# Patient Record
Sex: MALE | Race: WHITE | NOT HISPANIC OR LATINO | Employment: FULL TIME | URBAN - METROPOLITAN AREA
[De-identification: names, ages, dates, MRNs, and addresses within clinical notes are randomized per-mention and may not be internally consistent; named-entity substitution may affect disease eponyms.]

---

## 2017-06-14 ENCOUNTER — APPOINTMENT (OUTPATIENT)
Dept: RADIOLOGY | Facility: MEDICAL CENTER | Age: 77
DRG: 870 | End: 2017-06-14
Attending: HOSPITALIST
Payer: MEDICARE

## 2017-06-14 ENCOUNTER — RESOLUTE PROFESSIONAL BILLING HOSPITAL PROF FEE (OUTPATIENT)
Dept: HOSPITALIST | Facility: MEDICAL CENTER | Age: 77
End: 2017-06-14
Payer: MEDICARE

## 2017-06-14 ENCOUNTER — APPOINTMENT (OUTPATIENT)
Dept: RADIOLOGY | Facility: MEDICAL CENTER | Age: 77
DRG: 870 | End: 2017-06-14
Attending: EMERGENCY MEDICINE
Payer: MEDICARE

## 2017-06-14 ENCOUNTER — HOSPITAL ENCOUNTER (INPATIENT)
Facility: MEDICAL CENTER | Age: 77
LOS: 14 days | DRG: 870 | End: 2017-06-28
Attending: EMERGENCY MEDICINE | Admitting: HOSPITALIST
Payer: MEDICARE

## 2017-06-14 DIAGNOSIS — A41.51 SEPSIS DUE TO ESCHERICHIA COLI (HCC): ICD-10-CM

## 2017-06-14 PROBLEM — A41.9 SEPSIS, UNSPECIFIED: Status: ACTIVE | Noted: 2017-06-14

## 2017-06-14 PROBLEM — I21.4 NON-STEMI (NON-ST ELEVATED MYOCARDIAL INFARCTION) (HCC): Status: ACTIVE | Noted: 2017-06-14

## 2017-06-14 PROBLEM — K81.0 ACUTE CHOLECYSTITIS: Status: ACTIVE | Noted: 2017-06-14

## 2017-06-14 LAB
ALBUMIN SERPL BCP-MCNC: 3.1 G/DL (ref 3.2–4.9)
ALBUMIN/GLOB SERPL: 0.8 G/DL
ALP SERPL-CCNC: 312 U/L (ref 30–99)
ALT SERPL-CCNC: 142 U/L (ref 2–50)
ANION GAP SERPL CALC-SCNC: 23 MMOL/L (ref 0–11.9)
APPEARANCE UR: ABNORMAL
APTT PPP: 34.3 SEC (ref 24.7–36)
AST SERPL-CCNC: 159 U/L (ref 12–45)
BACTERIA #/AREA URNS HPF: ABNORMAL /HPF
BASOPHILS # BLD AUTO: 0 % (ref 0–1.8)
BASOPHILS # BLD: 0 K/UL (ref 0–0.12)
BILIRUB SERPL-MCNC: 1.6 MG/DL (ref 0.1–1.5)
BILIRUB UR QL STRIP.AUTO: ABNORMAL
BUN SERPL-MCNC: 60 MG/DL (ref 8–22)
CALCIUM SERPL-MCNC: 7.8 MG/DL (ref 8.4–10.2)
CHLORIDE SERPL-SCNC: 94 MMOL/L (ref 96–112)
CO2 SERPL-SCNC: 13 MMOL/L (ref 20–33)
COLOR UR: ABNORMAL
CREAT SERPL-MCNC: 3.55 MG/DL (ref 0.5–1.4)
EOSINOPHIL # BLD AUTO: 0 K/UL (ref 0–0.51)
EOSINOPHIL NFR BLD: 0 % (ref 0–6.9)
EPI CELLS #/AREA URNS HPF: ABNORMAL /HPF
ERYTHROCYTE [DISTWIDTH] IN BLOOD BY AUTOMATED COUNT: 43 FL (ref 35.9–50)
GFR SERPL CREATININE-BSD FRML MDRD: 17 ML/MIN/1.73 M 2
GIANT PLATELETS BLD QL SMEAR: NORMAL
GLOBULIN SER CALC-MCNC: 3.7 G/DL (ref 1.9–3.5)
GLUCOSE SERPL-MCNC: 178 MG/DL (ref 65–99)
GLUCOSE UR STRIP.AUTO-MCNC: NEGATIVE MG/DL
HCT VFR BLD AUTO: 42.2 % (ref 42–52)
HGB BLD-MCNC: 15.1 G/DL (ref 14–18)
INR PPP: 1.22 (ref 0.87–1.13)
KETONES UR STRIP.AUTO-MCNC: ABNORMAL MG/DL
LACTATE BLD-SCNC: 6.31 MMOL/L (ref 0.5–2)
LACTATE BLD-SCNC: 7.91 MMOL/L (ref 0.5–2)
LEUKOCYTE ESTERASE UR QL STRIP.AUTO: NEGATIVE
LIPASE SERPL-CCNC: 23 U/L (ref 7–58)
LYMPHOCYTES # BLD AUTO: 0.51 K/UL (ref 1–4.8)
LYMPHOCYTES NFR BLD: 7 % (ref 22–41)
MAGNESIUM SERPL-MCNC: 1.4 MG/DL (ref 1.5–2.5)
MANUAL DIFF BLD: NORMAL
MCH RBC QN AUTO: 32.1 PG (ref 27–33)
MCHC RBC AUTO-ENTMCNC: 35.8 G/DL (ref 33.7–35.3)
MCV RBC AUTO: 89.8 FL (ref 81.4–97.8)
METAMYELOCYTES NFR BLD MANUAL: 2 %
MICRO URNS: ABNORMAL
MONOCYTES # BLD AUTO: 0.29 K/UL (ref 0–0.85)
MONOCYTES NFR BLD AUTO: 4 % (ref 0–13.4)
MUCOUS THREADS #/AREA URNS HPF: ABNORMAL /HPF
NEUTROPHILS # BLD AUTO: 6.35 K/UL (ref 1.82–7.42)
NEUTROPHILS NFR BLD: 78 % (ref 44–72)
NEUTS BAND NFR BLD MANUAL: 9 % (ref 0–10)
NITRITE UR QL STRIP.AUTO: NEGATIVE
NRBC # BLD AUTO: 0.02 K/UL
NRBC BLD AUTO-RTO: 0.3 /100 WBC
PH UR STRIP.AUTO: 5 [PH]
PHOSPHATE SERPL-MCNC: 1.8 MG/DL (ref 2.5–4.5)
PLATELET # BLD AUTO: 100 K/UL (ref 164–446)
PMV BLD AUTO: 10.8 FL (ref 9–12.9)
POTASSIUM SERPL-SCNC: 2.5 MMOL/L (ref 3.6–5.5)
POTASSIUM SERPL-SCNC: 2.8 MMOL/L (ref 3.6–5.5)
PROT SERPL-MCNC: 6.8 G/DL (ref 6–8.2)
PROT UR QL STRIP: 30 MG/DL
PROTHROMBIN TIME: 15.2 SEC (ref 12–14.6)
RBC # BLD AUTO: 4.7 M/UL (ref 4.7–6.1)
RBC # URNS HPF: ABNORMAL /HPF
RBC BLD AUTO: NORMAL
RBC UR QL AUTO: ABNORMAL
SODIUM SERPL-SCNC: 130 MMOL/L (ref 135–145)
SP GR UR REFRACTOMETRY: 1.02
SPECIMEN DRAWN FROM PATIENT: ABNORMAL
SPECIMEN DRAWN FROM PATIENT: ABNORMAL
TROPONIN I SERPL-MCNC: 2.47 NG/ML (ref 0–0.04)
TROPONIN I SERPL-MCNC: 3.33 NG/ML (ref 0–0.04)
TSH SERPL DL<=0.005 MIU/L-ACNC: 3.41 UIU/ML (ref 0.35–5.5)
UNIDENT CRYS URNS QL MICRO: ABNORMAL /HPF
WBC # BLD AUTO: 7.3 K/UL (ref 4.8–10.8)
WBC #/AREA URNS HPF: ABNORMAL /HPF

## 2017-06-14 PROCEDURE — 85730 THROMBOPLASTIN TIME PARTIAL: CPT

## 2017-06-14 PROCEDURE — 700111 HCHG RX REV CODE 636 W/ 250 OVERRIDE (IP): Performed by: HOSPITALIST

## 2017-06-14 PROCEDURE — 99291 CRITICAL CARE FIRST HOUR: CPT | Mod: 25 | Performed by: HOSPITALIST

## 2017-06-14 PROCEDURE — 93005 ELECTROCARDIOGRAM TRACING: CPT

## 2017-06-14 PROCEDURE — 87186 SC STD MICRODIL/AGAR DIL: CPT

## 2017-06-14 PROCEDURE — 36556 INSERT NON-TUNNEL CV CATH: CPT

## 2017-06-14 PROCEDURE — 80053 COMPREHEN METABOLIC PANEL: CPT

## 2017-06-14 PROCEDURE — 51702 INSERT TEMP BLADDER CATH: CPT

## 2017-06-14 PROCEDURE — 700105 HCHG RX REV CODE 258

## 2017-06-14 PROCEDURE — 71010 DX-CHEST-PORTABLE (1 VIEW): CPT

## 2017-06-14 PROCEDURE — 83605 ASSAY OF LACTIC ACID: CPT | Mod: 91

## 2017-06-14 PROCEDURE — 99291 CRITICAL CARE FIRST HOUR: CPT

## 2017-06-14 PROCEDURE — 770022 HCHG ROOM/CARE - ICU (200)

## 2017-06-14 PROCEDURE — 85007 BL SMEAR W/DIFF WBC COUNT: CPT

## 2017-06-14 PROCEDURE — 83735 ASSAY OF MAGNESIUM: CPT

## 2017-06-14 PROCEDURE — 81001 URINALYSIS AUTO W/SCOPE: CPT

## 2017-06-14 PROCEDURE — 74176 CT ABD & PELVIS W/O CONTRAST: CPT

## 2017-06-14 PROCEDURE — 84484 ASSAY OF TROPONIN QUANT: CPT | Mod: 91

## 2017-06-14 PROCEDURE — 83690 ASSAY OF LIPASE: CPT

## 2017-06-14 PROCEDURE — 84145 PROCALCITONIN (PCT): CPT

## 2017-06-14 PROCEDURE — 36415 COLL VENOUS BLD VENIPUNCTURE: CPT

## 2017-06-14 PROCEDURE — 96361 HYDRATE IV INFUSION ADD-ON: CPT

## 2017-06-14 PROCEDURE — 700105 HCHG RX REV CODE 258: Performed by: HOSPITALIST

## 2017-06-14 PROCEDURE — 87077 CULTURE AEROBIC IDENTIFY: CPT

## 2017-06-14 PROCEDURE — 84443 ASSAY THYROID STIM HORMONE: CPT

## 2017-06-14 PROCEDURE — 87040 BLOOD CULTURE FOR BACTERIA: CPT

## 2017-06-14 PROCEDURE — 700105 HCHG RX REV CODE 258: Performed by: EMERGENCY MEDICINE

## 2017-06-14 PROCEDURE — 87086 URINE CULTURE/COLONY COUNT: CPT

## 2017-06-14 PROCEDURE — 85610 PROTHROMBIN TIME: CPT

## 2017-06-14 PROCEDURE — 303105 HCHG CATHETER EXTRA

## 2017-06-14 PROCEDURE — 700111 HCHG RX REV CODE 636 W/ 250 OVERRIDE (IP): Performed by: EMERGENCY MEDICINE

## 2017-06-14 PROCEDURE — 3E043XZ INTRODUCTION OF VASOPRESSOR INTO CENTRAL VEIN, PERCUTANEOUS APPROACH: ICD-10-PCS | Performed by: HOSPITALIST

## 2017-06-14 PROCEDURE — C1751 CATH, INF, PER/CENT/MIDLINE: HCPCS

## 2017-06-14 PROCEDURE — 84100 ASSAY OF PHOSPHORUS: CPT

## 2017-06-14 PROCEDURE — 96365 THER/PROPH/DIAG IV INF INIT: CPT

## 2017-06-14 PROCEDURE — 94760 N-INVAS EAR/PLS OXIMETRY 1: CPT

## 2017-06-14 PROCEDURE — 700101 HCHG RX REV CODE 250

## 2017-06-14 PROCEDURE — 36556 INSERT NON-TUNNEL CV CATH: CPT | Performed by: HOSPITALIST

## 2017-06-14 PROCEDURE — B548ZZA ULTRASONOGRAPHY OF SUPERIOR VENA CAVA, GUIDANCE: ICD-10-PCS | Performed by: HOSPITALIST

## 2017-06-14 PROCEDURE — 84132 ASSAY OF SERUM POTASSIUM: CPT

## 2017-06-14 PROCEDURE — 02HV33Z INSERTION OF INFUSION DEVICE INTO SUPERIOR VENA CAVA, PERCUTANEOUS APPROACH: ICD-10-PCS | Performed by: HOSPITALIST

## 2017-06-14 PROCEDURE — 85027 COMPLETE CBC AUTOMATED: CPT

## 2017-06-14 RX ORDER — LISINOPRIL AND HYDROCHLOROTHIAZIDE 12.5; 1 MG/1; MG/1
1 TABLET ORAL DAILY
Status: ON HOLD | COMMUNITY
End: 2017-06-28

## 2017-06-14 RX ORDER — SODIUM CHLORIDE 9 MG/ML
30 INJECTION, SOLUTION INTRAVENOUS
Status: DISCONTINUED | OUTPATIENT
Start: 2017-06-14 | End: 2017-06-14

## 2017-06-14 RX ORDER — LORAZEPAM 2 MG/ML
0.5 INJECTION INTRAMUSCULAR EVERY 6 HOURS PRN
Status: DISCONTINUED | OUTPATIENT
Start: 2017-06-14 | End: 2017-06-28 | Stop reason: HOSPADM

## 2017-06-14 RX ORDER — ASCORBIC ACID 500 MG
500 TABLET ORAL DAILY
COMMUNITY
End: 2021-01-01

## 2017-06-14 RX ORDER — VITAMIN E 268 MG
400 CAPSULE ORAL DAILY
COMMUNITY
End: 2021-01-01

## 2017-06-14 RX ORDER — SODIUM CHLORIDE 9 MG/ML
INJECTION, SOLUTION INTRAVENOUS ONCE
Status: DISCONTINUED | OUTPATIENT
Start: 2017-06-14 | End: 2017-06-14

## 2017-06-14 RX ORDER — AMOXICILLIN 250 MG
2 CAPSULE ORAL 2 TIMES DAILY
Status: DISCONTINUED | OUTPATIENT
Start: 2017-06-15 | End: 2017-06-18

## 2017-06-14 RX ORDER — BISACODYL 10 MG
10 SUPPOSITORY, RECTAL RECTAL
Status: DISCONTINUED | OUTPATIENT
Start: 2017-06-14 | End: 2017-06-18

## 2017-06-14 RX ORDER — ONDANSETRON 2 MG/ML
4 INJECTION INTRAMUSCULAR; INTRAVENOUS EVERY 4 HOURS PRN
Status: DISCONTINUED | OUTPATIENT
Start: 2017-06-14 | End: 2017-06-28 | Stop reason: HOSPADM

## 2017-06-14 RX ORDER — M-VIT,TX,IRON,MINS/CALC/FOLIC 27MG-0.4MG
1 TABLET ORAL DAILY
COMMUNITY
End: 2021-01-01

## 2017-06-14 RX ORDER — ONDANSETRON 4 MG/1
4 TABLET, ORALLY DISINTEGRATING ORAL EVERY 4 HOURS PRN
Status: DISCONTINUED | OUTPATIENT
Start: 2017-06-14 | End: 2017-06-28 | Stop reason: HOSPADM

## 2017-06-14 RX ORDER — OMEPRAZOLE 20 MG/1
20 CAPSULE, DELAYED RELEASE ORAL EVERY MORNING
COMMUNITY

## 2017-06-14 RX ORDER — SODIUM CHLORIDE 9 MG/ML
1000 INJECTION, SOLUTION INTRAVENOUS
Status: COMPLETED | OUTPATIENT
Start: 2017-06-14 | End: 2017-06-15

## 2017-06-14 RX ORDER — LORAZEPAM 0.5 MG/1
0.5 TABLET ORAL EVERY 6 HOURS PRN
Status: DISCONTINUED | OUTPATIENT
Start: 2017-06-14 | End: 2017-06-28 | Stop reason: HOSPADM

## 2017-06-14 RX ORDER — POLYETHYLENE GLYCOL 3350 17 G/17G
1 POWDER, FOR SOLUTION ORAL
Status: DISCONTINUED | OUTPATIENT
Start: 2017-06-14 | End: 2017-06-18

## 2017-06-14 RX ORDER — CHLORAL HYDRATE 500 MG
1000 CAPSULE ORAL DAILY
COMMUNITY
End: 2021-01-01

## 2017-06-14 RX ORDER — LORATADINE 10 MG/1
10 TABLET ORAL DAILY
COMMUNITY
End: 2021-01-01

## 2017-06-14 RX ORDER — ACETAMINOPHEN 325 MG/1
650 TABLET ORAL EVERY 6 HOURS PRN
Status: DISCONTINUED | OUTPATIENT
Start: 2017-06-14 | End: 2017-06-28 | Stop reason: HOSPADM

## 2017-06-14 RX ORDER — SODIUM CHLORIDE 9 MG/ML
INJECTION, SOLUTION INTRAVENOUS CONTINUOUS
Status: DISCONTINUED | OUTPATIENT
Start: 2017-06-14 | End: 2017-06-15

## 2017-06-14 RX ORDER — UBIDECARENONE 75 MG
100 CAPSULE ORAL DAILY
COMMUNITY
End: 2019-11-18

## 2017-06-14 RX ORDER — ASPIRIN 81 MG/1
81 TABLET, CHEWABLE ORAL DAILY
Status: ON HOLD | COMMUNITY
End: 2019-09-15

## 2017-06-14 RX ORDER — POTASSIUM CHLORIDE 7.45 MG/ML
10 INJECTION INTRAVENOUS
Status: COMPLETED | OUTPATIENT
Start: 2017-06-14 | End: 2017-06-15

## 2017-06-14 RX ORDER — SODIUM CHLORIDE 9 MG/ML
30 INJECTION, SOLUTION INTRAVENOUS ONCE
Status: COMPLETED | OUTPATIENT
Start: 2017-06-14 | End: 2017-06-14

## 2017-06-14 RX ADMIN — POTASSIUM CHLORIDE 10 MEQ: 7.46 INJECTION, SOLUTION INTRAVENOUS at 21:49

## 2017-06-14 RX ADMIN — SODIUM CHLORIDE 1000 ML: 9 INJECTION, SOLUTION INTRAVENOUS at 18:31

## 2017-06-14 RX ADMIN — NOREPINEPHRINE BITARTRATE 5 MCG/MIN: 1 INJECTION INTRAVENOUS at 21:47

## 2017-06-14 RX ADMIN — POTASSIUM CHLORIDE 10 MEQ: 7.46 INJECTION, SOLUTION INTRAVENOUS at 22:51

## 2017-06-14 RX ADMIN — SODIUM CHLORIDE 1000 ML: 9 INJECTION, SOLUTION INTRAVENOUS at 22:01

## 2017-06-14 RX ADMIN — POTASSIUM CHLORIDE 10 MEQ: 7.46 INJECTION, SOLUTION INTRAVENOUS at 23:50

## 2017-06-14 RX ADMIN — PIPERACILLIN SODIUM,TAZOBACTAM SODIUM 4.5 G: 4; .5 INJECTION, POWDER, FOR SOLUTION INTRAVENOUS at 21:00

## 2017-06-14 ASSESSMENT — PAIN SCALES - GENERAL
PAINLEVEL_OUTOF10: 0
PAINLEVEL_OUTOF10: 4

## 2017-06-14 ASSESSMENT — LIFESTYLE VARIABLES
ALCOHOL_USE: NO
EVER_SMOKED: YES
EVER_SMOKED: YES

## 2017-06-15 ENCOUNTER — APPOINTMENT (OUTPATIENT)
Dept: RADIOLOGY | Facility: MEDICAL CENTER | Age: 77
DRG: 870 | End: 2017-06-15
Attending: HOSPITALIST
Payer: MEDICARE

## 2017-06-15 PROBLEM — E87.1 HYPONATREMIA: Status: ACTIVE | Noted: 2017-06-15

## 2017-06-15 PROBLEM — E87.20 METABOLIC ACIDOSIS: Status: ACTIVE | Noted: 2017-06-15

## 2017-06-15 PROBLEM — R78.81 BACTEREMIA: Status: ACTIVE | Noted: 2017-06-15

## 2017-06-15 PROBLEM — N17.9 ACUTE KIDNEY INJURY (NONTRAUMATIC) (HCC): Status: ACTIVE | Noted: 2017-06-15

## 2017-06-15 PROBLEM — J96.01 ACUTE RESPIRATORY FAILURE WITH HYPOXIA (HCC): Status: ACTIVE | Noted: 2017-06-15

## 2017-06-15 PROBLEM — I95.9 HYPOTENSION (ARTERIAL): Status: ACTIVE | Noted: 2017-06-15

## 2017-06-15 PROBLEM — D69.6 THROMBOCYTOPENIA (HCC): Status: ACTIVE | Noted: 2017-06-15

## 2017-06-15 PROBLEM — K81.0 ACUTE EMPHYSEMATOUS CHOLECYSTITIS: Status: ACTIVE | Noted: 2017-06-15

## 2017-06-15 PROBLEM — E87.6 HYPOKALEMIA DUE TO INADEQUATE POTASSIUM INTAKE: Status: ACTIVE | Noted: 2017-06-15

## 2017-06-15 LAB
ABO GROUP BLD: NORMAL
ANION GAP SERPL CALC-SCNC: 17 MMOL/L (ref 0–11.9)
ANION GAP SERPL CALC-SCNC: 19 MMOL/L (ref 0–11.9)
ANION GAP SERPL CALC-SCNC: 21 MMOL/L (ref 0–11.9)
APTT PPP: 31.5 SEC (ref 24.7–36)
APTT PPP: 34.4 SEC (ref 24.7–36)
BARCODED ABORH UBTYP: 6200
BARCODED PRD CODE UBPRD: NORMAL
BARCODED UNIT NUM UBUNT: NORMAL
BASE EXCESS BLDA CALC-SCNC: -13 MMOL/L (ref -4–3)
BILIRUBIN.CONJUGATED [MASS/VOLUME] IN BODY FLUID: 0.2 MG/DL
BODY FLD TYPE: NORMAL
BODY TEMPERATURE: 37.7 CENTIGRADE
BUN SERPL-MCNC: 57 MG/DL (ref 8–22)
BUN SERPL-MCNC: 58 MG/DL (ref 8–22)
BUN SERPL-MCNC: 62 MG/DL (ref 8–22)
CALCIUM SERPL-MCNC: 6.7 MG/DL (ref 8.4–10.2)
CALCIUM SERPL-MCNC: 7 MG/DL (ref 8.4–10.2)
CALCIUM SERPL-MCNC: 7 MG/DL (ref 8.4–10.2)
CHLORIDE SERPL-SCNC: 103 MMOL/L (ref 96–112)
CHLORIDE SERPL-SCNC: 106 MMOL/L (ref 96–112)
CHLORIDE SERPL-SCNC: 107 MMOL/L (ref 96–112)
CO2 SERPL-SCNC: 12 MMOL/L (ref 20–33)
CO2 SERPL-SCNC: 12 MMOL/L (ref 20–33)
CO2 SERPL-SCNC: 13 MMOL/L (ref 20–33)
COMPONENT P 8504P: NORMAL
CREAT SERPL-MCNC: 2.55 MG/DL (ref 0.5–1.4)
CREAT SERPL-MCNC: 2.87 MG/DL (ref 0.5–1.4)
CREAT SERPL-MCNC: 3.04 MG/DL (ref 0.5–1.4)
EKG IMPRESSION: NORMAL
EKG IMPRESSION: NORMAL
ERYTHROCYTE [DISTWIDTH] IN BLOOD BY AUTOMATED COUNT: 47.7 FL (ref 35.9–50)
ERYTHROCYTE [DISTWIDTH] IN BLOOD BY AUTOMATED COUNT: 48.4 FL (ref 35.9–50)
ERYTHROCYTE [DISTWIDTH] IN BLOOD BY AUTOMATED COUNT: 49.1 FL (ref 35.9–50)
GFR SERPL CREATININE-BSD FRML MDRD: 20 ML/MIN/1.73 M 2
GFR SERPL CREATININE-BSD FRML MDRD: 22 ML/MIN/1.73 M 2
GFR SERPL CREATININE-BSD FRML MDRD: 25 ML/MIN/1.73 M 2
GLUCOSE SERPL-MCNC: 143 MG/DL (ref 65–99)
GLUCOSE SERPL-MCNC: 187 MG/DL (ref 65–99)
GLUCOSE SERPL-MCNC: 191 MG/DL (ref 65–99)
GRAM STN SPEC: NORMAL
HCO3 BLDA-SCNC: 12 MMOL/L (ref 17–25)
HCT VFR BLD AUTO: 35.8 % (ref 42–52)
HCT VFR BLD AUTO: 40.2 % (ref 42–52)
HCT VFR BLD AUTO: 41.2 % (ref 42–52)
HGB BLD-MCNC: 12.5 G/DL (ref 14–18)
HGB BLD-MCNC: 13.9 G/DL (ref 14–18)
HGB BLD-MCNC: 14.3 G/DL (ref 14–18)
INR PPP: 1.3 (ref 0.87–1.13)
INR PPP: 1.32 (ref 0.87–1.13)
LACTATE BLD-SCNC: 2.89 MMOL/L (ref 0.5–2)
LACTATE BLD-SCNC: 4.11 MMOL/L (ref 0.5–2)
LACTATE BLD-SCNC: 5.44 MMOL/L (ref 0.5–2)
LACTATE BLD-SCNC: 5.49 MMOL/L (ref 0.5–2)
LACTATE BLD-SCNC: 5.49 MMOL/L (ref 0.5–2)
LACTATE BLD-SCNC: 6.02 MMOL/L (ref 0.5–2)
LV EJECT FRACT  99904: 60
LV EJECT FRACT MOD 2C 99903: 60.82
LV EJECT FRACT MOD 4C 99902: 66.83
LV EJECT FRACT MOD BP 99901: 63.64
MAGNESIUM SERPL-MCNC: 2.6 MG/DL (ref 1.5–2.5)
MCH RBC QN AUTO: 32.9 PG (ref 27–33)
MCH RBC QN AUTO: 33.2 PG (ref 27–33)
MCH RBC QN AUTO: 33.2 PG (ref 27–33)
MCHC RBC AUTO-ENTMCNC: 34.6 G/DL (ref 33.7–35.3)
MCHC RBC AUTO-ENTMCNC: 34.7 G/DL (ref 33.7–35.3)
MCHC RBC AUTO-ENTMCNC: 34.9 G/DL (ref 33.7–35.3)
MCV RBC AUTO: 94.7 FL (ref 81.4–97.8)
MCV RBC AUTO: 95 FL (ref 81.4–97.8)
MCV RBC AUTO: 95.9 FL (ref 81.4–97.8)
PCO2 BLDA: 28 MMHG (ref 26–37)
PCO2 TEMP ADJ BLDA: 28.9 MMHG (ref 26–37)
PH BLDA: 7.27 [PH] (ref 7.4–7.5)
PH TEMP ADJ BLDA: 7.26 [PH] (ref 7.4–7.5)
PHOSPHATE SERPL-MCNC: 6.7 MG/DL (ref 2.5–4.5)
PLATELET # BLD AUTO: 58 K/UL (ref 164–446)
PLATELET # BLD AUTO: 59 K/UL (ref 164–446)
PLATELET # BLD AUTO: 83 K/UL (ref 164–446)
PMV BLD AUTO: 11.5 FL (ref 9–12.9)
PMV BLD AUTO: 11.6 FL (ref 9–12.9)
PMV BLD AUTO: 12 FL (ref 9–12.9)
PO2 BLDA: 310 MMHG (ref 64–87)
PO2 TEMP ADJ BLDA: 313.5 MMHG (ref 64–87)
POTASSIUM SERPL-SCNC: 3.1 MMOL/L (ref 3.6–5.5)
POTASSIUM SERPL-SCNC: 3.7 MMOL/L (ref 3.6–5.5)
POTASSIUM SERPL-SCNC: 3.9 MMOL/L (ref 3.6–5.5)
POTASSIUM SERPL-SCNC: 4 MMOL/L (ref 3.6–5.5)
PROCALCITONIN SERPL-MCNC: 51.97 NG/ML
PRODUCT TYPE UPROD: NORMAL
PROTHROMBIN TIME: 16 SEC (ref 12–14.6)
PROTHROMBIN TIME: 16.2 SEC (ref 12–14.6)
RBC # BLD AUTO: 3.77 M/UL (ref 4.7–6.1)
RBC # BLD AUTO: 4.19 M/UL (ref 4.7–6.1)
RBC # BLD AUTO: 4.35 M/UL (ref 4.7–6.1)
RH BLD: NORMAL
SAO2 % BLDA: 99.6 % (ref 93–99)
SIGNIFICANT IND 70042: NORMAL
SITE SITE: NORMAL
SODIUM SERPL-SCNC: 134 MMOL/L (ref 135–145)
SODIUM SERPL-SCNC: 137 MMOL/L (ref 135–145)
SODIUM SERPL-SCNC: 139 MMOL/L (ref 135–145)
SOURCE SOURCE: NORMAL
SPECIMEN DRAWN FROM PATIENT: ABNORMAL
TROPONIN I SERPL-MCNC: 0.78 NG/ML (ref 0–0.04)
TROPONIN I SERPL-MCNC: 0.96 NG/ML (ref 0–0.04)
TROPONIN I SERPL-MCNC: 1.04 NG/ML (ref 0–0.04)
TROPONIN I SERPL-MCNC: 1.12 NG/ML (ref 0–0.04)
TROPONIN I SERPL-MCNC: 1.7 NG/ML (ref 0–0.04)
UNIT STATUS USTAT: NORMAL
WBC # BLD AUTO: 17.9 K/UL (ref 4.8–10.8)
WBC # BLD AUTO: 25.6 K/UL (ref 4.8–10.8)
WBC # BLD AUTO: 5.7 K/UL (ref 4.8–10.8)

## 2017-06-15 PROCEDURE — 94640 AIRWAY INHALATION TREATMENT: CPT

## 2017-06-15 PROCEDURE — 700111 HCHG RX REV CODE 636 W/ 250 OVERRIDE (IP): Performed by: HOSPITALIST

## 2017-06-15 PROCEDURE — 74176 CT ABD & PELVIS W/O CONTRAST: CPT

## 2017-06-15 PROCEDURE — 84132 ASSAY OF SERUM POTASSIUM: CPT

## 2017-06-15 PROCEDURE — C1729 CATH, DRAINAGE: HCPCS

## 2017-06-15 PROCEDURE — 84484 ASSAY OF TROPONIN QUANT: CPT | Mod: 91

## 2017-06-15 PROCEDURE — 700102 HCHG RX REV CODE 250 W/ 637 OVERRIDE(OP): Performed by: HOSPITALIST

## 2017-06-15 PROCEDURE — 700105 HCHG RX REV CODE 258: Performed by: HOSPITALIST

## 2017-06-15 PROCEDURE — 99233 SBSQ HOSP IP/OBS HIGH 50: CPT | Performed by: HOSPITALIST

## 2017-06-15 PROCEDURE — 82803 BLOOD GASES ANY COMBINATION: CPT

## 2017-06-15 PROCEDURE — 85610 PROTHROMBIN TIME: CPT | Mod: 91

## 2017-06-15 PROCEDURE — 80048 BASIC METABOLIC PNL TOTAL CA: CPT

## 2017-06-15 PROCEDURE — 87040 BLOOD CULTURE FOR BACTERIA: CPT | Mod: 91

## 2017-06-15 PROCEDURE — 93005 ELECTROCARDIOGRAM TRACING: CPT | Performed by: HOSPITALIST

## 2017-06-15 PROCEDURE — 0F9430Z DRAINAGE OF GALLBLADDER WITH DRAINAGE DEVICE, PERCUTANEOUS APPROACH: ICD-10-PCS | Performed by: RADIOLOGY

## 2017-06-15 PROCEDURE — 0BH18EZ INSERTION OF ENDOTRACHEAL AIRWAY INTO TRACHEA, VIA NATURAL OR ARTIFICIAL OPENING ENDOSCOPIC: ICD-10-PCS | Performed by: EMERGENCY MEDICINE

## 2017-06-15 PROCEDURE — 700111 HCHG RX REV CODE 636 W/ 250 OVERRIDE (IP)

## 2017-06-15 PROCEDURE — 93306 TTE W/DOPPLER COMPLETE: CPT

## 2017-06-15 PROCEDURE — P9034 PLATELETS, PHERESIS: HCPCS

## 2017-06-15 PROCEDURE — 84100 ASSAY OF PHOSPHORUS: CPT

## 2017-06-15 PROCEDURE — 700105 HCHG RX REV CODE 258: Performed by: INTERNAL MEDICINE

## 2017-06-15 PROCEDURE — 700101 HCHG RX REV CODE 250

## 2017-06-15 PROCEDURE — 700111 HCHG RX REV CODE 636 W/ 250 OVERRIDE (IP): Performed by: INTERNAL MEDICINE

## 2017-06-15 PROCEDURE — 700105 HCHG RX REV CODE 258

## 2017-06-15 PROCEDURE — A9270 NON-COVERED ITEM OR SERVICE: HCPCS | Performed by: HOSPITALIST

## 2017-06-15 PROCEDURE — 700101 HCHG RX REV CODE 250: Performed by: HOSPITALIST

## 2017-06-15 PROCEDURE — 93306 TTE W/DOPPLER COMPLETE: CPT | Mod: 26 | Performed by: INTERNAL MEDICINE

## 2017-06-15 PROCEDURE — 87070 CULTURE OTHR SPECIMN AEROBIC: CPT

## 2017-06-15 PROCEDURE — 36430 TRANSFUSION BLD/BLD COMPNT: CPT

## 2017-06-15 PROCEDURE — 83605 ASSAY OF LACTIC ACID: CPT | Mod: 91

## 2017-06-15 PROCEDURE — 87077 CULTURE AEROBIC IDENTIFY: CPT | Mod: 91

## 2017-06-15 PROCEDURE — 87205 SMEAR GRAM STAIN: CPT

## 2017-06-15 PROCEDURE — 5A1955Z RESPIRATORY VENTILATION, GREATER THAN 96 CONSECUTIVE HOURS: ICD-10-PCS | Performed by: INTERNAL MEDICINE

## 2017-06-15 PROCEDURE — 770022 HCHG ROOM/CARE - ICU (200)

## 2017-06-15 PROCEDURE — 71010 DX-CHEST-PORTABLE (1 VIEW): CPT

## 2017-06-15 PROCEDURE — 85027 COMPLETE CBC AUTOMATED: CPT | Mod: 91

## 2017-06-15 PROCEDURE — 85730 THROMBOPLASTIN TIME PARTIAL: CPT | Mod: 91

## 2017-06-15 PROCEDURE — 83735 ASSAY OF MAGNESIUM: CPT

## 2017-06-15 PROCEDURE — 82248 BILIRUBIN DIRECT: CPT

## 2017-06-15 PROCEDURE — 700111 HCHG RX REV CODE 636 W/ 250 OVERRIDE (IP): Performed by: EMERGENCY MEDICINE

## 2017-06-15 PROCEDURE — 94002 VENT MGMT INPAT INIT DAY: CPT

## 2017-06-15 PROCEDURE — 93010 ELECTROCARDIOGRAM REPORT: CPT | Performed by: INTERNAL MEDICINE

## 2017-06-15 PROCEDURE — 87186 SC STD MICRODIL/AGAR DIL: CPT | Mod: 91

## 2017-06-15 RX ORDER — LEVALBUTEROL INHALATION SOLUTION 1.25 MG/3ML
1.25 SOLUTION RESPIRATORY (INHALATION) 4 TIMES DAILY
Status: DISCONTINUED | OUTPATIENT
Start: 2017-06-15 | End: 2017-06-16

## 2017-06-15 RX ORDER — HYDRALAZINE HYDROCHLORIDE 20 MG/ML
20 INJECTION INTRAMUSCULAR; INTRAVENOUS EVERY 6 HOURS PRN
Status: DISCONTINUED | OUTPATIENT
Start: 2017-06-15 | End: 2017-06-15

## 2017-06-15 RX ORDER — SODIUM CHLORIDE 9 MG/ML
INJECTION, SOLUTION INTRAVENOUS CONTINUOUS
Status: DISCONTINUED | OUTPATIENT
Start: 2017-06-15 | End: 2017-06-15

## 2017-06-15 RX ORDER — POTASSIUM CHLORIDE 7.45 MG/ML
10 INJECTION INTRAVENOUS ONCE
Status: COMPLETED | OUTPATIENT
Start: 2017-06-15 | End: 2017-06-15

## 2017-06-15 RX ORDER — MAGNESIUM SULFATE HEPTAHYDRATE 40 MG/ML
4 INJECTION, SOLUTION INTRAVENOUS ONCE
Status: COMPLETED | OUTPATIENT
Start: 2017-06-15 | End: 2017-06-15

## 2017-06-15 RX ORDER — MIDAZOLAM HYDROCHLORIDE 1 MG/ML
.5-2 INJECTION INTRAMUSCULAR; INTRAVENOUS PRN
Status: ACTIVE | OUTPATIENT
Start: 2017-06-15 | End: 2017-06-15

## 2017-06-15 RX ORDER — MORPHINE SULFATE 4 MG/ML
2-4 INJECTION, SOLUTION INTRAMUSCULAR; INTRAVENOUS
Status: DISCONTINUED | OUTPATIENT
Start: 2017-06-15 | End: 2017-06-28 | Stop reason: HOSPADM

## 2017-06-15 RX ORDER — FUROSEMIDE 10 MG/ML
40 INJECTION INTRAMUSCULAR; INTRAVENOUS ONCE
Status: COMPLETED | OUTPATIENT
Start: 2017-06-15 | End: 2017-06-15

## 2017-06-15 RX ORDER — MAGNESIUM SULFATE HEPTAHYDRATE 40 MG/ML
INJECTION, SOLUTION INTRAVENOUS
Status: COMPLETED
Start: 2017-06-15 | End: 2017-06-15

## 2017-06-15 RX ORDER — SUCCINYLCHOLINE CHLORIDE 20 MG/ML
160 INJECTION INTRAMUSCULAR; INTRAVENOUS ONCE
Status: COMPLETED | OUTPATIENT
Start: 2017-06-15 | End: 2017-06-15

## 2017-06-15 RX ORDER — ONDANSETRON 2 MG/ML
4 INJECTION INTRAMUSCULAR; INTRAVENOUS PRN
Status: ACTIVE | OUTPATIENT
Start: 2017-06-15 | End: 2017-06-15

## 2017-06-15 RX ORDER — POTASSIUM CHLORIDE 14.9 MG/ML
20 INJECTION INTRAVENOUS ONCE
Status: DISCONTINUED | OUTPATIENT
Start: 2017-06-15 | End: 2017-06-15

## 2017-06-15 RX ORDER — SODIUM CHLORIDE 9 MG/ML
500 INJECTION, SOLUTION INTRAVENOUS
Status: ACTIVE | OUTPATIENT
Start: 2017-06-15 | End: 2017-06-15

## 2017-06-15 RX ORDER — LEVALBUTEROL INHALATION SOLUTION 1.25 MG/3ML
1.25 SOLUTION RESPIRATORY (INHALATION)
Status: DISCONTINUED | OUTPATIENT
Start: 2017-06-15 | End: 2017-06-15

## 2017-06-15 RX ORDER — ETOMIDATE 2 MG/ML
160 INJECTION INTRAVENOUS ONCE
Status: COMPLETED | OUTPATIENT
Start: 2017-06-15 | End: 2017-06-15

## 2017-06-15 RX ORDER — LATANOPROST 50 UG/ML
1 SOLUTION/ DROPS OPHTHALMIC DAILY
COMMUNITY

## 2017-06-15 RX ADMIN — PROPOFOL 60 MCG/KG/MIN: 10 INJECTION, EMULSION INTRAVENOUS at 14:25

## 2017-06-15 RX ADMIN — LEVALBUTEROL 1.25 MG: 1.25 SOLUTION RESPIRATORY (INHALATION) at 09:39

## 2017-06-15 RX ADMIN — SODIUM CHLORIDE: 9 INJECTION, SOLUTION INTRAVENOUS at 11:00

## 2017-06-15 RX ADMIN — NOREPINEPHRINE BITARTRATE 10 MCG/MIN: 1 INJECTION, SOLUTION, CONCENTRATE INTRAVENOUS at 11:09

## 2017-06-15 RX ADMIN — POTASSIUM CHLORIDE 10 MEQ: 7.46 INJECTION, SOLUTION INTRAVENOUS at 20:57

## 2017-06-15 RX ADMIN — VASOPRESSIN 0.03 UNITS/MIN: 20 INJECTION INTRAVENOUS at 13:28

## 2017-06-15 RX ADMIN — PIPERACILLIN AND TAZOBACTAM 3.38 G: 3; .375 INJECTION, POWDER, LYOPHILIZED, FOR SOLUTION INTRAVENOUS; PARENTERAL at 02:48

## 2017-06-15 RX ADMIN — MORPHINE SULFATE 4 MG: 4 INJECTION INTRAVENOUS at 09:35

## 2017-06-15 RX ADMIN — SODIUM BICARBONATE: 84 INJECTION, SOLUTION INTRAVENOUS at 14:38

## 2017-06-15 RX ADMIN — PIPERACILLIN AND TAZOBACTAM 3.38 G: 3; .375 INJECTION, POWDER, FOR SOLUTION INTRAVENOUS at 08:43

## 2017-06-15 RX ADMIN — MAGNESIUM SULFATE HEPTAHYDRATE 4 G: 40 INJECTION, SOLUTION INTRAVENOUS at 00:59

## 2017-06-15 RX ADMIN — PROPOFOL 50 MCG/KG/MIN: 10 INJECTION, EMULSION INTRAVENOUS at 17:28

## 2017-06-15 RX ADMIN — HYDRALAZINE HYDROCHLORIDE 20 MG: 20 INJECTION INTRAMUSCULAR; INTRAVENOUS at 10:07

## 2017-06-15 RX ADMIN — LEVALBUTEROL 1.25 MG: 1.25 SOLUTION RESPIRATORY (INHALATION) at 22:38

## 2017-06-15 RX ADMIN — CALCIUM GLUCONATE 1 G: 94 INJECTION, SOLUTION INTRAVENOUS at 05:53

## 2017-06-15 RX ADMIN — PIPERACILLIN AND TAZOBACTAM 3.38 G: 3; .375 INJECTION, POWDER, FOR SOLUTION INTRAVENOUS at 15:02

## 2017-06-15 RX ADMIN — PROPOFOL 65 MCG/KG/MIN: 10 INJECTION, EMULSION INTRAVENOUS at 12:01

## 2017-06-15 RX ADMIN — POTASSIUM CHLORIDE 10 MEQ: 7.46 INJECTION, SOLUTION INTRAVENOUS at 01:01

## 2017-06-15 RX ADMIN — LEVALBUTEROL 1.25 MG: 1.25 SOLUTION RESPIRATORY (INHALATION) at 14:36

## 2017-06-15 RX ADMIN — PROPOFOL 30 MG/KG/HR: 10 INJECTION, EMULSION INTRAVENOUS at 10:30

## 2017-06-15 RX ADMIN — NOREPINEPHRINE BITARTRATE 30 MCG/MIN: 1 INJECTION, SOLUTION, CONCENTRATE INTRAVENOUS at 20:36

## 2017-06-15 RX ADMIN — MORPHINE SULFATE 4 MG: 4 INJECTION INTRAVENOUS at 22:59

## 2017-06-15 RX ADMIN — PIPERACILLIN AND TAZOBACTAM 3.38 G: 3; .375 INJECTION, POWDER, FOR SOLUTION INTRAVENOUS at 20:58

## 2017-06-15 RX ADMIN — SODIUM CHLORIDE 1000 ML: 9 INJECTION, SOLUTION INTRAVENOUS at 00:08

## 2017-06-15 RX ADMIN — NOREPINEPHRINE BITARTRATE 30 MCG/MIN: 1 INJECTION, SOLUTION, CONCENTRATE INTRAVENOUS at 16:37

## 2017-06-15 RX ADMIN — PROPOFOL 40 MCG/KG/MIN: 10 INJECTION, EMULSION INTRAVENOUS at 23:58

## 2017-06-15 RX ADMIN — SODIUM BICARBONATE: 84 INJECTION, SOLUTION INTRAVENOUS at 22:59

## 2017-06-15 RX ADMIN — FUROSEMIDE 40 MG: 10 INJECTION, SOLUTION INTRAMUSCULAR; INTRAVENOUS at 09:34

## 2017-06-15 RX ADMIN — PROPOFOL 45 MCG/KG/MIN: 10 INJECTION, EMULSION INTRAVENOUS at 20:36

## 2017-06-15 RX ADMIN — PHENYLEPHRINE HYDROCHLORIDE 20 MCG/MIN: 10 INJECTION INTRAVENOUS at 14:38

## 2017-06-15 RX ADMIN — SUCCINYLCHOLINE CHLORIDE 160 MG: 20 INJECTION, SOLUTION INTRAMUSCULAR; INTRAVENOUS at 10:45

## 2017-06-15 RX ADMIN — LORAZEPAM 0.5 MG: 2 INJECTION INTRAMUSCULAR; INTRAVENOUS at 09:41

## 2017-06-15 RX ADMIN — SODIUM CHLORIDE 1000 ML: 9 INJECTION, SOLUTION INTRAVENOUS at 06:41

## 2017-06-15 RX ADMIN — POTASSIUM PHOSPHATE, MONOBASIC AND POTASSIUM PHOSPHATE, DIBASIC 30 MMOL: 224; 236 INJECTION, SOLUTION INTRAVENOUS at 02:18

## 2017-06-15 RX ADMIN — SODIUM BICARBONATE: 84 INJECTION, SOLUTION INTRAVENOUS at 13:33

## 2017-06-15 RX ADMIN — ETOMIDATE 20 MG: 2 INJECTION, SOLUTION INTRAVENOUS at 10:44

## 2017-06-15 RX ADMIN — Medication 2 TABLET: at 08:43

## 2017-06-15 RX ADMIN — VASOPRESSIN 0.03 UNITS/MIN: 20 INJECTION INTRAVENOUS at 20:36

## 2017-06-15 ASSESSMENT — PAIN SCALES - GENERAL
PAINLEVEL_OUTOF10: 0
PAINLEVEL_OUTOF10: 6
PAINLEVEL_OUTOF10: 3
PAINLEVEL_OUTOF10: 0
PAINLEVEL_OUTOF10: 0
PAINLEVEL_OUTOF10: 6
PAINLEVEL_OUTOF10: 0

## 2017-06-15 ASSESSMENT — ENCOUNTER SYMPTOMS
MYALGIAS: 0
BRUISES/BLEEDS EASILY: 0
FEVER: 0
FOCAL WEAKNESS: 0
BACK PAIN: 0
CONSTIPATION: 0
HEADACHES: 0
CLAUDICATION: 0
LOSS OF CONSCIOUSNESS: 0
CHILLS: 0
SENSORY CHANGE: 0
WEAKNESS: 0
PALPITATIONS: 0
EYE DISCHARGE: 0
DIAPHORESIS: 1
NAUSEA: 0
WHEEZING: 1
ABDOMINAL PAIN: 1
SHORTNESS OF BREATH: 1
SPUTUM PRODUCTION: 0
DIZZINESS: 0
SORE THROAT: 0
NECK PAIN: 0
VOMITING: 0
EYE PAIN: 0
DEPRESSION: 0
DIARRHEA: 0
SPEECH CHANGE: 0
HEMOPTYSIS: 0
COUGH: 0

## 2017-06-15 ASSESSMENT — COPD QUESTIONNAIRES
DO YOU EVER COUGH UP ANY MUCUS OR PHLEGM?: NO/ONLY WITH OCCASIONAL COLDS OR INFECTIONS
COPD SCREENING SCORE: 4
HAVE YOU SMOKED AT LEAST 100 CIGARETTES IN YOUR ENTIRE LIFE: YES
DURING THE PAST 4 WEEKS HOW MUCH DID YOU FEEL SHORT OF BREATH: NONE/LITTLE OF THE TIME

## 2017-06-15 ASSESSMENT — LIFESTYLE VARIABLES: SUBSTANCE_ABUSE: 0

## 2017-06-15 NOTE — PROGRESS NOTES
Isa from Lab called with critical result of Lactic of 5.49 at 1235. Critical lab result read back to Isa.   Dr. Nash notified of critical lab result at 1240.  Critical lab result read back by Dr. Nash.

## 2017-06-15 NOTE — PROGRESS NOTES
Updated Dr. Mancilla about patient's Magnesium level of 1.4 and phosphorus of 1.8. Also updated about potassium 2.5, running the 3rd K-rider right now. Received orders of 30 mmol of K-Phos and 4 grams of magnesium.

## 2017-06-15 NOTE — PROGRESS NOTES
Rashida from Lab called with critical result of Calcium = 6.7 at 0505. Critical lab result read back to Rashida.   Dr. Obrien paged at 0513. Dr. Obrien notified of critical lab result at 0527.  Critical lab result read back by Dr. Obrien. Corrected calcium is 7.4. Also updated about BUN/Cr and WBC this am. Received order for calcium replacement. Will continue to monitor.

## 2017-06-15 NOTE — CARE PLAN
Problem: Ventilation Defect:  Goal: Ability to achieve and maintain unassisted ventilation or tolerate decreased levels of ventilator support  Outcome: PROGRESSING AS EXPECTED  Adult Ventilation Update    Total Vent Days: 1      Patient Lines/Drains/Airways Status    Active Airway      Name: Placement date: Placement time: Site: Days:     Airway Group ET Tube Oral 8.0 06/15/17  1020  Oral  less than 1                 Pt placed on ventilation on full support in ICU.

## 2017-06-15 NOTE — ED NOTES
Britney from Lab called with critical result of Trop at 3.33. Critical lab result read back to Britney.   Dr. Boudreaux notified of critical lab result at 1920.  Critical lab result read back by Dr. Boudreaux.

## 2017-06-15 NOTE — ED NOTES
Arrives diaphoretic, dyspneic for 4 days at home with intermittent fevers. Wife states he has passed out and she has had a hard time moving him. Iv's started bilateral ac and NS infusing at a fast rate. Monitor on and labs with cultures sent. Orders in progress.

## 2017-06-15 NOTE — PROGRESS NOTES
[facility] Hospitalist Progress Note    Date of Service: 6/15/2017    Chief Complaint  76 y.o. male admitted 6/14/2017 with abdominal pain and SOB, fever, chills, diaphoresis, syncope x4.  CT abdomen revealed acute cholecystitis with lactic acid of 8 admitted to ICU on zosyn IV, sepsis protocol initiated.    Interval Problem Update  6/15:  On exam, patient had become more diaphoretic, complaining of severe upper abdominal pain that radiated to both shoulders.  He was given morphine IV, /100s, increased RR.  His IVFs were dc'd, stat CXR, lasix 40mg IV x1, xopenex respiratory neb, but continued to worsen with increased RR, shallow breathing.  He was acidotic with low bicarb due to sepsis.  Patient required ET intubation, Dr. Ivy kindly consulted for vent management.  I spoke with Dr. Last about worsening condition.  Ordered stat abd CT:  No perforation, remains with emphysematous swollen gallbladder.  U/s cholecystostomy tube placed successfully with drain of dark necrotic bile fluid.  BCs + GNR ss pending, on zosyn.  Met with wife and son and gave updates in a.m. and in afternoon.  Family aware of critically ill patient for next 24 hours.    Consultants/Specialty  Dr. Berhane Ivy      Disposition  TBD.        Review of Systems   Constitutional: Positive for diaphoresis. Negative for fever, chills and malaise/fatigue.        Pale   HENT: Negative for congestion and sore throat.    Eyes: Negative for pain and discharge.   Respiratory: Positive for shortness of breath and wheezing. Negative for cough, hemoptysis and sputum production.    Cardiovascular: Negative for chest pain, palpitations, claudication and leg swelling.   Gastrointestinal: Positive for abdominal pain. Negative for nausea, vomiting, diarrhea, constipation and melena.        Radiating to b/l shoulders   Genitourinary: Negative for dysuria, urgency and frequency.   Musculoskeletal: Negative for myalgias, back pain, joint pain and neck pain.    Skin: Negative for itching and rash.   Neurological: Negative for dizziness, sensory change, speech change, focal weakness, loss of consciousness, weakness and headaches.   Endo/Heme/Allergies: Does not bruise/bleed easily.   Psychiatric/Behavioral: Negative for depression, suicidal ideas and substance abuse.      Physical Exam  Laboratory/Imaging   Hemodynamics  Temp (24hrs), Av.1 °C (98.8 °F), Min:35.9 °C (96.6 °F), Max:38 °C (100.4 °F)   Temperature: 37.4 °C (99.3 °F)  Pulse  Av.7  Min: 70  Max: 120 Heart Rate (Monitored): 77  Blood Pressure : (!) 79/51 mmHg, NIBP: 123/80 mmHg CVP (mm Hg): (!) 16 MM HG    Respiratory      Respiration: (!) 27, Pulse Oximetry: 99 %     Work Of Breathing / Effort: Tachypnea  RUL Breath Sounds: Clear, RML Breath Sounds: Clear, RLL Breath Sounds: Clear;Diminished, OZZY Breath Sounds: Clear, LLL Breath Sounds: Clear;Diminished    Fluids    Intake/Output Summary (Last 24 hours) at 06/15/17 0757  Last data filed at 06/15/17 0600   Gross per 24 hour   Intake 3082.25 ml   Output    650 ml   Net 2432.25 ml       Nutrition  Orders Placed This Encounter   Procedures   • Diet NPO     Standing Status: Standing      Number of Occurrences: 1      Standing Expiration Date:      Order Specific Question:  Restrict to:     Answer:  Sips with Medications [3]     Physical Exam   Constitutional: He is oriented to person, place, and time. He appears well-developed. He appears distressed.   Pale, diaphoretic with increased RR   HENT:   Head: Normocephalic and atraumatic.   Mouth/Throat: Oropharynx is clear and moist. No oropharyngeal exudate.   Eyes: Conjunctivae and EOM are normal. Pupils are equal, round, and reactive to light. Right eye exhibits no discharge. Left eye exhibits no discharge. No scleral icterus.   Neck: Normal range of motion. Neck supple. No JVD present. No tracheal deviation present. No thyromegaly present.   Cardiovascular: Normal rate, regular rhythm and normal heart  sounds.  Exam reveals no gallop and no friction rub.    No murmur heard.  Pulmonary/Chest: He is in respiratory distress (increased RR 40). He has wheezes (b/l expiratory). He has no rales. He exhibits no tenderness.   Abdominal: Soft. Bowel sounds are normal. He exhibits no distension and no mass. There is tenderness. There is no rebound and no guarding.   Musculoskeletal: Normal range of motion. He exhibits no edema or tenderness.   Lymphadenopathy:     He has no cervical adenopathy.   Neurological: He is alert and oriented to person, place, and time. No cranial nerve deficit. He exhibits normal muscle tone.   Skin: Skin is warm. No rash noted. He is diaphoretic. No erythema.   Psychiatric: He has a normal mood and affect. His behavior is normal. Judgment and thought content normal.   Vitals reviewed.      Recent Labs      06/14/17   1820  06/15/17   0410   WBC  7.3  25.6*   RBC  4.70  3.77*   HEMOGLOBIN  15.1  12.5*   HEMATOCRIT  42.2  35.8*   MCV  89.8  95.0   MCH  32.1  33.2*   MCHC  35.8*  34.9   RDW  43.0  47.7   PLATELETCT  100*  83*   MPV  10.8  11.6     Recent Labs      06/14/17   1820  06/14/17   2020  06/15/17   0410   SODIUM  130*   --   134*   POTASSIUM  2.8*  2.5*  3.9   CHLORIDE  94*   --   103   CO2  13*   --   12*   GLUCOSE  178*   --   191*   BUN  60*   --   57*   CREATININE  3.55*   --   3.04*   CALCIUM  7.8*   --   6.7*     Recent Labs      06/14/17   1720  06/15/17   0410   APTT  34.3  34.4   INR  1.22*  1.30*                  Assessment/Plan     * Sepsis (CMS-[facility]) (present on admission)  Assessment & Plan  Source:  Infected emphysematous cholecystitis  IVFs  Zosyn  BC+ GNR x 2    Non-STEMI (non-ST elevated myocardial infarction) (CMS-[facility]) (present on admission)  Assessment & Plan  Dr. Chua consulted:  No intervention  Cardiac strain due to sepsis  dc'd heparin drip  trops trending down    Acute respiratory failure with hypoxia (CMS-[facility]) (present on admission)  Assessment & Plan  6/15  requiring ET intubation for acute wheeze, acidosis due to sepsis.  xopenex breathing treatments  S/p lasix 40mg iv with improved breathing post intubation      Metabolic acidosis (present on admission)  Assessment & Plan  2/2 severe sepsis    Acute kidney injury (nontraumatic) (CMS-HCC) (present on admission)  Assessment & Plan  New onset  2/2 sepsis related renal failure with poor perfusion  Attempt to raise BP with pressors  Bicarb drip    Hyponatremia (present on admission)  Assessment & Plan  Bicarb drip    Bacteremia (present on admission)  Assessment & Plan  GNR on BC 6/14 x2 ss pending  Zosyn IV    Acute emphysematous cholecystitis (present on admission)  Assessment & Plan  6/15 u/s cholecystostomy completed  Culture fluid  Repeat CT abdomen:  No perforation  Spoke with Dr. Last about worsening clinical sepsis:  Too sick for OR removal  Await MRCP results and improvement with u/s drain of infection.    Hypotension (arterial) (present on admission)  Assessment & Plan  Since intubated, started propofol drip, drop in BP:  Levophed, vasopressin, neosinephrine drips  Bicarb drip at 150/hr.    Thrombocytopenia (CMS-HCC) (present on admission)  Assessment & Plan  Unclear etiology  Given 1 unit platelets prior to u/s cholecystostomy for plts <50K.    Hypokalemia due to inadequate potassium intake (present on admission)  Assessment & Plan  Replace K x 1 K rider.  BMP q 6 hr.        Laird catheter: No Laird      DVT Prophylaxis: Contraindicated - High bleeding risk    Ulcer prophylaxis: Not indicated  Antibiotics: Treating active infection/contamination beyond 24 hours perioperative coverage

## 2017-06-15 NOTE — ED NOTES
Ruthie from Lab called with critical result of Lactic acid at 7.91. Critical lab result read back to Ruthie.   Dr. Boudreaux notified of critical lab result at Lactic acid at 7.91.  Critical lab result read back by Dr. Boudreaux.

## 2017-06-15 NOTE — PROGRESS NOTES
Pt unable to come down to MRI at this time per RN.  Pt on medications that require a pump.  RN will call when pt is ready for MRI.

## 2017-06-15 NOTE — ED NOTES
Pallavi from Lab called with critical result of Lactic at 6.31. Critical lab result read back to Korina.   Dr. Boudreaux notified of critical lab result at 2025.  Critical lab result read back by Dr. Boudreaux.

## 2017-06-15 NOTE — PROGRESS NOTES
2127  Report received from Katerin DILLARD. Per Katerin DAWKINS made aware of the MRI not being able to be done tonight d/t staffing and that MD dcd heparin for the patient and new potassium order for replacement.     2145  Patient admitted to room 321. Alert and oriented, family at BS. Denies discomfort especially chest pain or nausea at this time. Has been tachyonic and c/o sob with transfer from [facility] to bed. NS Bolus #4 and Zosyn infusing at this time. Oriented to room and use of call light, verbalized understanding. Laird in place with small and cosme output. POC discussed with patient and family, verbalized understanding. Bed low and locked, BA on and call light within reach. Will continue to monitor closely.      2149 First potassium bag started.     2153  Levophed started d/t low BP.

## 2017-06-15 NOTE — FLOWSHEET NOTE
This note also relates to the following rows which could not be included:  Respiration - Cannot attach notes to unvalidated device data  Pulse - Cannot attach notes to unvalidated device data  Heart Rate (Monitored) - Cannot attach notes to unvalidated device data  Pulse Oximetry - Cannot attach notes to unvalidated device data         06/15/17 0941   Interdisciplinary Plan of Care-Outcomes    Bronchodilator Outcome Diminished Wheezing and Volume of Air Movement Increased   Education   Education Yes - Pt. / Family has been Instructed in use of Respiratory Medications and Adverse Reactions   RT Assessment of Delivered Medications   Evaluation of Medication Delivery Daily Yes-- Pt /Family has been Instructed in use of Respiratory Medications and Adverse Reactions   SVN Group   #SVN Performed Yes   Given By: Mouthpiece   Date SVN Last Changed 06/15/17   Date SVN Next Change Due (Q 7 Days) 06/22/17   Respiratory WDL   Respiratory (WDL) X   Chest Exam   Work Of Breathing / Effort Tachypnea   Breath Sounds   RUL Breath Sounds Expiratory Wheezes   RML Breath Sounds Expiratory Wheezes   RLL Breath Sounds Diminished   OZZY Breath Sounds Expiratory Wheezes   LLL Breath Sounds Diminished   Secretions   Cough Moist;Non Productive   How Sputum Obtained Cough on Request   Sputum Amount Unable to Evaluate   Sputum Color Unable to Evaluate   Sputum Consistency Unable to Evaluate   Oximetry   Continuous Oximetry Yes   Oxygen   O2 (LPM) 5   O2 Daily Delivery Respiratory  OxyMask

## 2017-06-15 NOTE — ED PROVIDER NOTES
ED Provider Note    Subsequent hospital care note    The patient had been admitted last night and was called to the ICU to intubate him.  I requested the hospitalist, who is at bedside.    I spoke with the patient.  He is wearing his being intubated.  He is noted respiratory distress with increased work of breathing, tachypnea, one-word sentences and hypoxemia without significant amounts of supple oxygenation.    The patient was preoxygenated.  His airway is assessed.    Intubation Procedure Note    Indication: Respiratory failure    Consent: Unable to be obtained due to the emergent nature of this procedure.    Medications Used: etomidate intravenously and succinycholine intravenously    Procedure: The patient was placed in the appropriate position.  Cricoid pressure was not required.  Intubation was performed by direct laryngoscopy using a laryngoscope and an 8.0 cuffed endotracheal tube.  The cuff was then inflated and the tube was secured appropriately at a distance of 25 cm to the dental ridge.  Initial confirmation of placement included bilateral breath sounds, an end tidal CO2 detector, absence of sounds over the stomach, tube fogging, adequate chest rise and adequate pulse oximetry reading.  A chest x-ray to verify correct placement of the tube showed appropriate tube position.    The patient tolerated the procedure well.     Complications: None    The hospitals was at bedside.  Care transferred back to the hospitalist.  She will speak with [facility].    Koby Moctezuma MD

## 2017-06-15 NOTE — PROGRESS NOTES
Korina from Lab called with critical result of lactic = 4.11 at 0426. Critical lab result read back to Korina. Also received a call from Korina at 0353 re positive blood culture first set = both positive with gram negative; second set anaerobe = positive with gram negative. Dr. Obrien notified of critical lab result at 0436 both lactic and positive blood cultures. Also updated that patient has received 5L of NS since ER and NS at 125 ml/hr is running. No new order regarding lactic acid. Received order to draw 2 sets of blood cultures.

## 2017-06-15 NOTE — CARE PLAN
Problem: Safety  Goal: Will remain free from falls  Intervention: Implement fall precautions  Hand washing every encounter. IV ports scrubbed with alcohol when hanging medicine. Patient watch for s/s of infection. Patient taught to report s/s of infection, verbalized understanding.        Problem: Infection  Goal: Will remain free from infection  Intervention: Implement standard precautions and perform hand washing before and after patient contact  Hand washing every encounter. IV ports scrubbed with alcohol when hanging medicine. Patient watch for s/s of infection. Patient taught to report s/s of infection, verbalized understanding.        Problem: Pain Management  Goal: Pain level will decrease to patient’s comfort goal  Intervention: Follow pain managment plan developed in collaboration with patient and Interdisciplinary Team  Pain assessment completed. Denies any discomfort. Encouraged to report, verbalized understanding.       Problem: Respiratory:  Goal: Respiratory status will improve  Intervention: Educate and encourage coughing and deep breathing  Encouraged to perform coughing and deep breathing, verbalized understanding.

## 2017-06-15 NOTE — PROGRESS NOTES
Pt seen and examined  Presented with GN sepsis, cholecystitis and questionable MI/myocardial strain  Ct shows cholecystitis, ? Emphysematous  GNR on BC  On levophed  On abx  Abd soft but tender in RUQ  Would recommend IR cholecystostomy drainage  Needs MRCP due to LFTS all elevated  May need GI consult if MRCP positive for choledocholithiasis  Will follow along

## 2017-06-15 NOTE — CONSULTS
Cardiology Consult Note:    Humza Chua  Date of Service:    6/15/2017   7:11 AM     Referring MD:  Dr. Valdivia    Patient ID:   Name:             Damian Larsen   YOB: 1940  Age:                 76 y.o.  male   MRN:               [id number]                                                             Chief Complaint:      NSTEMI    History of Present Illness:    75 y/o male was admitted for sepsis with abd pain and SOB; and found to have lactic acidosis and elevated Troponin suggested NSTEMI; h/o HTN  EKG: sinus tachycardia  rate is 116,  QT interval with QT at 484, [place] of 124,    Review of Systems:      Constitutional: Denies fevers, Denies weight changes  Eyes: Denies changes in vision, no eye pain  Ears/Nose/Throat/Mouth: Denies nasal congestion or sore throat   Cardiovascular: - chest pain, - palpitations   Respiratory: - shortness of breath , Denies cough  Gastrointestinal/Hepatic: Denies abdominal pain, nausea, vomiting, diarrhea, constipation or GI bleeding   Genitourinary: Denies dysuria or frequency  Musculoskeletal/Rheum: Denies  joint pain and swelling   Skin: Denies rash  Neurological: Denies headache, confusion, memory loss or focal weakness/parasthesias  Psychiatric: denies mood disorder   Endocrine: Kitty thyroid problems  Heme/Oncology/Lymph Nodes: Denies enlarged lymph nodes, denies brusing or known bleeding disorder  All other systems were reviewed and are negative (AMA/CMS criteria)                Past Medical History:   History reviewed. No pertinent past medical history.  Active Hospital Problems    Diagnosis   • Acute cholecystitis [K81.0]   • Sepsis (CMS-HCC) [A41.9]   • Non-STEMI (non-ST elevated myocardial infarction) (CMS-HCC) [I21.4]       Past Surgical History:  History reviewed. No pertinent past surgical history.    Hospital Medications:    Current facility-administered medications:   •  senna-docusate (PERICOLACE or SENOKOT S) 8.6-50 MG per tablet 2 Tab, 2 Tab, Oral,  BID **AND** polyethylene glycol/lytes (MIRALAX) PACKET 1 Packet, 1 Packet, Oral, QDAY PRN **AND** magnesium hydroxide (MILK OF MAGNESIA) suspension 30 mL, 30 mL, Oral, QDAY PRN **AND** bisacodyl (DULCOLAX) suppository 10 mg, 10 mg, Rectal, QDAY PRN, Jose Valdivia M.D.  •  Respiratory Care per Protocol, , Nebulization, Continuous RT, Jose Valdivia M.D.  •  NS infusion, , Intravenous, Continuous, Jose Valdivia M.D., Last Rate: 125 mL/hr at 06/15/17 0641, 1,000 mL at 06/15/17 0641  •  acetaminophen (TYLENOL) tablet 650 mg, 650 mg, Oral, Q6HRS PRN, Jose Valdivia M.D.  •  piperacillin-tazobactam (ZOSYN) 3.375 g in  mL IVPB, 3.375 g, Intravenous, Q6HRS, Jose Valdivia M.D., 0 g at 06/15/17 0248  •  norepinephrine (LEVOPHED) 8 mg in  mL Infusion, 0.5-30 mcg/min, Intravenous, Continuous, Last Rate: 5.6 mL/hr at 06/15/17 0636, 3 mcg/min at 06/15/17 0636 **AND** vasopressin (VASOSTRICT) 20 Units in  mL Infusion, 0.03 Units/min, Intravenous, Continuous, Stopped at 06/14/17 2200 **AND** Notify physician if MAP remains less than 65 mmHg after 15 minutes of Norepinephrine and Vasopressin initiation, , , Once, Jose Valdivia M.D.  •  ondansetron (ZOFRAN) syringe/vial injection 4 mg, 4 mg, Intravenous, Q4HRS PRN, Jose Valdivia M.D.  •  ondansetron (ZOFRAN ODT) dispertab 4 mg, 4 mg, Oral, Q4HRS PRN, Jose Valdivia M.D.  •  lorazepam (ATIVAN) tablet 0.5 mg, 0.5 mg, Oral, Q6HRS PRN **OR** lorazepam (ATIVAN) injection 0.5 mg, 0.5 mg, Intravenous, Q6HRS PRN, Jose Valdivia M.D.    Current Outpatient Medications:  Prescriptions prior to admission   Medication Sig Dispense Refill Last Dose   • lisinopril-hydrochlorothiazide (PRINZIDE, ZESTORETIC) 10-12.5 MG per tablet Take 1 Tab by mouth every day.   6/14/2017 at 09   • omeprazole (PRILOSEC) 20 MG delayed-release capsule Take 20 mg by mouth every day.   6/14/2017 at 09   • ascorbic acid (ASCORBIC ACID) 500 MG Tab Take 500 mg by mouth every day.   6/13/2017 at  "Unknown time   • vitamin D (CHOLECALCIFEROL) 1000 UNIT Tab Take 1,000 Units by mouth every day.   2017   • cyanocobalamin (VITAMIN B-12) 100 MCG Tab Take 100 mcg by mouth every day.   2017   • vitamin e (VITAMIN E) 400 UNIT Cap Take 400 Units by mouth every day.   2017   • therapeutic multivitamin-minerals (THERAGRAN-M) Tab Take 1 Tab by mouth every day.   2017   • Omega-3 Fatty Acids (FISH OIL) 1000 MG Cap capsule Take 1,000 mg by mouth 3 times a day, with meals.   2017   • aspirin (ASA) 81 MG Chew Tab chewable tablet Take 81 mg by mouth every day.   2017   • loratadine (CLARITIN) 10 MG Tab Take 10 mg by mouth every day.   2017       Medication Allergy:  No Known Allergies    Family History:  Mother  of colon cancer and had COPD.  Father  of    colon cancer. History reviewed. No pertinent family history.    Social History:  Social History     Social History   • Marital Status:      Spouse Name: N/A   • Number of Children: N/A   • Years of Education: N/A     Occupational History   • Not on file.     Social History Main Topics   • Smoking status: Former Smoker     Quit date: 1987   • Smokeless tobacco: Not on file   • Alcohol Use: No   • Drug Use: No   • Sexual Activity: Not on file     Other Topics Concern   • Not on file     Social History Narrative   • No narrative on file         Physical Exam:  Vitals  Weight/BMI: Body mass index is 32.04 kg/(m^2).  Blood pressure 79/51, pulse 73, temperature 37.4 °C (99.3 °F), resp. rate 23, height 1.778 m (5' 10\"), weight 101.3 kg (223 lb 5.2 oz), SpO2 100 %.  Filed Vitals:    06/15/17 0545 06/15/17 0600 06/15/17 0615 06/15/17 0630   BP:       Pulse: 73 74 76 73   Temp:       Resp: 8    Height:       Weight:   101.3 kg (223 lb 5.2 oz)    SpO2: 100% 100%       Oxygen Therapy:  Pulse Oximetry: 100 %, O2 (LPM): 3, O2 Delivery: Silicone Nasal Cannula  General Appearance:  Obese;  Well developed, Well nourished, No " acute distress, Non-toxic appearance.   HENT:  Normocephalic, Atraumatic, Oropharynx moist mucous membranes, Dentition: , Nose normal.    Eyes:  PERRLA, EOMI, Conjunctiva normal, No discharge.  Neck:  Normal range of motion, No cervical tenderness, Supple, No stridor, no JVD .  No thyromegaly.  No carotid bruit.  Cardiovascular:  Normal heart rate, Normal rhythm,  S1, S2, no S3,  S4; No gallops; No murmurs, No rubs, .   Extremitites with intact distal pulses, no cyanosis, clubbing or edema.  No heaves, thrills, HJR;  Peripheral pulses: carotid 2+, brachial 2+, radial 2+, ulnar 2+, femoral 2+, popliteal 2+, PT 2+, DP 2+;  Lungs:  Respiratory effort is normal. Normal breath sounds, breath sounds clear to auscultation bilaterally,  no rales, no rhonchi, no wheezing.   Abdomen: Bowel sounds normal, Soft, No tenderness, No guarding, No rebound, No masses, No hepatosplenomegaly.  Skin: Warm, Dry, No erythema, No rash, no induration or crepitus.  Neurologic: Alert & oriented x 3, Normal motor function, Normal sensory function, No focal deficits noted, cranial nerves II through XII are normal, .  Psychiatric: Affect normal, Judgment normal, Mood normal.      MDM (Data Review):     Records reviewed and summarized in current documentation    Lab Data Review:  Recent Results (from the past 24 hour(s))   PT/INR    Collection Time: 06/14/17  5:20 PM   Result Value Ref Range    PT 15.2 (H) 12.0 - 14.6 sec    INR 1.22 (H) 0.87 - 1.13   PTT    Collection Time: 06/14/17  5:20 PM   Result Value Ref Range    APTT 34.3 24.7 - 36.0 sec   TSH (Thyroid Stimulating Hormone)    Collection Time: 06/14/17  5:20 PM   Result Value Ref Range    TSH 3.410 0.350 - 5.500 uIU/mL   Lactic acid (lactate)    Collection Time: 06/14/17  6:20 PM   Result Value Ref Range    Lactic Acid 7.91 (HH) 0.50 - 2.00 mmol/L    Specimen Venous    CBC WITH DIFFERENTIAL    Collection Time: 06/14/17  6:20 PM   Result Value Ref Range    WBC 7.3 4.8 - 10.8 K/uL    RBC  4.70 4.70 - 6.10 M/uL    Hemoglobin 15.1 14.0 - 18.0 g/dL    Hematocrit 42.2 42.0 - 52.0 %    MCV 89.8 81.4 - 97.8 fL    MCH 32.1 27.0 - 33.0 pg    MCHC 35.8 (H) 33.7 - 35.3 g/dL    RDW 43.0 35.9 - 50.0 fL    Platelet Count 100 (L) 164 - 446 K/uL    MPV 10.8 9.0 - 12.9 fL    Nucleated RBC 0.30 /100 WBC    NRBC (Absolute) 0.02 K/uL    Neutrophils-Polys 78.00 (H) 44.00 - 72.00 %    Lymphocytes 7.00 (L) 22.00 - 41.00 %    Monocytes 4.00 0.00 - 13.40 %    Eosinophils 0.00 0.00 - 6.90 %    Basophils 0.00 0.00 - 1.80 %    Neutrophils (Absolute) 6.35 1.82 - 7.42 K/uL    Lymphs (Absolute) 0.51 (L) 1.00 - 4.80 K/uL    Monos (Absolute) 0.29 0.00 - 0.85 K/uL    Eos (Absolute) 0.00 0.00 - 0.51 K/uL    Baso (Absolute) 0.00 0.00 - 0.12 K/uL   COMP METABOLIC PANEL    Collection Time: 06/14/17  6:20 PM   Result Value Ref Range    Sodium 130 (L) 135 - 145 mmol/L    Potassium 2.8 (L) 3.6 - 5.5 mmol/L    Chloride 94 (L) 96 - 112 mmol/L    Co2 13 (L) 20 - 33 mmol/L    Anion Gap 23.0 (H) 0.0 - 11.9    Glucose 178 (H) 65 - 99 mg/dL    Bun 60 (H) 8 - 22 mg/dL    Creatinine 3.55 (H) 0.50 - 1.40 mg/dL    Calcium 7.8 (L) 8.4 - 10.2 mg/dL    AST(SGOT) 159 (H) 12 - 45 U/L    ALT(SGPT) 142 (H) 2 - 50 U/L    Alkaline Phosphatase 312 (H) 30 - 99 U/L    Total Bilirubin 1.6 (H) 0.1 - 1.5 mg/dL    Albumin 3.1 (L) 3.2 - 4.9 g/dL    Total Protein 6.8 6.0 - 8.2 g/dL    Globulin 3.7 (H) 1.9 - 3.5 g/dL    A-G Ratio 0.8 g/dL   BLOOD CULTURE    Collection Time: 06/14/17  6:20 PM   Result Value Ref Range    Significant Indicator POS (POS)     Source BLD     Site Peripheral     Blood Culture (A)      Growth detected by Bactec instrument.  06/15/2017  03:54  Gram Stain: Gram negative rods.  Blood culture testing and Gram stain, if indicated, are  performed at [facility], [address] [place] [address].  Positive blood cultures are  sent to [facility], [address] [place] [address], for organism  identification and  susceptibility testing.     TROPONIN    Collection Time: 17  6:20 PM   Result Value Ref Range    Troponin I 3.33 (H) 0.00 - 0.04 ng/mL   LIPASE    Collection Time: 17  6:20 PM   Result Value Ref Range    Lipase 23 7 - 58 U/L   ESTIMATED GFR    Collection Time: 17  6:20 PM   Result Value Ref Range    GFR If  20 (A) >60 mL/min/1.73 m 2    GFR If Non African American 17 (A) >60 mL/min/1.73 m 2   DIFFERENTIAL MANUAL    Collection Time: 17  6:20 PM   Result Value Ref Range    Bands-Stabs 9.00 0.00 - 10.00 %    Metamyelocytes 2.00 %    Manual Diff Status PERFORMED    MORPHOLOGY    Collection Time: 17  6:20 PM   Result Value Ref Range    RBC Morphology Normal     Giant Platelets 1+    EKG (ER)    Collection Time: 17  6:25 PM   Result Value Ref Range    Report       [facility] Emergency [facility].    Test Date:  2017  Pt Name:    ANITHA RICHMOND                 Department: [facility]  MRN:        [id number]                      Room:       [facility][note 3]  Gender:     M                            Technician: SANTO  :        1940                   Requested By:PRESLEY SHORT  Order #:    [id number]                    Reading MD: PRESLEY SHORT MD    Measurements  Intervals                                Axis  Rate:       116                          P:          52  DC:         124                          QRS:        61  QRSD:       92                           T:          31  QT:         348  QTc:        484    Interpretive Statements  SINUS TACHYCARDIA  BORDERLINE PROLONGED QT INTERVAL  No previous ECG available for comparison    Electronically Signed On 6- 1:01:13 PDT by PRESLEY SHORT MD     BLOOD CULTURE    Collection Time: 17  6:30 PM   Result Value Ref Range    Significant Indicator POS (POS)     Source BLD     Site Peripheral     Blood Culture (A)      Growth detected by Bactec instrument.  06/15/2017  03:50  Gram Stain: Gram  negative rods.  Blood culture testing and Gram stain, if indicated, are  performed at [facility], [address] [place] [address].  Positive blood cultures are  sent to [facility], [address] [place] [address], for organism identification and  susceptibility testing.     Lactic acid (lactate)    Collection Time: 06/14/17  8:20 PM   Result Value Ref Range    Lactic Acid 6.31 (HH) 0.50 - 2.00 mmol/L    Specimen Venous    Potassium serum (K)    Collection Time: 06/14/17  8:20 PM   Result Value Ref Range    Potassium 2.5 (LL) 3.6 - 5.5 mmol/L   Troponin    Collection Time: 06/14/17  8:20 PM   Result Value Ref Range    Troponin I 2.47 (H) 0.00 - 0.04 ng/mL   MAGNESIUM    Collection Time: 06/14/17  8:20 PM   Result Value Ref Range    Magnesium 1.4 (L) 1.5 - 2.5 mg/dL   PHOSPHORUS    Collection Time: 06/14/17  8:20 PM   Result Value Ref Range    Phosphorus 1.8 (L) 2.5 - 4.5 mg/dL   URINALYSIS    Collection Time: 06/14/17  9:15 PM   Result Value Ref Range    Micro Urine Req Microscopic     Color Dark Yellow     Character Hazy (A)     Ph 5.0 5.0-8.0    Glucose Negative Negative mg/dL    Ketones Trace (A) Negative mg/dL    Protein 30 (A) Negative mg/dL    Bilirubin Moderate (A) Negative    Nitrite Negative Negative    Leukocyte Esterase Negative Negative    Occult Blood Moderate (A) Negative   REFRACTOMETER SG    Collection Time: 06/14/17  9:15 PM   Result Value Ref Range    Specific Gravity 1.020    URINE MICROSCOPIC (W/UA)    Collection Time: 06/14/17  9:15 PM   Result Value Ref Range    WBC 2-5 (A) /hpf    RBC 2-5 (A) /hpf    Bacteria Few (A) None /hpf    Epithelial Cells Few Few /hpf    Mucous Threads Few /hpf    Urine Crystals Many Amorphous /hpf   Lactic Acid -STAT Once    Collection Time: 06/14/17 11:53 PM   Result Value Ref Range    Lactic Acid 5.49 (HH) 0.50 - 2.00 mmol/L    Specimen Venous    TROPONIN    Collection Time: 06/14/17 11:53 PM   Result Value Ref Range     Troponin I 1.70 (H) 0.00 - 0.04 ng/mL   Prothrombin time (INR)    Collection Time: 06/15/17  4:10 AM   Result Value Ref Range    PT 16.0 (H) 12.0 - 14.6 sec    INR 1.30 (H) 0.87 - 1.13   APTT    Collection Time: 06/15/17  4:10 AM   Result Value Ref Range    APTT 34.4 24.7 - 36.0 sec   Lactic Acid Every four hours after STAT order    Collection Time: 06/15/17  4:10 AM   Result Value Ref Range    Lactic Acid 4.11 (HH) 0.50 - 2.00 mmol/L    Specimen Venous    Troponin    Collection Time: 06/15/17  4:10 AM   Result Value Ref Range    Troponin I 1.12 (H) 0.00 - 0.04 ng/mL   CBC without Differential    Collection Time: 06/15/17  4:10 AM   Result Value Ref Range    WBC 25.6 (H) 4.8 - 10.8 K/uL    RBC 3.77 (L) 4.70 - 6.10 M/uL    Hemoglobin 12.5 (L) 14.0 - 18.0 g/dL    Hematocrit 35.8 (L) 42.0 - 52.0 %    MCV 95.0 81.4 - 97.8 fL    MCH 33.2 (H) 27.0 - 33.0 pg    MCHC 34.9 33.7 - 35.3 g/dL    RDW 47.7 35.9 - 50.0 fL    Platelet Count 83 (L) 164 - 446 K/uL    MPV 11.6 9.0 - 12.9 fL   Basic Metabolic Panel (BMP)    Collection Time: 06/15/17  4:10 AM   Result Value Ref Range    Potassium 3.9 3.6 - 5.5 mmol/L    Sodium 134 (L) 135 - 145 mmol/L    Chloride 103 96 - 112 mmol/L    Co2 12 (L) 20 - 33 mmol/L    Glucose 191 (H) 65 - 99 mg/dL    Bun 57 (H) 8 - 22 mg/dL    Creatinine 3.04 (H) 0.50 - 1.40 mg/dL    Calcium 6.7 (LL) 8.4 - 10.2 mg/dL    Anion Gap 19.0 (H) 0.0 - 11.9   ESTIMATED GFR    Collection Time: 06/15/17  4:10 AM   Result Value Ref Range    GFR If  24 (A) >60 mL/min/1.73 m 2    GFR If Non African American 20 (A) >60 mL/min/1.73 m 2       Imaging/Procedures Review:    Chest Xray:  Reviewed    EKG:   As in HPI. See above    MDM (Assessment and Plan):     Active Hospital Problems    Diagnosis   • Acute cholecystitis [K81.0]   • Sepsis (CMS-[facility]) [A41.9]   • Non-STEMI (non-ST elevated myocardial infarction) (CMS-HCC) [I21.4]     NSTEMI is likely related to his sepsis  Ischemia w/u when his condition  improves  Order Echo to assess LV fxn  Currently he has renal insufficiency and will follow renal fxn  Case discussed with attending  Will follow  Thx

## 2017-06-15 NOTE — PROGRESS NOTES
Pulmonary Critical Care Consult        Chief Complaint: VDRF    History of Present Illness:   76 y.o. male was admitted on 6/14 with a chief complaint of abdominal pain. Last felt well 4 days PTA. 3 days PTA developed abd pain, sob, and has had fever, chills, diaphoresis and syncope    Presented to ED on 6/14 and CT Abd showed:   1.  Severe cholecystitis, possible component of emphysematous cholecystitis.  2.  Appendicolith, the appendix otherwise appears within normal limits. Appendicolith however places patient at future risk for development of appendicitis.  3.  Atherosclerosis and atherosclerotic coronary artery disease  4.  Hepatomegaly  Was cultured, placed on zosyn, and admitted to the ICU. Had hypotension, treated with IVF and levophed. Had increased O2 requirements which resulted in intubation and mechanical ventilation, hence Pulmonary Service notified for consultation.    Now intubated, mechanically ventilated, tachycardic, hypotensive on levophed with a CVP of 12 cm of water. Current vent settings are APV 16/500/8/100 with ABG pending. His white count is 25,600, hemoglobin 12.5, hematocrit 35.8, and platelet count 83,000. He has 9% bands. His sodium is 134, bicarbonate 12, anion gap 19, glucose 191, BUN 57, creatinine 3.04, estimated GFR 24, , , alkaline phosphatase 312, total bilirubin 1.6, and albumin 3.1. His pro calcitonin is 51.97 ng per mL with normal being less than 0.25 ng per mL. His blood cultures are growing gram-negative rods.    His post intubation chest x-ray shows that the ET tube is in a satisfactory position. There is minimal linear lower lobe atelectasis with possible mild edema. His EKG does not show acute changes other than sinus tachycardia. His INR is 1.32.    Past medical history: Hypertension    Medical procedures and surgery: Colonoscopies    Medication allergies: None    Tobacco: He is a former smoker but stopped in 1987 he smoked a pack a day for 30  years    Alcohol: None    Illicit drugs: None    Social history: he is .    Family history: Mother with COPD and colon cancer. Father with colon cancer.    ROS:  Respiratory: , Cardiac: unable to perform due to the patient's inability to effectively communicate, GI: unable to perform due to the patient's inability to effectively communicate.  All other systems negative.    Interval Events:  24 hour interval history reviewed    PFSH:  No change.    Respiratory:  Fuller Vent Mode: APVCMV, Rate (breaths/min): 16, Vt Target (mL): 500, PEEP/CPAP: 8, FiO2: 100, Static Compliance (ml / cm H2O): 44, Control VTE (exp VT): 500  Pulse Oximetry: 100 %  Chest Tube Drains:          Exam: unlabored respirations, no intercostal retractions or accessory muscle use  ImagingAvailable data reviewed   Recent Labs      06/14/17   2353  06/15/17   0410  06/15/17   0745   ISTATSPEC  Venous  Venous  Venous       HemoDynamics:  Pulse: (!) 111, Heart Rate (Monitored): (!) 110  Blood Pressure : (!) 79/51 mmHg, NIBP: (!) 77/41 mmHg  CVP (mm Hg): (!) 12 MM HG    Exam: sinus tachycardia  Imaging: Available data reviewed  Recent Labs      06/14/17   2353  06/15/17   0410  06/15/17   0745   TROPONINI  1.70*  1.12*  1.04*       Neuro:  GCS         Exam: Heavily sedated  Imaging: Available data reviewed    Fluids:  Intake/Output       06/13/17 0700 - 06/14/17 0659 (Not Admitted) 06/14/17 0700 - 06/15/17 0659 06/15/17 0700 - 06/16/17 0659      2154-7035 2930-6007 Total 4562-7202 8952-4185 Total 0652-6563 9305-9980 Total       Intake    P.O.  --  -- --  --  -- --  0  -- 0    P.O. -- -- -- -- -- -- 0 -- 0    I.V.  --  -- --  --  3082.3 3082.3  265.3  -- 265.3    Magnesium Sulfate Volume -- -- -- -- 100 100 -- -- --    Norepinephrine Volume -- -- -- -- 182.3 182.3 15.3 -- 15.3    IV Volume (NS Bolus) -- -- -- -- 1000 1000 -- -- --    IV Volume (Potassium) -- -- -- -- 400 400 -- -- --    IV Volume (/hr) -- -- -- -- 1000 1000 250 -- 250     IV Volume (Potassium Phosphate ) -- -- -- -- 400 400 -- -- --    Total Intake -- -- -- -- 3082.3 3082.3 265.3 -- 265.3       Output    Urine  --  -- --  --  650 650  340  -- 340    Indwelling Cathether -- -- -- -- 650 650 340 -- 340    Total Output -- -- -- -- 650 650 340 -- 340       Net I/O     -- -- -- -- 2432.3 2432.3 -74.7 -- -74.7        Weight: 101.3 kg (223 lb 5.2 oz)  Recent Labs      06/14/17   1820  06/14/17   2020  06/15/17   0410  06/15/17   0745   SODIUM  130*   --   134*   --    POTASSIUM  2.8*  2.5*  3.9  4.0   CHLORIDE  94*   --   103   --    CO2  13*   --   12*   --    BUN  60*   --   57*   --    CREATININE  3.55*   --   3.04*   --    MAGNESIUM   --   1.4*   --   2.6*   PHOSPHORUS   --   1.8*   --   6.7*   CALCIUM  7.8*   --   6.7*   --        GI/Nutrition:  Exam: patient sedated  Imaging: None - Reviewed  NPO  Liver Function  Recent Labs      06/14/17   1820  06/15/17   0410   ALTSGPT  142*   --    ASTSGOT  159*   --    ALKPHOSPHAT  312*   --    TBILIRUBIN  1.6*   --    LIPASE  23   --    GLUCOSE  178*  191*       Heme:  Recent Labs      06/14/17   1720  06/14/17   1820  06/15/17   0410  06/15/17   1032   RBC   --   4.70  3.77*   --    HEMOGLOBIN   --   15.1  12.5*   --    HEMATOCRIT   --   42.2  35.8*   --    PLATELETCT   --   100*  83*   --    PROTHROMBTM  15.2*   --   16.0*  16.2*   APTT  34.3   --   34.4  31.5   INR  1.22*   --   1.30*  1.32*       Infectious Disease:  Temp  Av.2 °C (98.9 °F)  Min: 35.9 °C (96.6 °F)  Max: 38 °C (100.4 °F)  Micro: reviewed  Recent Labs      17   1820  06/15/17   0410   WBC  7.3  25.6*   NEUTSPOLYS  78.00*   --    LYMPHOCYTES  7.00*   --    MONOCYTES  4.00   --    EOSINOPHILS  0.00   --    BASOPHILS  0.00   --    ASTSGOT  159*   --    ALTSGPT  142*   --    ALKPHOSPHAT  312*   --    TBILIRUBIN  1.6*   --      Current Facility-Administered Medications   Medication Dose Frequency Provider Last Rate Last Dose   • morphine (pf) 4 mg/ml injection 2-4 mg   2-4 mg Q2HRS PRN Che Nash M.D.   4 mg at 06/15/17 0935   • levalbuterol (XOPENEX) 1.25 MG/3ML nebulizer solution 1.25 mg  1.25 mg 4XDAY Che Nash M.D.   1.25 mg at 06/15/17 0939   • hydrALAZINE (APRESOLINE) injection 20 mg  20 mg Q6HRS PRN Che Nash M.D.   20 mg at 06/15/17 1007   • MD ALERT...Pharmacy to implement PROPOFOL CRITICAL CARE PROTOCOL 1 Each  1 Each PRN Che Nash M.D.       • propofol (DIPRIVAN) injection  5-80 mcg/kg/min Continuous Che Nash M.D. 39.5 mL/hr at 06/15/17 1051 65 mcg/kg/min at 06/15/17 1051   • norepinephrine (LEVOPHED) 8 mg in  mL Infusion  0.5-30 mcg/min Continuous Che Nash M.D. 18.8 mL/hr at 06/15/17 1109 10 mcg/min at 06/15/17 1109   • NS infusion   Continuous Che Nash M.D. 75 mL/hr at 06/15/17 1100     • senna-docusate (PERICOLACE or SENOKOT S) 8.6-50 MG per tablet 2 Tab  2 Tab BID Jose Valdivia M.D.   2 Tab at 06/15/17 0843    And   • polyethylene glycol/lytes (MIRALAX) PACKET 1 Packet  1 Packet QDAY PRN Jose Valdivia M.D.        And   • magnesium hydroxide (MILK OF MAGNESIA) suspension 30 mL  30 mL QDAY PRN Jose Valdivia M.D.        And   • bisacodyl (DULCOLAX) suppository 10 mg  10 mg QDAY PRN Jose Valdivia M.D.       • Respiratory Care per Protocol   Continuous RT Jose Valdivia M.D.       • acetaminophen (TYLENOL) tablet 650 mg  650 mg Q6HRS PRN Jose Valdivia M.D.       • piperacillin-tazobactam (ZOSYN) 3.375 g in  mL IVPB  3.375 g Q6HRS Jose Valdivia M.D.   Stopped at 06/15/17 0913   • ondansetron (ZOFRAN) syringe/vial injection 4 mg  4 mg Q4HRS PRN Jose Valdivia M.D.       • ondansetron (ZOFRAN ODT) dispertab 4 mg  4 mg Q4HRS PRN Jose Valdivia M.D.       • lorazepam (ATIVAN) tablet 0.5 mg  0.5 mg Q6HRS PRN Jose Valdivia M.D.        Or   • lorazepam (ATIVAN) injection 0.5 mg  0.5 mg Q6HRS PRN Jose Valdivia M.D.   0.5 mg at 06/15/17 0941     Last reviewed on 6/15/2017  8:22 AM by Ayanna Chaudhari, TrentT    Quality   Measures:  Radiology images reviewed, Medications reviewed and Labs reviewed                      Problems:  Acute cholecystitis  Gram-negative rhiannon bacteremia  Severe sepsis with shock  Ventilator dependent respiratory failure  History of systemic arterial hypertension  Anemia, leukocytosis, thrombocytopenia  Metabolic acidosis acute renal failure  Mild atelectasis  S/p brook drain      Plan:  Ventilator support, adjust FiO2 and PEEP as well as tidal volume and rate to provide for satisfactory alveolar ventilation and oxygenation using lung protective strategies.  Anti-infectives   Pressors and intravenous fluids  Prophylaxis awaiting surgical evaluation check echocardiogram   Cholecystostomy  Adjust IVF, add bicarb        Discussed patient condition and risk of morbidity and/or mortality with RN, RT and hospitalist.  D/w family at the bedside  The patient remains critically ill.  Critical care time = 45 minutes in directly providing and coordinating critical care and extensive data review.  No time overlap and excludes procedures.

## 2017-06-15 NOTE — PROGRESS NOTES
Received bedside report from Romelia DILLARD with preceptor NISHANT Busby at 0700.  Patient is A&O x 4 resting in bed comfortably and stated he had 0/10 (pain) abdominal tightness.  Gtts confirmed, receiving 3L O2 via NC, and safety protocols put in place.  Patient's wife Glenda is at bedside.  Vitals stable at this time and will begin to titrate patient off vasopressor support before IR procedure scheduled for today.

## 2017-06-15 NOTE — PROGRESS NOTES
Blanca from Lab called with critical result of platelets of 46 at 1025. Critical lab result read back to Blanca.   Dr. Nash notified of critical lab result at 1025.  Critical lab result read back by Dr. Nash.

## 2017-06-15 NOTE — PROGRESS NOTES
0922 - Responded to patient call light and found patient with sudden onset abdominal pain 6/10, in respiratory distress: shortness of breath, labored breathing, expiratory wheezing.  Blood pressure at this time was 194/120    0924 - Called Dr. Brooks and notified her of abnormal VS and patient condition.    0927 - Dr. Brooks arrived in patients room 321.  New orders received and carried out at this time. (See MAR / Diagnositcs)    10:09 - ED physician DR. Moctezuma was contacted by Dr. Brooks for emergency intubation.    10:13 - Medications for intubation given followed by rapid intubation at 10:15 by Dr. Moctezuma.  New orders received. (See MAR / Diagnostics)

## 2017-06-15 NOTE — PROGRESS NOTES
Notified by nurse  Pt has 2 blood cx's positive for gram negative rods. He is on zosyn and pressors. Will repeat blood cx x 2; follow up on cultures and de-escalate as allowed.

## 2017-06-15 NOTE — PROGRESS NOTES
Korina from Lab called with critical result of Lactic = 5.49 at 0005. Critical lab result read back to Korina.    not notified. Patient has PRN bolus order and will infuse immediately.

## 2017-06-15 NOTE — PROGRESS NOTES
Blanca from Lab called with critical result of pH 7.27, temp corrected 7.26 at 1155. Critical lab result read back to Blanca.   Dr. Nash notified of critical lab result at 1200.  Critical lab result read back by Dr. Nash.

## 2017-06-15 NOTE — DISCHARGE PLANNING
Medical Social Work    Referral: Pt discussed at IDT rounds this AM.    Intervention: Per flowsheet, pt lives with spouse and expects to d.c home.  No SS needs identified nor any requests for BEN Hernandez during rounds.      Plan: BEN available for any assistance with d.c planning.    Care Transition Team Assessment    Information Source  Information Given By: Patient    Readmission Evaluation  Is this a readmission?: No    Elopement Risk  Legal Hold: No  Ambulatory or Self Mobile in Wheelchair: No-Not an Elopement Risk  Elopement Risk: Not at Risk for Elopement    Interdisciplinary Discharge Planning  Does Admitting Nurse Feel This Could be a Complex Discharge?: No  Primary Care Physician: Dr. Graham Barcenas  Lives with - Patient's Self Care Capacity: Spouse  Patient or legal guardian wants to designate a caregiver (see row info): No  Support Systems: Family Member(s), Friends / Neighbors  Housing / Facility: [address]  Do You Take your Prescribed Medications Regularly: Yes  Able to Return to Previous ADL's: Yes  Mobility Issues: No  Prior Services: None  Patient Expects to be Discharged to:: home  Assistance Needed: Unknown at this Time  Durable Medical Equipment: Not Applicable    Discharge Preparedness  What is your plan after discharge?: Uncertain - pending medical team collaboration  What are your discharge supports?: Spouse         Finances  Prescription Coverage: Yes    Vision / Hearing Impairment  Vision Impairment : Yes  Right Eye Vision: Wears Glasses  Left Eye Vision: Wears Glasses  Hearing Impairment : Yes  Hearing Impairment: Both Ears, Hearing Device(s) Available  Does Pt Need Special Equipment for the Hearing Impaired?: No    Values / Beliefs / Concerns  Values / Beliefs Concerns : No    Advance Directive  Advance Directive?: None    Domestic Abuse  Have you ever been the victim of abuse or violence?: No  Physical Abuse or Sexual Abuse: No  Verbal Abuse or Emotional Abuse: No  Possible Abuse Reported  to:: Not Applicable    Psychological Assessment  History of Substance Abuse: None         Anticipated Discharge Information  Anticipated discharge disposition: Home

## 2017-06-15 NOTE — ED PROVIDER NOTES
CHIEF COMPLAINT  Chief Complaint   Patient presents with   • Abdominal Pain     abdominal pain since saturday  diaphoretic and weakness  diarrhea yesterday       HPI  Damian Larsen is a 76 y.o. male who presents with diffuse abdominal pain for the past several days. Associated with constipation initially however for the past 2 days has had diarrhea. Patient's significant other at bedside notes that he was diaphoretic and weak at home. Had a syncopal episode yesterday secondary to his profound weakness. No vomiting. No obvious fevers at home. Has been having severe chills however. Denies any chest pain or shortness of breath. Denies prior history of abdominal surgeries.    Denies dysuria or hematuria. No recent travel. No recent antibiotics. No recent sick contacts.    REVIEW OF SYSTEMS  See HPI for further details. All other systems are negative.     PAST MEDICAL HISTORY    hypertension    SOCIAL HISTORY  Social History     Social History Main Topics   • Smoking status: Former Smoker     Quit date: 06/14/1987   • Smokeless tobacco: Not on file   • Alcohol Use: No   • Drug Use: No   • Sexual Activity: Not on file       SURGICAL HISTORY  patient denies any surgical history    CURRENT MEDICATIONS  Home Medications     Reviewed by Ángela Sherman R.N. (Registered Nurse) on 06/14/17 at 2227  Med List Status: Complete    Medication Last Dose Status    ascorbic acid (ASCORBIC ACID) 500 MG Tab 6/13/2017 Active    aspirin (ASA) 81 MG Chew Tab chewable tablet 6/13/2017 Active    cyanocobalamin (VITAMIN B-12) 100 MCG Tab 6/13/2017 Active    lisinopril-hydrochlorothiazide (PRINZIDE, ZESTORETIC) 10-12.5 MG per tablet 6/14/2017 Active    loratadine (CLARITIN) 10 MG Tab 6/13/2017 Active    Omega-3 Fatty Acids (FISH OIL) 1000 MG Cap capsule 6/13/2017 Active    omeprazole (PRILOSEC) 20 MG delayed-release capsule 6/14/2017 Active    therapeutic multivitamin-minerals (THERAGRAN-M) Tab 6/13/2017 Active    vitamin D (CHOLECALCIFEROL)  "1000 UNIT Tab 6/13/2017 Active    vitamin e (VITAMIN E) 400 UNIT Cap 6/13/2017 Active                ALLERGIES  No Known Allergies    PHYSICAL EXAM  VITAL SIGNS: BP 85/53 mmHg  Pulse 120  Temp(Src) 35.9 °C (96.6 °F)  Resp 28  Ht 1.778 m (5' 10\")  Wt 93.7 kg (206 lb 9.1 oz)  BMI 29.64 kg/m2  Pulse ox interpretation: I interpret this pulse ox as normal. 96% on room air.  Constitutional: Alert in no apparent distress.  HENT: No signs of trauma, Bilateral external ears normal, Nose normal.   Eyes: Pupils are equal and reactive, Conjunctiva normal, Non-icteric.   Neck: Normal range of motion, No tenderness, Supple, No stridor.   Cardiovascular: Regular rate and rhythm, no murmurs.   Thorax & Lungs: Normal breath sounds, No respiratory distress, No wheezing, No chest tenderness.   Abdomen: Bowel sounds normal, Soft, mild right sided abdominal tenderness, No masses, No pulsatile masses. No peritoneal signs.  Skin: Warm, Dry, No erythema, No rash.   Back: No bony tenderness, No CVA tenderness.   Extremities: Intact distal pulses, No edema, No tenderness, No cyanosis  Musculoskeletal: Good range of motion in all major joints. No tenderness to palpation or major deformities noted.   Neurologic: Alert , Normal motor function and gait, Normal sensory function, No focal deficits noted.   Psychiatric: Affect normal, Judgment normal, Mood normal.       DIAGNOSTIC STUDIES / PROCEDURES    EKG  6/14/2017 at 1825  Sinus tachycardia at 116  [place], QRS, QTC within normal limits  Normal axis  No ST elevations or depressions  No T-wave abnormalities  No signs of acute ischemia      LABS  Labs Reviewed   LACTIC ACID - Abnormal; Notable for the following:     Lactic Acid 7.91 (*)     All other components within normal limits   LACTIC ACID - Abnormal; Notable for the following:     Lactic Acid 6.31 (*)     All other components within normal limits   CBC WITH DIFFERENTIAL - Abnormal; Notable for the following:     MCHC 35.8 (*)     " Platelet Count 100 (*)     Neutrophils-Polys 78.00 (*)     Lymphocytes 7.00 (*)     Lymphs (Absolute) 0.51 (*)     All other components within normal limits   COMP METABOLIC PANEL - Abnormal; Notable for the following:     Sodium 130 (*)     Potassium 2.8 (*)     Chloride 94 (*)     Co2 13 (*)     Anion Gap 23.0 (*)     Glucose 178 (*)     Bun 60 (*)     Creatinine 3.55 (*)     Calcium 7.8 (*)     AST(SGOT) 159 (*)     ALT(SGPT) 142 (*)     Alkaline Phosphatase 312 (*)     Total Bilirubin 1.6 (*)     Albumin 3.1 (*)     Globulin 3.7 (*)     All other components within normal limits   URINALYSIS - Abnormal; Notable for the following:     Character Hazy (*)     Ketones Trace (*)     Protein 30 (*)     Bilirubin Moderate (*)     Occult Blood Moderate (*)     All other components within normal limits    Narrative:     Indication for culture:->Emergency Room Patient   TROPONIN - Abnormal; Notable for the following:     Troponin I 3.33 (*)     All other components within normal limits   ESTIMATED GFR - Abnormal; Notable for the following:     GFR If  20 (*)     GFR If Non  17 (*)     All other components within normal limits   PROTHROMBIN TIME - Abnormal; Notable for the following:     PT 15.2 (*)     INR 1.22 (*)     All other components within normal limits    Narrative:     Indicate which anticoagulants the patient is on:->UNKNOWN   LACTIC ACID - Abnormal; Notable for the following:     Lactic Acid 5.49 (*)     All other components within normal limits   POTASSIUM SERUM (K) - Abnormal; Notable for the following:     Potassium 2.5 (*)     All other components within normal limits    Narrative:     Every 6 hours for 4 tests  Every 6 hours for 3 tests   TROPONIN - Abnormal; Notable for the following:     Troponin I 2.47 (*)     All other components within normal limits    Narrative:     Every 6 hours for 4 tests  Every 6 hours for 3 tests   MAGNESIUM - Abnormal; Notable for the following:   "   Magnesium 1.4 (*)     All other components within normal limits    Narrative:     Every 6 hours for 4 tests  Every 6 hours for 3 tests   PHOSPHORUS - Abnormal; Notable for the following:     Phosphorus 1.8 (*)     All other components within normal limits    Narrative:     Every 6 hours for 4 tests  Every 6 hours for 3 tests   URINE MICROSCOPIC (W/UA) - Abnormal; Notable for the following:     WBC 2-5 (*)     RBC 2-5 (*)     Bacteria Few (*)     All other components within normal limits    Narrative:     Indication for culture:->Emergency Room Patient   TROPONIN - Abnormal; Notable for the following:     Troponin I 1.70 (*)     All other components within normal limits   URINE CULTURE(NEW)    Narrative:     Indication for culture:->Emergency Room Patient   BLOOD CULTURE    Narrative:     Per Hospital Policy: Only change Specimen Src: to \"Line\" if  specified by physician order.   BLOOD CULTURE    Narrative:     Per Hospital Policy: Only change Specimen Src: to \"Line\" if  specified by physician order.   LIPASE   DIFFERENTIAL MANUAL   MORPHOLOGY   APTT    Narrative:     Indicate which anticoagulants the patient is on:->UNKNOWN   TSH    Narrative:     If not done within the last 4 hours  Indicate which anticoagulants the patient is on:->NONE   REFRACTOMETER SG    Narrative:     Indication for culture:->Emergency Room Patient   CULTURE RESPIRATORY W/ GRM STN   PROTHROMBIN TIME   APTT   LACTIC ACID   PROCALCITONIN   TROPONIN   CBC WITHOUT DIFFERENTIAL   BASIC METABOLIC PANEL       RADIOLOGY  DX-CHEST-PORTABLE (1 VIEW)   Final Result         1.  No acute cardiopulmonary disease.      CT-ABDOMEN-PELVIS W/O   Final Result         1.  Severe cholecystitis, possible component of emphysematous cholecystitis.   2.  Appendicolith, the appendix otherwise appears within normal limits. Appendicolith however places patient at future risk for development of appendicitis.   3.  Atherosclerosis and atherosclerotic coronary artery " disease   4.  Hepatomegaly      These findings were discussed with the patient's clinician, Poncho Boudreaux, on 6/14/2017 8:07 PM.      DX-CHEST-PORTABLE (1 VIEW)   Final Result      Hypoinflation without other evidence for acute cardiopulmonary disease.      Echocardiogram Comp W/O Cont    (Results Pending)   [id number]    (Results Pending)         COURSE & MEDICAL DECISION MAKING  Pertinent Labs & Imaging studies reviewed. (See chart for details)  76 y.o. male presenting with abdominal pain. He is hypotensive and tachycardic upon arrival. Has right-sided abdominal pain however does not appear to be peritoneal. Patient was started on sepsis protocol.    Laboratory studies were performed showing lactic acidosis of 7.9. No leukocytosis. Has metabolic acidosis along with acute renal failure with a troponin of 3. To further evaluate the patient's abdominal pain, after fluid resuscitation and improvement in blood pressure, CT abdomen and pelvis without contrast was performed showing concerns for cholecystitis. At that time, the Zosyn was ordered for antibiotic coverage and Dr. Last from general surgery was contacted.     Lactic acid improving with IV fluid resuscitation. Patient's elevation in troponin most likely secondary to demand ischemia from his persistent hypotension from sepsis. Less likely ACS. No active chest pain or shortness of breath. EKG without ischemic changes. To continue monitoring troponin. Given that he is an acute surgical candidate, anticoagulation was not immediately started for this patient. He denies prior known history of cardiac issues in general and ACS in particular.    Spoke with Dr. Last asked 8:15 PM and she is recommending admission to the ICU by medicine and she will consults. Recommending MRCP for further evaluation.    Spoke with Dr. Grajeda from medicine service and he agrees to the admission. He will be placing a central line for further resuscitation efforts in the intensive care  unit as the patient continues to be with continued low blood pressure in the 90s systolic. The patient was admitted to the ICU in critical condition.    The total critical care time on this patient is 50 minutes, resuscitating patient, speaking with admitting physician, and deciphering test results. This 50 minutes is exclusive of separately billable procedures.      FINAL IMPRESSION  Cholecystitis  Severe sepsis  Acute renal failure  Dehydration  Elevated troponin  Hypotension  Septic shock        Electronically signed by: Poncho Boudreaux, 6/14/2017 6:26 PM

## 2017-06-15 NOTE — OP REPORT
Reason for procedure: hypotrension    Type of Procedure: Right Internal Jugular line placement with US guidance    Course of procedure:  Informed consent was obtained, the patient was explained risks, benefits alternatives and complications. The patient signed the consent. Time out was take. Patient was draped and dressed in a sterile fashion in the reverse trendelenburg position. The right nape of the neck was disinfected with chlorhexidine rinse for 60 seconds. I was dressed in sterile fashion including hat, mask, gown and gloves, and my assistants were wearing hat and mask and gloves. The right IJ was pierced with a 21 gauge needle under US guidance. The guide was introduced through the needle into position and the needle was removed and discarded. A small incision was made along the guide wire. A dilator probe was advanced over the wire and the skin dilated. The triple lumen catheter was than advanced over the guide wire into position. The guide wire was removed. The triple lumen catheter was secured to the nape of the neck using two interrupted sutures.  All three ports were tested and flushed. A bio-disk was applied at the entrance of the catheter through the skin. A Tegaderm was used to cover the area. Postoperatively a stat portable chest x-ray was ordered.     Complications: none    Blood Loss: less than 5 ml

## 2017-06-15 NOTE — H&P
DATE OF ADMISSION:  06/14/2017    CHIEF COMPLAINT:  Abdominal pain.    PRIMARY CARE PHYSICIAN:  Dr. Barcenas.    [facility]    DIAGNOSES:  1.  Sepsis secondary to acute cholecystitis.  2.  Non-ST elevation myocardial infarction.    HISTORY OF CURRENT ILLNESS:  The patient is a 76-year-old gentleman whose   Saturday was in his normal state of health and he played a round of golf with   his friends afterwards, he ate an entire pizza.  The patient next morning woke   up, had severe abdominal pain and shortness of breath.  He lingered around   for the next 4 days and he was having on and off chills and rigors and fevers.    He has been diaphoretic on many occasions, he passed out 4 times.  He also   had severe abdominal pain, shortness of breath, and he has also had nausea,   vomiting.  The patient finally today decided to come into the emergency room   to be evaluated.  At this point, patient had then an urgent CT of the abdomen   which at this point shows an acute gallbladder.  The patient's lactic acid is   up to almost 8.  His troponin is up to 3.3.  The patient at this point for   severe sepsis will be admitted to the ICU.  He will at this point be given   fluid resuscitation and pressors; he will be given IV antibiotics with Zosyn.    He will be placed on heparin drip.  Cardiology and surgery will need to   consult as will need GI.  The patient will need at this point resuscitation   and then he will need an MRCP once he is stable.  The patient then will need a   gallbladder to be removed once he is stabilized.    PAST MEDICAL HISTORY:  Includes hypertension.    PAST SURGICAL HISTORY:  He only had recurrent colonoscopies.    ALLERGIES:  No known drug allergies.    MEDICATIONS:  At the time of admission include lisinopril 10 mg p.o. daily,   hydrochlorothiazide 12.5 mg p.o. daily, omeprazole 20 mg p.o. daily.    SOCIAL HISTORY:  He is .  He is a former smoker.  He smoked till  .    He smoked for 30 years, 1 pack per day.  He also smoked a pipe.  No alcohol,   no drugs.  He has no advanced directive.  He is full code status.    FAMILY HISTORY:  Mother  of colon cancer and had COPD.  Father  of   colon cancer.    REVIEW OF SYSTEMS:  At this point, he complains of 4 days of constipation with   abdominal pain, shortness of breath, chills, rigors, fevers, diaphoresis,   syncope x4, nausea and vomiting as well as inability to keep food down or eat   and he complains of shortness of breath as well.  All other review of systems   were reviewed and are negative.    PHYSICAL EXAMINATION:  VITAL SIGNS:  Temperature 35.9 with a pulse of 102, respirations 24, blood   pressure 79/51.  He is 177 cm tall and he is 93 kilograms in weight.  GENERAL:  He is currently alert, awake, oriented x3, pleasant, cooperative   gentleman with his wife at bedside.  HEENT:  Head is atraumatic.  Eyes follow normal range of gaze.  Pupils are   equal, round, reactive bilaterally.  Nose midline without discharge.  Ears   bilaterally intact without discharge.  Oral cavity, moist mucous membranes.    No apparent focus infection in the oral cavity.  NECK:  Soft and supple.  No JVD, carotid bruit, thyromegaly or lymphadenopathy   appreciated.  CHEST WALL:  Moves equal with inspiration and expiration.  No paradoxical   motion.  No reproducible chest wall tenderness.  HEART:  S1, S2.  No murmurs or gallops detected.  LUNGS:  Clear to auscultation.  No rales or rhonchi detected.  ABDOMEN:  Distended, bowel sounds are hyperactive.  There is tenderness in the   epigastric region and the right upper quadrant.  The patient has a rebound   with Robins sign positive.  GENITAL:  Deferred.  RECTAL:  Deferred.  Spleen and liver could not be palpated.  EXTREMITIES:  Upper and lower extremities, positive pulses, no edema.  Good   muscle strength, good muscle tone.  Positive deep tendon reflexes.  NEUROLOGIC:  Cranial nerves  II-XII grossly within normal limits without any   focal deficits appreciated.  SKIN:  Normal turgor.  No rashes or abrasions identified.    LABORATORY EVALUATION:  WBC count is 7.3, hemoglobin is 15.1, hematocrit 42.2,   platelets are down to 100.  His sodium is 130, potassium 2.8, chloride 94,   CO2 is 13, BUN 60, creatinine 3.55, calcium 7.8 with a glucose of 178.  Liver   function tests are all elevated with a total bilirubin of 1.6, _____.  AST   159, , albumin is down at 3.1, anion gap is 23, troponin is 3.33 and   the lactic acid is 7.91, INR is 1.22.    Imaging studies which I have reviewed myself at this point shows CT of the   abdomen and pelvis without contrast due to the patient's acute renal failure.    At this point shows severe cholecystitis, emphysematous, there is   appendicolith with the appendix, otherwise normal.  Atherosclerosis and   hepatomegaly.  The patient also had a portable chest x-ray, which at this   point shows hypoinflation, otherwise negative for cardiopulmonary process, I   have reviewed it myself.  A 12-lead EKG, which I have reviewed myself shows a   sinus tachycardia, borderline prolonged QT interval with QT at 484, IN of 124,   rate is 116, otherwise no ST elevations, no acute MI noted on the 12-lead   EKG.    IMPRESSION AND PLAN:  At this point, the patient is a 76-year-old gentleman   who for the past 4 days has been having abdominal pain.  He appears to have at   this point an acute gallbladder that is emphysematous.  The patient at this   point will need an MRCP to evaluate his gallbladder and then surgery has been   consulted.  Dr. Last will be seeing the patient.  The patient will most   likely also need GI evaluation once the MRCP is back.  1.  The patient is septic.  Patient's sepsis is from his acute gallbladder.    He is at this point with a lactic acid of 7.9.  The patient will need fluid   resuscitation.  He is hypotensive.  I am going to put him on pressor  support   and the patient at this point will need blood cultures and I have put him on   IV Zosyn.  2.  Acute non-ST elevation myocardial infarction.  Troponins will be trended.    He will be placed on heparin drip.  Currently, I cannot put him on beta   blockers due to his hypotension.  Patient does have tachycardia, which is   related to his sepsis.  The patient at this point is essentially positive for   stress test, eventually the patient will probably need a cardiac   catheterization.  3.  Hyponatremia.  This is secondary to fluid dehydration.  He will need at   this point fluid resuscitation.  4.  Hypokalemia.  He will need potassium supplementation.  5.  Anion gap metabolic acidosis.  This is going to be corrected with of the   sepsis.  6.  Acute renal injury related to the sepsis present on admission.  The   patient's renal functions will need to be monitored daily and need to be given   fluid resuscitation.  7.  Acute hyperglycemia, most likely related to the sepsis.  Monitor at this   point blood sugars.  8.  Acute thrombocytopenia, monitor at this point does not need transfusion if   he starts bleeding the patient will need platelet supplementation.  Patient   at this point is critically ill.    I have spent 60 minutes of critical care time with this patient.  Patient is   full admission to the ICU.       ____________________________________     MD DOLORES PEREZ / GREGG    DD:  06/14/2017 21:11:58  DT:  06/14/2017 21:55:04    D#:  [id number]  Job#:  [id number]

## 2017-06-15 NOTE — PROGRESS NOTES
1210 Pt transported on ventilator with RT at bedside to CT, ICU monitoring during transport.  1240 Pt returned to Room 321 from CT scan, Pt tolerated well, on full mechanical ventilation, titrating Levophed per protocol, see Dr Kyle THAKUR at bedside for IR placement of drain to gall bladder, explaining procedure to wife. Dr Marron called to inform Pt at maximum dose of  Levophed and BP dropping. Orders received for vasopressin gtt entered and implemented.  1315 Procedure completed at bedside, new DOMINGO drain placed to right upper abdomen, labs sent per order Dr Wright. Pt resting calmly, no distress or agitation noted. Vasopressin gtt now infusing per order Dr Nash, Bicarb gtt per orders Dr Ivy, Will closely monitor.

## 2017-06-15 NOTE — PROGRESS NOTES
Korina from Lab called with critical result of Potassium = 2.5 and Troponin = 2.47 at 2143. Critical results taken by Nicole DILLARD. Critical lab result read back to Korina.   Dr. Valdivia not notified of critical lab d/t K-riders x4 is already ordee edand has been started; troponin is improving from previous result. Will recheck potassium level at 0200 and troponin at 0000. Will continue to monitor closely.

## 2017-06-15 NOTE — PROGRESS NOTES
Alyse from Lab called with critical result of Lactic Acid of 6.02 at 1645. Critical lab result read back to Kiki.   Dr. Nash notified of critical lab result at 1647.  Critical lab result read back by Dr. Nash.

## 2017-06-16 ENCOUNTER — APPOINTMENT (OUTPATIENT)
Dept: RADIOLOGY | Facility: MEDICAL CENTER | Age: 77
DRG: 870 | End: 2017-06-16
Attending: HOSPITALIST
Payer: MEDICARE

## 2017-06-16 PROBLEM — I48.91 ATRIAL FIBRILLATION WITH RVR (HCC): Status: ACTIVE | Noted: 2017-06-16

## 2017-06-16 LAB
ANION GAP SERPL CALC-SCNC: 10 MMOL/L (ref 0–11.9)
ANION GAP SERPL CALC-SCNC: 11 MMOL/L (ref 0–11.9)
ANION GAP SERPL CALC-SCNC: 14 MMOL/L (ref 0–11.9)
ANION GAP SERPL CALC-SCNC: 19 MMOL/L (ref 0–11.9)
BASE EXCESS BLDA CALC-SCNC: -8 MMOL/L (ref -4–3)
BODY TEMPERATURE: 37.2 CENTIGRADE
BUN SERPL-MCNC: 36 MG/DL (ref 8–22)
BUN SERPL-MCNC: 43 MG/DL (ref 8–22)
BUN SERPL-MCNC: 55 MG/DL (ref 8–22)
BUN SERPL-MCNC: 61 MG/DL (ref 8–22)
CALCIUM SERPL-MCNC: 6.8 MG/DL (ref 8.4–10.2)
CALCIUM SERPL-MCNC: 7.1 MG/DL (ref 8.4–10.2)
CHLORIDE SERPL-SCNC: 100 MMOL/L (ref 96–112)
CHLORIDE SERPL-SCNC: 100 MMOL/L (ref 96–112)
CHLORIDE SERPL-SCNC: 104 MMOL/L (ref 96–112)
CHLORIDE SERPL-SCNC: 105 MMOL/L (ref 96–112)
CO2 SERPL-SCNC: 15 MMOL/L (ref 20–33)
CO2 SERPL-SCNC: 22 MMOL/L (ref 20–33)
CO2 SERPL-SCNC: 25 MMOL/L (ref 20–33)
CO2 SERPL-SCNC: 27 MMOL/L (ref 20–33)
CREAT SERPL-MCNC: 1.33 MG/DL (ref 0.5–1.4)
CREAT SERPL-MCNC: 1.49 MG/DL (ref 0.5–1.4)
CREAT SERPL-MCNC: 2.01 MG/DL (ref 0.5–1.4)
CREAT SERPL-MCNC: 2.53 MG/DL (ref 0.5–1.4)
EKG IMPRESSION: NORMAL
ERYTHROCYTE [DISTWIDTH] IN BLOOD BY AUTOMATED COUNT: 47.1 FL (ref 35.9–50)
ERYTHROCYTE [DISTWIDTH] IN BLOOD BY AUTOMATED COUNT: 47.3 FL (ref 35.9–50)
ERYTHROCYTE [DISTWIDTH] IN BLOOD BY AUTOMATED COUNT: 48.3 FL (ref 35.9–50)
ERYTHROCYTE [DISTWIDTH] IN BLOOD BY AUTOMATED COUNT: 49.7 FL (ref 35.9–50)
GFR SERPL CREATININE-BSD FRML MDRD: 25 ML/MIN/1.73 M 2
GFR SERPL CREATININE-BSD FRML MDRD: 32 ML/MIN/1.73 M 2
GFR SERPL CREATININE-BSD FRML MDRD: 46 ML/MIN/1.73 M 2
GFR SERPL CREATININE-BSD FRML MDRD: 52 ML/MIN/1.73 M 2
GLUCOSE BLD-MCNC: 174 MG/DL (ref 65–99)
GLUCOSE BLD-MCNC: 184 MG/DL (ref 65–99)
GLUCOSE BLD-MCNC: 236 MG/DL (ref 65–99)
GLUCOSE BLD-MCNC: 318 MG/DL (ref 65–99)
GLUCOSE BLD-MCNC: 319 MG/DL (ref 65–99)
GLUCOSE SERPL-MCNC: 207 MG/DL (ref 65–99)
GLUCOSE SERPL-MCNC: 263 MG/DL (ref 65–99)
GLUCOSE SERPL-MCNC: 296 MG/DL (ref 65–99)
GLUCOSE SERPL-MCNC: 343 MG/DL (ref 65–99)
GRAM STN SPEC: NORMAL
HCO3 BLDA-SCNC: 17 MMOL/L (ref 17–25)
HCT VFR BLD AUTO: 35 % (ref 42–52)
HCT VFR BLD AUTO: 35.2 % (ref 42–52)
HCT VFR BLD AUTO: 35.4 % (ref 42–52)
HCT VFR BLD AUTO: 38.1 % (ref 42–52)
HGB BLD-MCNC: 12.5 G/DL (ref 14–18)
HGB BLD-MCNC: 12.6 G/DL (ref 14–18)
HGB BLD-MCNC: 12.8 G/DL (ref 14–18)
HGB BLD-MCNC: 13.3 G/DL (ref 14–18)
LACTATE BLD-SCNC: 2.58 MMOL/L (ref 0.5–2)
LACTATE BLD-SCNC: 2.81 MMOL/L (ref 0.5–2)
LACTATE BLD-SCNC: 2.84 MMOL/L (ref 0.5–2)
LACTATE BLD-SCNC: 4.75 MMOL/L (ref 0.5–2)
LACTATE BLD-SCNC: 6.58 MMOL/L (ref 0.5–2)
MCH RBC QN AUTO: 33.3 PG (ref 27–33)
MCH RBC QN AUTO: 33.4 PG (ref 27–33)
MCH RBC QN AUTO: 33.5 PG (ref 27–33)
MCH RBC QN AUTO: 33.6 PG (ref 27–33)
MCHC RBC AUTO-ENTMCNC: 34.9 G/DL (ref 33.7–35.3)
MCHC RBC AUTO-ENTMCNC: 35.7 G/DL (ref 33.7–35.3)
MCHC RBC AUTO-ENTMCNC: 35.8 G/DL (ref 33.7–35.3)
MCHC RBC AUTO-ENTMCNC: 36.2 G/DL (ref 33.7–35.3)
MCV RBC AUTO: 92.4 FL (ref 81.4–97.8)
MCV RBC AUTO: 93.6 FL (ref 81.4–97.8)
MCV RBC AUTO: 94.1 FL (ref 81.4–97.8)
MCV RBC AUTO: 95.5 FL (ref 81.4–97.8)
O2/TOTAL GAS SETTING VFR VENT: 35 % (ref 30–60)
PCO2 BLDA: 33.5 MMHG (ref 26–37)
PCO2 TEMP ADJ BLDA: 33.8 MMHG (ref 26–37)
PEEP SETTING VENT: 8 CM[H2O]
PH BLDA: 7.32 [PH] (ref 7.4–7.5)
PH TEMP ADJ BLDA: 7.32 [PH] (ref 7.4–7.5)
PLATELET # BLD AUTO: 40 K/UL (ref 164–446)
PLATELET # BLD AUTO: 40 K/UL (ref 164–446)
PLATELET # BLD AUTO: 41 K/UL (ref 164–446)
PLATELET # BLD AUTO: 54 K/UL (ref 164–446)
PLATELETS.RETICULATED NFR BLD AUTO: 12.8 K/UL (ref 0.6–13.1)
PLATELETS.RETICULATED NFR BLD AUTO: 16 K/UL (ref 0.6–13.1)
PMV BLD AUTO: 12.3 FL (ref 9–12.9)
PMV BLD AUTO: 12.7 FL (ref 9–12.9)
PMV BLD AUTO: 12.9 FL (ref 9–12.9)
PMV BLD AUTO: 13.1 FL (ref 9–12.9)
PO2 BLDA: 127.4 MMHG (ref 64–87)
PO2 TEMP ADJ BLDA: 128.6 MMHG (ref 64–87)
POTASSIUM SERPL-SCNC: 2.8 MMOL/L (ref 3.6–5.5)
POTASSIUM SERPL-SCNC: 3.3 MMOL/L (ref 3.6–5.5)
POTASSIUM SERPL-SCNC: 3.5 MMOL/L (ref 3.6–5.5)
POTASSIUM SERPL-SCNC: 3.6 MMOL/L (ref 3.6–5.5)
RBC # BLD AUTO: 3.72 M/UL (ref 4.7–6.1)
RBC # BLD AUTO: 3.76 M/UL (ref 4.7–6.1)
RBC # BLD AUTO: 3.83 M/UL (ref 4.7–6.1)
RBC # BLD AUTO: 3.99 M/UL (ref 4.7–6.1)
RESP RATE: 16
SAO2 % BLDA: 97.9 % (ref 93–99)
SIGNIFICANT IND 70042: NORMAL
SITE SITE: NORMAL
SODIUM SERPL-SCNC: 136 MMOL/L (ref 135–145)
SODIUM SERPL-SCNC: 138 MMOL/L (ref 135–145)
SODIUM SERPL-SCNC: 138 MMOL/L (ref 135–145)
SODIUM SERPL-SCNC: 140 MMOL/L (ref 135–145)
SOURCE SOURCE: NORMAL
SPECIMEN DRAWN FROM PATIENT: ABNORMAL
TROPONIN I SERPL-MCNC: 0.65 NG/ML (ref 0–0.04)
VT SETTING VENT: 500 ML
WBC # BLD AUTO: 13 K/UL (ref 4.8–10.8)
WBC # BLD AUTO: 13.7 K/UL (ref 4.8–10.8)
WBC # BLD AUTO: 14.1 K/UL (ref 4.8–10.8)
WBC # BLD AUTO: 17.6 K/UL (ref 4.8–10.8)

## 2017-06-16 PROCEDURE — 94640 AIRWAY INHALATION TREATMENT: CPT

## 2017-06-16 PROCEDURE — 93010 ELECTROCARDIOGRAM REPORT: CPT | Performed by: INTERNAL MEDICINE

## 2017-06-16 PROCEDURE — 99233 SBSQ HOSP IP/OBS HIGH 50: CPT | Performed by: HOSPITALIST

## 2017-06-16 PROCEDURE — 82962 GLUCOSE BLOOD TEST: CPT

## 2017-06-16 PROCEDURE — 87070 CULTURE OTHR SPECIMN AEROBIC: CPT

## 2017-06-16 PROCEDURE — 700102 HCHG RX REV CODE 250 W/ 637 OVERRIDE(OP): Performed by: HOSPITALIST

## 2017-06-16 PROCEDURE — 36600 WITHDRAWAL OF ARTERIAL BLOOD: CPT

## 2017-06-16 PROCEDURE — 86901 BLOOD TYPING SEROLOGIC RH(D): CPT

## 2017-06-16 PROCEDURE — 93005 ELECTROCARDIOGRAM TRACING: CPT | Performed by: HOSPITALIST

## 2017-06-16 PROCEDURE — 700101 HCHG RX REV CODE 250

## 2017-06-16 PROCEDURE — 84484 ASSAY OF TROPONIN QUANT: CPT

## 2017-06-16 PROCEDURE — 700102 HCHG RX REV CODE 250 W/ 637 OVERRIDE(OP)

## 2017-06-16 PROCEDURE — A9270 NON-COVERED ITEM OR SERVICE: HCPCS | Performed by: HOSPITALIST

## 2017-06-16 PROCEDURE — 85027 COMPLETE CBC AUTOMATED: CPT | Mod: 91

## 2017-06-16 PROCEDURE — 700105 HCHG RX REV CODE 258: Performed by: HOSPITALIST

## 2017-06-16 PROCEDURE — 86900 BLOOD TYPING SEROLOGIC ABO: CPT

## 2017-06-16 PROCEDURE — 83036 HEMOGLOBIN GLYCOSYLATED A1C: CPT

## 2017-06-16 PROCEDURE — 71010 DX-CHEST-PORTABLE (1 VIEW): CPT

## 2017-06-16 PROCEDURE — 85055 RETICULATED PLATELET ASSAY: CPT | Mod: 91

## 2017-06-16 PROCEDURE — 700111 HCHG RX REV CODE 636 W/ 250 OVERRIDE (IP): Performed by: HOSPITALIST

## 2017-06-16 PROCEDURE — 80048 BASIC METABOLIC PNL TOTAL CA: CPT | Mod: 91

## 2017-06-16 PROCEDURE — 94003 VENT MGMT INPAT SUBQ DAY: CPT

## 2017-06-16 PROCEDURE — 700101 HCHG RX REV CODE 250: Performed by: HOSPITALIST

## 2017-06-16 PROCEDURE — 83605 ASSAY OF LACTIC ACID: CPT

## 2017-06-16 PROCEDURE — 82803 BLOOD GASES ANY COMBINATION: CPT

## 2017-06-16 PROCEDURE — 87205 SMEAR GRAM STAIN: CPT

## 2017-06-16 PROCEDURE — 770022 HCHG ROOM/CARE - ICU (200)

## 2017-06-16 RX ORDER — POTASSIUM CHLORIDE 29.8 MG/ML
40 INJECTION INTRAVENOUS ONCE
Status: COMPLETED | OUTPATIENT
Start: 2017-06-16 | End: 2017-06-16

## 2017-06-16 RX ORDER — BISACODYL 10 MG
10 SUPPOSITORY, RECTAL RECTAL
Status: DISCONTINUED | OUTPATIENT
Start: 2017-06-16 | End: 2017-06-16

## 2017-06-16 RX ORDER — POTASSIUM CHLORIDE 7.45 MG/ML
10 INJECTION INTRAVENOUS
Status: DISCONTINUED | OUTPATIENT
Start: 2017-06-16 | End: 2017-06-16

## 2017-06-16 RX ORDER — LEVALBUTEROL INHALATION SOLUTION 1.25 MG/3ML
1.25 SOLUTION RESPIRATORY (INHALATION)
Status: DISCONTINUED | OUTPATIENT
Start: 2017-06-16 | End: 2017-06-21

## 2017-06-16 RX ORDER — POLYETHYLENE GLYCOL 3350 17 G/17G
1 POWDER, FOR SOLUTION ORAL
Status: DISCONTINUED | OUTPATIENT
Start: 2017-06-16 | End: 2017-06-16

## 2017-06-16 RX ORDER — DEXTROSE MONOHYDRATE 25 G/50ML
25 INJECTION, SOLUTION INTRAVENOUS
Status: DISCONTINUED | OUTPATIENT
Start: 2017-06-16 | End: 2017-06-16

## 2017-06-16 RX ORDER — ACETAMINOPHEN 650 MG/1
650 SUPPOSITORY RECTAL EVERY 6 HOURS PRN
Status: DISCONTINUED | OUTPATIENT
Start: 2017-06-16 | End: 2017-06-28 | Stop reason: HOSPADM

## 2017-06-16 RX ORDER — AMOXICILLIN 250 MG
2 CAPSULE ORAL 2 TIMES DAILY
Status: DISCONTINUED | OUTPATIENT
Start: 2017-06-16 | End: 2017-06-16

## 2017-06-16 RX ORDER — DEXTROSE MONOHYDRATE 25 G/50ML
25 INJECTION, SOLUTION INTRAVENOUS
Status: DISCONTINUED | OUTPATIENT
Start: 2017-06-16 | End: 2017-06-28 | Stop reason: HOSPADM

## 2017-06-16 RX ORDER — SODIUM CHLORIDE 9 MG/ML
INJECTION, SOLUTION INTRAVENOUS CONTINUOUS
Status: DISCONTINUED | OUTPATIENT
Start: 2017-06-16 | End: 2017-06-18

## 2017-06-16 RX ADMIN — SODIUM BICARBONATE: 84 INJECTION, SOLUTION INTRAVENOUS at 05:52

## 2017-06-16 RX ADMIN — LEVALBUTEROL 1.25 MG: 1.25 SOLUTION RESPIRATORY (INHALATION) at 03:00

## 2017-06-16 RX ADMIN — PIPERACILLIN AND TAZOBACTAM 3.38 G: 3; .375 INJECTION, POWDER, FOR SOLUTION INTRAVENOUS at 02:30

## 2017-06-16 RX ADMIN — INSULIN LISPRO 10 UNITS: 100 INJECTION, SOLUTION INTRAVENOUS; SUBCUTANEOUS at 01:36

## 2017-06-16 RX ADMIN — NOREPINEPHRINE BITARTRATE 25 MCG/MIN: 1 INJECTION, SOLUTION, CONCENTRATE INTRAVENOUS at 02:00

## 2017-06-16 RX ADMIN — PROPOFOL 40 MCG/KG/MIN: 10 INJECTION, EMULSION INTRAVENOUS at 02:00

## 2017-06-16 RX ADMIN — LEVALBUTEROL 1.25 MG: 1.25 SOLUTION RESPIRATORY (INHALATION) at 22:25

## 2017-06-16 RX ADMIN — PROPOFOL 40 MCG/KG/MIN: 10 INJECTION, EMULSION INTRAVENOUS at 19:49

## 2017-06-16 RX ADMIN — LEVALBUTEROL 1.25 MG: 1.25 SOLUTION RESPIRATORY (INHALATION) at 06:40

## 2017-06-16 RX ADMIN — POTASSIUM CHLORIDE 40 MEQ: 400 INJECTION, SOLUTION INTRAVENOUS at 14:04

## 2017-06-16 RX ADMIN — FAMOTIDINE 20 MG: 10 INJECTION, SOLUTION INTRAVENOUS at 10:45

## 2017-06-16 RX ADMIN — ACETAMINOPHEN 650 MG: 650 SUPPOSITORY RECTAL at 16:15

## 2017-06-16 RX ADMIN — PIPERACILLIN AND TAZOBACTAM 3.38 G: 3; .375 INJECTION, POWDER, FOR SOLUTION INTRAVENOUS at 09:05

## 2017-06-16 RX ADMIN — VASOPRESSIN 0.03 UNITS/MIN: 20 INJECTION INTRAVENOUS at 07:17

## 2017-06-16 RX ADMIN — SODIUM CHLORIDE 1000 ML: 9 INJECTION, SOLUTION INTRAVENOUS at 22:17

## 2017-06-16 RX ADMIN — VASOPRESSIN 0.03 UNITS/MIN: 20 INJECTION INTRAVENOUS at 18:12

## 2017-06-16 RX ADMIN — PROPOFOL 45 MCG/KG/MIN: 10 INJECTION, EMULSION INTRAVENOUS at 16:05

## 2017-06-16 RX ADMIN — LEVALBUTEROL 1.25 MG: 1.25 SOLUTION RESPIRATORY (INHALATION) at 14:19

## 2017-06-16 RX ADMIN — PIPERACILLIN AND TAZOBACTAM 3.38 G: 3; .375 INJECTION, POWDER, FOR SOLUTION INTRAVENOUS at 20:44

## 2017-06-16 RX ADMIN — PROPOFOL 45 MCG/KG/MIN: 10 INJECTION, EMULSION INTRAVENOUS at 09:34

## 2017-06-16 RX ADMIN — INSULIN LISPRO 10 UNITS: 100 INJECTION, SOLUTION INTRAVENOUS; SUBCUTANEOUS at 05:56

## 2017-06-16 RX ADMIN — PROPOFOL 45 MCG/KG/MIN: 10 INJECTION, EMULSION INTRAVENOUS at 12:33

## 2017-06-16 RX ADMIN — NOREPINEPHRINE BITARTRATE 16 MCG/MIN: 1 INJECTION, SOLUTION, CONCENTRATE INTRAVENOUS at 18:12

## 2017-06-16 RX ADMIN — NOREPINEPHRINE BITARTRATE 17 MCG/MIN: 1 INJECTION, SOLUTION, CONCENTRATE INTRAVENOUS at 10:24

## 2017-06-16 RX ADMIN — INSULIN LISPRO 1 UNITS: 100 INJECTION, SOLUTION INTRAVENOUS; SUBCUTANEOUS at 21:19

## 2017-06-16 RX ADMIN — PROPOFOL 35 MCG/KG/MIN: 10 INJECTION, EMULSION INTRAVENOUS at 05:03

## 2017-06-16 RX ADMIN — SODIUM CHLORIDE: 9 INJECTION, SOLUTION INTRAVENOUS at 09:13

## 2017-06-16 RX ADMIN — SODIUM CHLORIDE: 9 INJECTION, SOLUTION INTRAVENOUS at 13:16

## 2017-06-16 RX ADMIN — CALCIUM GLUCONATE 2 G: 94 INJECTION, SOLUTION INTRAVENOUS at 01:30

## 2017-06-16 RX ADMIN — PROPOFOL 45 MCG/KG/MIN: 10 INJECTION, EMULSION INTRAVENOUS at 23:57

## 2017-06-16 RX ADMIN — INSULIN LISPRO 4 UNITS: 100 INJECTION, SOLUTION INTRAVENOUS; SUBCUTANEOUS at 12:19

## 2017-06-16 RX ADMIN — PIPERACILLIN AND TAZOBACTAM 3.38 G: 3; .375 INJECTION, POWDER, FOR SOLUTION INTRAVENOUS at 15:03

## 2017-06-16 RX ADMIN — INSULIN LISPRO 1 UNITS: 100 INJECTION, SOLUTION INTRAVENOUS; SUBCUTANEOUS at 17:15

## 2017-06-16 RX ADMIN — LEVALBUTEROL 1.25 MG: 1.25 SOLUTION RESPIRATORY (INHALATION) at 10:17

## 2017-06-16 RX ADMIN — LEVALBUTEROL 1.25 MG: 1.25 SOLUTION RESPIRATORY (INHALATION) at 18:48

## 2017-06-16 NOTE — ASSESSMENT & PLAN NOTE
S/p cholecystostomy completed  Cx with Klebsiella pneumonia- continue zosyn IV and ID following.   Surgery Dr Last following: plan OR once pt has stabilized- likely 4-6 weeks.

## 2017-06-16 NOTE — ASSESSMENT & PLAN NOTE
Dr. Chua consulted:  No intervention  Type 2 2/2  sepsis  No wall motion abnormalities on echo  Preserved LVEF, Gd I DHF

## 2017-06-16 NOTE — PROGRESS NOTES
Cardiology Progress Note               Author: Teresita Guzman Date & Time created: 2017  7:03 AM     Interval History:  Patient intubated and sedated currently on Levophed for blood pressure support.    Review of Systems   Unable to perform ROS: intubated       Physical Exam   Constitutional: He appears well-developed and well-nourished.   HENT:   Head: Normocephalic and atraumatic.   Right IJ line present   Eyes: Right eye exhibits no discharge.   Neck: No JVD present. No tracheal deviation present.   Cardiovascular: Normal rate and regular rhythm.    Murmur heard.  Pulmonary/Chest:   Patient currently intubated decreased breath sounds in lower half lungs.   Abdominal: Soft.   Drain seen near left upper abdominal quadrant   Musculoskeletal: He exhibits no edema.   Neurological:   Deferred as patient is intubated     Psychiatric:   Deferred       Hemodynamics:  Temp (24hrs), Av.3 °C (97.4 °F), Min:35.3 °C (95.6 °F), Max:37.4 °C (99.4 °F)  Temperature: 36.1 °C (97 °F), Monitored Temp: 36.9 °C (98.4 °F)  Pulse  Av.4  Min: 70  Max: 120Heart Rate (Monitored): 75  Blood Pressure : (!) 78/48 mmHg, NIBP: 101/64 mmHg  CVP (mm Hg): (!) 15 MM HG  Respiratory:  Fuller Vent Mode: APVCMV, Rate (breaths/min): 16, PEEP/CPAP: 8, FiO2: 35, P Peak (PIP): 18, P MEAN: 10 Respiration: 15, Pulse Oximetry: 99 %, O2 Daily Delivery Respiratory : OxyMask     Given By:: Mouthpiece, Work Of Breathing / Effort: Vented  RUL Breath Sounds: Clear, RML Breath Sounds: Clear, RLL Breath Sounds: Diminished, OZZY Breath Sounds: Clear, LLL Breath Sounds: Diminished  Fluids:     Weight: 102.8 kg (226 lb 10.1 oz)  GI/Nutrition:  Orders Placed This Encounter   Procedures   • Diet NPO     Standing Status: Standing      Number of Occurrences: 1      Standing Expiration Date:      Order Specific Question:  Restrict to:     Answer:  Sips with Medications [3]     Lab Results:  Recent Labs      06/15/17   1800  17   0015  17   0540    WBC  17.9*  17.6*  14.1*   RBC  4.19*  3.99*  3.72*   HEMOGLOBIN  13.9*  13.3*  12.5*   HEMATOCRIT  40.2*  38.1*  35.0*   MCV  95.9  95.5  94.1   MCH  33.2*  33.3*  33.6*   MCHC  34.6  34.9  35.7*   RDW  49.1  49.7  48.3   PLATELETCT  59*  54*  40*   MPV  11.5  13.1*  12.9     Recent Labs      06/15/17   1800  17   0015  17   0540   SODIUM  139  138  136   POTASSIUM  3.1*  3.6  3.3*   CHLORIDE  106  104  100   CO2  12*  15*  22   GLUCOSE  187*  296*  343*   BUN  62*  61*  55*   CREATININE  2.87*  2.53*  2.01*   CALCIUM  7.0*  6.8*  7.1*     Recent Labs      17   1720  06/15/17   0410  06/15/17   1032   APTT  34.3  34.4  31.5   INR  1.22*  1.30*  1.32*         Recent Labs      06/15/17   1800  06/15/17   2015  06/16/17   0015   TROPONINI  0.96*  0.78*  0.65*             TTE: 6/15/17  No prior study is available for comparison.   Left ventricular ejection fraction is visually estimated to be 60%.  Grade I diastolic dysfunction.  No valvular heart disease.  Estimated right ventricular systolic pressure  is 35 mmHg    EK/15/17  EKG personally reviewed by me.  Sinus rhythm 111 bpm normal axis diffuse nonspecific ST-T wave changes.  Medical Decision Making, by Problem:  Active Hospital Problems    Diagnosis   • *Sepsis (CMS-HCC) [A41.9]   • Acute respiratory failure with hypoxia (CMS-HCC) [J96.01]   • Metabolic acidosis [E87.2]   • Acute kidney injury (nontraumatic) (CMS-HCC) [N17.9]   • Hyponatremia [E87.1]   • Bacteremia [R78.81]   • Acute emphysematous cholecystitis [K81.0]   • Hypotension (arterial) [I95.9]   • Thrombocytopenia (CMS-HCC) [D69.6]   • Hypokalemia due to inadequate potassium intake [E87.6]   • Non-STEMI (non-ST elevated myocardial infarction) (CMS-HCC) [I21.4]       Plan:  6-year-old male with known history of hypertension admitted for sepsis due to cholecystitis also found to have elevated troponin acute renal failure. Secondary to elevated troponin and cardiology was  consulted.    Elevated troponin:  Enzyme leak in setting of acute renal failure and hypotension rather than acute coronary syndrome.  Echocardiogram showed preserved LV function. When stable consider initiating aspirin.  As patient is on pressors not a candidate for beta blockers.    Primary hypertension:  Repeat echocardiogram on outpatient basis once acute situation resolves.    Sepsis:  Titrate Levophed for map more than 60.    Continue supportive care.    Thank you for allowing me to participate in taking care of patient.    Teresita Mendez MD.        Core Measures

## 2017-06-16 NOTE — PROGRESS NOTES
Rashida from Lab called with critical result of platelets at 0640. Critical lab result read back to Rashida.   Dr. Obrien notified of critical lab result at 0648.  Critical lab result read back by Dr. Obrien.

## 2017-06-16 NOTE — PROGRESS NOTES
Report received from Swapnil Rn's. Pt appears to be resting comfortably, sedated, intubated. BL soft wrist restrains in place. All drips/lines verified, NSR 80s on telemetry. Will continue with care.

## 2017-06-16 NOTE — FLOWSHEET NOTE
17 0145   Ventilator Management Group   Intensivist Group Yes   Ventilator Identifier   Ventilator Number [id number]   General Vent Information   #Ventilator Subsequent Day Yes   Ventilator Red Plug Ventilator Plugged into Red Electrical Outlet   Vent Temp (Celsius) 35   Pulse Oximetry 100 %   Heart Rate (Monitored) 78   CO2 Monitoring   #ETCO2 32   Vent Alarms   Ventilation Alarms Reviewed / Activated Yes   Upper Pressure Limit (HI PIP) Alarm 40   Low VE (LPM) Alarm 4   High Respiratory Rate Alarm 40   Low Respiratory Rate Alarm 8   Low VT Alarm 200   Apnea Parameters Checked / Activated Yes   EtCO2 High Alarm 55   EtCO2 Low Alarm 15   Fuller Vent   Fuller Vent Yes   Fuller Vent Mode APVCMV   Fulelr Conventional Settings   Rate (breaths/min) 16   Vt Target (mL) 500   TI (Seconds) 0.7   PEEP/CPAP 8   Pramp 50   FiO2 35   Sensitivity Setting Flow Trigger   Other Settings 5   Fuller Measured Parameters   P Peak (PIP) 15    Minute Volume 9.41   Control VTE (exp VT) 503   f Total (Breaths/Min) 18   P MEAN 9   Static Compliance (ml / cm H2O) 87   Servo Vent   PEEP (Monitored) (cmH2O) 8   Chest Exam   Work Of Breathing / Effort Vented   Breath Sounds   RUL Breath Sounds Clear   RML Breath Sounds Clear   RLL Breath Sounds Diminished   OZZY Breath Sounds Clear   LLL Breath Sounds Diminished   Secretions   Cough Productive  (following ABG)   How Sputum Obtained Endotracheal   Sputum Amount Small   Sputum Color Clear;Tan   Sputum Consistency Thin   Suction Frequency Suctioned Once or Twice Per Encounter   Blood Gas / Site draw   Blood Gas / Site draw  #R Radial   Allens Test Performed Yes   Site Pressure Held X 5 Min Yes   Airway Group ET Tube Oral 8.0   Placement Date/Time: 06/15/17 1020   Airway Type: ET Tube  Airway Location: Oral  Airway Size: 8.0  Inserted In: Unit  Inserted by: MD   Skin Integrity Intact   Airway Tube Secured By Commercial securing device   Date Securing Device changed? 06/15/17    Date Securing Device to be changed (Q 7 days) 06/22/17   Secured At  (cm) 26   Tube Repositioned Left   Cuffless No   Cuff Pressure cmH2O (R.C.) 22

## 2017-06-16 NOTE — PROGRESS NOTES
Yuridia from Lab called with critical result of calcium at 0100. Critical lab result read back to Yuridia.   Dr. Mancilla notified of critical lab result at 0110.  Critical lab result read back by Dr. Mancilla.  Orders received and input to give pt 2 G calcium gluconate IV.

## 2017-06-16 NOTE — PROGRESS NOTES
MRI still on hold per RN; pt is still vented and on pressors.  RN will call us when pt is stable enough for MRI.

## 2017-06-16 NOTE — DISCHARGE PLANNING
Medical Social Work    Referral: Pt discussed at IDT rounds this AM.    Intervention: Per flowsheet, pt lives with spouse and expects to d.c home.  Pt is on vent currently. No SS needs identified nor any requests for BEN Hernandez during rounds.      Plan: BEN available for any assistance with d.c planning.

## 2017-06-16 NOTE — FLOWSHEET NOTE
17 0300   Ventilator Management Group   Intensivist Group Yes   Ventilator Identifier   Ventilator Number [id number]   General Vent Information   #Ventilator Subsequent Day Yes   Ventilator Red Plug Ventilator Plugged into Red Electrical Outlet   Vent Temp (Celsius) 35   Pulse Oximetry 99 %   Heart Rate (Monitored) 74   CO2 Monitoring   #ETCO2 32   Vent Alarms   Ventilation Alarms Reviewed / Activated Yes   Upper Pressure Limit (HI PIP) Alarm 40   Low VE (LPM) Alarm 4   High Respiratory Rate Alarm 40   Low Respiratory Rate Alarm 8   Low VT Alarm 200   Apnea Parameters Checked / Activated Yes   EtCO2 High Alarm 55   EtCO2 Low Alarm 15   Fuller Vent   Fuller Vent Yes   Fuller Vent Mode APVCMV   Fuller Conventional Settings   Rate (breaths/min) 16   Vt Target (mL) 500   TI (Seconds) 0.7   PEEP/CPAP 8   Pramp 50   FiO2 35   Sensitivity Setting Flow Trigger   Other Settings 5   Fuller Measured Parameters   P Peak (PIP) 18    Minute Volume 9.84   Control VTE (exp VT) 496   f Total (Breaths/Min) 20   P MEAN 10   Static Compliance (ml / cm H2O) 63   Servo Vent   PEEP (Monitored) (cmH2O) 8   Mobility Group   Activity Performed Unable to mobilize   Pt Calls for Assistance No   Reason Not Mobilized Unstable condition   Vent Inline Medication   #(Single Treatment) Vent Inline Medication Yes   Chest Exam   Work Of Breathing / Effort Vented   Breath Sounds   RUL Breath Sounds Clear   RML Breath Sounds Clear   RLL Breath Sounds Diminished   OZZY Breath Sounds Clear   LLL Breath Sounds Diminished

## 2017-06-16 NOTE — PROGRESS NOTES
Pulmonary Critical Care Consult        Chief Complaint: VDRF    History of Present Illness:   76 y.o. male was admitted on 6/14 with a chief complaint of abdominal pain. Last felt well 4 days PTA. 3 days PTA developed abd pain, sob, and has had fever, chills, diaphoresis and syncope    Presented to ED on 6/14 and CT Abd showed:   1.  Severe cholecystitis, possible component of emphysematous cholecystitis.  2.  Appendicolith, the appendix otherwise appears within normal limits. Appendicolith however places patient at future risk for development of appendicitis.  3.  Atherosclerosis and atherosclerotic coronary artery disease  4.  Hepatomegaly  Was cultured, placed on zosyn, and admitted to the ICU. Had hypotension, treated with IVF and levophed. Had increased O2 requirements which resulted in intubation and mechanical ventilation, hence Pulmonary Service notified for consultation.    Now intubated, mechanically ventilated, tachycardic, hypotensive on levophed with a CVP of 12 cm of water. Current vent settings are APV 16/500/8/100 with ABG pending. His white count is 25,600, hemoglobin 12.5, hematocrit 35.8, and platelet count 83,000. He has 9% bands. His sodium is 134, bicarbonate 12, anion gap 19, glucose 191, BUN 57, creatinine 3.04, estimated GFR 24, , , alkaline phosphatase 312, total bilirubin 1.6, and albumin 3.1. His pro calcitonin is 51.97 ng per mL with normal being less than 0.25 ng per mL. His blood cultures are growing gram-negative rods.    His post intubation chest x-ray shows that the ET tube is in a satisfactory position. There is minimal linear lower lobe atelectasis with possible mild edema. His EKG does not show acute changes other than sinus tachycardia. His INR is 1.32.    Past medical history: Hypertension    Medical procedures and surgery: Colonoscopies    Medication allergies: None    Tobacco: He is a former smoker but stopped in 1987 he smoked a pack a day for 30  years    Alcohol: None    Illicit drugs: None    Social history: he is .    Family history: Mother with COPD and colon cancer. Father with colon cancer.    ROS:  Respiratory: , Cardiac: unable to perform due to the patient's inability to effectively communicate, GI: unable to perform due to the patient's inability to effectively communicate.  All other systems negative.    Interval Events:  24 hour interval history reviewed      6/16 - remains on 3 pressors, cvp 15, on 35/8/500/16 with an excellent abg, improved bicarb to 17, plts to 40 K, still anemic, leukocytosis, renal fxn better, K+ 3.3, glu higher, lactate better at 4.75, + about 1 L last 24 H and + 3.4 L this admission, 3.7 L UO; awaiting ID of GNR; remains on Zosyn; echo: No prior study is available for comparison.   Left ventricular ejection fraction is visually estimated to be 60%.  Grade I diastolic dysfunction.  No valvular heart disease.  Estimated right ventricular systolic pressure  is 35 mmHg.  CXR reviewed and shows mild edema, atx, small left effusion      PFSH:  No change.    Respiratory:  Fuller Vent Mode: APVCMV, Rate (breaths/min): 16, Vt Target (mL): 500, PEEP/CPAP: 8, FiO2: 35, Static Compliance (ml / cm H2O): 63, Control VTE (exp VT): 496  Pulse Oximetry: 99 %  Chest Tube Drains:  Axel Pillai 1: 20 ml       Exam: unlabored respirations, no intercostal retractions or accessory muscle use  ImagingAvailable data reviewed   Recent Labs      06/15/17   1135   06/15/17   2015  06/16/17   0015  06/16/17   0145  06/16/17   0540   VQLEQ49E  7.27*   --    --    --   7.32*   --    XQEFYK039P  28.0   --    --    --   33.5   --    UARXU173V  310.0*   --    --    --   127.4*   --    [id number]  99.6*   --    --    --   97.9   --    ARTHCO3  12*   --    --    --   17   --    FIO2   --    --    --    --   35   --    ARTBE  -13*   --    --    --   -8*   --    ISTATSPEC   --    < >  Venous  Venous   --   Venous    < > = values in this interval not  displayed.       HemoDynamics:  Pulse: 75, Heart Rate (Monitored): 75  Blood Pressure : (!) 78/48 mmHg, NIBP: 101/64 mmHg  CVP (mm Hg): (!) 15 MM HG    Exam: sinus tachycardia  Imaging: Available data reviewed  Recent Labs      06/15/17   1800  06/15/17   2015  06/16/17   0015   TROPONINI  0.96*  0.78*  0.65*       Neuro:  GCS Total Jyothi Coma Score: 6       Exam: Heavily sedated  Imaging: Available data reviewed    Fluids:  Intake/Output       06/14/17 0700 - 06/15/17 0659 06/15/17 0700 - 06/16/17 0659 06/16/17 0700 - 06/17/17 0659      3015-6427 4408-0023 Total 2324-2286 6280-9801 Total 8466-8164 9281-2636 Total       Intake    P.O.  --  -- --  0  0 0  --  -- --    P.O. -- -- -- 0 0 0 -- -- --    I.V.  --  3082.3 3082.3  2037.9  3201.8 5239.7  --  -- --    Magnesium Sulfate Volume -- 100 100 -- -- -- -- -- --    Vasopressin Volume -- -- -- 40.8 108 148.8 -- -- --    Phenylephrine Volume -- -- -- 138.8 56.3 195 -- -- --    Propofol Volume -- -- -- 256.5 294.5 551 -- -- --    Norepinephrine Volume -- 182.3 182.3 376.8 543.1 919.9 -- -- --    IV Volume (NS Bolus) -- 1000 1000 -- -- -- -- -- --    IV Volume (Potassium) -- 400 400 -- 100 100 -- -- --    IV Volume (/hr) -- 1000 1000 525 -- 525 -- -- --    IV Volume (Potassium Phosphate ) -- 400 400 -- -- -- -- -- --    IV Volume (Bicarb at 75 ml/hr) -- -- -- 500 1800 2300 -- -- --    IV Piggyback Volume -- -- -- 200 300 500 -- -- --    Blood  --  -- --  227  -- 227  --  -- --    Volume (RELEASE PLATELET PHERESIS) -- -- -- 227 -- 227 -- -- --    Enteral  --  -- --  --  90 90  --  -- --    Free Water / Tube Flush -- -- -- -- 90 90 -- -- --    Total Intake -- 3082.3 3082.3 2264.9 3291.8 5556.7 -- -- --       Output    Urine  --  650 650  1835  1900 3735  --  -- --    Indwelling Cathether --  1900 3735 -- -- --    Drains  --  -- --  380  505 885  --  -- --    Nasogastric Tube -- -- -- 250 450 700 -- -- --    Axel Pillai 1 -- -- -- 130 55 185 -- -- --     Total Output -- [phone] 2405 4620 -- -- --       Net I/O     -- 2432.3 2432.3 49.9 886.8 936.7 -- -- --        Weight: 102.8 kg (226 lb 10.1 oz)  Recent Labs      06/14/17   2020   06/15/17   0745   06/15/17   1800  17   0015  17   0540   SODIUM   --    < >   --    < >  139  138  136   POTASSIUM  2.5*   < >  4.0   < >  3.1*  3.6  3.3*   CHLORIDE   --    < >   --    < >  106  104  100   CO2   --    < >   --    < >  12*  15*  22   BUN   --    < >   --    < >  62*  61*  55*   CREATININE   --    < >   --    < >  2.87*  2.53*  2.01*   MAGNESIUM  1.4*   --   2.6*   --    --    --    --    PHOSPHORUS  1.8*   --   6.7*   --    --    --    --    CALCIUM   --    < >   --    < >  7.0*  6.8*  7.1*    < > = values in this interval not displayed.       GI/Nutrition:  Exam: patient sedated  Imaging: None - Reviewed  NPO  Liver Function  Recent Labs      17   1820   06/15/17   1800  17   0015  17   0540   ALTSGPT  142*   --    --    --    --    ASTSGOT  159*   --    --    --    --    ALKPHOSPHAT  312*   --    --    --    --    TBILIRUBIN  1.6*   --    --    --    --    LIPASE  23   --    --    --    --    GLUCOSE  178*   < >  187*  296*  343*    < > = values in this interval not displayed.       Heme:  Recent Labs      17   1720   06/15/17   0410  06/15/17   1032   06/15/17   1800  17   0015  17   0540   RBC   --    < >  3.77*   --    < >  4.19*  3.99*  3.72*   HEMOGLOBIN   --    < >  12.5*   --    < >  13.9*  13.3*  12.5*   HEMATOCRIT   --    < >  35.8*   --    < >  40.2*  38.1*  35.0*   PLATELETCT   --    < >  83*   --    < >  59*  54*  40*   PROTHROMBTM  15.2*   --   16.0*  16.2*   --    --    --    --    APTT  34.3   --   34.4  31.5   --    --    --    --    INR  1.22*   --   1.30*  1.32*   --    --    --    --     < > = values in this interval not displayed.       Infectious Disease:  Temp  Av.3 °C (97.4 °F)  Min: 35.3 °C (95.6 °F)  Max: 37.4 °C (99.4 °F)  Monitored  Temp  Av.1 °C (98.8 °F)  Min: 36.8 °C (98.2 °F)  Max: 37.3 °C (99.1 °F)  Micro: reviewed  Recent Labs      17   1820   06/15/17   1800  17   0015  17   0540   WBC  7.3   < >  17.9*  17.6*  14.1*   NEUTSPOLYS  78.00*   --    --    --    --    LYMPHOCYTES  7.00*   --    --    --    --    MONOCYTES  4.00   --    --    --    --    EOSINOPHILS  0.00   --    --    --    --    BASOPHILS  0.00   --    --    --    --    ASTSGOT  159*   --    --    --    --    ALTSGPT  142*   --    --    --    --    ALKPHOSPHAT  312*   --    --    --    --    TBILIRUBIN  1.6*   --    --    --    --     < > = values in this interval not displayed.     Current Facility-Administered Medications   Medication Dose Frequency Provider Last Rate Last Dose   • insulin lispro (HUMALOG) injection 3-14 Units  3-14 Units Q6HRS Era Mancilla M.D.   10 Units at 17 0556   • glucose 4 g chewable tablet 16 g  16 g Q15 MIN PRN Era Mancilla M.D.        And   • dextrose 50% (D50W) injection 25 mL  25 mL Q15 MIN PRN Era Mancilla M.D.       • levalbuterol (XOPENEX) 1.25 MG/3ML nebulizer solution 1.25 mg  1.25 mg Q4HRS (RT) Che Nash M.D.   1.25 mg at 17 0640   • morphine (pf) 4 mg/ml injection 2-4 mg  2-4 mg Q2HRS PRN Che Nash M.D.   4 mg at 06/15/17 1500   • MD ALERT...Pharmacy to implement PROPOFOL CRITICAL CARE PROTOCOL 1 Each  1 Each PRN Che Nash M.D.       • propofol (DIPRIVAN) injection  5-80 mcg/kg/min Continuous Che Nash M.D. 21.3 mL/hr at 17 0503 35 mcg/kg/min at 17 0503   • norepinephrine (LEVOPHED) 8 mg in  mL Infusion  0.5-30 mcg/min Continuous Che Nash M.D. 28.1 mL/hr at 17 15 mcg/min at 17   • vasopressin (VASOSTRICT) 20 Units in  mL Infusion  0.03 Units/min Continuous Che Nash M.D. 9 mL/hr at 06/15/17 2036 0.03 Units/min at 06/15/17 2036   • phenylephrine (SAW-SYNEPHRINE) 40,000 mcg in  mL Infusion  1-300 mcg/min  Continuous Che Nash M.D.   Stopped at 06/15/17 1955   • sodium bicarbonate 8.4 % 150 mEq in D5W 1,000 mL infusion   Continuous Che Nash M.D. 150 mL/hr at 06/16/17 0552     • senna-docusate (PERICOLACE or SENOKOT S) 8.6-50 MG per tablet 2 Tab  2 Tab BID Jose Valdivia M.D.   Stopped at 06/15/17 2100    And   • polyethylene glycol/lytes (MIRALAX) PACKET 1 Packet  1 Packet QDAY PRN Jose Valdivia M.D.        And   • magnesium hydroxide (MILK OF MAGNESIA) suspension 30 mL  30 mL QDAY PRN Jose Valdivia M.D.        And   • bisacodyl (DULCOLAX) suppository 10 mg  10 mg QDAY PRN Jose Valdivia M.D.       • Respiratory Care per Protocol   Continuous RT Jose Valdivia M.D.       • acetaminophen (TYLENOL) tablet 650 mg  650 mg Q6HRS PRN Jose Valdivia M.D.       • piperacillin-tazobactam (ZOSYN) 3.375 g in  mL IVPB  3.375 g Q6HRS Jose Valdivia M.D.   Stopped at 06/16/17 0300   • ondansetron (ZOFRAN) syringe/vial injection 4 mg  4 mg Q4HRS PRN Jose Valdivia M.D.       • ondansetron (ZOFRAN ODT) dispertab 4 mg  4 mg Q4HRS PRN Jose Valdivia M.D.       • lorazepam (ATIVAN) tablet 0.5 mg  0.5 mg Q6HRS PRN Jose Valdivia M.D.        Or   • lorazepam (ATIVAN) injection 0.5 mg  0.5 mg Q6HRS PRN Jose Valdivia M.D.   0.5 mg at 06/15/17 0941     Last reviewed on 6/15/2017  8:22 AM by Sahara Staton    Quality  Measures:  Radiology images reviewed, Medications reviewed and Labs reviewed                      Problems:  Acute cholecystitis  Gram-negative rhiannon bacteremia  Severe sepsis with shock  Ventilator dependent respiratory failure  History of systemic arterial hypertension  Anemia, leukocytosis, thrombocytopenia  Metabolic acidosis acute renal failure  Mild atelectasis  S/p brook drain      Plan:  Ventilator support, adjust FiO2 and PEEP as well as tidal volume and rate to provide for satisfactory alveolar ventilation and oxygenation using lung protective strategies.  Anti-infectives   Pressors and  intravenous fluids  Prophylaxis awaiting surgical evaluation check echocardiogram   Cholecystostomy  Adjust IVF, add bicarb        Discussed patient condition and risk of morbidity and/or mortality with RN, RT and hospitalist.  D/w family at the bedside  The patient remains critically ill.  Critical care time = 45 minutes in directly providing and coordinating critical care and extensive data review.  No time overlap and excludes procedures.

## 2017-06-16 NOTE — PROGRESS NOTES
Kiki from Lab called with critical result of lactic acid 5.44 at 2053. Critical lab result read back to Kiki.   This critical lab result is within parameters established by  for this patient

## 2017-06-16 NOTE — PROGRESS NOTES
Yuridia from Lab called with critical result of positive blood cultures- anaerobic and aerobic bottles positive for gram neg rods at 0040. Critical lab result read back to Yuridia.   Reported to Dr. Mancilla at 0110.

## 2017-06-16 NOTE — RESPIRATORY CARE
COPD EDUCATION by COPD CLINICAL EDUCATOR  6/16/2017 at 7:24 AM by Mehnaz Snyder     Patient reviewed by COPD education team. Patient does not qualify for COPD program. No COPD history.

## 2017-06-16 NOTE — PROGRESS NOTES
Yuridia from Lab called with critical result of lactic acid of 6.58 at 0040. Critical lab result read back to Yuridia.   Dr. Mancilla notified of critical lab result at 0110.  Critical lab result read back by .  Orders input in epic to change lactic acid draws to Q 8 hours per MD.

## 2017-06-16 NOTE — PROGRESS NOTES
[facility] Hospitalist Progress Note    Date of Service: 6/16/2017    Chief Complaint  76 y.o. male admitted 6/14/2017 with abdominal pain and SOB, fever, chills, diaphoresis, syncope x4.  CT abdomen revealed acute cholecystitis with lactic acid of 8 admitted to ICU on zosyn IV, sepsis protocol initiated.    Interval Problem Update  6/15:  On exam, patient had become more diaphoretic, complaining of severe upper abdominal pain that radiated to both shoulders.  He was given morphine IV, /100s, increased RR.  His IVFs were dc'd, stat CXR, lasix 40mg IV x1, xopenex respiratory neb, but continued to worsen with increased RR, shallow breathing.  He was acidotic with low bicarb due to sepsis.  Patient required ET intubation, Dr. Ivy kindly consulted for vent management.  I spoke with Dr. Last about worsening condition.  Ordered stat abd CT:  No perforation, remains with emphysematous swollen gallbladder.  U/s cholecystostomy tube placed successfully with drain of dark necrotic bile fluid.  BCs + GNR ss pending, on zosyn.  Met with wife and son and gave updates in a.m. and in afternoon.  Family aware of critically ill patient for next 24 hours.  6/16:  Mild improvement overnight with improving bicarb, renal function and lactic acid levels.  BCs and bile culture with LFGNR ss pending.  BP maintaining on decreasing amounts of levophed and vasopressin, off neosinephrine drip.  Remains intubated.  Went into afib with RVR at 0800, but platelets remain at 40 therefore no AC at this time, no rate controllers due to hypotension/pressors.  Attempt to wean propofol and pressors to better control the afib.  [id number] drip changed to NS since CO2 up to 22 and elevated blood sugars.  Check a1c and added SSI, accuchecks for now.  Better perfusion peripheral pulses stronger with improved cap refill.    Consultants/Specialty  Dr. Last:  General surgery  Dr. Ivy:  pulmonology  Dr. Guzman:  cardiology      Disposition  TBD.         Review of Systems   Unable to perform ROS: critical illness      Physical Exam  Laboratory/Imaging   Hemodynamics  Temp (24hrs), Av.7 °C (96.3 °F), Min:35.3 °C (95.6 °F), Max:36.1 °C (97 °F)   Temperature: 36.1 °C (97 °F), Monitored Temp: 38 °C (100.4 °F)  Pulse  Av.7  Min: 70  Max: 124 Heart Rate (Monitored): (!) 107  NIBP: 114/72 mmHg CVP (mm Hg): (!) 292 MM HG    Respiratory  Fuller Vent Mode: APVCMV, Rate (breaths/min): 16, PEEP/CPAP: 8, PEEP/CPAP: 8, FiO2: 35, P Peak (PIP): 22, P MEAN: 11   Respiration: 16, Pulse Oximetry: 98 %     Work Of Breathing / Effort: Vented (Simultaneous filing. User may not have seen previous data.)  RUL Breath Sounds: Clear;Diminished, RML Breath Sounds: Clear;Diminished, RLL Breath Sounds: Clear;Diminished, OZZY Breath Sounds: Clear;Diminished, LLL Breath Sounds: Clear;Diminished    Fluids    Intake/Output Summary (Last 24 hours) at 17 1553  Last data filed at 17 1400   Gross per 24 hour   Intake 5609.92 ml   Output   4180 ml   Net 1429.92 ml       Nutrition  Orders Placed This Encounter   Procedures   • Diet NPO     Standing Status: Standing      Number of Occurrences: 1      Standing Expiration Date:      Order Specific Question:  Restrict to:     Answer:  Sips with Medications [3]     Physical Exam   Constitutional: He is oriented to person, place, and time. He appears well-developed and well-nourished. No distress.   More calm, RR normalized to 14.   HENT:   Head: Normocephalic and atraumatic.   Mouth/Throat: Oropharynx is clear and moist. No oropharyngeal exudate.   Eyes: Conjunctivae and EOM are normal. Pupils are equal, round, and reactive to light. Right eye exhibits no discharge. Left eye exhibits no discharge. No scleral icterus.   Neck: Normal range of motion. Neck supple. No JVD present. No tracheal deviation present. No thyromegaly present.   Cardiovascular: Normal rate, regular rhythm and normal heart sounds.  Exam reveals no gallop and no  friction rub.    No murmur heard.  Pulmonary/Chest: No respiratory distress. He has no wheezes. He has no rales. He exhibits no tenderness.   Abdominal: Soft. Bowel sounds are normal. He exhibits no distension and no mass. There is no tenderness. There is no rebound and no guarding.   Dark, necrotic appearance to DOMINGO cholecystostomy drain.  Dark bile NGT aspirate.   Musculoskeletal: Normal range of motion. He exhibits no edema or tenderness.   Lymphadenopathy:     He has no cervical adenopathy.   Neurological: He is alert and oriented to person, place, and time. No cranial nerve deficit. He exhibits normal muscle tone.   Skin: Skin is warm. No rash noted. He is not diaphoretic. No erythema.   Psychiatric: He has a normal mood and affect. His behavior is normal. Judgment and thought content normal.   Nursing note and vitals reviewed.      Recent Labs      06/16/17   0015  06/16/17   0540  06/16/17   1235   WBC  17.6*  14.1*  13.7*   RBC  3.99*  3.72*  3.83*   HEMOGLOBIN  13.3*  12.5*  12.8*   HEMATOCRIT  38.1*  35.0*  35.4*   MCV  95.5  94.1  92.4   MCH  33.3*  33.6*  33.4*   MCHC  34.9  35.7*  36.2*   RDW  49.7  48.3  47.1   PLATELETCT  54*  40*  40*   MPV  13.1*  12.9  12.3     Recent Labs      06/16/17   0015  06/16/17   0540  06/16/17   1235   SODIUM  138  136  138   POTASSIUM  3.6  3.3*  2.8*   CHLORIDE  104  100  100   CO2  15*  22  27   GLUCOSE  296*  343*  263*   BUN  61*  55*  43*   CREATININE  2.53*  2.01*  1.49*   CALCIUM  6.8*  7.1*  7.1*     Recent Labs      06/14/17   1720  06/15/17   0410  06/15/17   1032   APTT  34.3  34.4  31.5   INR  1.22*  1.30*  1.32*                  Assessment/Plan     * Sepsis (CMS-HCC) (present on admission)  Assessment & Plan  Source:  Infected emphysematous cholecystitis  IVFs  Zosyn  6/14 BC+ LFGNR  6/15 BC+ x2 LFGNR  6/15Chole fluid:  LFGNR  6/16 lactic acid trending down, bicarb normalizing, BP improving, less pressors, remains on levophed, vasopressin, off steve  drip.    Non-STEMI (non-ST elevated myocardial infarction) (CMS-HCC) (present on admission)  Assessment & Plan  Dr. Chua consulted:  No intervention  Cardiac strain due to sepsis  dc'd heparin drip since low platelets  trops trending down    Acute respiratory failure with hypoxia (CMS-[facility]) (present on admission)  Assessment & Plan  6/15 requiring ET intubation for acute wheeze, acidosis due to sepsis.  xopenex breathing treatments  S/p lasix 40mg iv with improved breathing post intubation  6/16 repeat CXR with minimal pulmonary edema, lungs CTA.      Metabolic acidosis (present on admission)  Assessment & Plan  2/2 severe sepsis  6/16 improving bicarb with drainage of infection, abx, fluids, BP improvement    Acute kidney injury (nontraumatic) (CMS-[facility]) (present on admission)  Assessment & Plan  New onset since admission   2/2 sepsis related renal failure with poor perfusion  6/16 improving since BP improved and sepsis control  Bicarb improved, 6/16 changed bicarb drip to NS at 125/hr.    Hyponatremia (present on admission)  Assessment & Plan  Resolving on IVFs.    Bacteremia (present on admission)  Assessment & Plan  BC 6/14 x2 with LFGNR  6/15 BCs x2 +LFGNR  Source:  Acute cholecystitis  Zosyn IV    Acute emphysematous cholecystitis (present on admission)  Assessment & Plan  6/15 u/s cholecystostomy completed  Culture fluid  Repeat CT abdomen:  No perforation  Spoke with Dr. Last about worsening clinical sepsis:  Too sick for OR removal  Await MRCP results and improvement with u/s drain of infection.    Hypotension (arterial) (present on admission)  Assessment & Plan  Since intubated, started propofol drip, drop in BP:  Levophed, vasopressin drips continue  Off neosinephrine drip  dc'd Bicarb drip at 150/hr to NS at 125/hr    Thrombocytopenia (CMS-[facility]) (present on admission)  Assessment & Plan  Etiology 2/2 severe sepsis, no known heavy alcohol use.  Given 1 unit platelets prior to u/s cholecystostomy for plts  <50K.    Hypokalemia due to inadequate potassium intake (present on admission)  Assessment & Plan  Replace K with IV 40K as needed.  BMP q 6 hr.  Renal function improving with IVFs and increase in BP.    Atrial fibrillation with RVR (CMS-HCC)  Assessment & Plan  Secondary to acute sepsis and pressors  NO AC since platelets 40  No rate control since hypotension requiring pressors, rate currently in 100-118 range.  Attempt to decrease levophed as BP allows, off neosinephrine.  Continues with vasopressin        Laird catheter: No Laird      DVT Prophylaxis: Contraindicated - High bleeding risk    Ulcer prophylaxis: Not indicated  Antibiotics: Treating active infection/contamination beyond 24 hours perioperative coverage

## 2017-06-16 NOTE — PROGRESS NOTES
"  Trauma/Surgical Progress Note    Author: Kae aLst Date & Time created: 6/16/2017   6:38 AM     Interval Events:  Events of last 24 hours noted. Intubated. Still on pressors. Cholecystostomy tube placed. On abx.  WBC stable. Still thrombocytopenic.  Lactate improving.    Hemodynamics:  Blood pressure 78/48, pulse 75, temperature 36.1 °C (97 °F), resp. rate 15, height 1.778 m (5' 10\"), weight 102.8 kg (226 lb 10.1 oz), SpO2 99 %.     Respiratory:  Fuller Vent Mode: APVCMV, Rate (breaths/min): 16, PEEP/CPAP: 8, FiO2: 35, P Peak (PIP): 18, P MEAN: 10 Respiration: 15, Pulse Oximetry: 99 %, O2 Daily Delivery Respiratory : OxyMask     Given By:: Mouthpiece, Work Of Breathing / Effort: Vented  RUL Breath Sounds: Clear, RML Breath Sounds: Clear, RLL Breath Sounds: Diminished, OZZY Breath Sounds: Clear, LLL Breath Sounds: Diminished  Fluids:    Intake/Output Summary (Last 24 hours) at 06/16/17 0638  Last data filed at 06/16/17 0600   Gross per 24 hour   Intake 5556.69 ml   Output   4620 ml   Net 936.69 ml     Admit Weight: 93.7 kg (206 lb 9.1 oz)  Current Weight: 102.8 kg (226 lb 10.1 oz)    Physical Exam   Constitutional: He appears well-developed and well-nourished.   HENT:   Head: Normocephalic.   Cardiovascular: Normal rate.    Pulmonary/Chest: Effort normal.   Abdominal: Soft. He exhibits distension.   DOMINGO in RUQ - bilious   Genitourinary:   Laird to gravity   Skin: Skin is warm.       Medical Decision Making/Problem List:    Active Hospital Problems    Diagnosis   • Acute respiratory failure with hypoxia (CMS-[facility]) [J96.01]   • Metabolic acidosis [E87.2]   • Acute kidney injury (nontraumatic) (CMS-HCC) [N17.9]   • Hyponatremia [E87.1]   • Bacteremia [R78.81]   • Acute emphysematous cholecystitis [K81.0]   • Hypotension (arterial) [I95.9]   • Thrombocytopenia (CMS-HCC) [D69.6]   • Hypokalemia due to inadequate potassium intake [E87.6]   • Sepsis (CMS-HCC) [A41.9]   • Non-STEMI (non-ST elevated myocardial " infarction) (CMS-[facility]) [I21.4]     Core Measures & Quality Metrics:  Labs reviewed, Medications reviewed and Radiology images reviewed  Laird catheter: Critically Ill - Requiring Accurate Measurement of Urinary Output  Central line in place: Need for access and Vasopressors    DVT Prophylaxis: Contraindicated - High bleeding risk  DVT prophylaxis - mechanical: SCDs  Ulcer prophylaxis: Yes  Antibiotics: Treating active infection/contamination beyond 24 hours perioperative coverage  Assessed for rehab: Patient unable to tolerate rehabilitation therapeutic regimen    WALT Score  Discussed patient condition with Family and RN.  CRITICAL CARE TIME EXCLUDING PROCEDURES: 20   minutes

## 2017-06-16 NOTE — PROGRESS NOTES
0715 - Received bedside report from Ángela DILLARD.  Patient resting comfortably in bed under sedation with no apparent signs of pain or distress.  IV medications checked, safety protocols in place, and ventilator settings verified.    1000 - Dr. Nash rounded on patient and discussed POC with family.

## 2017-06-16 NOTE — ASSESSMENT & PLAN NOTE
Source:  Infected emphysematous cholecystitis  IVFs  PICC line placed  Zosyn stop date 7/7  Need elective cholecystectomy as outpatient

## 2017-06-16 NOTE — PROGRESS NOTES
Hospitalist RN:     Notified by primary RN of following critical labs: Platelets 40 and K+ 2.8. Values verified in EPIC and reported to Dr. Nash. Dr. Nash read back. Dr. Nash to place orders for K+ replacement.

## 2017-06-16 NOTE — RESPIRATORY CARE
Vent update: Day 1  No vent changes made. Vent settings: APVCMV 16,500,35% FI02, peep 8. Suctioned for small amount of thin pale secretions, both down ETT and mouth. No mobility done today, pt continues on propofol and is too unstable.

## 2017-06-16 NOTE — PROGRESS NOTES
Blanca from Lab called with critical result of Platelets of 40 at 1312. Critical lab result read back to Blanca.   This critical lab result is within parameters established by Dr. Nash for this patient.  Potassium of 2.8 was also reported to Dr. Nash at this time.

## 2017-06-16 NOTE — RESPIRATORY CARE
Adult Ventilation Update    Total Vent Days:2   Patient Lines/Drains/Airways Status    Active Airway     Name: Placement date: Placement time: Site: Days:    Airway Group ET Tube Oral 8.0 06/15/17  1020  Oral  less than 1              Patient cont on APV/CMV: 16    ml  FI02 35%  PEEP 8   Results for ANITHA RICHMOND (MRN [id number]) as of 6/16/2017 05:28   Ref. Range 6/16/2017 01:45   Ph Latest Ref Range: 7.40-7.50  7.32 (L)   Pco2 Latest Ref Range: 26.0-37.0 mmHg 33.5   Po2 Latest Ref Range: 64.0-87.0 mmHg 127.4 (H)   Hco3 Latest Ref Range: 17-25 mmol/L 17   Base Excess Latest Ref Range: -4-3 mmol/L -8 (L)   O2 Saturation Latest Ref Range: 93.0-99.0 % 97.9   Ph -TC Latest Ref Range: 7.40-7.50  7.32 (L)   Pco2 -TC Latest Ref Range: 26.0-37.0 mmHg 33.8   Po2 -TC Latest Ref Range: 64.0-87.0 mmHg 128.6 (H)   FIO2 Latest Ref Range: 30-60 % 35   Peep Unknown 8   Tidal Volume Unknown 500   Frequency of Treatments Unknown 16   Body Temp Latest Units: Centigrade 37.2      Plateau Pressure (Q Shift): 16 (06/15/17 1900)  Static Compliance (ml / cm H2O): 63 (06/16/17 0300)  Patient failed trials because of Barriers to Wean: No Order, cont propofol infusion (06/15/17 3788)  Barriers to SBT    Length of Weaning Trial    Cough: Congested;Productive (06/16/17 0400)  Sputum Amount: Small (06/16/17 0400)  Sputum Color: Clear (06/16/17 0400)  Sputum Consistency: Thin (06/16/17 0400)  Mobility Group  Activity Performed: Unable to mobilize (06/16/17 0400)  Pt Calls for Assistance: No (06/16/17 0300)  Reason Not Mobilized: Unstable condition (06/16/17 0400)    Events/Summary/Plan: ABG drawn & sputum collected for lab (06/16/17 0145) HOB elevated @ 30 degrees, oral care done X 2 this shift by RT, separate suction for oral & ETT suction, continue to monitor closely

## 2017-06-16 NOTE — CARE PLAN
Problem: Fluid Volume:  Goal: Hemodynamic stability will improve  Outcome: PROGRESSING AS EXPECTED  Patient maintained MAP >65 but is still receiving vasoactive medications to achieve goal.    Problem: Skin Integrity  Goal: Risk for impaired skin integrity will decrease  Outcome: MET Date Met:  06/16/17  Patient's skin condition will not worsen over shift.

## 2017-06-17 ENCOUNTER — APPOINTMENT (OUTPATIENT)
Dept: RADIOLOGY | Facility: MEDICAL CENTER | Age: 77
DRG: 870 | End: 2017-06-17
Attending: HOSPITALIST
Payer: MEDICARE

## 2017-06-17 LAB
ALBUMIN SERPL BCP-MCNC: 2.1 G/DL (ref 3.2–4.9)
ALBUMIN/GLOB SERPL: 0.7 G/DL
ALP SERPL-CCNC: 114 U/L (ref 30–99)
ALT SERPL-CCNC: 219 U/L (ref 2–50)
ANION GAP SERPL CALC-SCNC: 10 MMOL/L (ref 0–11.9)
ANION GAP SERPL CALC-SCNC: 10 MMOL/L (ref 0–11.9)
ANION GAP SERPL CALC-SCNC: 11 MMOL/L (ref 0–11.9)
AST SERPL-CCNC: 212 U/L (ref 12–45)
BACTERIA BLD CULT: ABNORMAL
BACTERIA FLD AEROBE CULT: ABNORMAL
BACTERIA FLD AEROBE CULT: ABNORMAL
BACTERIA UR CULT: NORMAL
BASE EXCESS BLDA CALC-SCNC: 1 MMOL/L (ref -4–3)
BILIRUB SERPL-MCNC: 2.1 MG/DL (ref 0.1–1.5)
BODY TEMPERATURE: 37.5 CENTIGRADE
BUN SERPL-MCNC: 29 MG/DL (ref 8–22)
BUN SERPL-MCNC: 33 MG/DL (ref 8–22)
BUN SERPL-MCNC: 35 MG/DL (ref 8–22)
CALCIUM SERPL-MCNC: 7 MG/DL (ref 8.4–10.2)
CALCIUM SERPL-MCNC: 7.1 MG/DL (ref 8.4–10.2)
CALCIUM SERPL-MCNC: 7.1 MG/DL (ref 8.4–10.2)
CHLORIDE SERPL-SCNC: 105 MMOL/L (ref 96–112)
CHLORIDE SERPL-SCNC: 108 MMOL/L (ref 96–112)
CHLORIDE SERPL-SCNC: 108 MMOL/L (ref 96–112)
CO2 SERPL-SCNC: 24 MMOL/L (ref 20–33)
CO2 SERPL-SCNC: 25 MMOL/L (ref 20–33)
CO2 SERPL-SCNC: 26 MMOL/L (ref 20–33)
CREAT SERPL-MCNC: 1.04 MG/DL (ref 0.5–1.4)
CREAT SERPL-MCNC: 1.12 MG/DL (ref 0.5–1.4)
CREAT SERPL-MCNC: 1.3 MG/DL (ref 0.5–1.4)
ERYTHROCYTE [DISTWIDTH] IN BLOOD BY AUTOMATED COUNT: 47.4 FL (ref 35.9–50)
ERYTHROCYTE [DISTWIDTH] IN BLOOD BY AUTOMATED COUNT: 47.6 FL (ref 35.9–50)
ERYTHROCYTE [DISTWIDTH] IN BLOOD BY AUTOMATED COUNT: 48.1 FL (ref 35.9–50)
EST. AVERAGE GLUCOSE BLD GHB EST-MCNC: 189 MG/DL
GFR SERPL CREATININE-BSD FRML MDRD: 54 ML/MIN/1.73 M 2
GFR SERPL CREATININE-BSD FRML MDRD: >60 ML/MIN/1.73 M 2
GFR SERPL CREATININE-BSD FRML MDRD: >60 ML/MIN/1.73 M 2
GLOBULIN SER CALC-MCNC: 3.1 G/DL (ref 1.9–3.5)
GLUCOSE BLD-MCNC: 129 MG/DL (ref 65–99)
GLUCOSE BLD-MCNC: 151 MG/DL (ref 65–99)
GLUCOSE BLD-MCNC: 165 MG/DL (ref 65–99)
GLUCOSE SERPL-MCNC: 167 MG/DL (ref 65–99)
GLUCOSE SERPL-MCNC: 190 MG/DL (ref 65–99)
GLUCOSE SERPL-MCNC: 212 MG/DL (ref 65–99)
GRAM STN SPEC: ABNORMAL
HBA1C MFR BLD: 8.2 % (ref 0–5.6)
HCO3 BLDA-SCNC: 25 MMOL/L (ref 17–25)
HCT VFR BLD AUTO: 33.3 % (ref 42–52)
HCT VFR BLD AUTO: 35.1 % (ref 42–52)
HCT VFR BLD AUTO: 36.3 % (ref 42–52)
HGB BLD-MCNC: 11.6 G/DL (ref 14–18)
HGB BLD-MCNC: 12.3 G/DL (ref 14–18)
HGB BLD-MCNC: 12.7 G/DL (ref 14–18)
LACTATE BLD-SCNC: 1.91 MMOL/L (ref 0.5–2)
LACTATE BLD-SCNC: 2.69 MMOL/L (ref 0.5–2)
MAGNESIUM SERPL-MCNC: 2.3 MG/DL (ref 1.5–2.5)
MCH RBC QN AUTO: 32.3 PG (ref 27–33)
MCH RBC QN AUTO: 32.9 PG (ref 27–33)
MCH RBC QN AUTO: 33 PG (ref 27–33)
MCHC RBC AUTO-ENTMCNC: 34.8 G/DL (ref 33.7–35.3)
MCHC RBC AUTO-ENTMCNC: 35 G/DL (ref 33.7–35.3)
MCHC RBC AUTO-ENTMCNC: 35 G/DL (ref 33.7–35.3)
MCV RBC AUTO: 92.8 FL (ref 81.4–97.8)
MCV RBC AUTO: 94 FL (ref 81.4–97.8)
MCV RBC AUTO: 94.1 FL (ref 81.4–97.8)
O2/TOTAL GAS SETTING VFR VENT: 35 % (ref 30–60)
PCO2 BLDA: 38 MMHG (ref 26–37)
PCO2 TEMP ADJ BLDA: 38.8 MMHG (ref 26–37)
PEEP SETTING VENT: 8 CM[H2O]
PH BLDA: 7.44 [PH] (ref 7.4–7.5)
PH TEMP ADJ BLDA: 7.43 [PH] (ref 7.4–7.5)
PLATELET # BLD AUTO: 41 K/UL (ref 164–446)
PLATELET # BLD AUTO: 47 K/UL (ref 164–446)
PLATELET # BLD AUTO: 48 K/UL (ref 164–446)
PLATELETS.RETICULATED NFR BLD AUTO: 16.4 K/UL (ref 0.6–13.1)
PMV BLD AUTO: 12.2 FL (ref 9–12.9)
PMV BLD AUTO: 12.2 FL (ref 9–12.9)
PMV BLD AUTO: 12.9 FL (ref 9–12.9)
PO2 BLDA: 102.3 MMHG (ref 64–87)
PO2 TEMP ADJ BLDA: 105.4 MMHG (ref 64–87)
POTASSIUM SERPL-SCNC: 3.3 MMOL/L (ref 3.6–5.5)
POTASSIUM SERPL-SCNC: 3.3 MMOL/L (ref 3.6–5.5)
POTASSIUM SERPL-SCNC: 3.5 MMOL/L (ref 3.6–5.5)
PROT SERPL-MCNC: 5.2 G/DL (ref 6–8.2)
RBC # BLD AUTO: 3.59 M/UL (ref 4.7–6.1)
RBC # BLD AUTO: 3.73 M/UL (ref 4.7–6.1)
RBC # BLD AUTO: 3.86 M/UL (ref 4.7–6.1)
RESP RATE: 16
SAO2 % BLDA: 97.3 % (ref 93–99)
SIGNIFICANT IND 70042: ABNORMAL
SIGNIFICANT IND 70042: NORMAL
SITE SITE: ABNORMAL
SITE SITE: NORMAL
SODIUM SERPL-SCNC: 141 MMOL/L (ref 135–145)
SODIUM SERPL-SCNC: 142 MMOL/L (ref 135–145)
SODIUM SERPL-SCNC: 144 MMOL/L (ref 135–145)
SOURCE SOURCE: ABNORMAL
SOURCE SOURCE: NORMAL
SPECIMEN DRAWN FROM PATIENT: ABNORMAL
SPECIMEN DRAWN FROM PATIENT: NORMAL
TRIGL SERPL-MCNC: 563 MG/DL (ref 0–149)
VT SETTING VENT: 500 ML
WBC # BLD AUTO: 12.6 K/UL (ref 4.8–10.8)
WBC # BLD AUTO: 12.7 K/UL (ref 4.8–10.8)
WBC # BLD AUTO: 13.5 K/UL (ref 4.8–10.8)

## 2017-06-17 PROCEDURE — 71010 DX-CHEST-LIMITED (1 VIEW): CPT

## 2017-06-17 PROCEDURE — 94003 VENT MGMT INPAT SUBQ DAY: CPT

## 2017-06-17 PROCEDURE — 700111 HCHG RX REV CODE 636 W/ 250 OVERRIDE (IP): Performed by: HOSPITALIST

## 2017-06-17 PROCEDURE — 85027 COMPLETE CBC AUTOMATED: CPT | Mod: 91

## 2017-06-17 PROCEDURE — 700102 HCHG RX REV CODE 250 W/ 637 OVERRIDE(OP): Performed by: HOSPITALIST

## 2017-06-17 PROCEDURE — 87077 CULTURE AEROBIC IDENTIFY: CPT

## 2017-06-17 PROCEDURE — 700111 HCHG RX REV CODE 636 W/ 250 OVERRIDE (IP)

## 2017-06-17 PROCEDURE — A9270 NON-COVERED ITEM OR SERVICE: HCPCS | Performed by: INTERNAL MEDICINE

## 2017-06-17 PROCEDURE — 80048 BASIC METABOLIC PNL TOTAL CA: CPT

## 2017-06-17 PROCEDURE — 770022 HCHG ROOM/CARE - ICU (200)

## 2017-06-17 PROCEDURE — 80053 COMPREHEN METABOLIC PANEL: CPT

## 2017-06-17 PROCEDURE — 84478 ASSAY OF TRIGLYCERIDES: CPT

## 2017-06-17 PROCEDURE — 700101 HCHG RX REV CODE 250: Performed by: HOSPITALIST

## 2017-06-17 PROCEDURE — 94640 AIRWAY INHALATION TREATMENT: CPT

## 2017-06-17 PROCEDURE — 36600 WITHDRAWAL OF ARTERIAL BLOOD: CPT

## 2017-06-17 PROCEDURE — 82803 BLOOD GASES ANY COMBINATION: CPT

## 2017-06-17 PROCEDURE — 85055 RETICULATED PLATELET ASSAY: CPT

## 2017-06-17 PROCEDURE — 99233 SBSQ HOSP IP/OBS HIGH 50: CPT | Performed by: HOSPITALIST

## 2017-06-17 PROCEDURE — 94770 HCHG CO2 EXPIRED GAS DETERMINATION: CPT

## 2017-06-17 PROCEDURE — 87186 SC STD MICRODIL/AGAR DIL: CPT

## 2017-06-17 PROCEDURE — 87040 BLOOD CULTURE FOR BACTERIA: CPT

## 2017-06-17 PROCEDURE — 700105 HCHG RX REV CODE 258: Performed by: HOSPITALIST

## 2017-06-17 PROCEDURE — 700102 HCHG RX REV CODE 250 W/ 637 OVERRIDE(OP): Performed by: INTERNAL MEDICINE

## 2017-06-17 PROCEDURE — 82962 GLUCOSE BLOOD TEST: CPT | Mod: 91

## 2017-06-17 PROCEDURE — 83735 ASSAY OF MAGNESIUM: CPT

## 2017-06-17 PROCEDURE — 83605 ASSAY OF LACTIC ACID: CPT

## 2017-06-17 RX ORDER — POTASSIUM CHLORIDE 29.8 MG/ML
40 INJECTION INTRAVENOUS ONCE
Status: COMPLETED | OUTPATIENT
Start: 2017-06-17 | End: 2017-06-17

## 2017-06-17 RX ORDER — DIGOXIN 0.25 MG/ML
125 INJECTION INTRAMUSCULAR; INTRAVENOUS EVERY 12 HOURS
Status: DISCONTINUED | OUTPATIENT
Start: 2017-06-17 | End: 2017-06-21

## 2017-06-17 RX ORDER — DIGOXIN 0.25 MG/ML
INJECTION INTRAMUSCULAR; INTRAVENOUS
Status: COMPLETED
Start: 2017-06-17 | End: 2017-06-17

## 2017-06-17 RX ORDER — CHLORHEXIDINE GLUCONATE ORAL RINSE 1.2 MG/ML
30 SOLUTION DENTAL 2 TIMES DAILY
Status: DISCONTINUED | OUTPATIENT
Start: 2017-06-17 | End: 2017-06-21

## 2017-06-17 RX ORDER — DIGOXIN 0.25 MG/ML
250 INJECTION INTRAMUSCULAR; INTRAVENOUS ONCE
Status: COMPLETED | OUTPATIENT
Start: 2017-06-17 | End: 2017-06-17

## 2017-06-17 RX ADMIN — LEVALBUTEROL 1.25 MG: 1.25 SOLUTION RESPIRATORY (INHALATION) at 19:41

## 2017-06-17 RX ADMIN — DIGOXIN 125 MCG: 0.25 INJECTION INTRAMUSCULAR; INTRAVENOUS at 08:39

## 2017-06-17 RX ADMIN — DIGOXIN 250 MCG: 0.25 INJECTION INTRAMUSCULAR; INTRAVENOUS at 03:47

## 2017-06-17 RX ADMIN — PIPERACILLIN AND TAZOBACTAM 3.38 G: 3; .375 INJECTION, POWDER, FOR SOLUTION INTRAVENOUS at 08:36

## 2017-06-17 RX ADMIN — NOREPINEPHRINE BITARTRATE 8 MCG/MIN: 1 INJECTION, SOLUTION, CONCENTRATE INTRAVENOUS at 19:16

## 2017-06-17 RX ADMIN — PROPOFOL 45 MCG/KG/MIN: 10 INJECTION, EMULSION INTRAVENOUS at 08:40

## 2017-06-17 RX ADMIN — PIPERACILLIN AND TAZOBACTAM 3.38 G: 3; .375 INJECTION, POWDER, FOR SOLUTION INTRAVENOUS at 14:37

## 2017-06-17 RX ADMIN — LEVALBUTEROL 1.25 MG: 1.25 SOLUTION RESPIRATORY (INHALATION) at 06:44

## 2017-06-17 RX ADMIN — SODIUM CHLORIDE: 9 INJECTION, SOLUTION INTRAVENOUS at 13:38

## 2017-06-17 RX ADMIN — LEVALBUTEROL 1.25 MG: 1.25 SOLUTION RESPIRATORY (INHALATION) at 14:21

## 2017-06-17 RX ADMIN — PROPOFOL 42 MCG/KG/MIN: 10 INJECTION, EMULSION INTRAVENOUS at 04:12

## 2017-06-17 RX ADMIN — PIPERACILLIN AND TAZOBACTAM 3.38 G: 3; .375 INJECTION, POWDER, FOR SOLUTION INTRAVENOUS at 03:05

## 2017-06-17 RX ADMIN — POTASSIUM CHLORIDE 40 MEQ: 400 INJECTION, SOLUTION INTRAVENOUS at 08:38

## 2017-06-17 RX ADMIN — FAMOTIDINE 20 MG: 10 INJECTION, SOLUTION INTRAVENOUS at 08:36

## 2017-06-17 RX ADMIN — DIGOXIN 125 MCG: 0.25 INJECTION INTRAMUSCULAR; INTRAVENOUS at 21:00

## 2017-06-17 RX ADMIN — INSULIN LISPRO 1 UNITS: 100 INJECTION, SOLUTION INTRAVENOUS; SUBCUTANEOUS at 06:03

## 2017-06-17 RX ADMIN — PROPOFOL 60 MCG/KG/MIN: 10 INJECTION, EMULSION INTRAVENOUS at 15:54

## 2017-06-17 RX ADMIN — MORPHINE SULFATE 4 MG: 4 INJECTION INTRAVENOUS at 21:54

## 2017-06-17 RX ADMIN — SODIUM CHLORIDE 125 ML: 9 INJECTION, SOLUTION INTRAVENOUS at 23:53

## 2017-06-17 RX ADMIN — LEVALBUTEROL 1.25 MG: 1.25 SOLUTION RESPIRATORY (INHALATION) at 10:22

## 2017-06-17 RX ADMIN — PROPOFOL 55 MCG/KG/MIN: 10 INJECTION, EMULSION INTRAVENOUS at 21:54

## 2017-06-17 RX ADMIN — PIPERACILLIN AND TAZOBACTAM 3.38 G: 3; .375 INJECTION, POWDER, FOR SOLUTION INTRAVENOUS at 21:00

## 2017-06-17 RX ADMIN — LEVALBUTEROL 1.25 MG: 1.25 SOLUTION RESPIRATORY (INHALATION) at 02:00

## 2017-06-17 RX ADMIN — CHLORHEXIDINE GLUCONATE 30 ML: 1.2 RINSE ORAL at 21:00

## 2017-06-17 RX ADMIN — MORPHINE SULFATE 4 MG: 4 INJECTION INTRAVENOUS at 13:38

## 2017-06-17 RX ADMIN — MORPHINE SULFATE 2 MG: 4 INJECTION INTRAVENOUS at 19:56

## 2017-06-17 RX ADMIN — NOREPINEPHRINE BITARTRATE 15 MCG/MIN: 1 INJECTION, SOLUTION, CONCENTRATE INTRAVENOUS at 04:12

## 2017-06-17 RX ADMIN — LEVALBUTEROL 1.25 MG: 1.25 SOLUTION RESPIRATORY (INHALATION) at 22:38

## 2017-06-17 ASSESSMENT — COPD QUESTIONNAIRES
COPD SCREENING SCORE: 2
DO YOU EVER COUGH UP ANY MUCUS OR PHLEGM?: NO/ONLY WITH OCCASIONAL COLDS OR INFECTIONS
DURING THE PAST 4 WEEKS HOW MUCH DID YOU FEEL SHORT OF BREATH: NONE/LITTLE OF THE TIME
HAVE YOU SMOKED AT LEAST 100 CIGARETTES IN YOUR ENTIRE LIFE: NO/DON'T KNOW

## 2017-06-17 ASSESSMENT — PAIN SCALES - GENERAL
PAINLEVEL_OUTOF10: 0
PAINLEVEL_OUTOF10: 7
PAINLEVEL_OUTOF10: ASSUMED PAIN PRESENT
PAINLEVEL_OUTOF10: ASSUMED PAIN PRESENT

## 2017-06-17 ASSESSMENT — LIFESTYLE VARIABLES: REASON UNABLE TO ASSESS: SEDATED

## 2017-06-17 NOTE — PROGRESS NOTES
[id number] - report at bs from Romelia DILLARD.  Pt resting comfortably in bed.  Vented and sedated. Pt opens and follows with eyes to voice, does not follow other commands.  Repositioning q2 for comfort ongoing.  Fall precautions in effect. No s/s pain per cpot. Remains npo with ngt to lis.  See flowsheet for assess.  poc reviewed with pt during care, no evidence of learn, reviewed with wife, all questions answered.  Tele = a-fib,  Vss.

## 2017-06-17 NOTE — CARE PLAN
Problem: Safety  Goal: Will remain free from falls  Intervention: Implement fall precautions  Room/floor clear. Non skid socks on. Proper signs up. Bed alarm on, bed low and locked. Call light and belonging within reach. Hourly rounding in place to make sure needs are met.        Problem: Infection  Goal: Will remain free from infection  Intervention: Implement standard precautions and perform hand washing before and after patient contact  Hand washing every encounter. IV ports scrubbed with alcohol when hanging medicine. Patient watch for s/s of infection. Patient taught to report s/s of infection, verbalized understanding.

## 2017-06-17 NOTE — PROGRESS NOTES
Pulmonary Critical Care Progress        Chief Complaint: VDRF    History of Present Illness:   76 y.o. male was admitted on 6/14 with a chief complaint of abdominal pain. Last felt well 4 days PTA. 3 days PTA developed abd pain, sob, and has had fever, chills, diaphoresis and syncope    Presented to ED on 6/14 and CT Abd showed:   1.  Severe cholecystitis, possible component of emphysematous cholecystitis.  2.  Appendicolith, the appendix otherwise appears within normal limits. Appendicolith however places patient at future risk for development of appendicitis.  3.  Atherosclerosis and atherosclerotic coronary artery disease  4.  Hepatomegaly  Was cultured, placed on zosyn, and admitted to the ICU. Had hypotension, treated with IVF and levophed. Had increased O2 requirements which resulted in intubation and mechanical ventilation, hence Pulmonary Service notified for consultation.    Now intubated, mechanically ventilated, tachycardic, hypotensive on levophed with a CVP of 12 cm of water. Current vent settings are APV 16/500/8/100 with ABG pending. His white count is 25,600, hemoglobin 12.5, hematocrit 35.8, and platelet count 83,000. He has 9% bands. His sodium is 134, bicarbonate 12, anion gap 19, glucose 191, BUN 57, creatinine 3.04, estimated GFR 24, , , alkaline phosphatase 312, total bilirubin 1.6, and albumin 3.1. His pro calcitonin is 51.97 ng per mL with normal being less than 0.25 ng per mL. His blood cultures are growing gram-negative rods.    His post intubation chest x-ray shows that the ET tube is in a satisfactory position. There is minimal linear lower lobe atelectasis with possible mild edema. His EKG does not show acute changes other than sinus tachycardia. His INR is 1.32.    Past medical history: Hypertension    Medical procedures and surgery: Colonoscopies    Medication allergies: None    Tobacco: He is a former smoker but stopped in 1987 he smoked a pack a day for 30  years    Alcohol: None    Illicit drugs: None    Social history: he is .    Family history: Mother with COPD and colon cancer. Father with colon cancer.    ROS:  Respiratory: , Cardiac: unable to perform due to the patient's inability to effectively communicate, GI: unable to perform due to the patient's inability to effectively communicate.  All other systems negative.    Interval Events:  24 hour interval history reviewed      6/16 - remains on 3 pressors, cvp 15, on 35/8/500/16 with an excellent abg, improved bicarb to 17, plts to 40 K, still anemic, leukocytosis, renal fxn better, K+ 3.3, glu higher, lactate better at 4.75, + about 1 L last 24 H and + 3.4 L this admission, 3.7 L UO; awaiting ID of GNR; remains on Zosyn; echo: No prior study is available for comparison.   Left ventricular ejection fraction is visually estimated to be 60%.  Grade I diastolic dysfunction.  No valvular heart disease.  Estimated right ventricular systolic pressure  is 35 mmHg.  CXR reviewed and shows mild edema, atx, small left effusion    6/17 - CBC similar with plts sl better, persisting anemia, leukocytosis, ongoing hypokalemia, improved azotemia, persisting abnl LFTs, hypoalbuminemia, improving lactic acid; abgs with normal PO2 and acid-base; BC + kleb oxytoca; CXR with improved edema; + 1.3 L last 24 hours and + 4.7 L this admission; weaning pressors, p atrial fib per cards, need rate control; try sedation vacation and vent wean soon, correct potassium      PFSH:  No change.    Respiratory:  Fuller Vent Mode: APVCMV, Rate (breaths/min): 16, Vt Target (mL): 500, PEEP/CPAP: 8, FiO2: 35, Static Compliance (ml / cm H2O): 50, Control VTE (exp VT): 493  Pulse Oximetry: 99 %  Chest Tube Drains:  Axel Pillai 1: 35 ml       Exam: unlabored respirations, no intercostal retractions or accessory muscle use  ImagingAvailable data reviewed   Recent Labs      06/15/17   1135   06/16/17   0145   06/16/17   1805  06/16/17   2141   06/17/17   0145  06/17/17   0609   [id number]  7.27*   --   7.32*   --    --    --   7.44   --    [id number]  28.0   --   33.5   --    --    --   38.0*   --    [id number]  310.0*   --   127.4*   --    --    --   102.3*   --    KIPC0QEC  99.6*   --   97.9   --    --    --   97.3   --    ARTHCO3  12*   --   17   --    --    --   25   --    FIO2   --    < >  35   --    --    --   35   --    ARTBE  -13*   --   -8*   --    --    --   1   --    ISTATSPEC   --    < >   --    < >  Venous  Venous   --   Venous    < > = values in this interval not displayed.       HemoDynamics:  Pulse: 83, Heart Rate (Monitored): (!) 124  NIBP: 127/90 mmHg  CVP (mm Hg): (!) 12 MM HG    Exam: sinus tachycardia  Imaging: Available data reviewed  Recent Labs      06/15/17   1800  06/15/17   2015  06/16/17   0015   TROPONINI  0.96*  0.78*  0.65*       Neuro:  GCS Total Shubuta Coma Score: 7       Exam: Heavily sedated  Imaging: Available data reviewed    Fluids:  Intake/Output       06/15/17 0700 - 06/16/17 0659 06/16/17 0700 - 06/17/17 0659 06/17/17 0700 - 06/18/17 0659      7935-6246 5889-5322 Total 2501-5669 5412-6221 Total 7066-5627 4163-8749 Total       Intake    P.O.  0  0 0  --  -- --  --  -- --    P.O. 0 0 0 -- -- -- -- -- --    I.V.  2037.9  3201.8 5239.7  2522.9  1922 4444.9  343.7  -- 343.7    Vasopressin Volume 40.8 108 148.8 108 75.9 183.9 -- -- --    Phenylephrine Volume 138.8 56.3 195 -- -- -- -- -- --    Propofol Volume 256.5 294.5 551 308.3 314.5 622.8 54.8 -- 54.8    Norepinephrine Volume 376.8 543.1 919.9 366.6 331.6 698.2 38.9 -- 38.9    IV Volume (Potassium) -- 100 100 100 -- 100 -- -- --    IV Volume (/hr) 525 -- 525 1065 1000 2065 250 -- 250    IV Volume (Bicarb at 75 ml/hr) 500 1800 2300 375 -- 375 -- -- --    IV Piggyback Volume 200 300 500 200 200 400 -- -- --    Blood  227  -- 227  --  -- --  --  -- --    Volume (RELEASE PLATELET PHERESIS) 227 -- 227 -- -- -- -- -- --    Enteral  --  90 90  --  -- --  --  -- --     Free Water / Tube Flush -- 90 90 -- -- -- -- -- --    Total Intake 2264.9 3291.8 5556.7 2522.9 1922 4444.9 343.7 -- 343.7       Output    Urine  1835  1900 3735  1575  1025 2600  90  -- 90    Indwelling Cathether 1835 1900 3735 1575 1025 2600 90 -- 90    Drains  380  505 885  180  335 515  --  -- --    Nasogastric Tube 250 450 700 150 300 450 -- -- --    Axel Pillai 1 130 55 185 30 35 65 -- -- --    Total Output 2215 2405 4620 1755 1360 3115 90 -- 90       Net I/O     49.9 886.8 936.7 767.9 562 1329.9 253.7 -- 253.7           Recent Labs      06/14/17   2020   06/15/17   0745   06/16/17   1805  06/17/17   0020  06/17/17   0609   SODIUM   --    < >   --    < >  140  141  142   POTASSIUM  2.5*   < >  4.0   < >  3.5*  3.3*  3.3*   CHLORIDE   --    < >   --    < >  105  105  108   CO2   --    < >   --    < >  25  26  24   BUN   --    < >   --    < >  36*  35*  33*   CREATININE   --    < >   --    < >  1.33  1.30  1.12   MAGNESIUM  1.4*   --   2.6*   --    --    --   2.3   PHOSPHORUS  1.8*   --   6.7*   --    --    --    --    CALCIUM   --    < >   --    < >  7.1*  7.0*  7.1*    < > = values in this interval not displayed.       GI/Nutrition:  Exam: patient sedated  Imaging: None - Reviewed  NPO  Liver Function  Recent Labs      06/14/17   1820   06/16/17   1805  06/17/17   0020  06/17/17   0609   ALTSGPT  142*   --    --    --   219*   ASTSGOT  159*   --    --    --   212*   ALKPHOSPHAT  312*   --    --    --   114*   TBILIRUBIN  1.6*   --    --    --   2.1*   LIPASE  23   --    --    --    --    GLUCOSE  178*   < >  207*  212*  190*    < > = values in this interval not displayed.       Heme:  Recent Labs      06/14/17   1720   06/15/17   0410  06/15/17   1032   06/16/17   1805  06/17/17   0020  06/17/17   0609   RBC   --    < >  3.77*   --    < >  3.76*  3.73*  3.86*   HEMOGLOBIN   --    < >  12.5*   --    < >  12.6*  12.3*  12.7*   HEMATOCRIT   --    < >  35.8*   --    < >  35.2*  35.1*  36.3*   PLATELETCT   --    <  >  83*   --    < >  41*  41*  47*   PROTHROMBTM  15.2*   --   16.0*  16.2*   --    --    --    --    APTT  34.3   --   34.4  31.5   --    --    --    --    INR  1.22*   --   1.30*  1.32*   --    --    --    --     < > = values in this interval not displayed.       Infectious Disease:  Monitored Temp  Av.5 °C (99.5 °F)  Min: 37 °C (98.6 °F)  Max: 38 °C (100.4 °F)  Micro: reviewed  Recent Labs      17   1820   17   1805  17   0020  17   0609   WBC  7.3   < >  13.0*  12.6*  13.5*   NEUTSPOLYS  78.00*   --    --    --    --    LYMPHOCYTES  7.00*   --    --    --    --    MONOCYTES  4.00   --    --    --    --    EOSINOPHILS  0.00   --    --    --    --    BASOPHILS  0.00   --    --    --    --    ASTSGOT  159*   --    --    --   212*   ALTSGPT  142*   --    --    --   219*   ALKPHOSPHAT  312*   --    --    --   114*   TBILIRUBIN  1.6*   --    --    --   2.1*    < > = values in this interval not displayed.     Current Facility-Administered Medications   Medication Dose Frequency Provider Last Rate Last Dose   • insulin lispro (HUMALOG) injection 1-6 Units  1-6 Units Q6HRS Che Nash M.D.   1 Units at 17 0603   • potassium chloride in water (KCL) ivpb **Administer in ICU only** 40 mEq  40 mEq Once Che Nash M.D. 25 mL/hr at 17 0838 40 mEq at 17 0838   • digoxin (LANOXIN) injection 125 mcg  125 mcg Q12HR Che Nash M.D.   125 mcg at 17 0839   • levalbuterol (XOPENEX) 1.25 MG/3ML nebulizer solution 1.25 mg  1.25 mg Q4HRS (RT) Che Nash M.D.   1.25 mg at 17 0644   • NS infusion   Continuous Che Nash M.D. 125 mL/hr at 17 1,000 mL at 17   • famotidine (PEPCID) injection 20 mg  20 mg DAILY Che Nash M.D.   20 mg at 17 0836   • glucose 4 g chewable tablet 16 g  16 g Q15 MIN PRN Che Nash M.D.        And   • dextrose 50% (D50W) injection 25 mL  25 mL Q15 MIN PRN Che Nash M.D.       • acetaminophen  (TYLENOL) suppository 650 mg  650 mg Q6HRS PRN Che Nash M.D.   650 mg at 06/16/17 1615   • morphine (pf) 4 mg/ml injection 2-4 mg  2-4 mg Q2HRS PRN Che Nash M.D.   4 mg at 06/15/17 2259   • MD ARTHUR...Pharmacy to implement PROPOFOL CRITICAL CARE PROTOCOL 1 Each  1 Each PRN Che Nash M.D.       • propofol (DIPRIVAN) injection  5-80 mcg/kg/min Continuous Che Nash M.D. 33.4 mL/hr at 06/17/17 0849 55 mcg/kg/min at 06/17/17 0849   • norepinephrine (LEVOPHED) 8 mg in  mL Infusion  0.5-30 mcg/min Continuous Che Nash M.D. 18.8 mL/hr at 06/17/17 0621 10 mcg/min at 06/17/17 0621   • vasopressin (VASOSTRICT) 20 Units in  mL Infusion  0.03 Units/min Continuous Che Nash M.D.   Stopped at 06/17/17 0226   • phenylephrine (SAW-SYNEPHRINE) 40,000 mcg in  mL Infusion  1-300 mcg/min Continuous Che Nash M.D.   Stopped at 06/15/17 1955   • senna-docusate (PERICOLACE or SENOKOT S) 8.6-50 MG per tablet 2 Tab  2 Tab BID Jose Valdivia M.D.   Stopped at 06/15/17 2100    And   • polyethylene glycol/lytes (MIRALAX) PACKET 1 Packet  1 Packet QDAY PRN Jose Valdivia M.D.        And   • magnesium hydroxide (MILK OF MAGNESIA) suspension 30 mL  30 mL QDAY PRN Jose Valdivia M.D.        And   • bisacodyl (DULCOLAX) suppository 10 mg  10 mg QDAY PRN Jose Valdivia M.D.       • Respiratory Care per Protocol   Continuous RT Jose Valdivia M.D.       • acetaminophen (TYLENOL) tablet 650 mg  650 mg Q6HRS PRN Jose Valdivia M.D.       • piperacillin-tazobactam (ZOSYN) 3.375 g in  mL IVPB  3.375 g Q6HRS Jose Valdivia M.D. 200 mL/hr at 06/17/17 0836 3.375 g at 06/17/17 0836   • ondansetron (ZOFRAN) syringe/vial injection 4 mg  4 mg Q4HRS PRN Jose Valdivia M.D.       • ondansetron (ZOFRAN ODT) dispertab 4 mg  4 mg Q4HRS PRN Jose Valdivia M.D.       • lorazepam (ATIVAN) tablet 0.5 mg  0.5 mg Q6HRS PRN Jose Valdivia M.D.        Or   • lorazepam (ATIVAN) injection 0.5 mg  0.5 mg Q6HRS  GUNNER Valdivia M.D.   0.5 mg at 06/15/17 0941     Last reviewed on 6/15/2017  8:22 AM by Sahara Staton    Quality  Measures:  Radiology images reviewed, Medications reviewed and Labs reviewed                      Problems:  Acute cholecystitis  Kleb bacteremia  Severe sepsis with shock, improving  Ventilator dependent respiratory failure  History of systemic arterial hypertension  Anemia, leukocytosis, thrombocytopenia  Metabolic acidosis acute renal failure  Mild atelectasis  S/p brook drain  PAF  Hypokalemia  Volume overload  Abnormal LFTs      Plan:  Ventilator support, adjust FiO2 and PEEP as well as tidal volume and rate to provide for satisfactory alveolar ventilation and oxygenation using lung protective strategies.  Anti-infectives   Pressors and intravenous fluids  Prophylaxis awaiting surgical evaluation check echocardiogram   Cholecystostomy  Adjust IVF        Discussed patient condition and risk of morbidity and/or mortality with RN, RT and hospitalist.  D/w family at the bedside  The patient remains critically ill.  Critical care time = 43 minutes in directly providing and coordinating critical care and extensive data review.  No time overlap and excludes procedures.

## 2017-06-17 NOTE — PROGRESS NOTES
"  Trauma/Surgical Progress Note    Author: Kae Last Date & Time created: 6/17/2017  7:21 AM     Interval Events:  Critically ill. On vent. Improving - weaning off pressors. In a fib. ? More aggressive tx of a fib.  DOMINGO less output. WBC, LFTs and lactate down. Cholecystostomy tube placed. On abx.      Hemodynamics:  Blood pressure 78/48, pulse 108, temperature 36.1 °C (97 °F), resp. rate 16, height 1.778 m (5' 10\"), weight 102.8 kg (226 lb 10.1 oz), SpO2 99 %.     Respiratory:  Fuller Vent Mode: APVCMV, Rate (breaths/min): 16, PEEP/CPAP: 8, FiO2: 35, P Peak (PIP): 22, P MEAN: 11 Respiration: 16, Pulse Oximetry: 99 %     Work Of Breathing / Effort: Vented  RUL Breath Sounds: Clear After Suction, RML Breath Sounds: Clear, RLL Breath Sounds: Diminished, OZZY Breath Sounds: Clear, LLL Breath Sounds: Diminished  Fluids:    Intake/Output Summary (Last 24 hours) at 06/17/17 0723  Last data filed at 06/17/17 0600   Gross per 24 hour   Intake 4444.89 ml   Output   3115 ml   Net 1329.89 ml         Admit Weight: 93.7 kg (206 lb 9.1 oz)  Current      Physical Exam   Constitutional: He appears well-developed and well-nourished.   HENT:   Head: Normocephalic.   Cardiovascular: Normal rate.    Pulmonary/Chest: Effort normal.   Abdominal: Soft. He exhibits distension.   DOMINGO in RUQ - bilious   Genitourinary:   Laird to gravity   Skin: Skin is warm.       Medical Decision Making/Problem List:    Active Hospital Problems    Diagnosis   • Atrial fibrillation with RVR (CMS-[facility]) [I48.91]   • Acute respiratory failure with hypoxia (CMS-[facility]) [J96.01]   • Metabolic acidosis [E87.2]   • Acute kidney injury (nontraumatic) (CMS-[facility]) [N17.9]   • Hyponatremia [E87.1]   • Bacteremia [R78.81]   • Acute emphysematous cholecystitis [K81.0]   • Hypotension (arterial) [I95.9]   • Thrombocytopenia (CMS-[facility]) [D69.6]   • Hypokalemia due to inadequate potassium intake [E87.6]   • Sepsis (CMS-[facility]) [A41.9]   • Non-STEMI (non-ST elevated myocardial " infarction) (CMS-[facility]) [I21.4]     Core Measures & Quality Metrics:  Labs reviewed, Medications reviewed and Radiology images reviewed  Laird catheter: Critically Ill - Requiring Accurate Measurement of Urinary Output  Central line in place: Need for access and Vasopressors    DVT Prophylaxis: Contraindicated - High bleeding risk  DVT prophylaxis - mechanical: SCDs  Ulcer prophylaxis: Yes  Antibiotics: Treating active infection/contamination beyond 24 hours perioperative coverage  Assessed for rehab: Patient unable to tolerate rehabilitation therapeutic regimen    WALT Score  Discussed patient condition with RN.  CRITICAL CARE TIME EXCLUDING PROCEDURES: 20   minutes

## 2017-06-17 NOTE — PROGRESS NOTES
Paged Dr. Mancilla to update about heart rhythm A-Fib 130's consistently up to 160's.     0330 Spoke to Dr. Mancilla to update about the heart rhythm mentioned above. Also updated about kidney function and that patient is down to 1 pressor Levophed at 15 mcg/kg/min, has been off vaso since 0226. MD ordered to give patient one time dose of digoxin 250 mcg x1 IV.

## 2017-06-17 NOTE — RESPIRATORY CARE
Adult Ventilation Update    Total Vent Days: 3   Patient Lines/Drains/Airways Status    Active Airway     Name: Placement date: Placement time: Site: Days:    Airway Group ET Tube Oral 8.0 06/15/17  1020  Oral  1            Vent mode continued:  APV/CMV 16    ml   PEEP 8  FI02 35%  with AM ABG's  As follows  Results for ANITHA RICHMOND (MRN [id number]) as of 6/17/2017 02:16   Ref. Range 6/17/2017 01:45   Ph Latest Ref Range: 7.40-7.50  7.44   Pco2 Latest Ref Range: 26.0-37.0 mmHg 38.0 (H)   Po2 Latest Ref Range: 64.0-87.0 mmHg 102.3 (H)   Hco3 Latest Ref Range: 17-25 mmol/L 25   Base Excess Latest Ref Range: -4-3 mmol/L 1   O2 Saturation Latest Ref Range: 93.0-99.0 % 97.3   Ph -TC Latest Ref Range: 7.40-7.50  7.43   Pco2 -TC Latest Ref Range: 26.0-37.0 mmHg 38.8 (H)   Po2 -TC Latest Ref Range: 64.0-87.0 mmHg 105.4 (H)   FIO2 Latest Ref Range: 30-60 % 35   Peep Unknown 8   Tidal Volume Unknown 500   Frequency of Treatments Unknown 16   Body Temp Latest Units: Centigrade 37.5   Plateau Pressure (Q Shift): 15 (06/16/17 1848)  Static Compliance (ml / cm H2O): 51 (06/17/17 0200)  Patient failed trials because of Barriers to Wean: No Order (06/16/17 1848)  Barriers to SBT    Length of Weaning Trial    Cough: Productive (06/17/17 0200)  Sputum Amount: Moderate (06/17/17 0200)  Sputum Color: Tan;White (06/17/17 0200)  Sputum Consistency: Thick;Thin (06/17/17 0200)  Mobility Group  Activity Performed: Unable to mobilize (06/16/17 1848)  Pt Calls for Assistance: No (06/16/17 2000)  Reason Not Mobilized: Unstable condition (06/16/17 1848)  Events/Summary/Plan: morning ABG's done, HOB elevated 30 degrees, oral care done by RT X 3 for > oral secretions with ETT suctioning.  No order for SBT.  Propofol infusion, 42 mcg/kg

## 2017-06-17 NOTE — PROGRESS NOTES
[facility] Hospitalist Progress Note    Date of Service: 6/17/2017    Chief Complaint  76 y.o. male admitted 6/14/2017 with abdominal pain and SOB, fever, chills, diaphoresis, syncope x4.  CT abdomen revealed acute cholecystitis with lactic acid of 8 admitted to ICU on zosyn IV, sepsis protocol initiated.    Interval Problem Update  6/15:  On exam, patient had become more diaphoretic, complaining of severe upper abdominal pain that radiated to both shoulders.  He was given morphine IV, /100s, increased RR.  His IVFs were dc'd, stat CXR, lasix 40mg IV x1, xopenex respiratory neb, but continued to worsen with increased RR, shallow breathing.  He was acidotic with low bicarb due to sepsis.  Patient required ET intubation, Dr. Ivy kindly consulted for vent management.  I spoke with Dr. Last about worsening condition.  Ordered stat abd CT:  No perforation, remains with emphysematous swollen gallbladder.  U/s cholecystostomy tube placed successfully with drain of dark necrotic bile fluid.  BCs + GNR ss pending, on zosyn.  Met with wife and son and gave updates in a.m. and in afternoon.  Family aware of critically ill patient for next 24 hours.  6/16:  Mild improvement overnight with improving bicarb, renal function and lactic acid levels.  BCs and bile culture with LFGNR ss pending.  BP maintaining on decreasing amounts of levophed and vasopressin, off neosinephrine drip.  Remains intubated.  Went into afib with RVR at 0800, but platelets remain at 40 therefore no AC at this time, no rate controllers due to hypotension/pressors.  Attempt to wean propofol and pressors to better control the afib.  [id number] drip changed to NS since CO2 up to 22 and elevated blood sugars.  Check a1c and added SSI, accuchecks for now.  Better perfusion peripheral pulses stronger with improved cap refill.  6/17:  Improving perfusion with warm to touch, brisk distal pulses and <2 sec cap refill, RR improved, awakes occasionally with  movement all 4 extremities, remains on vent.  Gave family updates.  Renal function improved to normal, remains on 1 pressor levophen at 10.  Afib remains, but better controlled on digoxin IV started in a.m.  Lactic acid normal.  BC with Klebsiella oxytoca ss zosyn IV.  Bile fluid with klebsiella pneumonia ss zosyn.  Repeat BCs x2.    Consultants/Specialty  Dr. Last:  General surgery  Dr. Ivy:  pulmonology  Dr. Guzman:  cardiology      Disposition  TBD.        Review of Systems   Unable to perform ROS: critical illness      Physical Exam  Laboratory/Imaging   Hemodynamics  No data recorded.   Monitored Temp: 37.1 °C (98.8 °F)  Pulse  Av.5  Min: 59  Max: 131 Heart Rate (Monitored): (!) 110  NIBP: 113/71 mmHg CVP (mm Hg): (!) 13 MM HG    Respiratory  Fuller Vent Mode: APVCMV, Rate (breaths/min): 16, PEEP/CPAP: 8, PEEP/CPAP: 8, FiO2: 35, P Peak (PIP): 21, P MEAN: 11   Respiration: 16, Pulse Oximetry: 98 %     Work Of Breathing / Effort: Vented  RUL Breath Sounds: Clear, RML Breath Sounds: Clear, RLL Breath Sounds: Diminished, OZZY Breath Sounds: Clear, LLL Breath Sounds: Diminished;Clear    Fluids    Intake/Output Summary (Last 24 hours) at 17 1640  Last data filed at 17 1600   Gross per 24 hour   Intake 4232.72 ml   Output 2897.5 ml   Net 1335.22 ml       Nutrition  Orders Placed This Encounter   Procedures   • Diet NPO     Standing Status: Standing      Number of Occurrences: 1      Standing Expiration Date:      Order Specific Question:  Restrict to:     Answer:  Sips with Medications [3]     Physical Exam   Constitutional: He is oriented to person, place, and time. He appears well-developed and well-nourished. No distress.   More calm, RR normalized to 14.   HENT:   Head: Normocephalic and atraumatic.   Mouth/Throat: Oropharynx is clear and moist. No oropharyngeal exudate.   Eyes: Conjunctivae and EOM are normal. Pupils are equal, round, and reactive to light. Right eye exhibits no discharge.  Left eye exhibits no discharge. No scleral icterus.   Neck: Normal range of motion. Neck supple. No JVD present. No tracheal deviation present. No thyromegaly present.   Cardiovascular: Normal rate, regular rhythm and normal heart sounds.  Exam reveals no gallop and no friction rub.    No murmur heard.  Pulmonary/Chest: No respiratory distress. He has no wheezes. He has no rales. He exhibits no tenderness.   Abdominal: Soft. Bowel sounds are normal. He exhibits no distension and no mass. There is no tenderness. There is no rebound and no guarding.   Dark, necrotic appearance to DOMINGO cholecystostomy drain.  Dark bile NGT aspirate.   Musculoskeletal: Normal range of motion. He exhibits no edema or tenderness.   Lymphadenopathy:     He has no cervical adenopathy.   Neurological: He is alert and oriented to person, place, and time. No cranial nerve deficit. He exhibits normal muscle tone.   Skin: Skin is warm. No rash noted. He is not diaphoretic. No erythema.   Psychiatric: He has a normal mood and affect. His behavior is normal. Judgment and thought content normal.   Nursing note and vitals reviewed.      Recent Labs      06/17/17   0020  06/17/17   0609  06/17/17   1200   WBC  12.6*  13.5*  12.7*   RBC  3.73*  3.86*  3.59*   HEMOGLOBIN  12.3*  12.7*  11.6*   HEMATOCRIT  35.1*  36.3*  33.3*   MCV  94.1  94.0  92.8   MCH  33.0  32.9  32.3   MCHC  35.0  35.0  34.8   RDW  47.6  48.1  47.4   PLATELETCT  41*  47*  48*   MPV  12.9  12.2  12.2     Recent Labs      06/17/17   0020  06/17/17   0609  06/17/17   1200   SODIUM  141  142  144   POTASSIUM  3.3*  3.3*  3.5*   CHLORIDE  105  108  108   CO2  26  24  25   GLUCOSE  212*  190*  167*   BUN  35*  33*  29*   CREATININE  1.30  1.12  1.04   CALCIUM  7.0*  7.1*  7.1*     Recent Labs      06/14/17   1720  06/15/17   0410  06/15/17   1032   APTT  34.3  34.4  31.5   INR  1.22*  1.30*  1.32*         Recent Labs      06/17/17   1200   TRIGLYCERIDE  563*          Assessment/Plan     *  Sepsis (CMS-[facility]) (present on admission)  Assessment & Plan  Source:  Infected emphysematous cholecystitis  IVFs  Zosyn  6/14 BC+x2 Klebsiella oxytoca ss zosyn IV  6/15 BC+ x2 LFGNR  6/15Chole fluid:  Klebsiella pneumonia ss zosyn IV  6/16 lactic acid trending down, bicarb normal, BP improving, less pressors, remains on levophed, off vasopressin, off steve drip.  Remains on vent  6/17:  Lactic acid normal, bicarb normal, renal function normal.  Perfusion improved.    Non-STEMI (non-ST elevated myocardial infarction) (CMS-HCC) (present on admission)  Assessment & Plan  Dr. Chua consulted:  No intervention  Cardiac strain due to sepsis  dc'd heparin drip since low platelets  trops trending down    Acute respiratory failure with hypoxia (CMS-HCC) (present on admission)  Assessment & Plan  6/15 requiring ET intubation for acute wheeze, acidosis due to sepsis.  xopenex breathing treatments  S/p lasix 40mg iv with improved breathing post intubation  6/16 repeat CXR with minimal pulmonary edema, lungs CTA.      Metabolic acidosis (present on admission)  Assessment & Plan  2/2 severe sepsis  6/16 improving bicarb with drainage of infection, abx, fluids, BP improvement    Acute kidney injury (nontraumatic) (CMS-HCC) (present on admission)  Assessment & Plan  New onset since admission   2/2 sepsis related renal failure with poor perfusion  6/16 improving since BP improved and sepsis control  Bicarb improved, 6/16 changed bicarb drip to NS at 125/hr.    Hyponatremia (present on admission)  Assessment & Plan  Resolving on IVFs.    Bacteremia (present on admission)  Assessment & Plan  BC 6/14 x2 with Klebsiella ss zosyn   6/15 BCs x2 +LFGNR  Source:  Acute cholecystitis  Zosyn IV  6/17 repeat BCs x2.    Acute emphysematous cholecystitis (present on admission)  Assessment & Plan  6/15 u/s cholecystostomy completed  Culture fluid:  Klebsiella pneumonia ss zosyn IV  BCs with Klebsiella  Repeat CT abdomen:  No perforation  Spoke with  Dr. Last about worsening clinical sepsis:  Too sick for OR removal  Await MRCP results and improvement with u/s drain of infection.    Hypotension (arterial) (present on admission)  Assessment & Plan  Since intubated, started propofol drip, drop in BP:  Levophed, vasopressin drips continue  Off neosinephrine drip  dc'd Bicarb drip at 150/hr to NS at 125/hr    Thrombocytopenia (CMS-HCC) (present on admission)  Assessment & Plan  Etiology 2/2 severe sepsis, no known heavy alcohol use.  Given 1 unit platelets prior to u/s cholecystostomy for plts <50K.    Hypokalemia due to inadequate potassium intake (present on admission)  Assessment & Plan  Replace K with IV 40K as needed.  Renal function improving with IVFs and increase in BP.  6/17:  Changed BMP q 12  And CMP daily since normal renal function, improved K.    Atrial fibrillation with RVR (CMS-[facility])  Assessment & Plan  Secondary to acute sepsis and pressors  NO AC since platelets 40  Attempt to decrease levophed as BP allows, off neosinephrine.  Continues with vasopressin  6/17:  Started digoxin 250 IV x1 since renal function improved, then 125 mcg IV bid with better rate control.  Briefly went to NSR 2130 6/16, then back to afib.        Laird catheter: No Laird      DVT Prophylaxis: Contraindicated - High bleeding risk    Ulcer prophylaxis: Not indicated  Antibiotics: Treating active infection/contamination beyond 24 hours perioperative coverage

## 2017-06-17 NOTE — FLOWSHEET NOTE
17 1848   Ventilator Management Group   Intensivist Group Yes   Ventilator Identifier   Ventilator Number [id number]   General Vent Information   #Ventilator Subsequent Day Yes   Ventilator Red Plug Ventilator Plugged into Red Electrical Outlet   Vent Temp (Celsius) 35   Pulse Oximetry 99 %   Heart Rate (Monitored) 90   Resuscitation Bag Resuscitation Bag and Mask at Bedside and Checked   CO2 Monitoring   #ETCO2 35   Vent Alarms   Ventilation Alarms Reviewed / Activated Yes   Upper Pressure Limit (HI PIP) Alarm 40   Low VE (LPM) Alarm 4   High Respiratory Rate Alarm 40   Low Respiratory Rate Alarm 8   Low VT Alarm 250   Apnea Parameters Checked / Activated Yes   EtCO2 High Alarm 55   EtCO2 Low Alarm 15   Fuller Vent   Fuller Vent Yes   Fuller Vent Mode APVCMV   Fuller Conventional Settings   Rate (breaths/min) 16   Vt Target (mL) 500   TI (Seconds) 0.7   PEEP/CPAP 8   Pramp 50   FiO2 35   Sensitivity Setting Flow Trigger   Other Settings 5   Fuller Measured Parameters   P Peak (PIP) 24    Minute Volume 7.98   Control VTE (exp VT) 503   f Total (Breaths/Min) 16   P MEAN 11   Static Compliance (ml / cm H2O) 47   R exp 10   Plateau Pressure (Q Shift) 15   Servo Vent   PEEP (Monitored) (cmH2O) 8   Mobility Group   Activity Performed Unable to mobilize   Pt Calls for Assistance No   Reason Not Mobilized Unstable condition   Weaning Trial   Weaning Trial Initiated No   Barriers to Wean No Order   Vent Weaning Smart Text completed? Yes   Vent Inline Medication   #(Single Treatment) Vent Inline Medication Yes   Chest Exam   Work Of Breathing / Effort Vented   Breath Sounds   RUL Breath Sounds Clear;Diminished   RML Breath Sounds Clear;Diminished   RLL Breath Sounds Clear;Diminished   OZZY Breath Sounds Clear;Diminished   LLL Breath Sounds Clear;Diminished   Secretions   How Sputum Obtained Endotracheal;Oropharyngeal   Sputum Amount Small;Moderate   Sputum Color Clear;Tan   Sputum Consistency Thin   Suction  Frequency Suctioned Once or Twice Per Encounter   Airway Group ET Tube Oral 8.0   Placement Date/Time: 06/15/17 1020   Airway Type: ET Tube  Airway Location: Oral  Airway Size: 8.0  Inserted In: Unit  Inserted by: MD   Skin Integrity Intact   Airway Tube Secured By Commercial securing device   Date Securing Device changed? 06/15/17   Date Securing Device to be changed (Q 7 days) 06/22/17   Secured At  (cm) 26   Tube Repositioned Center   Cuff Pressure cmH2O (R.C.) 22

## 2017-06-17 NOTE — CARE PLAN
Problem: Safety  Goal: Will remain free from falls  See risk assessment.  Pt vented and in restraints.  Frequent rounding,  repositioning q 2     Problem: Infection  Goal: Will remain free from infection  Administer abx as ordered, monitor labs    Problem: Knowledge Deficit  Goal: Knowledge of disease process/condition, treatment plan, diagnostic tests, and medications will improve  See education.  poc reviewed with pt during all care. Poc to be reviewed with family daily.  Encouraged to call with any questions/concerns.    Problem: Pain Management  Goal: Pain level will decrease to patient’s comfort goal  Pain assessment and treat per cpot.    Problem: Fluid Volume:  Goal: Will maintain balanced intake and output  Monitor I&O's, labs.

## 2017-06-17 NOTE — DISCHARGE PLANNING
Medical Social Work    Referral: Pt discussed at IDT rounds this AM.    Intervention: Per flowsheet, pt lives with spouse and expects to d.c home.  Pt is currently on the ventilator.  No SS needs identified nor any requests for BEN Hernandez during rounds.      Plan: BEN available for any assistance with d.c planning.

## 2017-06-17 NOTE — PROGRESS NOTES
Kiki from Lab called with critical result of Platelet Count of 41 at 1634. Critical lab result read back to Kiki.   This critical lab result is within parameters established by  for this patient

## 2017-06-17 NOTE — PROGRESS NOTES
BS report received. Patient in bed, family at BS. Appears in no distress at this time and tolerating vent. POC discussed with family, verbalized understanding. Lines, drips, and monitor alarms verified. Bed low and locked, bed alarm on. Call light and belonging Fall precautions in effect. Will continue to monitor closely.

## 2017-06-17 NOTE — RESPIRATORY CARE
Adult Ventilation Update    Total Vent Days: 3    Patient Lines/Drains/Airways Status    Active Airway     Name: Placement date: Placement time: Site: Days:    Airway Group ET Tube Oral 8.0 06/15/17  1020  Oral  2                    Plateau Pressure (Q Shift): 20 (06/17/17 1422)  Static Compliance (ml / cm H2O): 51 (06/17/17 1422)    Patient failed trials because of Barriers to Wean: No Order (06/17/17 1422)  Barriers to SBT    Length of Weaning Trial         Sputum/Suction   Cough: Productive (06/17/17 1422)  Sputum Amount: Moderate (06/17/17 1422)  Sputum Color: White (06/17/17 1422)  Sputum Consistency: Thin (06/17/17 1422)    Mobility Group  Activity Performed: Unable to mobilize (06/17/17 1200)  Pt Calls for Assistance: No (06/17/17 0800)  Reason Not Mobilized: Unstable condition (06/17/17 1200)    Events/Summary/Plan:weaning pressors as tolerated. When odd the pressors will attempt MRI.    Results for ANITHA RICHMOND (MRN [id number]) as of 6/17/2017 14:32   Ref. Range 6/17/2017 01:45   Ph Latest Ref Range: 7.40-7.50  7.44   Pco2 Latest Ref Range: 26.0-37.0 mmHg 38.0 (H)   Po2 Latest Ref Range: 64.0-87.0 mmHg 102.3 (H)   Hco3 Latest Ref Range: 17-25 mmol/L 25   Base Excess Latest Ref Range: -4-3 mmol/L 1   O2 Saturation Latest Ref Range: 93.0-99.0 % 97.3

## 2017-06-17 NOTE — PROGRESS NOTES
Bianka from Lab called with critical result of Platelet = 47 at 0630. Critical lab result read back to Bianka.    MD not notified of critical lab result. Patient's last platelet is improving from previous one. CBC is being check every 6 hours.

## 2017-06-17 NOTE — PROGRESS NOTES
Yuridia from Lab called with critical result of Platelet = 41 at 0052. Critical lab result read back to Yuridia.   MD not notified of critical lab result. Patient's last platelet was 41. CBC is being check every 6 hours.

## 2017-06-17 NOTE — CARE PLAN
Problem: Ventilation Defect:  Goal: Ability to achieve and maintain unassisted ventilation or tolerate decreased levels of ventilator support  Outcome: PROGRESSING AS EXPECTED  Patient is still requiring mechanical ventilation. We have not attempted ventilator weaning at this time.  Intervention: Support and monitor invasive and noninvasive mechanical ventilation  Continuous cardiac and SpO2 monitoring. Daily and prn abg's with chest x-ray  Intervention: Monitor ventilator weaning response  Continuous ventilator monitoring, SBT's attempted as patient condition allows.  Intervention: Perform ventilator associated pneumonia prevention interventions  Head of bed is elevated to 30 degress or more. At least q4 oral suctioning and cleansing completed.  Intervention: Manage ventilation therapy by monitoring diagnostic test results  SpO2 monitoring. Daily and prn abg's with chest x-ray. At least q4 and prn Ventilator and patient assessments completed.

## 2017-06-18 LAB
ALBUMIN SERPL BCP-MCNC: 1.9 G/DL (ref 3.2–4.9)
ALBUMIN/GLOB SERPL: 0.7 G/DL
ALP SERPL-CCNC: 96 U/L (ref 30–99)
ALT SERPL-CCNC: 149 U/L (ref 2–50)
ANION GAP SERPL CALC-SCNC: 10 MMOL/L (ref 0–11.9)
ANION GAP SERPL CALC-SCNC: 9 MMOL/L (ref 0–11.9)
AST SERPL-CCNC: 115 U/L (ref 12–45)
BACTERIA BLD CULT: ABNORMAL
BACTERIA SPEC RESP CULT: NORMAL
BASE EXCESS BLDA CALC-SCNC: 2 MMOL/L (ref -4–3)
BILIRUB SERPL-MCNC: 2.2 MG/DL (ref 0.1–1.5)
BODY TEMPERATURE: 36.9 CENTIGRADE
BUN SERPL-MCNC: 20 MG/DL (ref 8–22)
BUN SERPL-MCNC: 22 MG/DL (ref 8–22)
CA-I SERPL-SCNC: 1.05 MMOL/L (ref 1.1–1.3)
CALCIUM SERPL-MCNC: 7.1 MG/DL (ref 8.4–10.2)
CALCIUM SERPL-MCNC: 7.1 MG/DL (ref 8.4–10.2)
CHLORIDE SERPL-SCNC: 109 MMOL/L (ref 96–112)
CHLORIDE SERPL-SCNC: 113 MMOL/L (ref 96–112)
CO2 SERPL-SCNC: 23 MMOL/L (ref 20–33)
CO2 SERPL-SCNC: 25 MMOL/L (ref 20–33)
CREAT SERPL-MCNC: 0.85 MG/DL (ref 0.5–1.4)
CREAT SERPL-MCNC: 1.05 MG/DL (ref 0.5–1.4)
ERYTHROCYTE [DISTWIDTH] IN BLOOD BY AUTOMATED COUNT: 49.5 FL (ref 35.9–50)
GFR SERPL CREATININE-BSD FRML MDRD: >60 ML/MIN/1.73 M 2
GFR SERPL CREATININE-BSD FRML MDRD: >60 ML/MIN/1.73 M 2
GLOBULIN SER CALC-MCNC: 2.8 G/DL (ref 1.9–3.5)
GLUCOSE BLD-MCNC: 138 MG/DL (ref 65–99)
GLUCOSE BLD-MCNC: 147 MG/DL (ref 65–99)
GLUCOSE BLD-MCNC: 152 MG/DL (ref 65–99)
GLUCOSE BLD-MCNC: 157 MG/DL (ref 65–99)
GLUCOSE SERPL-MCNC: 170 MG/DL (ref 65–99)
GLUCOSE SERPL-MCNC: 177 MG/DL (ref 65–99)
GRAM STN SPEC: NORMAL
HCO3 BLDA-SCNC: 26 MMOL/L (ref 17–25)
HCT VFR BLD AUTO: 33.9 % (ref 42–52)
HGB BLD-MCNC: 11.8 G/DL (ref 14–18)
LIPASE SERPL-CCNC: 29 U/L (ref 7–58)
MCH RBC QN AUTO: 33.1 PG (ref 27–33)
MCHC RBC AUTO-ENTMCNC: 34.8 G/DL (ref 33.7–35.3)
MCV RBC AUTO: 95.2 FL (ref 81.4–97.8)
O2/TOTAL GAS SETTING VFR VENT: 35 % (ref 30–60)
PCO2 BLDA: 37.2 MMHG (ref 26–37)
PCO2 TEMP ADJ BLDA: 37 MMHG (ref 26–37)
PEEP SETTING VENT: 8 CM[H2O]
PH BLDA: 7.46 [PH] (ref 7.4–7.5)
PH TEMP ADJ BLDA: 7.46 [PH] (ref 7.4–7.5)
PLATELET # BLD AUTO: 60 K/UL (ref 164–446)
PMV BLD AUTO: 12 FL (ref 9–12.9)
PO2 BLDA: 127.2 MMHG (ref 64–87)
PO2 TEMP ADJ BLDA: 126.6 MMHG (ref 64–87)
POTASSIUM SERPL-SCNC: 3.4 MMOL/L (ref 3.6–5.5)
POTASSIUM SERPL-SCNC: 4 MMOL/L (ref 3.6–5.5)
PROT SERPL-MCNC: 4.7 G/DL (ref 6–8.2)
RBC # BLD AUTO: 3.56 M/UL (ref 4.7–6.1)
RESP RATE: 16
SAO2 % BLDA: 98.3 % (ref 93–99)
SIGNIFICANT IND 70042: ABNORMAL
SIGNIFICANT IND 70042: ABNORMAL
SIGNIFICANT IND 70042: NORMAL
SITE SITE: ABNORMAL
SITE SITE: ABNORMAL
SITE SITE: NORMAL
SODIUM SERPL-SCNC: 144 MMOL/L (ref 135–145)
SODIUM SERPL-SCNC: 145 MMOL/L (ref 135–145)
SOURCE SOURCE: ABNORMAL
SOURCE SOURCE: ABNORMAL
SOURCE SOURCE: NORMAL
VT SETTING VENT: 500 ML
WBC # BLD AUTO: 11.4 K/UL (ref 4.8–10.8)

## 2017-06-18 PROCEDURE — 700102 HCHG RX REV CODE 250 W/ 637 OVERRIDE(OP): Performed by: INTERNAL MEDICINE

## 2017-06-18 PROCEDURE — 700111 HCHG RX REV CODE 636 W/ 250 OVERRIDE (IP): Performed by: HOSPITALIST

## 2017-06-18 PROCEDURE — 770022 HCHG ROOM/CARE - ICU (200)

## 2017-06-18 PROCEDURE — 80048 BASIC METABOLIC PNL TOTAL CA: CPT

## 2017-06-18 PROCEDURE — 80053 COMPREHEN METABOLIC PANEL: CPT

## 2017-06-18 PROCEDURE — 700105 HCHG RX REV CODE 258: Performed by: HOSPITALIST

## 2017-06-18 PROCEDURE — A9270 NON-COVERED ITEM OR SERVICE: HCPCS | Performed by: INTERNAL MEDICINE

## 2017-06-18 PROCEDURE — 94003 VENT MGMT INPAT SUBQ DAY: CPT

## 2017-06-18 PROCEDURE — 700105 HCHG RX REV CODE 258: Performed by: INTERNAL MEDICINE

## 2017-06-18 PROCEDURE — 85027 COMPLETE CBC AUTOMATED: CPT

## 2017-06-18 PROCEDURE — 700101 HCHG RX REV CODE 250: Performed by: HOSPITALIST

## 2017-06-18 PROCEDURE — 82803 BLOOD GASES ANY COMBINATION: CPT

## 2017-06-18 PROCEDURE — 82962 GLUCOSE BLOOD TEST: CPT

## 2017-06-18 PROCEDURE — 82330 ASSAY OF CALCIUM: CPT

## 2017-06-18 PROCEDURE — 99233 SBSQ HOSP IP/OBS HIGH 50: CPT | Performed by: HOSPITALIST

## 2017-06-18 PROCEDURE — 83690 ASSAY OF LIPASE: CPT

## 2017-06-18 PROCEDURE — 700111 HCHG RX REV CODE 636 W/ 250 OVERRIDE (IP): Performed by: INTERNAL MEDICINE

## 2017-06-18 PROCEDURE — 94640 AIRWAY INHALATION TREATMENT: CPT

## 2017-06-18 RX ORDER — POTASSIUM CHLORIDE 29.8 MG/ML
40 INJECTION INTRAVENOUS ONCE
Status: COMPLETED | OUTPATIENT
Start: 2017-06-18 | End: 2017-06-18

## 2017-06-18 RX ORDER — SODIUM CHLORIDE 9 MG/ML
INJECTION, SOLUTION INTRAVENOUS CONTINUOUS
Status: DISCONTINUED | OUTPATIENT
Start: 2017-06-18 | End: 2017-06-20

## 2017-06-18 RX ADMIN — FAMOTIDINE 20 MG: 10 INJECTION, SOLUTION INTRAVENOUS at 09:07

## 2017-06-18 RX ADMIN — PIPERACILLIN AND TAZOBACTAM 3.38 G: 3; .375 INJECTION, POWDER, FOR SOLUTION INTRAVENOUS at 09:05

## 2017-06-18 RX ADMIN — PROPOFOL 40 MCG/KG/MIN: 10 INJECTION, EMULSION INTRAVENOUS at 16:49

## 2017-06-18 RX ADMIN — LEVALBUTEROL 1.25 MG: 1.25 SOLUTION RESPIRATORY (INHALATION) at 18:51

## 2017-06-18 RX ADMIN — NOREPINEPHRINE BITARTRATE 7 MCG/MIN: 1 INJECTION, SOLUTION, CONCENTRATE INTRAVENOUS at 02:52

## 2017-06-18 RX ADMIN — LEVALBUTEROL 1.25 MG: 1.25 SOLUTION RESPIRATORY (INHALATION) at 06:46

## 2017-06-18 RX ADMIN — PROPOFOL 55 MCG/KG/MIN: 10 INJECTION, EMULSION INTRAVENOUS at 02:53

## 2017-06-18 RX ADMIN — PROPOFOL 40 MCG/KG/MIN: 10 INJECTION, EMULSION INTRAVENOUS at 21:06

## 2017-06-18 RX ADMIN — LEVALBUTEROL 1.25 MG: 1.25 SOLUTION RESPIRATORY (INHALATION) at 10:30

## 2017-06-18 RX ADMIN — DIGOXIN 125 MCG: 0.25 INJECTION INTRAMUSCULAR; INTRAVENOUS at 09:07

## 2017-06-18 RX ADMIN — CHLORHEXIDINE GLUCONATE 30 ML: 1.2 RINSE ORAL at 09:07

## 2017-06-18 RX ADMIN — CALCIUM CHLORIDE 1 G: 100 INJECTION INTRAVENOUS at 09:06

## 2017-06-18 RX ADMIN — ONDANSETRON 4 MG: 2 INJECTION INTRAMUSCULAR; INTRAVENOUS at 01:25

## 2017-06-18 RX ADMIN — PIPERACILLIN AND TAZOBACTAM 3.38 G: 3; .375 INJECTION, POWDER, FOR SOLUTION INTRAVENOUS at 16:11

## 2017-06-18 RX ADMIN — MORPHINE SULFATE 4 MG: 4 INJECTION INTRAVENOUS at 01:25

## 2017-06-18 RX ADMIN — LEVALBUTEROL 1.25 MG: 1.25 SOLUTION RESPIRATORY (INHALATION) at 22:08

## 2017-06-18 RX ADMIN — LEVALBUTEROL 1.25 MG: 1.25 SOLUTION RESPIRATORY (INHALATION) at 14:50

## 2017-06-18 RX ADMIN — PROPOFOL 50 MCG/KG/MIN: 10 INJECTION, EMULSION INTRAVENOUS at 10:36

## 2017-06-18 RX ADMIN — INSULIN LISPRO 1 UNITS: 100 INJECTION, SOLUTION INTRAVENOUS; SUBCUTANEOUS at 12:26

## 2017-06-18 RX ADMIN — PIPERACILLIN AND TAZOBACTAM 3.38 G: 3; .375 INJECTION, POWDER, FOR SOLUTION INTRAVENOUS at 20:40

## 2017-06-18 RX ADMIN — PROPOFOL 50 MCG/KG/MIN: 10 INJECTION, EMULSION INTRAVENOUS at 07:45

## 2017-06-18 RX ADMIN — PROPOFOL 45 MCG/KG/MIN: 10 INJECTION, EMULSION INTRAVENOUS at 13:30

## 2017-06-18 RX ADMIN — CHLORHEXIDINE GLUCONATE 30 ML: 1.2 RINSE ORAL at 20:40

## 2017-06-18 RX ADMIN — LEVALBUTEROL 1.25 MG: 1.25 SOLUTION RESPIRATORY (INHALATION) at 03:21

## 2017-06-18 RX ADMIN — PIPERACILLIN AND TAZOBACTAM 3.38 G: 3; .375 INJECTION, POWDER, FOR SOLUTION INTRAVENOUS at 02:53

## 2017-06-18 RX ADMIN — POTASSIUM CHLORIDE 40 MEQ: 400 INJECTION, SOLUTION INTRAVENOUS at 01:00

## 2017-06-18 RX ADMIN — PROPOFOL 50 MCG/KG/MIN: 10 INJECTION, EMULSION INTRAVENOUS at 06:01

## 2017-06-18 ASSESSMENT — COPD QUESTIONNAIRES
HAVE YOU SMOKED AT LEAST 100 CIGARETTES IN YOUR ENTIRE LIFE: NO/DON'T KNOW
COPD SCREENING SCORE: 2
DURING THE PAST 4 WEEKS HOW MUCH DID YOU FEEL SHORT OF BREATH: NONE/LITTLE OF THE TIME
DO YOU EVER COUGH UP ANY MUCUS OR PHLEGM?: NO/ONLY WITH OCCASIONAL COLDS OR INFECTIONS

## 2017-06-18 ASSESSMENT — PAIN SCALES - GENERAL
PAINLEVEL_OUTOF10: 0
PAINLEVEL_OUTOF10: 0
PAINLEVEL_OUTOF10: 7

## 2017-06-18 NOTE — PROGRESS NOTES
"  Trauma/Surgical Progress Note    Author: Kae Last Date & Time created: 6/18/2017  2:58 PM     Interval Events:  Critically ill. On vent. Improving - weaning off pressors. A fib controlled.  WBC, LFTs and lactate down. Cholecystostomy tube draining. On abx.      Hemodynamics:  Blood pressure 136/82, pulse 94, temperature 36.1 °C (97 °F), resp. rate 15, height 1.778 m (5' 10\"), weight 102.2 kg (225 lb 5 oz), SpO2 100 %.     Respiratory:  Fuller Vent Mode: APVCMV, Rate (breaths/min): 16, PEEP/CPAP: 8, FiO2: 30, P Peak (PIP): 21, P MEAN: 11 Respiration: 15, Pulse Oximetry: 100 %     Work Of Breathing / Effort: Vented  RUL Breath Sounds: Clear, RML Breath Sounds: Clear, RLL Breath Sounds: Diminished, OZZY Breath Sounds: Clear, LLL Breath Sounds: Diminished  Fluids:    Intake/Output Summary (Last 24 hours) at 06/18/17 1458  Last data filed at 06/18/17 1200   Gross per 24 hour   Intake   3733 ml   Output   2025 ml   Net   1708 ml         Admit Weight: 93.7 kg (206 lb 9.1 oz)  Current Weight: 102.2 kg (225 lb 5 oz)    Physical Exam   Constitutional: He appears well-developed and well-nourished.   HENT:   Head: Normocephalic.   Cardiovascular: Normal rate.    Pulmonary/Chest: Effort normal.   Abdominal: Soft. He exhibits distension.   DOMINGO in RUQ - bilious   Genitourinary:   Laird to gravity   Skin: Skin is warm.       Medical Decision Making/Problem List:    Active Hospital Problems    Diagnosis   • Atrial fibrillation with RVR (CMS-HCC) [I48.91]   • Acute respiratory failure with hypoxia (CMS-HCC) [J96.01]   • Metabolic acidosis [E87.2]   • Acute kidney injury (nontraumatic) (CMS-HCC) [N17.9]   • Hyponatremia [E87.1]   • Bacteremia [R78.81]   • Acute emphysematous cholecystitis [K81.0]   • Hypotension (arterial) [I95.9]   • Thrombocytopenia (CMS-HCC) [D69.6]   • Hypokalemia due to inadequate potassium intake [E87.6]   • Sepsis (CMS-HCC) [A41.9]   • Non-STEMI (non-ST elevated myocardial infarction) (CMS-[facility]) [I21.4] "     Core Measures & Quality Metrics:  Labs reviewed, Medications reviewed and Radiology images reviewed  Laird catheter: Critically Ill - Requiring Accurate Measurement of Urinary Output  Central line in place: Need for access and Vasopressors    DVT Prophylaxis: Contraindicated - High bleeding risk  DVT prophylaxis - mechanical: SCDs  Ulcer prophylaxis: Yes  Antibiotics: Treating active infection/contamination beyond 24 hours perioperative coverage  Assessed for rehab: Patient unable to tolerate rehabilitation therapeutic regimen    WALT Score  Discussed patient condition with Family and RN Dr. Nash.  CRITICAL CARE TIME EXCLUDING PROCEDURES: 20   minutes

## 2017-06-18 NOTE — PROGRESS NOTES
Assessed labs for K elve 3.4. Notified Dr. PETRONA Mancilla of lab levels. Rec'd orders to give 40meq of kcl ivpb via central line stat. Will give when medication is available.

## 2017-06-18 NOTE — RESPIRATORY CARE
Adult Ventilation Update    Total Vent Days: 4    Patient Lines/Drains/Airways Status    Active Airway     Name: Placement date: Placement time: Site: Days:    Airway Group ET Tube Oral 8.0 06/15/17  1020  Oral  3              Plateau Pressure (Q Shift): 19 (06/18/17 1450)  Static Compliance (ml / cm H2O): 48 (06/18/17 1450)    Patient failed trials because of Barriers to Wean: No Order (06/18/17 1025)  Barriers to SBT    Length of Weaning Trial          Sputum/Suction   Cough: Productive (06/18/17 1450)  Sputum Amount: Moderate (06/18/17 1450)  Sputum Color: White (06/18/17 1450)  Sputum Consistency: Thin (06/18/17 1450)    Mobility Group  Activity Performed: Unable to mobilize (06/18/17 1200)  Pt Calls for Assistance: No (06/18/17 0800)  Reason Not Mobilized: Unstable condition (06/18/17 1200)    Events/Summary/Plan: Still attempting to wean pressors in order to completed a MRI to assess if surgery is needed.  (06/16/17 0145)      Results for ANITHA RICHMOND (MRN [id number]) as of 6/18/2017 15:55   Ref. Range 6/18/2017 03:37   Ph Latest Ref Range: 7.40-7.50  7.46   Pco2 Latest Ref Range: 26.0-37.0 mmHg 37.2 (H)   Po2 Latest Ref Range: 64.0-87.0 mmHg 127.2 (H)   Hco3 Latest Ref Range: 17-25 mmol/L 26 (H)   Base Excess Latest Ref Range: -4-3 mmol/L 2   O2 Saturation Latest Ref Range: 93.0-99.0 % 98.3

## 2017-06-18 NOTE — PROGRESS NOTES
Patient continues on pressors, therefore, unable to have MRI at this time. Requested RN to have exam cancelled and reordered when the patient is able to have MRI

## 2017-06-18 NOTE — PROGRESS NOTES
"Date of Service: 6/18/2017  Chief Complaint:  Chief Complaint   Patient presents with   • Abdominal Pain     abdominal pain since saturday  diaphoretic and weakness  diarrhea yesterday     Interval History:  Doing well overnight; Discussed with wife about AFib and cardiac eval;Still on pressor and intubated  Slept well per wife; Sedated currently; Monitor showed AFib with controlled VR  All recent medication, labs, imaging studies and procedures reviewed    Physical Exam   Blood pressure 136/82, pulse 108, temperature 36.1 °C (97 °F), resp. rate 16, height 1.778 m (5' 10\"), weight 102.2 kg (225 lb 5 oz), SpO2 97 %.    Constitutional:  He appears well-developed. Intubated and sedated  HENT: Normocephalic and atraumatic. No scleral icterus.   Neck: No JVD present.   Cardiovascular: Normal rate. IRRR; Exam reveals no gallop and no friction rub.  murmur heard.   Pulmonary/Chest: CTAB coarse   Abdominal: S/NT/ND BS+   Musculoskeletal: He exhibits no edema. Pulses present.  Skin: Skin is warm and dry.       Intake/Output Summary (Last 24 hours) at 06/18/17 0727  Last data filed at 06/18/17 0600   Gross per 24 hour   Intake 4691.52 ml   Output   2810 ml   Net 1881.52 ml       LABS:  Lab Results   Component Value Date/Time    CHOLESTEROL, 03/22/2013 09:36 AM    * 03/22/2013 09:36 AM    HDL 32* 03/22/2013 09:36 AM    TRIGLYCERIDES 563* 06/17/2017 12:00 PM       Lab Results   Component Value Date/Time    WBC 11.4* 06/18/2017 05:00 AM    RBC 3.56* 06/18/2017 05:00 AM    HEMOGLOBIN 11.8* 06/18/2017 05:00 AM    HEMATOCRIT 33.9* 06/18/2017 05:00 AM    MCV 95.2 06/18/2017 05:00 AM    NEUTROPHILS-POLYS 78.00* 06/14/2017 06:20 PM    LYMPHOCYTES 7.00* 06/14/2017 06:20 PM    MONOCYTES 4.00 06/14/2017 06:20 PM    EOSINOPHILS 0.00 06/14/2017 06:20 PM    BASOPHILS 0.00 06/14/2017 06:20 PM     Lab Results   Component Value Date/Time    SODIUM 145 06/18/2017 05:00 AM    POTASSIUM 4.0 06/18/2017 05:00 AM    CHLORIDE 113* " 06/18/2017 05:00 AM    CO2 23 06/18/2017 05:00 AM    GLUCOSE 170* 06/18/2017 05:00 AM    BUN 22 06/18/2017 05:00 AM    CREATININE 0.85 06/18/2017 05:00 AM     Lab Results   Component Value Date    HBA1C 8.2* 06/16/2017      Lab Results   Component Value Date/Time    ALKALINE PHOSPHATASE 96 06/18/2017 05:00 AM    AST(SGOT) 115* 06/18/2017 05:00 AM    ALT(SGPT) 149* 06/18/2017 05:00 AM    TOTAL BILIRUBIN 2.2* 06/18/2017 05:00 AM      No results found for: BNPBTYPENAT   No results found for: TSH  Lab Results   Component Value Date/Time    PT 16.2* 06/15/2017 10:32 AM    INR 1.32* 06/15/2017 10:32 AM        Medications reviewed    Imaging reviewed    ECHO(6/14/2017):  Left ventricular ejection fraction is visually estimated to be 60%.  Grade I diastolic dysfunction.  No valvular heart disease.  Estimated right ventricular systolic pressure  is 35 mmHg.    Impressions:  •  *Sepsis (CMS-[facility]) [A41.9]    •  Acute respiratory failure with hypoxia (CMS-[facility]) [J96.01]    •  Metabolic acidosis [E87.2]    •  Acute kidney injury (nontraumatic) (CMS-[facility]) [N17.9]    •  Hyponatremia [E87.1]    •  Bacteremia [R78.81]    •  Acute emphysematous cholecystitis [K81.0]    •  Hypotension (arterial) [I95.9]    •  Thrombocytopenia (CMS-[facility]) [D69.6]    •  Hypokalemia due to inadequate potassium intake [E87.6]    •  Non-STEMI (non-ST elevated myocardial infarction) (CMS-[facility]) [I21.4]                Recommendations:  Monitor if cardiac sx's  Reviewed: Trop trended down  Titrate down pressor and waiting for MRI  Will follow   Thx

## 2017-06-18 NOTE — PROGRESS NOTES
Pulmonary Critical Care Progress        Chief Complaint: VDRF    History of Present Illness:   76 y.o. male was admitted on 6/14 with a chief complaint of abdominal pain. Last felt well 4 days PTA. 3 days PTA developed abd pain, sob, and has had fever, chills, diaphoresis and syncope    Presented to ED on 6/14 and CT Abd showed:   1.  Severe cholecystitis, possible component of emphysematous cholecystitis.  2.  Appendicolith, the appendix otherwise appears within normal limits. Appendicolith however places patient at future risk for development of appendicitis.  3.  Atherosclerosis and atherosclerotic coronary artery disease  4.  Hepatomegaly  Was cultured, placed on zosyn, and admitted to the ICU. Had hypotension, treated with IVF and levophed. Had increased O2 requirements which resulted in intubation and mechanical ventilation, hence Pulmonary Service notified for consultation.    Now intubated, mechanically ventilated, tachycardic, hypotensive on levophed with a CVP of 12 cm of water. Current vent settings are APV 16/500/8/100 with ABG pending. His white count is 25,600, hemoglobin 12.5, hematocrit 35.8, and platelet count 83,000. He has 9% bands. His sodium is 134, bicarbonate 12, anion gap 19, glucose 191, BUN 57, creatinine 3.04, estimated GFR 24, , , alkaline phosphatase 312, total bilirubin 1.6, and albumin 3.1. His pro calcitonin is 51.97 ng per mL with normal being less than 0.25 ng per mL. His blood cultures are growing gram-negative rods.    His post intubation chest x-ray shows that the ET tube is in a satisfactory position. There is minimal linear lower lobe atelectasis with possible mild edema. His EKG does not show acute changes other than sinus tachycardia. His INR is 1.32.    Past medical history: Hypertension    Medical procedures and surgery: Colonoscopies    Medication allergies: None    Tobacco: He is a former smoker but stopped in 1987 he smoked a pack a day for 30  years    Alcohol: None    Illicit drugs: None    Social history: he is .    Family history: Mother with COPD and colon cancer. Father with colon cancer.    ROS:  Respiratory: , Cardiac: unable to perform due to the patient's inability to effectively communicate, GI: unable to perform due to the patient's inability to effectively communicate.  All other systems negative.    Interval Events:  24 hour interval history reviewed      6/16 - remains on 3 pressors, cvp 15, on 35/8/500/16 with an excellent abg, improved bicarb to 17, plts to 40 K, still anemic, leukocytosis, renal fxn better, K+ 3.3, glu higher, lactate better at 4.75, + about 1 L last 24 H and + 3.4 L this admission, 3.7 L UO; awaiting ID of GNR; remains on Zosyn; echo: No prior study is available for comparison.   Left ventricular ejection fraction is visually estimated to be 60%.  Grade I diastolic dysfunction.  No valvular heart disease.  Estimated right ventricular systolic pressure  is 35 mmHg.  CXR reviewed and shows mild edema, atx, small left effusion    6/17 - CBC similar with plts sl better, persisting anemia, leukocytosis, ongoing hypokalemia, improved azotemia, persisting abnl LFTs, hypoalbuminemia, improving lactic acid; abgs with normal PO2 and acid-base; BC + kleb oxytoca; CXR with improved edema; + 1.3 L last 24 hours and + 4.7 L this admission; weaning pressors, p atrial fib per cards, need rate control; try sedation vacation and vent wean soon, correct potassium    6/18 - replacing low potassium, less leukocytosis and plts to 60K, cxr with mild edema, +1.8 L last 24 hours and 6.6 L this admission, hyperglycemia, LFTs better, 1.9 albumin, still tachycardic, CVP 17, weaning pressors, gases great, down to 30%, may be ready for SBT; d/w RN, RT, family; has to be off of pressors to have MRI, will cut back on IVF and give an amp of calcium chloride as ionized low.        PFSH:  No change.    Respiratory:  Fuller Vent Mode: APVCMV, Rate  (breaths/min): 16, Vt Target (mL): 500, PEEP/CPAP: 8, FiO2: 30, Static Compliance (ml / cm H2O): 48, Control VTE (exp VT): 501  Pulse Oximetry: 97 %  Chest Tube Drains:  Axel Pillai 1: 50 ml       Exam: unlabored respirations, no intercostal retractions or accessory muscle use  ImagingAvailable data reviewed   Recent Labs      06/16/17   0145   06/16/17   2141  06/17/17   0145  06/17/17   0609  06/17/17   1458  06/18/17   0337   [id number]  7.32*   --    --   7.44   --    --   7.46   [id number]  33.5   --    --   38.0*   --    --   37.2*   [id number]  127.4*   --    --   102.3*   --    --   127.2*   [id number]  97.9   --    --   97.3   --    --   98.3   ARTHCO3  17   --    --   25   --    --   26*   FIO2  35   --    --   35   --    --   35   ARTBE  -8*   --    --   1   --    --   2   ISTATSPEC   --    < >  Venous   --   Venous  Venous   --     < > = values in this interval not displayed.       HemoDynamics:  Pulse: (!) 108, Heart Rate (Monitored): (!) 102  Blood Pressure : 136/82 mmHg, NIBP: 121/80 mmHg  CVP (mm Hg): (!) 17 MM HG    Exam: sinus tachycardia  Imaging: Available data reviewed  Recent Labs      06/15/17   1800  06/15/17   2015  06/16/17   0015   TROPONINI  0.96*  0.78*  0.65*       Neuro:  GCS Total [place] Coma Score: 9       Exam: Heavily sedated  Imaging: Available data reviewed    Fluids:  Intake/Output       06/16/17 0700 - 06/17/17 0659 06/17/17 0700 - 06/18/17 0659 06/18/17 0700 - 06/19/17 0659      5258-8893 1847-2442 Total 3743-6432 7684-7561 Total 9631-6110 8117-0097 Total       Intake    I.V.  2522.9  1922 4444.9  2357  2314.5 4671.5  --  -- --    Vasopressin Volume 108 75.9 183.9 -- -- -- -- -- --    Propofol Volume 308.3 314.5 622.8 411 431 842 -- -- --    Norepinephrine Volume 366.6 331.6 698.2 196 183.5 379.5 -- -- --    IV Volume (Potassium) 100 -- 100 200 -- 200 -- -- --    IV Volume (/hr) 1065 1000 2065 1450 1500 2950 -- -- --    IV Volume (Potassium Phosphate ) -- -- -- -- 100 100  -- -- --    IV Volume (Bicarb at 75 ml/hr) 375 -- 375 -- -- -- -- -- --    IV Piggyback Volume 200 200 400 100 100 200 -- -- --    Other  --  -- --  --  20 20  --  -- --    Flush / Irrigation Volume (DOMINGO) -- -- -- -- 20 20 -- -- --    Total Intake 2522.9 1922 4444.9 2357 2334.5 4691.5 -- -- --       Output    Urine  1575  1025 2600  980  1025 2005  --  -- --    Indwelling Cathether 1575 1025 2600 980 1025 2005 -- -- --    Drains  180  335 515  525  280 805  --  -- --    Nasogastric Tube 150 300 450 450 150 600 -- -- --    Axel Pillai 1 30 35 65 75 130 205 -- -- --    Total Output 1755 1360 3115 1505 1305 2810 -- -- --       Net I/O     767.9 562 1329.9 852 1029.5 1881.5 -- -- --        Weight: 102.2 kg (225 lb 5 oz)  Recent Labs      06/15/17   0745   06/17/17   0609 06/17/17   1200  06/18/17   0001  06/18/17   0500   SODIUM   --    < >  142  144  144  145   POTASSIUM  4.0   < >  3.3*  3.5*  3.4*  4.0   CHLORIDE   --    < >  108  108  109  113*   CO2   --    < >  24  25  25  23   BUN   --    < >  33*  29*  20  22   CREATININE   --    < >  1.12  1.04  1.05  0.85   MAGNESIUM  2.6*   --   2.3   --    --    --    PHOSPHORUS  6.7*   --    --    --    --    --    CALCIUM   --    < >  7.1*  7.1*  7.1*  7.1*    < > = values in this interval not displayed.       GI/Nutrition:  Exam: patient sedated  Imaging: None - Reviewed  NPO  Liver Function  Recent Labs      06/17/17   0609  06/17/17   1200  06/18/17   0001  06/18/17   0500   ALTSGPT  219*   --    --   149*   ASTSGOT  212*   --    --   115*   ALKPHOSPHAT  114*   --    --   96   TBILIRUBIN  2.1*   --    --   2.2*   LIPASE   --    --    --   29   GLUCOSE  190*  167*  177*  170*       Heme:  Recent Labs      06/15/17   1032   06/17/17   0609  06/17/17   1200  06/18/17   0500   RBC   --    < >  3.86*  3.59*  3.56*   HEMOGLOBIN   --    < >  12.7*  11.6*  11.8*   HEMATOCRIT   --    < >  36.3*  33.3*  33.9*   PLATELETCT   --    < >  47*  48*  60*   PROTHROMBTM  16.2*   --     --    --    --    APTT  31.5   --    --    --    --    INR  1.32*   --    --    --    --     < > = values in this interval not displayed.       Infectious Disease:  Monitored Temp  Av.2 °C (98.9 °F)  Min: 36.6 °C (97.9 °F)  Max: 37.3 °C (99.1 °F)  Micro: reviewed  Recent Labs      17   0609  17   1200  17   0500   WBC  13.5*  12.7*  11.4*   ASTSGOT  212*   --   115*   ALTSGPT  219*   --   149*   ALKPHOSPHAT  114*   --   96   TBILIRUBIN  2.1*   --   2.2*     Current Facility-Administered Medications   Medication Dose Frequency Provider Last Rate Last Dose   • insulin lispro (HUMALOG) injection 1-6 Units  1-6 Units Q6HRS Che Nash M.D.   Stopped at 17 1200   • digoxin (LANOXIN) injection 125 mcg  125 mcg Q12HR Che Nash M.D.   125 mcg at 17 2100   • chlorhexidine (PERIDEX) 0.12 % solution 30 mL  30 mL BID Graham Ivy M.D.   30 mL at 17 2100   • levalbuterol (XOPENEX) 1.25 MG/3ML nebulizer solution 1.25 mg  1.25 mg Q4HRS (RT) Che Nash M.D.   1.25 mg at 17 0646   • NS infusion   Continuous Che Nash M.D. 125 mL/hr at 17 2353 125 mL at 17 2353   • famotidine (PEPCID) injection 20 mg  20 mg DAILY Che Nash M.D.   20 mg at 17 0836   • glucose 4 g chewable tablet 16 g  16 g Q15 MIN PRN Che Nash M.D.        And   • dextrose 50% (D50W) injection 25 mL  25 mL Q15 MIN PRN Che Nash M.D.       • acetaminophen (TYLENOL) suppository 650 mg  650 mg Q6HRS PRN Che Nash M.D.   650 mg at 17 1615   • morphine (pf) 4 mg/ml injection 2-4 mg  2-4 mg Q2HRS PRN Che Nash M.D.   4 mg at 17 0125   • MD ARTHUR...Pharmacy to implement PROPOFOL CRITICAL CARE PROTOCOL 1 Each  1 Each PRN Che Nash M.D.       • propofol (DIPRIVAN) injection  5-80 mcg/kg/min Continuous Che Nash M.D. 30.4 mL/hr at 17 0601 50 mcg/kg/min at 17 0601   • norepinephrine (LEVOPHED) 8 mg in  mL Infusion   0.5-30 mcg/min Continuous Che Nash M.D. 13.1 mL/hr at 06/18/17 0252 7 mcg/min at 06/18/17 0252   • vasopressin (VASOSTRICT) 20 Units in  mL Infusion  0.03 Units/min Continuous Che Nash M.D.   Stopped at 06/17/17 0226   • phenylephrine (SAW-SYNEPHRINE) 40,000 mcg in  mL Infusion  1-300 mcg/min Continuous Che Nash M.D.   Stopped at 06/15/17 1955   • senna-docusate (PERICOLACE or SENOKOT S) 8.6-50 MG per tablet 2 Tab  2 Tab BID Jose Valdivia M.D.   Stopped at 06/15/17 2100    And   • polyethylene glycol/lytes (MIRALAX) PACKET 1 Packet  1 Packet QDAY PRN Jose Valdivia M.D.        And   • magnesium hydroxide (MILK OF MAGNESIA) suspension 30 mL  30 mL QDAY PRN Jose Valdivia M.D.        And   • bisacodyl (DULCOLAX) suppository 10 mg  10 mg QDAY PRN Jose Valdivia M.D.       • Respiratory Care per Protocol   Continuous RT Jose Valdivia M.D.       • acetaminophen (TYLENOL) tablet 650 mg  650 mg Q6HRS PRN Jose Valdivia M.D.       • piperacillin-tazobactam (ZOSYN) 3.375 g in  mL IVPB  3.375 g Q6HRS Jose Valdivia M.D.   Stopped at 06/18/17 0323   • ondansetron (ZOFRAN) syringe/vial injection 4 mg  4 mg Q4HRS PRN Jose Valdivia M.D.   4 mg at 06/18/17 0125   • ondansetron (ZOFRAN ODT) dispertab 4 mg  4 mg Q4HRS PRN Jose Valdivia M.D.       • lorazepam (ATIVAN) tablet 0.5 mg  0.5 mg Q6HRS PRN Jose Valdivia M.D.        Or   • lorazepam (ATIVAN) injection 0.5 mg  0.5 mg Q6HRS PRN Jose Valdivia M.D.   0.5 mg at 06/15/17 0941     Last reviewed on 6/15/2017  8:22 AM by Ayanna Chaudhari [facility]    Quality  Measures:  Radiology images reviewed, Medications reviewed and Labs reviewed                      Problems:  Acute cholecystitis  Kleb bacteremia  Severe sepsis with shock, improving  Ventilator dependent respiratory failure  History of systemic arterial hypertension  Anemia, leukocytosis, thrombocytopenia  Metabolic acidosis acute renal failure  Mild atelectasis  S/p brook  drain  PAF  Hypokalemia  Volume overload  Abnormal LFTs      Plan:  Ventilator support, adjust FiO2 and PEEP as well as tidal volume and rate to provide for satisfactory alveolar ventilation and oxygenation using lung protective strategies.  Anti-infectives   Pressors and intravenous fluids  Prophylaxis awaiting surgical evaluation check echocardiogram   Cholecystostomy  Adjust IVF        Discussed patient condition and risk of morbidity and/or mortality with RN, RT and hospitalist.  D/w family at the bedside  The patient remains critically ill.  Critical care time = 43 minutes in directly providing and coordinating critical care and extensive data review.  No time overlap and excludes procedures.

## 2017-06-18 NOTE — CARE PLAN
Problem: Ventilation Defect:  Goal: Ability to achieve and maintain unassisted ventilation or tolerate decreased levels of ventilator support  Outcome: PROGRESSING AS EXPECTED  Patient is still requiring mechanical ventilation. He has not tolerated weaning of pressors at this time.  Intervention: Support and monitor invasive and noninvasive mechanical ventilation  Continuous cardiac and SpO2 monitoring. Daily and prn abg's with chest x-ray  Intervention: Monitor ventilator weaning response  Continuous ventilator monitoring, SBT's attempted as patient condition allows.  Intervention: Perform ventilator associated pneumonia prevention interventions  Head of bed is elevated to 30 degress or more. At least q4 oral suctioning and cleansing completed.  Intervention: Manage ventilation therapy by monitoring diagnostic test results  SpO2 monitoring. Daily and prn abg's with chest x-ray. At least q4 and prn Ventilator and patient assessments completed.

## 2017-06-19 LAB
ALBUMIN SERPL BCP-MCNC: 1.8 G/DL (ref 3.2–4.9)
ALBUMIN/GLOB SERPL: 0.6 G/DL
ALP SERPL-CCNC: 102 U/L (ref 30–99)
ALT SERPL-CCNC: 101 U/L (ref 2–50)
ANION GAP SERPL CALC-SCNC: 7 MMOL/L (ref 0–11.9)
AST SERPL-CCNC: 64 U/L (ref 12–45)
BASE EXCESS BLDA CALC-SCNC: 0 MMOL/L (ref -4–3)
BILIRUB SERPL-MCNC: 2.2 MG/DL (ref 0.1–1.5)
BODY TEMPERATURE: 37.4 CENTIGRADE
BUN SERPL-MCNC: 23 MG/DL (ref 8–22)
CALCIUM SERPL-MCNC: 7.5 MG/DL (ref 8.4–10.2)
CHLORIDE SERPL-SCNC: 115 MMOL/L (ref 96–112)
CO2 SERPL-SCNC: 24 MMOL/L (ref 20–33)
CREAT SERPL-MCNC: 0.74 MG/DL (ref 0.5–1.4)
ERYTHROCYTE [DISTWIDTH] IN BLOOD BY AUTOMATED COUNT: 50.3 FL (ref 35.9–50)
GFR SERPL CREATININE-BSD FRML MDRD: >60 ML/MIN/1.73 M 2
GLOBULIN SER CALC-MCNC: 3.2 G/DL (ref 1.9–3.5)
GLUCOSE BLD-MCNC: 130 MG/DL (ref 65–99)
GLUCOSE BLD-MCNC: 133 MG/DL (ref 65–99)
GLUCOSE BLD-MCNC: 136 MG/DL (ref 65–99)
GLUCOSE BLD-MCNC: 141 MG/DL (ref 65–99)
GLUCOSE SERPL-MCNC: 155 MG/DL (ref 65–99)
HCO3 BLDA-SCNC: 24 MMOL/L (ref 17–25)
HCT VFR BLD AUTO: 35.2 % (ref 42–52)
HGB BLD-MCNC: 11.8 G/DL (ref 14–18)
MCH RBC QN AUTO: 32.3 PG (ref 27–33)
MCHC RBC AUTO-ENTMCNC: 33.5 G/DL (ref 33.7–35.3)
MCV RBC AUTO: 96.4 FL (ref 81.4–97.8)
O2/TOTAL GAS SETTING VFR VENT: 30 % (ref 30–60)
PCO2 BLDA: 34.3 MMHG (ref 26–37)
PCO2 TEMP ADJ BLDA: 34.9 MMHG (ref 26–37)
PH BLDA: 7.46 [PH] (ref 7.4–7.5)
PH TEMP ADJ BLDA: 7.45 [PH] (ref 7.4–7.5)
PLATELET # BLD AUTO: 77 K/UL (ref 164–446)
PMV BLD AUTO: 12 FL (ref 9–12.9)
PO2 BLDA: 94 MMHG (ref 64–87)
PO2 TEMP ADJ BLDA: 96.4 MMHG (ref 64–87)
POTASSIUM SERPL-SCNC: 3.9 MMOL/L (ref 3.6–5.5)
PROT SERPL-MCNC: 5 G/DL (ref 6–8.2)
RBC # BLD AUTO: 3.65 M/UL (ref 4.7–6.1)
SAO2 % BLDA: 97.1 % (ref 93–99)
SODIUM SERPL-SCNC: 146 MMOL/L (ref 135–145)
WBC # BLD AUTO: 12.1 K/UL (ref 4.8–10.8)

## 2017-06-19 PROCEDURE — 80053 COMPREHEN METABOLIC PANEL: CPT

## 2017-06-19 PROCEDURE — 700105 HCHG RX REV CODE 258: Performed by: INTERNAL MEDICINE

## 2017-06-19 PROCEDURE — 94640 AIRWAY INHALATION TREATMENT: CPT

## 2017-06-19 PROCEDURE — 700102 HCHG RX REV CODE 250 W/ 637 OVERRIDE(OP): Performed by: INTERNAL MEDICINE

## 2017-06-19 PROCEDURE — 700101 HCHG RX REV CODE 250: Performed by: HOSPITALIST

## 2017-06-19 PROCEDURE — 36600 WITHDRAWAL OF ARTERIAL BLOOD: CPT

## 2017-06-19 PROCEDURE — 700105 HCHG RX REV CODE 258: Performed by: HOSPITALIST

## 2017-06-19 PROCEDURE — 85027 COMPLETE CBC AUTOMATED: CPT

## 2017-06-19 PROCEDURE — 82803 BLOOD GASES ANY COMBINATION: CPT

## 2017-06-19 PROCEDURE — 770022 HCHG ROOM/CARE - ICU (200)

## 2017-06-19 PROCEDURE — 99233 SBSQ HOSP IP/OBS HIGH 50: CPT | Performed by: HOSPITALIST

## 2017-06-19 PROCEDURE — 82962 GLUCOSE BLOOD TEST: CPT | Mod: 91

## 2017-06-19 PROCEDURE — 94003 VENT MGMT INPAT SUBQ DAY: CPT

## 2017-06-19 PROCEDURE — 700111 HCHG RX REV CODE 636 W/ 250 OVERRIDE (IP): Performed by: HOSPITALIST

## 2017-06-19 PROCEDURE — A9270 NON-COVERED ITEM OR SERVICE: HCPCS | Performed by: INTERNAL MEDICINE

## 2017-06-19 RX ORDER — ETOMIDATE 2 MG/ML
0.3 INJECTION INTRAVENOUS
Status: DISCONTINUED | OUTPATIENT
Start: 2017-06-19 | End: 2017-06-22

## 2017-06-19 RX ORDER — SODIUM CHLORIDE 9 MG/ML
500 INJECTION, SOLUTION INTRAVENOUS ONCE
Status: COMPLETED | OUTPATIENT
Start: 2017-06-19 | End: 2017-06-19

## 2017-06-19 RX ADMIN — SODIUM CHLORIDE: 9 INJECTION, SOLUTION INTRAVENOUS at 10:33

## 2017-06-19 RX ADMIN — PROPOFOL 35 MCG/KG/MIN: 10 INJECTION, EMULSION INTRAVENOUS at 12:48

## 2017-06-19 RX ADMIN — LEVALBUTEROL 1.25 MG: 1.25 SOLUTION RESPIRATORY (INHALATION) at 02:46

## 2017-06-19 RX ADMIN — SODIUM CHLORIDE 500 ML: 9 INJECTION, SOLUTION INTRAVENOUS at 09:56

## 2017-06-19 RX ADMIN — ONDANSETRON 4 MG: 2 INJECTION INTRAMUSCULAR; INTRAVENOUS at 04:06

## 2017-06-19 RX ADMIN — FAMOTIDINE 20 MG: 10 INJECTION, SOLUTION INTRAVENOUS at 08:23

## 2017-06-19 RX ADMIN — SODIUM CHLORIDE: 9 INJECTION, SOLUTION INTRAVENOUS at 07:56

## 2017-06-19 RX ADMIN — PROPOFOL 35 MCG/KG/MIN: 10 INJECTION, EMULSION INTRAVENOUS at 17:16

## 2017-06-19 RX ADMIN — PROPOFOL 40 MCG/KG/MIN: 10 INJECTION, EMULSION INTRAVENOUS at 20:22

## 2017-06-19 RX ADMIN — PIPERACILLIN AND TAZOBACTAM 3.38 G: 3; .375 INJECTION, POWDER, FOR SOLUTION INTRAVENOUS at 21:25

## 2017-06-19 RX ADMIN — LEVALBUTEROL 1.25 MG: 1.25 SOLUTION RESPIRATORY (INHALATION) at 23:23

## 2017-06-19 RX ADMIN — PROPOFOL 40 MCG/KG/MIN: 10 INJECTION, EMULSION INTRAVENOUS at 01:30

## 2017-06-19 RX ADMIN — PROPOFOL 35 MCG/KG/MIN: 10 INJECTION, EMULSION INTRAVENOUS at 07:52

## 2017-06-19 RX ADMIN — PIPERACILLIN AND TAZOBACTAM 3.38 G: 3; .375 INJECTION, POWDER, FOR SOLUTION INTRAVENOUS at 04:07

## 2017-06-19 RX ADMIN — DIGOXIN 125 MCG: 0.25 INJECTION INTRAMUSCULAR; INTRAVENOUS at 20:18

## 2017-06-19 RX ADMIN — LEVALBUTEROL 1.25 MG: 1.25 SOLUTION RESPIRATORY (INHALATION) at 15:15

## 2017-06-19 RX ADMIN — LEVALBUTEROL 1.25 MG: 1.25 SOLUTION RESPIRATORY (INHALATION) at 11:04

## 2017-06-19 RX ADMIN — CHLORHEXIDINE GLUCONATE 30 ML: 1.2 RINSE ORAL at 08:22

## 2017-06-19 RX ADMIN — PROPOFOL 40 MCG/KG/MIN: 10 INJECTION, EMULSION INTRAVENOUS at 04:07

## 2017-06-19 RX ADMIN — LEVALBUTEROL 1.25 MG: 1.25 SOLUTION RESPIRATORY (INHALATION) at 06:31

## 2017-06-19 RX ADMIN — INSULIN LISPRO 1 UNITS: 100 INJECTION, SOLUTION INTRAVENOUS; SUBCUTANEOUS at 23:44

## 2017-06-19 RX ADMIN — CHLORHEXIDINE GLUCONATE 15 ML: 1.2 RINSE ORAL at 20:22

## 2017-06-19 RX ADMIN — LEVALBUTEROL 1.25 MG: 1.25 SOLUTION RESPIRATORY (INHALATION) at 18:15

## 2017-06-19 RX ADMIN — PIPERACILLIN AND TAZOBACTAM 3.38 G: 3; .375 INJECTION, POWDER, FOR SOLUTION INTRAVENOUS at 08:23

## 2017-06-19 RX ADMIN — MORPHINE SULFATE 4 MG: 4 INJECTION INTRAVENOUS at 04:06

## 2017-06-19 RX ADMIN — DIGOXIN 125 MCG: 0.25 INJECTION INTRAMUSCULAR; INTRAVENOUS at 08:23

## 2017-06-19 RX ADMIN — PIPERACILLIN AND TAZOBACTAM 3.38 G: 3; .375 INJECTION, POWDER, FOR SOLUTION INTRAVENOUS at 16:09

## 2017-06-19 RX ADMIN — SODIUM CHLORIDE: 9 INJECTION, SOLUTION INTRAVENOUS at 20:22

## 2017-06-19 ASSESSMENT — PAIN SCALES - GENERAL
PAINLEVEL_OUTOF10: 0
PAINLEVEL_OUTOF10: 0
PAINLEVEL_OUTOF10: 6
PAINLEVEL_OUTOF10: 0

## 2017-06-19 NOTE — PROGRESS NOTES
"  Trauma/Surgical Progress Note    Author: Kae Last Date & Time created: 6/19/2017  6:44 AM     Interval Events:  Critically ill. On vent.Stuck on small amount of pressors due to propofol.  WBC, LFTs and lactate stable. Cholecystostomy tube draining. On abx.  ? Extubation soon    Hemodynamics:  Blood pressure 136/82, pulse 69, temperature 36.1 °C (97 °F), resp. rate 16, height 1.778 m (5' 10\"), weight 102.6 kg (226 lb 3.1 oz), SpO2 97 %.     Respiratory:  Fuller Vent Mode: APVCMV, Rate (breaths/min): 16, PEEP/CPAP: 8, FiO2: 30, P Peak (PIP): 21, P MEAN: 11 Respiration: 16, Pulse Oximetry: 97 %     Work Of Breathing / Effort: Vented  RUL Breath Sounds: Clear After Suction, RML Breath Sounds: Clear, RLL Breath Sounds: Diminished, OZZY Breath Sounds: Clear, LLL Breath Sounds: Diminished  Fluids:    Intake/Output Summary (Last 24 hours) at 06/19/17 0644  Last data filed at 06/19/17 0600   Gross per 24 hour   Intake 2549.59 ml   Output   2475 ml   Net  74.59 ml         Admit Weight: 93.7 kg (206 lb 9.1 oz)  Current Weight: 102.6 kg (226 lb 3.1 oz)    Physical Exam   Constitutional: He appears well-developed and well-nourished.   HENT:   Head: Normocephalic.   Cardiovascular: Normal rate.    Pulmonary/Chest: Effort normal.   Abdominal: Soft. He exhibits distension.   DOMINGO in RUQ - bilious   Genitourinary:   Laird to gravity   Skin: Skin is warm.       Medical Decision Making/Problem List:    Active Hospital Problems    Diagnosis   • Atrial fibrillation with RVR (CMS-[facility]) [I48.91]   • Acute respiratory failure with hypoxia (CMS-[facility]) [J96.01]   • Metabolic acidosis [E87.2]   • Acute kidney injury (nontraumatic) (CMS-[facility]) [N17.9]   • Hyponatremia [E87.1]   • Bacteremia [R78.81]   • Acute emphysematous cholecystitis [K81.0]   • Hypotension (arterial) [I95.9]   • Thrombocytopenia (CMS-[facility]) [D69.6]   • Hypokalemia due to inadequate potassium intake [E87.6]   • Sepsis (CMS-[facility]) [A41.9]   • Non-STEMI (non-ST elevated " myocardial infarction) (CMS-[facility]) [I21.4]     Core Measures & Quality Metrics:  Labs reviewed, Medications reviewed and Radiology images reviewed  Laird catheter: Critically Ill - Requiring Accurate Measurement of Urinary Output  Central line in place: Need for access and Vasopressors    DVT Prophylaxis: Contraindicated - High bleeding risk  DVT prophylaxis - mechanical: SCDs  Ulcer prophylaxis: Yes  Antibiotics: Treating active infection/contamination beyond 24 hours perioperative coverage  Assessed for rehab: Patient unable to tolerate rehabilitation therapeutic regimen    WALT Score  Discussed patient condition with RN  CRITICAL CARE TIME EXCLUDING PROCEDURES: 20   minutes

## 2017-06-19 NOTE — PROGRESS NOTES
"Date of Service: 6/19/2017  Chief Complaint:  Chief Complaint   Patient presents with   • Abdominal Pain     abdominal pain since saturday  diaphoretic and weakness  diarrhea yesterday     Interval History:  Uneventful night and he is in SR; on Dig and very low dose of pressor  Discussed with wife  Waiting for extubation too  All recent medication, labs, imaging studies and procedures reviewed    Physical Exam   Blood pressure 136/82, pulse 74, temperature 36.1 °C (97 °F), resp. rate 16, height 1.778 m (5' 10\"), weight 102.6 kg (226 lb 3.1 oz), SpO2 98 %.    Constitutional: Intubated;  He appears well-developed.   HENT: Normocephalic and atraumatic. No scleral icterus.   Neck: No JVD present.   Cardiovascular: Normal rate. RRR; Exam reveals no gallop and no friction rub. No murmur heard.   Pulmonary/Chest: CTAB coarse   Abdominal: S/NT/ND BS+   Musculoskeletal: He exhibits no edema. Pulses present.  Skin: Skin is warm and dry.       Intake/Output Summary (Last 24 hours) at 06/19/17 0705  Last data filed at 06/19/17 0600   Gross per 24 hour   Intake 2549.59 ml   Output   2475 ml   Net  74.59 ml       LABS:  Lab Results   Component Value Date/Time    CHOLESTEROL, 03/22/2013 09:36 AM    * 03/22/2013 09:36 AM    HDL 32* 03/22/2013 09:36 AM    TRIGLYCERIDES 563* 06/17/2017 12:00 PM       Lab Results   Component Value Date/Time    WBC 12.1* 06/19/2017 05:00 AM    RBC 3.65* 06/19/2017 05:00 AM    HEMOGLOBIN 11.8* 06/19/2017 05:00 AM    HEMATOCRIT 35.2* 06/19/2017 05:00 AM    MCV 96.4 06/19/2017 05:00 AM    NEUTROPHILS-POLYS 78.00* 06/14/2017 06:20 PM    LYMPHOCYTES 7.00* 06/14/2017 06:20 PM    MONOCYTES 4.00 06/14/2017 06:20 PM    EOSINOPHILS 0.00 06/14/2017 06:20 PM    BASOPHILS 0.00 06/14/2017 06:20 PM     Lab Results   Component Value Date/Time    SODIUM 146* 06/19/2017 05:00 AM    POTASSIUM 3.9 06/19/2017 05:00 AM    CHLORIDE 115* 06/19/2017 05:00 AM    CO2 24 06/19/2017 05:00 AM    GLUCOSE 155* " 06/19/2017 05:00 AM    BUN 23* 06/19/2017 05:00 AM    CREATININE 0.74 06/19/2017 05:00 AM     Lab Results   Component Value Date    HBA1C 8.2* 06/16/2017      Lab Results   Component Value Date/Time    ALKALINE PHOSPHATASE 102* 06/19/2017 05:00 AM    AST(SGOT) 64* 06/19/2017 05:00 AM    ALT(SGPT) 101* 06/19/2017 05:00 AM    TOTAL BILIRUBIN 2.2* 06/19/2017 05:00 AM      No results found for: BNPBTYPENAT   No results found for: TSH  Lab Results   Component Value Date/Time    PT 16.2* 06/15/2017 10:32 AM    INR 1.32* 06/15/2017 10:32 AM        Medications reviewed    Imaging reviewed    ECHO(6/14/2017):  Left ventricular ejection fraction is visually estimated to be 60%.  Grade I diastolic dysfunction.  No valvular heart disease.  Estimated right ventricular systolic pressure  is 35 mmHg.    Impressions:  •   *Sepsis (CMS-[facility]) [A41.9]     •   Acute respiratory failure with hypoxia (CMS-[facility]) [J96.01]     •   Metabolic acidosis [E87.2]     •   Acute kidney injury (nontraumatic) (CMS-[facility]) [N17.9]     •   Hyponatremia [E87.1]     •   Bacteremia [R78.81]     •   Acute emphysematous cholecystitis [K81.0]     •   Hypotension (arterial) [I95.9]     •   Thrombocytopenia (CMS-[facility]) [D69.6]     •   Hypokalemia due to inadequate potassium intake [E87.6]     •   Non-STEMI (non-ST elevated myocardial infarction) (CMS-[facility]) [I21.4]                        Recommendations:  Abx for sepsis and acute cholecystitis; Improving  AFib in and out, on Dig for better rate control  Wean off pressor  Anticipate MRCP  Case discussed with attending and RN  Will follow

## 2017-06-19 NOTE — CARE PLAN
Problem: Ventilation Defect:  Goal: Ability to achieve and maintain unassisted ventilation or tolerate decreased levels of ventilator support  Outcome: PROGRESSING AS EXPECTED  Adult Ventilation Update    Total Vent Days: 5      Patient Lines/Drains/Airways Status    Active Airway      Name: Placement date: Placement time: Site: Days:     Airway Group ET Tube Oral 8.0 06/15/17  1020  Oral  4                     Events/Summary/Plan:  No changes to pt or vent this shift. Pt remains on full support

## 2017-06-19 NOTE — CARE PLAN
Problem: Communication  Goal: The ability to communicate needs accurately and effectively will improve  Intervention: Educate patient and significant other/support system about the plan of care, procedures, treatments, medications and allow for questions    06/19/17 1447   OTHER   Pt & Family Have Been Educated on Methods Available to Report Concerns Related to Care, Treatment, Services, and Patient Safety Issues Yes   Extensive time spent with Wife of Pt Pat, discussing POC, MD orders, treatment, medications and vital signs. Questions answered, and support given.      Problem: Fluid Volume:  Goal: Hemodynamic stability will improve  Outcome: PROGRESSING SLOWER THAN EXPECTED  Decreasing Levophed gtt, unable to stop at this time. Monitoring BP closely, titrating Propofol gtt per MD orders.

## 2017-06-19 NOTE — PROGRESS NOTES
Pt resting calmly in no distress, titrating propofol and Levophed gtt per MD orders. Pt turned and repositioned Q 2 hours, oral care Q 2 hours, U/O satisfactory at this time, closely monitoring VS.

## 2017-06-19 NOTE — DIETARY
Nutrition services.  Vented patient on propofol.  Day 5 NPO status, with cholecystostomy tube drainage. NG to ILWS.   RD to monitor clinical course, nutrition support orders when appropriate.

## 2017-06-19 NOTE — PROGRESS NOTES
0700 Report received from [facility], all labs, meds, orders and POC reviewed. Vent settings checked, Pt has no s/s of distress or agitation at this time.  0730 Dr Chua at bedside, POC reviewed.  0800 Full nursing assessment completed, HR NSR 72, BP stable on Levophed gtt, Pt calm and sedated on propofol gtt, all lines and tubes patent and maintained per hospital policy. DOMINGO drain to RUQ intact and to suction, NG to ILWS. Wife at bedside updated on POC.  0930 Dr Nash and Dr Reyna at bedside, POC, possible transport to MRI when Pt off Levophed gtt discussed.  ml bolus ordered and implemented.

## 2017-06-19 NOTE — PROGRESS NOTES
Pulmonary Critical Care Progress        Chief Complaint: Respiratory failure requiring mechanical ventilation.    History of Present Illness:   76 y.o. male was admitted on 6/14 with a chief complaint of abdominal pain, fever and chills. Last felt well 4 days prior to admission.    Presented to emergency department on 6/14 and a CT scan of the abdomen showed:   1.  Severe cholecystitis, possible component of emphysematous cholecystitis.  2.  Appendicolith, the appendix otherwise appears within normal limits. Appendicolith however places patient at future risk for development of appendicitis.  3.  Atherosclerosis and atherosclerotic coronary artery disease  4.  Hepatomegaly  Was cultured, placed on zosyn, and admitted to the ICU. Had hypotension, treated with intravenous crystalloid and Levophed. Had increased oxygen requirements which resulted in intubation and mechanical ventilation.    ROS:  unable to perform due to the patient's inability to effectively communicate.    Interval Events:  24 hour interval history reviewed.  Remains on full support mechanical ventilation following assist control set rate of 16 breaths per minute without dyspnea or ventilator dyssynchrony. The peak airway pressure is about 20 cm water at a set tidal volume of 500 mL. and the pressure volume loop does not suggest overdistention. He remains on full dose propofol sedation. During the sedation holiday he became agitated and tachypnea. He remains on low-dose vasopressor.    PFSH:  No change.    Respiratory:  Fuller Vent Mode: APVCMV, Rate (breaths/min): 16, Vt Target (mL): 500, PEEP/CPAP: 8, FiO2: 30, Static Compliance (ml / cm H2O): 54, Control VTE (exp VT): 500  Pulse Oximetry: 98 %  Axel Pillai 1: 80 ml     Exam: unlabored respirations, no intercostal retractions or accessory muscle use  ImagingAvailable data reviewed. The chest radiogram on June 17 demonstrated some improvement in patchy interstitial densities. No film  today.  Arterial blood gases demonstrated adequate arterial oxygen saturation with a minimal combined alkalosis.  Recent Labs      06/16/17   2141   06/17/17   0145  06/17/17   0609  06/17/17   1458  06/18/17   0337  06/19/17   0312   ORPTC18Z   --    --   7.44   --    --   7.46  7.46   [id number]   --    --   38.0*   --    --   37.2*  34.3   [id number]   --    --   102.3*   --    --   127.2*  94.0*   [id number]   --    --   97.3   --    --   98.3  97.1   ARTHCO3   --    --   25   --    --   26*  24   FIO2   --    < >  35   --    --   35  30   ARTBE   --    --   1   --    --   2  0   ISTATSPEC  Venous   --    --   Venous  Venous   --    --     < > = values in this interval not displayed.       HemoDynamics:  Pulse: 77, Heart Rate (Monitored): 77  NIBP: 109/56 mmHg  CVP (mm Hg): (!) 17 MM HG    Exam: sinus tachycardia. Intake and output were balanced over the past 24 hours but he is 6.6 L positive since admission.  Imaging: Available data reviewed. The echocardiogram on Rufina 15, 2017 demonstrated left ventricular systolic ejection fraction of 60% with grade 1 diastolic dysfunction and an estimated right ventricular systolic pressure of 35 mmHg.    Neuro:  GCS Total Jyothi Coma Score: 8  Exam: Heavily sedated on the propofol infusion after a failed a sedation holiday.  Imaging: None - Reviewed    Fluids:  Intake/Output       06/17/17 0700 - 06/18/17 0659 06/18/17 0700 - 06/19/17 0659 06/19/17 0700 - 06/20/17 0659      3617-6438 9281-6275 Total 5784-2109 7444-3894 Total 3942-8070 5669-0696 Total       Intake    I.V.  2357  2314.5 4671.5  1341.2  1168.4 2509.6  1630.7  -- 1630.7    Propofol Volume 411 431 842 344.3 295.6 639.9 208 -- 208    Norepinephrine Volume 196 183.5 379.5 71.9 72.8 144.7 22.7 -- 22.7    IV Volume (Potassium) 200 100 300 -- -- -- -- -- --    IV Volume (/hr) 1450 1500 2950  -- -- --    IV Volume (NS @ 50 ml/hr) -- -- -- -- -- -- 100 -- 100    IV Volume (NS @ 100 ml/hr) -- -- -- -- --  -- 800 -- 800    IV Volume (NS Bolus) -- -- -- -- -- -- 500 -- 500    IV Piggyback Volume 100 100 200 200 200 400 -- -- --    Other  --  20 20  20  20 40  --  -- --    Flush / Irrigation Volume (DOMINGO) -- 20 20 20 20 40 -- -- --    Total Intake 2357 2334.5 4691.5 1361.2 1188.4 2549.6 1630.7 -- 1630.7       Output    Urine  980  1025   605  515 1120  575  -- 575    Indwelling Cathether 980 1025   575 -- 575    Drains  525  280 805  165  1220 1385  80  -- 80    Nasogastric Tube 450 150 600 -- 1150 1150 -- -- --    Axel Pillai 1 75 130 205 165 70 235 80 -- 80    Total Output 1505 1305 2810 770 1735 2505 655 -- 655       Net I/O     852 1029.5 1881.5 591.2 -546.6 44.6 975.7 -- 975.7        Weight: 102.6 kg (226 lb 3.1 oz)  Recent Labs      17   0617   0001  17   0500  17   0500   SODIUM  142   < >  144  145  146*   POTASSIUM  3.3*   < >  3.4*  4.0  3.9   CHLORIDE  108   < >  109  113*  115*   CO2  24   < >  25  23  24   BUN  33*   < >  20  22  23*   CREATININE  1.12   < >  1.05  0.85  0.74   MAGNESIUM  2.3   --    --    --    --    CALCIUM  7.1*   < >  7.1*  7.1*  7.5*    < > = values in this interval not displayed.       GI/Nutrition:  Exam: patient sedated  Imaging: Available data reviewed  NPO  Liver Function  Recent Labs      17   0609   17   0001  17   0500  17   0500   ALTSGPT  219*   --    --   149*  101*   ASTSGOT  212*   --    --   115*  64*   ALKPHOSPHAT  114*   --    --   96  102*   TBILIRUBIN  2.1*   --    --   2.2*  2.2*   LIPASE   --    --    --   29   --    GLUCOSE  190*   < >  177*  170*  155*    < > = values in this interval not displayed.       Heme:  Recent Labs      17   1200  17   0500  17   0500   RBC  3.59*  3.56*  3.65*   HEMOGLOBIN  11.6*  11.8*  11.8*   HEMATOCRIT  33.3*  33.9*  35.2*   PLATELETCT  48*  60*  77*       Infectious Disease:  Monitored Temp  Av.2 °C (99 °F)  Min: 36.7 °C (98.1 °F)  Max:  37.6 °C (99.7 °F)  Micro: antibiotics reviewed, cultures reviewed and reviewed. Blood and sputum cultures are negative but the bile fluid culture yielded growth of Klebsiella. He remains on Zosyn.  Recent Labs      06/17/17   0609  06/17/17   1200  06/18/17   0500  06/19/17   0500   WBC  13.5*  12.7*  11.4*  12.1*   ASTSGOT  212*   --   115*  64*   ALTSGPT  219*   --   149*  101*   ALKPHOSPHAT  114*   --   96  102*   TBILIRUBIN  2.1*   --   2.2*  2.2*     Current Facility-Administered Medications   Medication Dose Frequency Provider Last Rate Last Dose   • etomidate (AMIDATE) injection 30.8 mg  0.3 mg/kg Once PRN Che Nash M.D.       • NS infusion   Continuous Che Nash M.D. 100 mL/hr at 06/19/17 1033     • insulin lispro (HUMALOG) injection 1-6 Units  1-6 Units Q6HRS Che Nash M.D.   Stopped at 06/18/17 1800   • digoxin (LANOXIN) injection 125 mcg  125 mcg Q12HR Che Nash M.D.   125 mcg at 06/19/17 0823   • chlorhexidine (PERIDEX) 0.12 % solution 30 mL  30 mL BID Graham Ivy M.D.   30 mL at 06/19/17 0822   • levalbuterol (XOPENEX) 1.25 MG/3ML nebulizer solution 1.25 mg  1.25 mg Q4HRS (RT) Che Nash M.D.   1.25 mg at 06/19/17 1515   • famotidine (PEPCID) injection 20 mg  20 mg DAILY Che Nash M.D.   20 mg at 06/19/17 0823   • glucose 4 g chewable tablet 16 g  16 g Q15 MIN PRN Che Nash M.D.        And   • dextrose 50% (D50W) injection 25 mL  25 mL Q15 MIN PRN Che Nash M.D.       • acetaminophen (TYLENOL) suppository 650 mg  650 mg Q6HRS PRN Che Nash M.D.   650 mg at 06/16/17 1615   • morphine (pf) 4 mg/ml injection 2-4 mg  2-4 mg Q2HRS PRN Che Nash M.D.   4 mg at 06/19/17 0406   • MD ALERT...Pharmacy to implement PROPOFOL CRITICAL CARE PROTOCOL 1 Each  1 Each PRN Che Nash M.D.       • propofol (DIPRIVAN) injection  5-80 mcg/kg/min Continuous Che Nash M.D. 21.3 mL/hr at 06/19/17 1634 35 mcg/kg/min at 06/19/17 1634   • norepinephrine  (LEVOPHED) 8 mg in  mL Infusion  0.5-30 mcg/min Continuous Che Nash M.D.   Stopped at 06/19/17 1611   • Respiratory Care per Protocol   Continuous RT Jose Valdivia M.D.       • acetaminophen (TYLENOL) tablet 650 mg  650 mg Q6HRS PRN Jose Valdivia M.D.       • piperacillin-tazobactam (ZOSYN) 3.375 g in  mL IVPB  3.375 g Q6HRS Jose Valdivia M.D. 200 mL/hr at 06/19/17 1609 3.375 g at 06/19/17 1609   • ondansetron (ZOFRAN) syringe/vial injection 4 mg  4 mg Q4HRS PRN Jose Valdivia M.D.   4 mg at 06/19/17 0406   • ondansetron (ZOFRAN ODT) dispertab 4 mg  4 mg Q4HRS PRN Jose Valdivia M.D.       • lorazepam (ATIVAN) tablet 0.5 mg  0.5 mg Q6HRS PRN Jose Valdivia M.D.        Or   • lorazepam (ATIVAN) injection 0.5 mg  0.5 mg Q6HRS PRN Jose Valdivia M.D.   0.5 mg at 06/15/17 0941     Last reviewed on 6/15/2017  8:22 AM by Ayanna Chaudhari [facility]    Quality  Measures:  Radiology images reviewed, Medications reviewed and Labs reviewed                      Problems:    1. Acute respiratory failure requiring full support mechanical ventilation.  2. Acute cholecystitis, now with biliary drain in place. Klebsiella recovered from bile fluid culture.  3. Sepsis, related to cholecystitis.  4. Shock, still requiring low-dose vasopressor. At this point the hypotension is probably more related to the vasodilatory effects of propofol and perhaps mild intravascular volume depletion.  5. Abnormal chest radiogram. The findings might suggest extravascular lung water but aspiration is a possibility.  6. Renal insufficiency, nonoliguric, improving by numbers.  7. Mild anemia, stable without evidence of ongoing blood loss.  8. From cytopenia, stable.  9. Paroxysmal atrial fibrillation, now in sinus rhythm.  10. History of systemic arterial hypertension.  11. Encephalopathy, without focal neurologic signs or symptoms, consistent with delirium.      Plan:  Continue twice a day sedation weaning trials. Initiate ventilator  weaning trials when the sedation requirement and agitation have subsided. Lung protective ventilation.   His NPO status precludes the use of most atypical antipsychotic agents and his hypotension contraindicates the use of dexmedetomidine.  Careful bolus dosing of intravenous crystalloid as he may have a mild component of intravascular volume depletion despite the total body volume overload. His Lopressor will be titrated.  Continue antibiotics. An MRI scan is contemplated when he is off the vasopressor.  Continue prophylaxis. He will require nutritional support.     Discussed patient condition and risk of morbidity and/or mortality with RN, RT and hospitalist. Discussed with family at the bedside  The patient remains critically ill.  Critical care time = 40 minutes in directly providing and coordinating critical care and extensive data review.  No time overlap and excludes procedures.

## 2017-06-19 NOTE — PROGRESS NOTES
[facility] Hospitalist Progress Note    Date of Service: 6/18/2017    Chief Complaint  76 y.o. male admitted 6/14/2017 with abdominal pain and SOB, fever, chills, diaphoresis, syncope x4.  CT abdomen revealed acute cholecystitis with lactic acid of 8 admitted to ICU on zosyn IV, sepsis protocol initiated.    Interval Problem Update  6/15:  On exam, patient had become more diaphoretic, complaining of severe upper abdominal pain that radiated to both shoulders.  He was given morphine IV, /100s, increased RR.  His IVFs were dc'd, stat CXR, lasix 40mg IV x1, xopenex respiratory neb, but continued to worsen with increased RR, shallow breathing.  He was acidotic with low bicarb due to sepsis.  Patient required ET intubation, Dr. Ivy kindly consulted for vent management.  I spoke with Dr. Last about worsening condition.  Ordered stat abd CT:  No perforation, remains with emphysematous swollen gallbladder.  U/s cholecystostomy tube placed successfully with drain of dark necrotic bile fluid.  BCs + GNR ss pending, on zosyn.  Met with wife and son and gave updates in a.m. and in afternoon.  Family aware of critically ill patient for next 24 hours.  6/16:  Mild improvement overnight with improving bicarb, renal function and lactic acid levels.  BCs and bile culture with LFGNR ss pending.  BP maintaining on decreasing amounts of levophed and vasopressin, off neosinephrine drip.  Remains intubated.  Went into afib with RVR at 0800, but platelets remain at 40 therefore no AC at this time, no rate controllers due to hypotension/pressors.  Attempt to wean propofol and pressors to better control the afib.  C7Fnnlic drip changed to NS since CO2 up to 22 and elevated blood sugars.  Check a1c and added SSI, accuchecks for now.  Better perfusion peripheral pulses stronger with improved cap refill.  6/17:  Improving perfusion with warm to touch, brisk distal pulses and <2 sec cap refill, RR improved, awakes occasionally with  movement all 4 extremities, remains on vent.  Gave family updates.  Renal function improved to normal, remains on 1 pressor levophen at 10.  Afib remains, but better controlled on digoxin IV started in a.m.  Lactic acid normal.  BC with Klebsiella oxytoca ss zosyn IV.  Bile fluid with klebsiella pneumonia ss zosyn.  Repeat BCs x2.  :  Remains in restraints on ventilator.  Renal function remains normal, afib in and out of SR on digoxin with rate controlled.  Lungs CTA.  Remains on 3 mcg/min levophed pressor.  Will attempt for MRCP in a.m. If off pressors.    Consultants/Specialty  Dr. Last:  General surgery  Dr. Ivy:  pulmonology  Dr. Guzman:  cardiology      Disposition  TBD.        Review of Systems   Unable to perform ROS: critical illness      Physical Exam  Laboratory/Imaging   Hemodynamics  No data recorded.   Monitored Temp: 37.4 °C (99.3 °F)  Pulse  Av.6  Min: 30  Max: 134 Heart Rate (Monitored): (!) 121  Blood Pressure : 136/82 mmHg, NIBP: 116/75 mmHg CVP (mm Hg): (!) 9 MM HG    Respiratory  Fuller Vent Mode: APVCMV, Rate (breaths/min): 16, PEEP/CPAP: 8, PEEP/CPAP: 8, FiO2: 30, P Peak (PIP): 21, P MEAN: 11   Respiration: 16, Pulse Oximetry: 99 %     Work Of Breathing / Effort: Vented  RUL Breath Sounds: Clear, RML Breath Sounds: Diminished, RLL Breath Sounds: Diminished, OZZY Breath Sounds: Clear, LLL Breath Sounds: Diminished    Fluids    Intake/Output Summary (Last 24 hours) at 17 1941  Last data filed at 17 1800   Gross per 24 hour   Intake 3669.35 ml   Output   2075 ml   Net 1594.35 ml       Nutrition  Orders Placed This Encounter   Procedures   • Diet NPO     Standing Status: Standing      Number of Occurrences: 1      Standing Expiration Date:      Order Specific Question:  Restrict to:     Answer:  Sips with Medications [3]     Physical Exam   Constitutional: He is oriented to person, place, and time. He appears well-developed and well-nourished. No distress.   More calm,  RR normalized to 14.   HENT:   Head: Normocephalic and atraumatic.   Mouth/Throat: Oropharynx is clear and moist. No oropharyngeal exudate.   Eyes: Conjunctivae and EOM are normal. Pupils are equal, round, and reactive to light. Right eye exhibits no discharge. Left eye exhibits no discharge. No scleral icterus.   Neck: Normal range of motion. Neck supple. No JVD present. No tracheal deviation present. No thyromegaly present.   Cardiovascular: Normal rate, regular rhythm and normal heart sounds.  Exam reveals no gallop and no friction rub.    No murmur heard.  Pulmonary/Chest: No respiratory distress. He has no wheezes. He has no rales. He exhibits no tenderness.   Abdominal: Soft. Bowel sounds are normal. He exhibits no distension and no mass. There is no tenderness. There is no rebound and no guarding.   Dark, necrotic appearance to DOMINGO cholecystostomy drain.  Dark bile NGT aspirate.   Musculoskeletal: Normal range of motion. He exhibits no edema or tenderness.   Lymphadenopathy:     He has no cervical adenopathy.   Neurological: He is alert and oriented to person, place, and time. No cranial nerve deficit. He exhibits normal muscle tone.   Skin: Skin is warm. No rash noted. He is not diaphoretic. No erythema.   Psychiatric: He has a normal mood and affect. His behavior is normal. Judgment and thought content normal.   Nursing note and vitals reviewed.      Recent Labs      06/17/17   0609  06/17/17   1200  06/18/17   0500   WBC  13.5*  12.7*  11.4*   RBC  3.86*  3.59*  3.56*   HEMOGLOBIN  12.7*  11.6*  11.8*   HEMATOCRIT  36.3*  33.3*  33.9*   MCV  94.0  92.8  95.2   MCH  32.9  32.3  33.1*   MCHC  35.0  34.8  34.8   RDW  48.1  47.4  49.5   PLATELETCT  47*  48*  60*   MPV  12.2  12.2  12.0     Recent Labs      06/17/17   1200  06/18/17   0001  06/18/17   0500   SODIUM  144  144  145   POTASSIUM  3.5*  3.4*  4.0   CHLORIDE  108  109  113*   CO2  25  25  23   GLUCOSE  167*  177*  170*   BUN  29*  20  22   CREATININE   1.04  1.05  0.85   CALCIUM  7.1*  7.1*  7.1*             Recent Labs      06/17/17   1200   TRIGLYCERIDE  563*          Assessment/Plan     * Sepsis (CMS-HCC) (present on admission)  Assessment & Plan  Source:  Infected emphysematous cholecystitis  IVFs  Zosyn  6/14 BC+x2 Klebsiella oxytoca ss zosyn IV  6/15 BC+ x2 LFGNR  6/15Chole fluid:  Klebsiella pneumonia ss zosyn IV  6/16 lactic acid trending down, bicarb normal, BP improving, less pressors, remains on levophed, off vasopressin, off steve drip.  Remains on vent  6/17:  Lactic acid normal, bicarb normal, renal function normal.  Perfusion improved.    Non-STEMI (non-ST elevated myocardial infarction) (CMS-HCC) (present on admission)  Assessment & Plan  Dr. Chua consulted:  No intervention  Cardiac strain due to sepsis  dc'd heparin drip since low platelets  trops trending down    Acute respiratory failure with hypoxia (CMS-HCC) (present on admission)  Assessment & Plan  6/15 requiring ET intubation for acute wheeze, acidosis due to sepsis.  xopenex breathing treatments  S/p lasix 40mg iv with improved breathing post intubation  6/16 repeat CXR with minimal pulmonary edema, lungs CTA.      Metabolic acidosis (present on admission)  Assessment & Plan  2/2 severe sepsis  6/16 improving bicarb with drainage of infection, abx, fluids, BP improvement    Acute kidney injury (nontraumatic) (CMS-HCC) (present on admission)  Assessment & Plan  New onset since admission   2/2 sepsis related renal failure with poor perfusion  6/16 improving since BP improved and sepsis control  Bicarb improved, 6/16 changed bicarb drip to NS at 125/hr.  6/18:  PMA cut back  to 50/hr. BP remains improved, resolved VIJAY.    Hyponatremia (present on admission)  Assessment & Plan  Resolving on IVFs.    Bacteremia (present on admission)  Assessment & Plan  BC 6/14 x2 with Klebsiella ss zosyn   6/15 BCs x2 +LFGNR  Source:  Acute cholecystitis  Zosyn IV  6/17 repeat BCs x2 negative    Acute  emphysematous cholecystitis (present on admission)  Assessment & Plan  6/15 u/s cholecystostomy completed  Culture fluid:  Klebsiella pneumonia ss zosyn IV  BCs with Klebsiella  Repeat CT abdomen:  No perforation  Spoke with Dr. Last about worsening clinical sepsis:  Too sick for OR removal  Await MRCP results and improvement with cholecystostomy drain of infection.    Hypotension (arterial) (present on admission)  Assessment & Plan  Since intubated, started propofol drip, drop in BP:  Levophed drip remains  6/17 off vasopressin   6/16 Off neosinephrine drip  6/16 dc'd Bicarb drip  6/18 NS decreased 125 to 50/hr.    Thrombocytopenia (CMS-[facility]) (present on admission)  Assessment & Plan  Etiology 2/2 severe sepsis, no known heavy alcohol use.  Given 1 unit platelets prior to u/s cholecystostomy for plts <50K.    Hypokalemia due to inadequate potassium intake (present on admission)  Assessment & Plan  Replace K with IV 40K as needed.  Renal function improving with IVFs and increase in BP.  6/17:  Changed BMP q 12  And CMP daily since normal renal function, improved K.  6/18:  CMP daily. Lipase normal    Atrial fibrillation with RVR (CMS-HCC)  Assessment & Plan  Secondary to acute sepsis and pressors  NO AC since platelets 40  Attempt to decrease levophed as BP allows, off neosinephrine.  Continues with vasopressin  6/17:  Started digoxin 250 IV x1 since renal function improved, then 125 mcg IV bid with better rate control.  Briefly went to NSR 2130 6/16, then back to afib.        Laird catheter: No Laird      DVT Prophylaxis: Contraindicated - High bleeding risk    Ulcer prophylaxis: Not indicated  Antibiotics: Treating active infection/contamination beyond 24 hours perioperative coverage

## 2017-06-19 NOTE — CARE PLAN
Problem: Ventilation Defect:  Goal: Ability to achieve and maintain unassisted ventilation or tolerate decreased levels of ventilator support  Outcome: PROGRESSING SLOWER THAN EXPECTED  Adult Ventilation Update    Total Vent Days:5      Patient Lines/Drains/Airways Status    Active Airway      Name: Placement date: Placement time: Site: Days:     Airway Group ET Tube Oral 8.0 06/15/17  1020  Oral secured at 26 at the lips 3                                                             Current Settings: APV/CMV - RR 16; Vt 400; Fio2 30%; PEEP + 8    Sputum/ suction : Productive; scant; white; thick    Mobility: was not performed pt unstable and on pressors     SBT: was not performed at this time no order, pt on pressors     Plateau Pressure (Q Shift): 16     Static Compliance (ml / cm H2O): 49              Intervention: Perform ventilator associated pneumonia prevention interventions  Oral and endotracheal  suctioning performed, circuit drained as needed, gloves worn during all patient and equipment contact, ventilator wiped down, Head of bed elevated >30%.

## 2017-06-20 ENCOUNTER — APPOINTMENT (OUTPATIENT)
Dept: RADIOLOGY | Facility: MEDICAL CENTER | Age: 77
DRG: 870 | End: 2017-06-20
Attending: HOSPITALIST
Payer: MEDICARE

## 2017-06-20 LAB
ALBUMIN SERPL BCP-MCNC: 1.6 G/DL (ref 3.2–4.9)
ALBUMIN/GLOB SERPL: 0.6 G/DL
ALP SERPL-CCNC: 96 U/L (ref 30–99)
ALT SERPL-CCNC: 76 U/L (ref 2–50)
ANION GAP SERPL CALC-SCNC: 10 MMOL/L (ref 0–11.9)
AST SERPL-CCNC: 50 U/L (ref 12–45)
BASE EXCESS BLDA CALC-SCNC: -2 MMOL/L (ref -4–3)
BILIRUB SERPL-MCNC: 2.1 MG/DL (ref 0.1–1.5)
BODY TEMPERATURE: ABNORMAL CENTIGRADE
BUN SERPL-MCNC: 20 MG/DL (ref 8–22)
CALCIUM SERPL-MCNC: 7.3 MG/DL (ref 8.4–10.2)
CHLORIDE SERPL-SCNC: 115 MMOL/L (ref 96–112)
CO2 SERPL-SCNC: 21 MMOL/L (ref 20–33)
CREAT SERPL-MCNC: 0.84 MG/DL (ref 0.5–1.4)
ERYTHROCYTE [DISTWIDTH] IN BLOOD BY AUTOMATED COUNT: 52 FL (ref 35.9–50)
GFR SERPL CREATININE-BSD FRML MDRD: >60 ML/MIN/1.73 M 2
GLOBULIN SER CALC-MCNC: 2.9 G/DL (ref 1.9–3.5)
GLUCOSE BLD-MCNC: 114 MG/DL (ref 65–99)
GLUCOSE BLD-MCNC: 123 MG/DL (ref 65–99)
GLUCOSE BLD-MCNC: 135 MG/DL (ref 65–99)
GLUCOSE BLD-MCNC: 147 MG/DL (ref 65–99)
GLUCOSE BLD-MCNC: 155 MG/DL (ref 65–99)
GLUCOSE SERPL-MCNC: 150 MG/DL (ref 65–99)
HCO3 BLDA-SCNC: 21 MMOL/L (ref 17–25)
HCT VFR BLD AUTO: 33.4 % (ref 42–52)
HGB BLD-MCNC: 10.8 G/DL (ref 14–18)
MCH RBC QN AUTO: 31.9 PG (ref 27–33)
MCHC RBC AUTO-ENTMCNC: 32.3 G/DL (ref 33.7–35.3)
MCV RBC AUTO: 98.5 FL (ref 81.4–97.8)
PCO2 BLDA: 30.6 MMHG (ref 26–37)
PH BLDA: 7.45 [PH] (ref 7.4–7.5)
PLATELET # BLD AUTO: 90 K/UL (ref 164–446)
PMV BLD AUTO: 12 FL (ref 9–12.9)
PO2 BLDA: 97.7 MMHG (ref 64–87)
POTASSIUM SERPL-SCNC: 3.9 MMOL/L (ref 3.6–5.5)
PROT SERPL-MCNC: 4.5 G/DL (ref 6–8.2)
RBC # BLD AUTO: 3.39 M/UL (ref 4.7–6.1)
SAO2 % BLDA: 97.2 % (ref 93–99)
SODIUM SERPL-SCNC: 146 MMOL/L (ref 135–145)
TRIGL SERPL-MCNC: 347 MG/DL (ref 0–149)
WBC # BLD AUTO: 11.2 K/UL (ref 4.8–10.8)

## 2017-06-20 PROCEDURE — 99233 SBSQ HOSP IP/OBS HIGH 50: CPT | Performed by: HOSPITALIST

## 2017-06-20 PROCEDURE — 84478 ASSAY OF TRIGLYCERIDES: CPT

## 2017-06-20 PROCEDURE — 85027 COMPLETE CBC AUTOMATED: CPT

## 2017-06-20 PROCEDURE — 82962 GLUCOSE BLOOD TEST: CPT | Mod: 91

## 2017-06-20 PROCEDURE — 700105 HCHG RX REV CODE 258: Performed by: HOSPITALIST

## 2017-06-20 PROCEDURE — 94003 VENT MGMT INPAT SUBQ DAY: CPT

## 2017-06-20 PROCEDURE — 94640 AIRWAY INHALATION TREATMENT: CPT

## 2017-06-20 PROCEDURE — 770022 HCHG ROOM/CARE - ICU (200)

## 2017-06-20 PROCEDURE — 700101 HCHG RX REV CODE 250: Performed by: INTERNAL MEDICINE

## 2017-06-20 PROCEDURE — 700111 HCHG RX REV CODE 636 W/ 250 OVERRIDE (IP): Performed by: HOSPITALIST

## 2017-06-20 PROCEDURE — 82803 BLOOD GASES ANY COMBINATION: CPT

## 2017-06-20 PROCEDURE — 80053 COMPREHEN METABOLIC PANEL: CPT

## 2017-06-20 PROCEDURE — 74181 MRI ABDOMEN W/O CONTRAST: CPT

## 2017-06-20 PROCEDURE — 700101 HCHG RX REV CODE 250: Performed by: HOSPITALIST

## 2017-06-20 PROCEDURE — 700102 HCHG RX REV CODE 250 W/ 637 OVERRIDE(OP): Performed by: INTERNAL MEDICINE

## 2017-06-20 PROCEDURE — 700105 HCHG RX REV CODE 258: Performed by: INTERNAL MEDICINE

## 2017-06-20 PROCEDURE — A9270 NON-COVERED ITEM OR SERVICE: HCPCS | Performed by: INTERNAL MEDICINE

## 2017-06-20 RX ADMIN — SODIUM CHLORIDE: 9 INJECTION, SOLUTION INTRAVENOUS at 10:21

## 2017-06-20 RX ADMIN — PROPOFOL 40 MCG/KG/MIN: 10 INJECTION, EMULSION INTRAVENOUS at 07:47

## 2017-06-20 RX ADMIN — PIPERACILLIN AND TAZOBACTAM 3.38 G: 3; .375 INJECTION, POWDER, FOR SOLUTION INTRAVENOUS at 04:07

## 2017-06-20 RX ADMIN — LEVALBUTEROL 1.25 MG: 1.25 SOLUTION RESPIRATORY (INHALATION) at 18:37

## 2017-06-20 RX ADMIN — PROPOFOL 40 MCG/KG/MIN: 10 INJECTION, EMULSION INTRAVENOUS at 00:06

## 2017-06-20 RX ADMIN — CHLORHEXIDINE GLUCONATE 30 ML: 1.2 RINSE ORAL at 08:08

## 2017-06-20 RX ADMIN — DEXMEDETOMIDINE HYDROCHLORIDE 0.2 MCG/KG/HR: 100 INJECTION, SOLUTION, CONCENTRATE INTRAVENOUS at 17:45

## 2017-06-20 RX ADMIN — PIPERACILLIN AND TAZOBACTAM 3.38 G: 3; .375 INJECTION, POWDER, FOR SOLUTION INTRAVENOUS at 22:06

## 2017-06-20 RX ADMIN — LEVALBUTEROL 1.25 MG: 1.25 SOLUTION RESPIRATORY (INHALATION) at 15:14

## 2017-06-20 RX ADMIN — FAMOTIDINE 20 MG: 10 INJECTION, SOLUTION INTRAVENOUS at 08:07

## 2017-06-20 RX ADMIN — PIPERACILLIN AND TAZOBACTAM 3.38 G: 3; .375 INJECTION, POWDER, FOR SOLUTION INTRAVENOUS at 16:24

## 2017-06-20 RX ADMIN — CHLORHEXIDINE GLUCONATE 30 ML: 1.2 RINSE ORAL at 22:06

## 2017-06-20 RX ADMIN — LEVALBUTEROL 1.25 MG: 1.25 SOLUTION RESPIRATORY (INHALATION) at 23:12

## 2017-06-20 RX ADMIN — LEVALBUTEROL 1.25 MG: 1.25 SOLUTION RESPIRATORY (INHALATION) at 03:35

## 2017-06-20 RX ADMIN — PROPOFOL 50 MCG/KG/MIN: 10 INJECTION, EMULSION INTRAVENOUS at 16:27

## 2017-06-20 RX ADMIN — PROPOFOL 40 MCG/KG/MIN: 10 INJECTION, EMULSION INTRAVENOUS at 04:08

## 2017-06-20 RX ADMIN — PIPERACILLIN AND TAZOBACTAM 3.38 G: 3; .375 INJECTION, POWDER, FOR SOLUTION INTRAVENOUS at 10:14

## 2017-06-20 RX ADMIN — DIGOXIN 125 MCG: 0.25 INJECTION INTRAMUSCULAR; INTRAVENOUS at 08:08

## 2017-06-20 RX ADMIN — PROPOFOL 40 MCG/KG/MIN: 10 INJECTION, EMULSION INTRAVENOUS at 19:51

## 2017-06-20 RX ADMIN — SODIUM CHLORIDE: 9 INJECTION, SOLUTION INTRAVENOUS at 04:09

## 2017-06-20 RX ADMIN — LEVALBUTEROL 1.25 MG: 1.25 SOLUTION RESPIRATORY (INHALATION) at 06:51

## 2017-06-20 RX ADMIN — PROPOFOL 40 MCG/KG/MIN: 10 INJECTION, EMULSION INTRAVENOUS at 10:22

## 2017-06-20 NOTE — PROGRESS NOTES
[facility] Hospitalist Progress Note    Date of Service: 6/19/2017    Chief Complaint  76 y.o. male admitted 6/14/2017 with abdominal pain and SOB, fever, chills, diaphoresis, syncope x4.  CT abdomen revealed acute cholecystitis with lactic acid of 8 admitted to ICU on zosyn IV, sepsis protocol initiated.    Interval Problem Update  6/15:  On exam, patient had become more diaphoretic, complaining of severe upper abdominal pain that radiated to both shoulders.  He was given morphine IV, /100s, increased RR.  His IVFs were dc'd, stat CXR, lasix 40mg IV x1, xopenex respiratory neb, but continued to worsen with increased RR, shallow breathing.  He was acidotic with low bicarb due to sepsis.  Patient required ET intubation, Dr. Ivy kindly consulted for vent management.  I spoke with Dr. Last about worsening condition.  Ordered stat abd CT:  No perforation, remains with emphysematous swollen gallbladder.  U/s cholecystostomy tube placed successfully with drain of dark necrotic bile fluid.  BCs + GNR ss pending, on zosyn.  Met with wife and son and gave updates in a.m. and in afternoon.  Family aware of critically ill patient for next 24 hours.  6/16:  Mild improvement overnight with improving bicarb, renal function and lactic acid levels.  BCs and bile culture with LFGNR ss pending.  BP maintaining on decreasing amounts of levophed and vasopressin, off neosinephrine drip.  Remains intubated.  Went into afib with RVR at 0800, but platelets remain at 40 therefore no AC at this time, no rate controllers due to hypotension/pressors.  Attempt to wean propofol and pressors to better control the afib.  S0Ipibws drip changed to NS since CO2 up to 22 and elevated blood sugars.  Check a1c and added SSI, accuchecks for now.  Better perfusion peripheral pulses stronger with improved cap refill.  6/17:  Improving perfusion with warm to touch, brisk distal pulses and <2 sec cap refill, RR improved, awakes occasionally with  movement all 4 extremities, remains on vent.  Gave family updates.  Renal function improved to normal, remains on 1 pressor levophen at 10.  Afib remains, but better controlled on digoxin IV started in a.m.  Lactic acid normal.  BC with Klebsiella oxytoca ss zosyn IV.  Bile fluid with klebsiella pneumonia ss zosyn.  Repeat BCs x2.  :  Remains in restraints on ventilator.  Renal function remains normal, afib in and out of SR on digoxin with rate controlled.  Lungs CTA.  Remains on 3 mcg/min levophed pressor.  Will attempt for MRCP in a.m. If off pressors.  :  Restraints, on vent.  Cutting back levophed to 0.5 mcg/min.  Remains on propofol.  It appears Dr. Last wants patient extubated prior to taking to surgery.  Will need to discuss with PMA in a.m. Regarding attempts to wean off vent since no longer acidotic from sepsis due to emphysematous cholecystitis.  Can get MRCP once off drips, ordered etomidate prn prior to MRCP.  Increased NS 50 to 100/hr since dark urine in Laird, clear lungs.  Will help get off pressors.    Consultants/Specialty  Dr. Last:  General surgery  Dr. Ivy:  pulmonology  Dr. Guzman:  cardiology      Disposition  TBD.        Review of Systems   Unable to perform ROS: critical illness      Physical Exam  Laboratory/Imaging   Hemodynamics  No data recorded.   Monitored Temp: 37.3 °C (99.1 °F)  Pulse  Av.1  Min: 30  Max: 134 Heart Rate (Monitored): 80  NIBP: 127/70 mmHg CVP (mm Hg): (!) 10 MM HG    Respiratory  Fuller Vent Mode: APVCMV, Rate (breaths/min): 16, PEEP/CPAP: 8, PEEP/CPAP: 8, FiO2: 30, P Peak (PIP): 20, P MEAN: 11   Respiration: 15, Pulse Oximetry: 98 %     Work Of Breathing / Effort: Vented  RUL Breath Sounds: Clear, RML Breath Sounds: Diminished, RLL Breath Sounds: Diminished, OZZY Breath Sounds: Clear, LLL Breath Sounds: Diminished    Fluids    Intake/Output Summary (Last 24 hours) at 17  Last data filed at 17   Gross per 24 hour   Intake  3176.06 ml   Output   3085 ml   Net  91.06 ml       Nutrition  Orders Placed This Encounter   Procedures   • Diet NPO     Standing Status: Standing      Number of Occurrences: 1      Standing Expiration Date:      Order Specific Question:  Restrict to:     Answer:  Sips with Medications [3]     Physical Exam   Constitutional: He is oriented to person, place, and time. He appears well-developed and well-nourished. No distress.   More calm, RR normalized to 14.   HENT:   Head: Normocephalic and atraumatic.   Mouth/Throat: Oropharynx is clear and moist. No oropharyngeal exudate.   Eyes: Conjunctivae and EOM are normal. Pupils are equal, round, and reactive to light. Right eye exhibits no discharge. Left eye exhibits no discharge. No scleral icterus.   Neck: Normal range of motion. Neck supple. No JVD present. No tracheal deviation present. No thyromegaly present.   Cardiovascular: Normal rate, regular rhythm and normal heart sounds.  Exam reveals no gallop and no friction rub.    No murmur heard.  Pulmonary/Chest: No respiratory distress. He has no wheezes. He has no rales. He exhibits no tenderness.   Abdominal: Soft. Bowel sounds are normal. He exhibits no distension and no mass. There is no tenderness. There is no rebound and no guarding.   Dark, necrotic appearance to DOMINGO cholecystostomy drain.  Dark bile NGT aspirate.   Musculoskeletal: Normal range of motion. He exhibits no edema or tenderness.   Lymphadenopathy:     He has no cervical adenopathy.   Neurological: He is alert and oriented to person, place, and time. No cranial nerve deficit. He exhibits normal muscle tone.   Skin: Skin is warm. No rash noted. He is not diaphoretic. No erythema.   Psychiatric: He has a normal mood and affect. His behavior is normal. Judgment and thought content normal.   Nursing note and vitals reviewed.      Recent Labs      06/17/17   1200  06/18/17   0500  06/19/17   0500   WBC  12.7*  11.4*  12.1*   RBC  3.59*  3.56*  3.65*    HEMOGLOBIN  11.6*  11.8*  11.8*   HEMATOCRIT  33.3*  33.9*  35.2*   MCV  92.8  95.2  96.4   MCH  32.3  33.1*  32.3   MCHC  34.8  34.8  33.5*   RDW  47.4  49.5  50.3*   PLATELETCT  48*  60*  77*   MPV  12.2  12.0  12.0     Recent Labs      06/18/17   0001  06/18/17   0500  06/19/17   0500   SODIUM  144  145  146*   POTASSIUM  3.4*  4.0  3.9   CHLORIDE  109  113*  115*   CO2  25  23  24   GLUCOSE  177*  170*  155*   BUN  20  22  23*   CREATININE  1.05  0.85  0.74   CALCIUM  7.1*  7.1*  7.5*             Recent Labs      06/17/17   1200   TRIGLYCERIDE  563*          Assessment/Plan     * Sepsis (CMS-HCC) (present on admission)  Assessment & Plan  Source:  Infected emphysematous cholecystitis  IVFs  Zosyn  6/14 BC+x2 Klebsiella oxytoca ss zosyn IV  6/15 BC+ x2 LFGNR  6/15Chole fluid:  Klebsiella pneumonia ss zosyn IV  6/16 lactic acid trending down, bicarb normal, BP improving, less pressors, remains on levophed, off vasopressin, off steve drip.  Remains on vent  6/17:  Lactic acid normal, bicarb normal, renal function normal.  Perfusion improved.    Non-STEMI (non-ST elevated myocardial infarction) (CMS-HCC) (present on admission)  Assessment & Plan  Dr. Chua consulted:  No intervention  Cardiac strain due to sepsis  dc'd heparin drip since low platelets  trops trending down    Acute respiratory failure with hypoxia (CMS-HCC) (present on admission)  Assessment & Plan  6/15 requiring ET intubation for acute wheeze, acidosis due to sepsis.  xopenex breathing treatments  S/p lasix 40mg iv with improved breathing post intubation  6/16 repeat CXR with minimal pulmonary edema, lungs CTA.      Metabolic acidosis (present on admission)  Assessment & Plan  2/2 severe sepsis  6/16 improving bicarb with drainage of infection, abx, fluids, BP improvement    Acute kidney injury (nontraumatic) (CMS-HCC) (present on admission)  Assessment & Plan  New onset since admission   2/2 sepsis related renal failure with poor perfusion  6/16  improving since BP improved and sepsis control  Bicarb improved, 6/16 changed bicarb drip to NS at 125/hr.  6/18:  PMA cut back  to 50/hr. BP remains improved, resolved VIJAY.    Hyponatremia (present on admission)  Assessment & Plan  Resolving on IVFs.    Bacteremia (present on admission)  Assessment & Plan  BC 6/14 x2 with Klebsiella ss zosyn   6/15 BCs x2 +LFGNR  Source:  Acute cholecystitis  Zosyn IV  6/17 repeat BCs x2 negative    Acute emphysematous cholecystitis (present on admission)  Assessment & Plan  6/15 u/s cholecystostomy completed  Culture fluid:  Klebsiella pneumonia ss zosyn IV  BCs with Klebsiella  Repeat CT abdomen:  No perforation  Spoke with Dr. Last about worsening clinical sepsis:  Too sick for OR removal  Await MRCP results and improvement with cholecystostomy drain of infection.    Hypotension (arterial) (present on admission)  Assessment & Plan  Since intubated, started propofol drip, drop in BP:  Levophed drip remains  6/17 off vasopressin   6/16 Off neosinephrine drip  6/16 dc'd Bicarb drip  6/18 NS decreased 125 to 50/hr.    Thrombocytopenia (CMS-HCC) (present on admission)  Assessment & Plan  Etiology 2/2 severe sepsis, no known heavy alcohol use.  Given 1 unit platelets prior to u/s cholecystostomy for plts <50K.    Hypokalemia due to inadequate potassium intake (present on admission)  Assessment & Plan  Replace K with IV 40K as needed.  Renal function improving with IVFs and increase in BP.  6/17:  Changed BMP q 12  And CMP daily since normal renal function, improved K.  6/18:  CMP daily. Lipase normal    Atrial fibrillation with RVR (CMS-HCC)  Assessment & Plan  Secondary to acute sepsis and pressors  NO AC since platelets 40  Attempt to decrease levophed as BP allows, off neosinephrine.  Continues with vasopressin  6/17:  Started digoxin 250 IV x1 since renal function improved, then 125 mcg IV bid with better rate control.  Briefly went to NSR 2130 6/16, then back to afib.         Laird catheter: No Laird      DVT Prophylaxis: Contraindicated - High bleeding risk    Ulcer prophylaxis: Not indicated  Antibiotics: Treating active infection/contamination beyond 24 hours perioperative coverage

## 2017-06-20 NOTE — PROGRESS NOTES
Report received from Tatiana Busby. Pt appears to be resting comfortably, resps even with ventilator. Wife at bedside. POC discussed. BL soft wrist restraints in place, all drips/lines verified. Will continue to monitor.

## 2017-06-20 NOTE — CARE PLAN
Problem: Ventilation Defect:  Goal: Ability to achieve and maintain unassisted ventilation or tolerate decreased levels of ventilator support  Adult Ventilation Update    Events/Summary/Plan:  Pt to MRI at this time

## 2017-06-20 NOTE — DISCHARGE PLANNING
Medical Social Work    Referral: Pt discussed at IDT rounds this AM.    Intervention: Per flowsheet, pt lives with spouse and expects to d.c home.  Pt is on vent.  No SS needs identified nor any requests for BEN Hernandez during rounds.      Plan: BEN available for any assistance with d.c planning.

## 2017-06-20 NOTE — PROGRESS NOTES
[id number] - report at bs from Ángela DILLARD.  Pt resting quietly in bed. wife at bs and is very anxious, update given.  Vented and sedated. Pt opens eyes to voice, does not follow other commands.  Repositioning q2 for comfort ongoing.  Fall precautions in effect. No s/s pain per cpot. Remains npo with ngt to lis.  See flowsheet for assess.  poc reviewed with pt during care, no evidence of learning, reviewed with wife, all questions answered.  Tele = sr,  Vss.  1045 - Pt readied for MRI.  MRI on hold for prior emergency, pending 1145.  Update to very anxious wife.  1145 - pt to MRI with RT & RN.  1330 - back from MRI. Pt jeanie well.  Very anxious and tearful family at bs. Update given.

## 2017-06-20 NOTE — PROGRESS NOTES
Pulmonary Critical Care Progress        Chief Complaint: Respiratory failure requiring mechanical ventilation.    History of Present Illness:   76 y.o. male was admitted on 6/14 with a chief complaint of abdominal pain, fever and chills. Last felt well 4 days prior to admission.    Presented to emergency department on 6/14 and a CT scan of the abdomen showed:   1.  Severe cholecystitis, possible component of emphysematous cholecystitis.  2.  Appendicolith, the appendix otherwise appears within normal limits. Appendicolith however places patient at future risk for development of appendicitis.  3.  Atherosclerosis and atherosclerotic coronary artery disease  4.  Hepatomegaly  Was cultured, placed on zosyn, and admitted to the ICU. Had hypotension, treated with intravenous crystalloid and Levophed. Had increased oxygen requirements which resulted in intubation and mechanical ventilation.    ROS:  unable to perform due to the patient's inability to effectively communicate.    Interval Events:  24 hour interval history reviewed.  Remains on full support mechanical ventilation following assist control set rate of 16 breaths per minute without dyspnea or ventilator dyssynchrony. The peak airway pressure is about 20 cm water at a set tidal volume of 500 mL. and the pressure volume loop does not suggest overdistention. He remains on full dose propofol sedation. During the sedation holiday he became agitated and tachypnea. His blood pressure is acceptable now off the low-dose vasopressor.    PFSH:  No change.    Respiratory:  Fuller Vent Mode: APVCMV, Rate (breaths/min): 15, Vt Target (mL): 500, PEEP/CPAP: 8, FiO2: 30, Static Compliance (ml / cm H2O): 51.1, Control VTE (exp VT): 501  Pulse Oximetry: 99 %  Axel Pillai 1: 0 ml     Exam: unlabored respirations, no intercostal retractions or accessory muscle use  ImagingAvailable data reviewed. The chest radiogram on June 17 demonstrated some improvement in patchy  interstitial densities. No film today.  Arterial blood gases demonstrate an adequate arterial oxygen saturation with a mild respiratory alkalosis.  Recent Labs      06/18/17   0337  06/19/17   0312  06/20/17   0350   [id number]  7.46  7.46  7.45   [id number]  37.2*  34.3  30.6   [id number]  127.2*  94.0*  97.7*   [id number]  98.3  97.1  97.2   ARTHCO3  26*  24  21   FIO2  35  30   --    ARTBE  2  0  -2       HemoDynamics:  Pulse: 63, Heart Rate (Monitored): 62  NIBP: 117/60 mmHg  CVP (mm Hg): (!) 13 MM HG    Exam: sinus tachycardia. Intake about 900 mL greater than output over the past 24 hours but he is 7.5 L positive since admission.  Imaging: Available data reviewed. The echocardiogram on Rufina 15, 2017 demonstrated left ventricular systolic ejection fraction of 60% with grade 1 diastolic dysfunction and an estimated right ventricular systolic pressure of 35 mmHg.    Neuro:  GCS Total [place] Coma Score: 4  Exam: Heavily sedated on the propofol infusion after a failed a sedation holiday.  Imaging: None - Reviewed    Fluids:  Intake/Output       06/18/17 0700 - 06/19/17 0659 06/19/17 0700 - 06/20/17 0659 06/20/17 0700 - 06/21/17 0659      [id number] 0480-3394 Total 5483-5126 4769-6299 Total 7822-0156 7945-4553 Total       Intake    I.V.  1341.2  1168.4 2509.6  1972.4  1638.7 3611.1  988.4  -- 988.4    Propofol Volume 344.3 295.6 639.9 249.5 288.7 538.2 288.4 -- 288.4    Norepinephrine Volume 71.9 72.8 144.7 22.9 0 22.9 -- -- --    IV Volume (/hr)  -- -- -- -- -- --    IV Volume (NS @ 50 ml/hr) -- -- -- 100 -- 100 -- -- --    IV Volume (NS @ 100 ml/hr) -- -- -- 1000 1150 2150 500 -- 500    IV Volume (NS Bolus) -- -- -- 500 -- 500 -- -- --    IV Piggyback Volume 200 200 400 100 200 300 200 -- 200    Other  20  20 40  --  40 40  0  -- 0    Flush / Irrigation Volume (DOMINGO) 20 20 40 -- 40 40 0 -- 0    Enteral  --  -- --  --  90 90  90  -- 90    Free Water / Tube Flush -- -- -- -- 90 90 90 -- 90    Total Intake  1361.2 1188.4 2549.6 1972.4 1768.7 3741.1 1078.4 -- 1078.4       Output    Urine  605  515 1120  650  525 1175  500  -- 500    Indwelling Cathether   500 -- 500    Drains  165  1220 1385  680  990 1670  350  -- 350    Nasogastric Tube -- 1150 1150  350 -- 350    Axel Pillai 1 165 70 235 80 90 170 0 -- 0    Total Output 770 1735 2505 1330 1515 2845 850 -- 850       Net I/O     591.2 -546.6 44.6 642.4 253.7 896.1 228.4 -- 228.4        Weight: 102.2 kg (225 lb 5 oz)  Recent Labs      17   0500  17   0500  17   0348   SODIUM  145  146*  146*   POTASSIUM  4.0  3.9  3.9   CHLORIDE  113*  115*  115*   CO2  23  24  21   BUN  22  23*  20   CREATININE  0.85  0.74  0.84   CALCIUM  7.1*  7.5*  7.3*       GI/Nutrition:  Exam: patient sedated  Imaging: Available data reviewed  NPO  Liver Function  Recent Labs      17   0500  17   0500  17   0348   ALTSGPT  149*  101*  76*   ASTSGOT  115*  64*  50*   ALKPHOSPHAT  96  102*  96   TBILIRUBIN  2.2*  2.2*  2.1*   LIPASE  29   --    --    GLUCOSE  170*  155*  150*       Heme:  Recent Labs      17   0500  17   0500  17   0348   RBC  3.56*  3.65*  3.39*   HEMOGLOBIN  11.8*  11.8*  10.8*   HEMATOCRIT  33.9*  35.2*  33.4*   PLATELETCT  60*  77*  90*       Infectious Disease:  Monitored Temp  Av.7 °C (98.1 °F)  Min: 36.2 °C (97.2 °F)  Max: 37.3 °C (99.1 °F)  Micro: antibiotics reviewed, cultures reviewed and reviewed. Blood and sputum cultures are negative but the bile fluid culture yielded growth of Klebsiella. He remains on Zosyn.  Recent Labs      17   0500  17   0500  17   0348   WBC  11.4*  12.1*  11.2*   ASTSGOT  115*  64*  50*   ALTSGPT  149*  101*  76*   ALKPHOSPHAT  96  102*  96   TBILIRUBIN  2.2*  2.2*  2.1*     Current Facility-Administered Medications   Medication Dose Frequency Provider Last Rate Last Dose   • etomidate (AMIDATE) injection 30.8 mg  0.3 mg/kg Once  PRN Che Nash M.D.       • insulin lispro (HUMALOG) injection 1-6 Units  1-6 Units Q6HRS Che Nash M.D.   Stopped at 06/20/17 0600   • digoxin (LANOXIN) injection 125 mcg  125 mcg Q12HR Che Nash M.D.   125 mcg at 06/20/17 0808   • chlorhexidine (PERIDEX) 0.12 % solution 30 mL  30 mL BID Graham Ivy M.D.   30 mL at 06/20/17 0808   • levalbuterol (XOPENEX) 1.25 MG/3ML nebulizer solution 1.25 mg  1.25 mg Q4HRS (RT) Che Nash M.D.   1.25 mg at 06/20/17 1514   • famotidine (PEPCID) injection 20 mg  20 mg DAILY Che Nash M.D.   20 mg at 06/20/17 0807   • glucose 4 g chewable tablet 16 g  16 g Q15 MIN PRN Che Nash M.D.        And   • dextrose 50% (D50W) injection 25 mL  25 mL Q15 MIN PRN Che Nash M.D.       • acetaminophen (TYLENOL) suppository 650 mg  650 mg Q6HRS PRN Che Nash M.D.   650 mg at 06/16/17 1615   • morphine (pf) 4 mg/ml injection 2-4 mg  2-4 mg Q2HRS PRN Che Nash M.D.   4 mg at 06/19/17 0406   • MD ALERT...Pharmacy to implement PROPOFOL CRITICAL CARE PROTOCOL 1 Each  1 Each PRN Che Nash M.D.       • propofol (DIPRIVAN) injection  5-80 mcg/kg/min Continuous Che Nash M.D. 30.4 mL/hr at 06/20/17 1627 50 mcg/kg/min at 06/20/17 1627   • norepinephrine (LEVOPHED) 8 mg in  mL Infusion  0.5-30 mcg/min Continuous hCe Nash M.D.   Stopped at 06/19/17 1611   • Respiratory Care per Protocol   Continuous RT Jose Valdivia M.D.       • acetaminophen (TYLENOL) tablet 650 mg  650 mg Q6HRS PRN Jose Valdivia M.D.       • piperacillin-tazobactam (ZOSYN) 3.375 g in  mL IVPB  3.375 g Q6HRS Jose Valdivia M.D. 200 mL/hr at 06/20/17 1624 3.375 g at 06/20/17 1624   • ondansetron (ZOFRAN) syringe/vial injection 4 mg  4 mg Q4HRS PRN Jose Valdivia M.D.   4 mg at 06/19/17 0406   • ondansetron (ZOFRAN ODT) dispertab 4 mg  4 mg Q4HRS PRN Jose Valdivia M.D.       • lorazepam (ATIVAN) tablet 0.5 mg  0.5 mg Q6HRS PRN Jose Valdivia M.D.         Or   • lorazepam (ATIVAN) injection 0.5 mg  0.5 mg Q6HRS PRN Jose Valdivia M.D.   0.5 mg at 06/15/17 0941     Last reviewed on 6/15/2017  8:22 AM by Sahara Staton    Quality  Measures:  Radiology images reviewed, Medications reviewed and Labs reviewed                      Problems:  1. Acute respiratory failure requiring full support mechanical ventilation.  2. Acute cholecystitis, now with biliary drain in place. Klebsiella recovered from bile fluid culture.  3. Sepsis, related to cholecystitis.  4. Shock, improved and now off low-dose vasopressor.   5. Abnormal chest radiogram. The findings might suggest extravascular lung water but aspiration is a possibility.  6. Renal insufficiency, nonoliguric, improving by numbers.  7. Mild anemia, stable without evidence of ongoing blood loss.  8. Thrombocytopenia, improving.  9. Paroxysmal atrial fibrillation, now in sinus rhythm.  10. History of systemic arterial hypertension.  11. Encephalopathy, without focal neurologic signs or symptoms, consistent with delirium.      Plan:  Continue twice a day sedation weaning trials. Initiate ventilator weaning trials when the sedation requirement and agitation have subsided. If he cannot tolerate tapering of the propofol infusion, dexmedetomidine might be considered that his blood pressure is stable off vasopressors. His NPO status precludes the use of most atypical antipsychotic agents. Continue lung protective ventilation.  Continue antibiotics. An MRI scan will be done today.  Continue prophylaxis. He will require nutritional support.     Discussed patient condition and risk of morbidity and/or mortality with RN, RT and hospitalist. Discussed with family at the bedside  The patient remains critically ill.  Critical care time = 35 minutes in directly providing and coordinating critical care and extensive data review.  No time overlap and excludes procedures.

## 2017-06-20 NOTE — PROGRESS NOTES
[facility]    Date of Service: 2017    Chief Complaint    76 y.o. male admitted 2017 with abdominal pain and SOB, fever, chills, diaphoresis, syncope x4.  CT abdomen revealed acute cholecystitis with lactic acid of 8 admitted to ICU on zosyn IV, sepsis protocol initiated.  Interval Problem Update  Overnight off pressors  Back in sinus  Alert but does not follow per RN and wife  ROS is unobtainable as pt is intubated    Consultants/Specialty  General Surgery Dr Last  Pulmonology Dr Reyna  Cardiology Dr Chua    Disposition  Remains critically ill in ICU        Review of Systems   Unable to perform ROS: intubated      Physical Exam  Laboratory/Imaging   Hemodynamics  No data recorded.   Monitored Temp: 36.2 °C (97.2 °F)  Pulse  Av.4  Min: 30  Max: 134 Heart Rate (Monitored): 66  NIBP: 119/67 mmHg CVP (mm Hg): (!) 9 MM HG    Respiratory  Fuller Vent Mode: APVCMV, Rate (breaths/min): 16, PEEP/CPAP: 8, PEEP/CPAP: 8, FiO2: 30, P Peak (PIP): 22, P MEAN: 11   Respiration: 16, Pulse Oximetry: 100 %     Work Of Breathing / Effort: Vented  RUL Breath Sounds: Clear, RML Breath Sounds: Diminished, RLL Breath Sounds: Diminished, OZZY Breath Sounds: Clear, LLL Breath Sounds: Diminished    Fluids    Intake/Output Summary (Last 24 hours) at 17 0820  Last data filed at 17 0600   Gross per 24 hour   Intake 3592.41 ml   Output   2695 ml   Net 897.41 ml       Nutrition  Orders Placed This Encounter   Procedures   • Diet NPO     Standing Status: Standing      Number of Occurrences: 1      Standing Expiration Date:      Order Specific Question:  Restrict to:     Answer:  Sips with Medications [3]     Physical Exam   Constitutional: He appears well-developed and well-nourished. No distress.   HENT:   Head: Normocephalic and atraumatic.   Eyes: Conjunctivae are normal.   Neck: No JVD present.   Cardiovascular: Normal rate.  Exam reveals no gallop.    No murmur heard.  Pulmonary/Chest: No  stridor. No respiratory distress. He has no wheezes. He has rales.   Intubated/vent   Abdominal: Soft. There is no tenderness. There is no rebound and no guarding.   Musculoskeletal: He exhibits no edema.   Neurological:   Alert, purposeful but not following   Skin: Skin is warm and dry. No rash noted. He is not diaphoretic.   Psychiatric: He has a normal mood and affect. Thought content normal.   Nursing note and vitals reviewed.      Recent Labs      06/18/17   0500  06/19/17   0500  06/20/17   0348   WBC  11.4*  12.1*  11.2*   RBC  3.56*  3.65*  3.39*   HEMOGLOBIN  11.8*  11.8*  10.8*   HEMATOCRIT  33.9*  35.2*  33.4*   MCV  95.2  96.4  98.5*   MCH  33.1*  32.3  31.9   MCHC  34.8  33.5*  32.3*   RDW  49.5  50.3*  52.0*   PLATELETCT  60*  77*  90*   MPV  12.0  12.0  12.0     Recent Labs      06/18/17   0500  06/19/17   0500  06/20/17   0348   SODIUM  145  146*  146*   POTASSIUM  4.0  3.9  3.9   CHLORIDE  113*  115*  115*   CO2  23  24  21   GLUCOSE  170*  155*  150*   BUN  22  23*  20   CREATININE  0.85  0.74  0.84   CALCIUM  7.1*  7.5*  7.3*             Recent Labs      06/17/17   1200  06/20/17   0348   TRIGLYCERIDE  563*  347*          Assessment/Plan     * Sepsis (CMS-[facility]) (present on admission)  Assessment & Plan  Source:  Infected emphysematous cholecystitis  IVFs  Zosyn  6/14 BC+x2 Klebsiella oxytoca ss zosyn IV  6/15 BC+ x2 LFGNR  6/15Chole fluid:  Klebsiella pneumonia ss zosyn IV  6/16 lactic acid trending down, bicarb normal, BP improving, less pressors, remains on levophed, off vasopressin, off steve drip.  Remains on vent  6/17:  Lactic acid normal, bicarb normal, renal function normal.  Perfusion improved.  6/20 off pressors overnight.  Alert but not following or interactive.  ROS unobtainable as pt intubated  MRCP today     Non-STEMI (non-ST elevated myocardial infarction) (CMS-HCC) (present on admission)  Assessment & Plan  Dr. Chua consulted:  No intervention  Cardiac strain due to sepsis  dc'd  heparin drip since low platelets  trops trending down  No wall motion abnormalities on echo  Preserved LVEF, Gd I DHF    Acute respiratory failure with hypoxia (CMS-HCC) (present on admission)  Assessment & Plan  6/15 requiring ET intubation for acute wheeze, acidosis due to sepsis.  xopenex breathing treatments  30% FiO2  Pulmonology following      Metabolic acidosis (present on admission)  Assessment & Plan  2/2 severe sepsis  6/16 improving bicarb with drainage of infection, abx, fluids, BP improvement    Acute kidney injury (nontraumatic) (CMS-HCC) (present on admission)  Assessment & Plan  New onset since admission   2/2 sepsis related renal failure with poor perfusion  Resolved  Cont to follow daily    Hyponatremia (present on admission)  Assessment & Plan  Resolved  Follow daily lab    Bacteremia (present on admission)  Assessment & Plan  BC 6/14 x2 with Klebsiella ss zosyn   6/15 BCs x2 +LFGNR  Source:  Acute cholecystitis  Zosyn IV  6/17 repeat BCs x2 negative  Off pressors  Consult ID    Acute emphysematous cholecystitis (present on admission)  Assessment & Plan  6/15 u/s cholecystostomy completed  Culture fluid:  Klebsiella pneumonia ss zosyn IV  BCs with Klebsiella  Repeat CT abdomen:  No perforation  Off pressors: get MRCP today  Surgery Dr Last following    Hypotension (arterial) (present on admission)  Assessment & Plan  Since intubated, started propofol drip, drop in BP:  Levophed drip remains  6/17 off vasopressin   6/16 Off neosinephrine drip  6/16 dc'd Bicarb drip  Dc IVF's  Off pressors 6/20    Thrombocytopenia (CMS-HCC) (present on admission)  Assessment & Plan  Etiology 2/2 severe sepsis, no known heavy alcohol use.  Given 1 unit platelets prior to u/s cholecystostomy for plts <50K.  Count improving slowly  conyt to follow daily    Hypokalemia due to inadequate potassium intake (present on admission)  Assessment & Plan  Follow K and Mg daily  Replace as needed    Atrial fibrillation with RVR  (CMS-[facility])  Assessment & Plan  Secondary to acute sepsis and pressors  NO AC per Card's  Now in sinus  cont's on dig    Medications reviewed, EKG reviewed, Labs reviewed and Radiology images reviewed  Laird catheter: Unconscious / Sedated Patient on a Ventilator      DVT Prophylaxis: Heparin  DVT prophylaxis - mechanical: SCDs  Ulcer prophylaxis: Yes  Antibiotics: Treating active infection/contamination beyond 24 hours perioperative coverage

## 2017-06-20 NOTE — CARE PLAN
Problem: Ventilation Defect:  Goal: Ability to achieve and maintain unassisted ventilation or tolerate decreased levels of ventilator support  Outcome: PROGRESSING AS EXPECTED  Adult Ventilation Update    Total Vent Days: 5      Patient Lines/Drains/Airways Status    Active Airway      Name: Placement date: Placement time: Site: Days:     Airway Group ET Tube Oral 8.0 06/15/17  1020  Oral  5                   Events/Summary/Plan:  Pt to MRI. No other changes to pt or vent this shift.

## 2017-06-20 NOTE — CARE PLAN
Problem: Ventilation Defect:  Goal: Ability to achieve and maintain unassisted ventilation or tolerate decreased levels of ventilator support  Outcome: PROGRESSING SLOWER THAN EXPECTED  Adult Ventilation Update    Total Vent Days: 6      Patient Lines/Drains/Airways Status    Active Airway      Name: Placement date: Placement time: Site: Days:     Airway Group ET Tube Oral 8.0 06/15/17  1020  Oral  4                 Current Settings: APV/CMV - RR 16; Vt 500 ; Fio2 30; PEEP + 8    Sputum/ suction : Productive; scnt; white/tan; thick    Mobility: was not performed no order for mobility at this time    SBT: was not performed at this time.    Plateau Pressure (Q Shift): 11     Static Compliance (ml / cm H2O): 53     Events/Summary/Plan: plan to possibly wean off vent and possible MRI        Intervention: Perform ventilator associated pneumonia prevention interventions    Oral and endotracheal suctioning performed, circuit drained as needed, gloves worn during all patient and equipment contact, ventilator wiped down, Head of bed elevated >30%.   Intervention: Manage ventilation therapy by monitoring diagnostic test results    Am AB.45/30.6/97.7/21/-2

## 2017-06-20 NOTE — PROGRESS NOTES
"Date of Service: 6/20/2017  Chief Complaint:  Chief Complaint   Patient presents with   • Abdominal Pain     abdominal pain since saturday  diaphoretic and weakness  diarrhea yesterday     Interval History:  Still intubated but doing better off pressor, perfusion adequate  Wife by his side and happy to see progress  Imaging study today  All recent medication, labs, imaging studies and procedures reviewed    Physical Exam   Blood pressure 136/82, pulse 64, temperature 36.1 °C (97 °F), resp. rate 18, height 1.778 m (5' 10\"), weight 102.2 kg (225 lb 5 oz), SpO2 99 %.    Constitutional: Intubated He appears well-developed.   HENT: Normocephalic and atraumatic. No scleral icterus.   Neck: No JVD present.   Cardiovascular: Normal rate. RRR; Exam reveals no gallop and no friction rub. No murmur heard.   Pulmonary/Chest: CTAB coarse   Abdominal: S/NT/ND BS+   Musculoskeletal: He exhibits no edema. Pulses present.  Skin: Skin is warm and dry.       Intake/Output Summary (Last 24 hours) at 06/20/17 1239  Last data filed at 06/20/17 1200   Gross per 24 hour   Intake 3109.92 ml   Output   2835 ml   Net 274.92 ml       LABS:  Lab Results   Component Value Date/Time    CHOLESTEROL, 03/22/2013 09:36 AM    * 03/22/2013 09:36 AM    HDL 32* 03/22/2013 09:36 AM    TRIGLYCERIDES 347* 06/20/2017 03:48 AM       Lab Results   Component Value Date/Time    WBC 11.2* 06/20/2017 03:48 AM    RBC 3.39* 06/20/2017 03:48 AM    HEMOGLOBIN 10.8* 06/20/2017 03:48 AM    HEMATOCRIT 33.4* 06/20/2017 03:48 AM    MCV 98.5* 06/20/2017 03:48 AM    NEUTROPHILS-POLYS 78.00* 06/14/2017 06:20 PM    LYMPHOCYTES 7.00* 06/14/2017 06:20 PM    MONOCYTES 4.00 06/14/2017 06:20 PM    EOSINOPHILS 0.00 06/14/2017 06:20 PM    BASOPHILS 0.00 06/14/2017 06:20 PM     Lab Results   Component Value Date/Time    SODIUM 146* 06/20/2017 03:48 AM    POTASSIUM 3.9 06/20/2017 03:48 AM    CHLORIDE 115* 06/20/2017 03:48 AM    CO2 21 06/20/2017 03:48 AM    GLUCOSE 150* " 06/20/2017 03:48 AM    BUN 20 06/20/2017 03:48 AM    CREATININE 0.84 06/20/2017 03:48 AM     Lab Results   Component Value Date    HBA1C 8.2* 06/16/2017      Lab Results   Component Value Date/Time    ALKALINE PHOSPHATASE 96 06/20/2017 03:48 AM    AST(SGOT) 50* 06/20/2017 03:48 AM    ALT(SGPT) 76* 06/20/2017 03:48 AM    TOTAL BILIRUBIN 2.1* 06/20/2017 03:48 AM      No results found for: BNPBTYPENAT   No results found for: TSH  Lab Results   Component Value Date/Time    PT 16.2* 06/15/2017 10:32 AM    INR 1.32* 06/15/2017 10:32 AM        Medications reviewed    Imaging reviewed    ECHO(6/14/2017):  Left ventricular ejection fraction is visually estimated to be 60%.  Grade I diastolic dysfunction.  No valvular heart disease.  Estimated right ventricular systolic pressure  is 35 mmHg.    Impressions:  •    *Sepsis (CMS-HCC) [A41.9]      •    Acute respiratory failure with hypoxia (CMS-HCC) [J96.01]      •    Metabolic acidosis [E87.2]      •    Acute kidney injury (nontraumatic) (CMS-HCC) [N17.9]      •    Hyponatremia [E87.1]      •    Bacteremia [R78.81]      •    Acute emphysematous cholecystitis [K81.0]      •    Hypotension (arterial) [I95.9]      •    Thrombocytopenia (CMS-[facility]) [D69.6]      •    Hypokalemia due to inadequate potassium intake [E87.6]      •    Non-STEMI (non-ST elevated myocardial infarction) (CMS-HCC) [I21.4]                             Recommendations:  Abx for sepsis and acute cholecystitis; Improving  AFib in and out, on Dig for better rate control  Wean off pressor already  Anticipate MRCP  Case discussed with attending and RN  Will follow      Thx

## 2017-06-20 NOTE — PROGRESS NOTES
"  Trauma/Surgical Progress Note    Author: Kae Last Date & Time created: 6/20/2017  6:47 AM     Interval Events:  Critically ill. On vent. Off pressors finally.  WBC, LFTs continue to improve. Cholecystostomy tube draining. On abx. MRCP today.  ? Extubation soon    Hemodynamics:  Blood pressure 136/82, pulse 66, temperature 36.1 °C (97 °F), resp. rate 15, height 1.778 m (5' 10\"), weight 102.2 kg (225 lb 5 oz), SpO2 99 %.     Respiratory:  Fuller Vent Mode: APVCMV, Rate (breaths/min): 16, PEEP/CPAP: 8, FiO2: 30, P Peak (PIP): 23, P MEAN: 11 Respiration: 15, Pulse Oximetry: 99 %     Work Of Breathing / Effort: Vented  RUL Breath Sounds: Clear, RML Breath Sounds: Diminished, RLL Breath Sounds: Diminished, OZZY Breath Sounds: Clear, LLL Breath Sounds: Diminished  Fluids:    Intake/Output Summary (Last 24 hours) at 06/20/17 0647  Last data filed at 06/20/17 0600   Gross per 24 hour   Intake 3741.11 ml   Output   2845 ml   Net 896.11 ml         Admit Weight: 93.7 kg (206 lb 9.1 oz)  Current Weight: 102.2 kg (225 lb 5 oz)    Physical Exam   Constitutional: He appears well-developed and well-nourished.   HENT:   Head: Normocephalic.   Cardiovascular: Normal rate.    Pulmonary/Chest: Effort normal.   Abdominal: Soft. He exhibits distension.   DOMINGO in RUQ - bilious   Genitourinary:   Laird to gravity   Skin: Skin is warm.       Medical Decision Making/Problem List:    Active Hospital Problems    Diagnosis   • Atrial fibrillation with RVR (CMS-[facility]) [I48.91]   • Acute respiratory failure with hypoxia (CMS-[facility]) [J96.01]   • Metabolic acidosis [E87.2]   • Acute kidney injury (nontraumatic) (CMS-[facility]) [N17.9]   • Hyponatremia [E87.1]   • Bacteremia [R78.81]   • Acute emphysematous cholecystitis [K81.0]   • Hypotension (arterial) [I95.9]   • Thrombocytopenia (CMS-[facility]) [D69.6]   • Hypokalemia due to inadequate potassium intake [E87.6]   • Sepsis (CMS-HCC) [A41.9]   • Non-STEMI (non-ST elevated myocardial infarction) (CMS-[facility]) " [I21.4]     Core Measures & Quality Metrics:  Labs reviewed, Medications reviewed and Radiology images reviewed  Laird catheter: Critically Ill - Requiring Accurate Measurement of Urinary Output  Central line in place: Need for access and Vasopressors    DVT Prophylaxis: Contraindicated - High bleeding risk  DVT prophylaxis - mechanical: SCDs  Ulcer prophylaxis: Yes  Antibiotics: Treating active infection/contamination beyond 24 hours perioperative coverage  Assessed for rehab: Patient unable to tolerate rehabilitation therapeutic regimen    WALT Score  Discussed patient condition with RN  CRITICAL CARE TIME EXCLUDING PROCEDURES: 20   minutes

## 2017-06-20 NOTE — PROGRESS NOTES
Levophed gtt stopped at 1611, pt tolerating well with SBP > 90, [facility] Hospitalist RN notified of improvement. DOMINGO drain flushed with 20 cc's sterile water per MD order. Pt calm and in no distress, wife at bedside.

## 2017-06-21 ENCOUNTER — APPOINTMENT (OUTPATIENT)
Dept: RADIOLOGY | Facility: MEDICAL CENTER | Age: 77
DRG: 870 | End: 2017-06-21
Attending: INTERNAL MEDICINE
Payer: MEDICARE

## 2017-06-21 LAB
ALBUMIN SERPL BCP-MCNC: 1.7 G/DL (ref 3.2–4.9)
ALBUMIN/GLOB SERPL: 0.5 G/DL
ALP SERPL-CCNC: 88 U/L (ref 30–99)
ALT SERPL-CCNC: 65 U/L (ref 2–50)
ANION GAP SERPL CALC-SCNC: 9 MMOL/L (ref 0–11.9)
AST SERPL-CCNC: 48 U/L (ref 12–45)
BASE EXCESS BLDA CALC-SCNC: -3 MMOL/L (ref -4–3)
BILIRUB SERPL-MCNC: 2 MG/DL (ref 0.1–1.5)
BODY TEMPERATURE: 36.6 CENTIGRADE
BUN SERPL-MCNC: 21 MG/DL (ref 8–22)
CALCIUM SERPL-MCNC: 7.6 MG/DL (ref 8.4–10.2)
CHLORIDE SERPL-SCNC: 116 MMOL/L (ref 96–112)
CO2 SERPL-SCNC: 23 MMOL/L (ref 20–33)
CREAT SERPL-MCNC: 0.69 MG/DL (ref 0.5–1.4)
DIGOXIN SERPL-MCNC: 0.4 NG/ML (ref 0.8–2)
ERYTHROCYTE [DISTWIDTH] IN BLOOD BY AUTOMATED COUNT: 52 FL (ref 35.9–50)
GFR SERPL CREATININE-BSD FRML MDRD: >60 ML/MIN/1.73 M 2
GLOBULIN SER CALC-MCNC: 3.4 G/DL (ref 1.9–3.5)
GLUCOSE BLD-MCNC: 132 MG/DL (ref 65–99)
GLUCOSE BLD-MCNC: 133 MG/DL (ref 65–99)
GLUCOSE BLD-MCNC: 134 MG/DL (ref 65–99)
GLUCOSE BLD-MCNC: 152 MG/DL (ref 65–99)
GLUCOSE SERPL-MCNC: 156 MG/DL (ref 65–99)
HCO3 BLDA-SCNC: 21 MMOL/L (ref 17–25)
HCT VFR BLD AUTO: 35 % (ref 42–52)
HGB BLD-MCNC: 11.4 G/DL (ref 14–18)
MCH RBC QN AUTO: 32.3 PG (ref 27–33)
MCHC RBC AUTO-ENTMCNC: 32.6 G/DL (ref 33.7–35.3)
MCV RBC AUTO: 99.2 FL (ref 81.4–97.8)
O2/TOTAL GAS SETTING VFR VENT: 30 % (ref 30–60)
PCO2 BLDA: 32.6 MMHG (ref 26–37)
PCO2 TEMP ADJ BLDA: 32 MMHG (ref 26–37)
PH BLDA: 7.42 [PH] (ref 7.4–7.5)
PH TEMP ADJ BLDA: 7.43 [PH] (ref 7.4–7.5)
PLATELET # BLD AUTO: 118 K/UL (ref 164–446)
PMV BLD AUTO: 11.9 FL (ref 9–12.9)
PO2 BLDA: 91.9 MMHG (ref 64–87)
PO2 TEMP ADJ BLDA: 89.6 MMHG (ref 64–87)
POTASSIUM SERPL-SCNC: 3.9 MMOL/L (ref 3.6–5.5)
PROT SERPL-MCNC: 5.1 G/DL (ref 6–8.2)
RBC # BLD AUTO: 3.53 M/UL (ref 4.7–6.1)
SAO2 % BLDA: 96.8 % (ref 93–99)
SODIUM SERPL-SCNC: 148 MMOL/L (ref 135–145)
WBC # BLD AUTO: 9.4 K/UL (ref 4.8–10.8)

## 2017-06-21 PROCEDURE — 94150 VITAL CAPACITY TEST: CPT

## 2017-06-21 PROCEDURE — 82803 BLOOD GASES ANY COMBINATION: CPT

## 2017-06-21 PROCEDURE — 99233 SBSQ HOSP IP/OBS HIGH 50: CPT | Performed by: HOSPITALIST

## 2017-06-21 PROCEDURE — 700101 HCHG RX REV CODE 250: Performed by: INTERNAL MEDICINE

## 2017-06-21 PROCEDURE — 71010 DX-CHEST-PORTABLE (1 VIEW): CPT

## 2017-06-21 PROCEDURE — 82962 GLUCOSE BLOOD TEST: CPT | Mod: 91

## 2017-06-21 PROCEDURE — 700105 HCHG RX REV CODE 258: Performed by: HOSPITALIST

## 2017-06-21 PROCEDURE — 700111 HCHG RX REV CODE 636 W/ 250 OVERRIDE (IP): Performed by: HOSPITALIST

## 2017-06-21 PROCEDURE — A9270 NON-COVERED ITEM OR SERVICE: HCPCS | Performed by: INTERNAL MEDICINE

## 2017-06-21 PROCEDURE — 700102 HCHG RX REV CODE 250 W/ 637 OVERRIDE(OP): Performed by: INTERNAL MEDICINE

## 2017-06-21 PROCEDURE — 700105 HCHG RX REV CODE 258: Performed by: INTERNAL MEDICINE

## 2017-06-21 PROCEDURE — 94640 AIRWAY INHALATION TREATMENT: CPT

## 2017-06-21 PROCEDURE — 770022 HCHG ROOM/CARE - ICU (200)

## 2017-06-21 PROCEDURE — 36600 WITHDRAWAL OF ARTERIAL BLOOD: CPT

## 2017-06-21 PROCEDURE — 85027 COMPLETE CBC AUTOMATED: CPT

## 2017-06-21 PROCEDURE — 94003 VENT MGMT INPAT SUBQ DAY: CPT

## 2017-06-21 PROCEDURE — 700101 HCHG RX REV CODE 250: Performed by: HOSPITALIST

## 2017-06-21 PROCEDURE — 80162 ASSAY OF DIGOXIN TOTAL: CPT

## 2017-06-21 PROCEDURE — 80053 COMPREHEN METABOLIC PANEL: CPT

## 2017-06-21 RX ORDER — DIGOXIN 0.25 MG/ML
125 INJECTION INTRAMUSCULAR; INTRAVENOUS DAILY
Status: DISCONTINUED | OUTPATIENT
Start: 2017-06-22 | End: 2017-06-21

## 2017-06-21 RX ORDER — DIGOXIN 0.25 MG/ML
125 INJECTION INTRAMUSCULAR; INTRAVENOUS DAILY
Status: DISCONTINUED | OUTPATIENT
Start: 2017-06-21 | End: 2017-06-27

## 2017-06-21 RX ADMIN — CHLORHEXIDINE GLUCONATE 15 ML: 1.2 RINSE ORAL at 08:40

## 2017-06-21 RX ADMIN — FAMOTIDINE 20 MG: 10 INJECTION, SOLUTION INTRAVENOUS at 08:40

## 2017-06-21 RX ADMIN — DEXMEDETOMIDINE HYDROCHLORIDE 0.2 MCG/KG/HR: 100 INJECTION, SOLUTION, CONCENTRATE INTRAVENOUS at 00:16

## 2017-06-21 RX ADMIN — INSULIN LISPRO 1 UNITS: 100 INJECTION, SOLUTION INTRAVENOUS; SUBCUTANEOUS at 06:10

## 2017-06-21 RX ADMIN — PIPERACILLIN AND TAZOBACTAM 3.38 G: 3; .375 INJECTION, POWDER, FOR SOLUTION INTRAVENOUS at 20:47

## 2017-06-21 RX ADMIN — LEVALBUTEROL 1.25 MG: 1.25 SOLUTION RESPIRATORY (INHALATION) at 07:00

## 2017-06-21 RX ADMIN — PIPERACILLIN AND TAZOBACTAM 3.38 G: 3; .375 INJECTION, POWDER, FOR SOLUTION INTRAVENOUS at 15:01

## 2017-06-21 RX ADMIN — PIPERACILLIN AND TAZOBACTAM 3.38 G: 3; .375 INJECTION, POWDER, FOR SOLUTION INTRAVENOUS at 03:00

## 2017-06-21 RX ADMIN — DEXMEDETOMIDINE HYDROCHLORIDE 0.5 MCG/KG/HR: 100 INJECTION, SOLUTION, CONCENTRATE INTRAVENOUS at 15:01

## 2017-06-21 RX ADMIN — PROPOFOL 30 MCG/KG/MIN: 10 INJECTION, EMULSION INTRAVENOUS at 00:16

## 2017-06-21 RX ADMIN — PROPOFOL 30 MCG/KG/MIN: 10 INJECTION, EMULSION INTRAVENOUS at 05:15

## 2017-06-21 RX ADMIN — PIPERACILLIN AND TAZOBACTAM 3.38 G: 3; .375 INJECTION, POWDER, FOR SOLUTION INTRAVENOUS at 08:40

## 2017-06-21 RX ADMIN — DEXMEDETOMIDINE HYDROCHLORIDE 0.6 MCG/KG/HR: 100 INJECTION, SOLUTION, CONCENTRATE INTRAVENOUS at 09:53

## 2017-06-21 RX ADMIN — DEXMEDETOMIDINE HYDROCHLORIDE 0.4 MCG/KG/HR: 100 INJECTION, SOLUTION, CONCENTRATE INTRAVENOUS at 19:32

## 2017-06-21 RX ADMIN — PROPOFOL 25 MCG/KG/MIN: 10 INJECTION, EMULSION INTRAVENOUS at 09:50

## 2017-06-21 ASSESSMENT — PULMONARY FUNCTION TESTS: FVC: 1.84

## 2017-06-21 ASSESSMENT — PAIN SCALES - GENERAL: PAINLEVEL_OUTOF10: 0

## 2017-06-21 NOTE — PROGRESS NOTES
"  Trauma/Surgical Progress Note    Author: Kae Last Date & Time created: 6/21/2017  6:15 AM     Interval Events:  Critically ill. On vent. MRCP showed no choledocholithiasis. Drain partially out of GB. WBC - nl; LFTs continue to improve. Cholecystostomy tube draining. On abx. ? Extubation soon    Hemodynamics:  Blood pressure 136/82, pulse 55, temperature 36.1 °C (97 °F), resp. rate 16, height 1.778 m (5' 10\"), weight 103.6 kg (228 lb 6.3 oz), SpO2 93 %.     Respiratory:  Fuller Vent Mode: APVCMV, Rate (breaths/min): 16, PEEP/CPAP: 8, FiO2: 30, P Peak (PIP): 22, P MEAN: 11 Respiration: 16, Pulse Oximetry: 93 %     Work Of Breathing / Effort: Vented  RUL Breath Sounds: Clear, RML Breath Sounds: Clear;Diminished, RLL Breath Sounds: Diminished, OZZY Breath Sounds: Clear, LLL Breath Sounds: Diminished  Fluids:    Intake/Output Summary (Last 24 hours) at 06/21/17 0615  Last data filed at 06/21/17 0600   Gross per 24 hour   Intake 1884.73 ml   Output   2155 ml   Net -270.27 ml         Admit Weight: 93.7 kg (206 lb 9.1 oz)  Current Weight: 103.6 kg (228 lb 6.3 oz)    Physical Exam   Constitutional: He appears well-developed and well-nourished.   HENT:   Head: Normocephalic.   Cardiovascular: Normal rate.    Pulmonary/Chest: Effort normal.   Abdominal: Soft. He exhibits distension.   DOMINGO in RUQ - bilious   Genitourinary:   Laird to gravity   Skin: Skin is warm.       Medical Decision Making/Problem List:    Active Hospital Problems    Diagnosis   • Atrial fibrillation with RVR (CMS-[facility]) [I48.91]   • Acute respiratory failure with hypoxia (CMS-[facility]) [J96.01]   • Metabolic acidosis [E87.2]   • Acute kidney injury (nontraumatic) (CMS-[facility]) [N17.9]   • Hyponatremia [E87.1]   • Bacteremia [R78.81]   • Acute emphysematous cholecystitis [K81.0]   • Hypotension (arterial) [I95.9]   • Thrombocytopenia (CMS-[facility]) [D69.6]   • Hypokalemia due to inadequate potassium intake [E87.6]   • Sepsis (CMS-HCC) [A41.9]   • Non-STEMI (non-ST " elevated myocardial infarction) (CMS-HCC) [I21.4]     Core Measures & Quality Metrics:  Labs reviewed, Medications reviewed and Radiology images reviewed  Laird catheter: Critically Ill - Requiring Accurate Measurement of Urinary Output  Central line in place: Need for access and Vasopressors    DVT Prophylaxis: Contraindicated - High bleeding risk  DVT prophylaxis - mechanical: SCDs  Ulcer prophylaxis: Yes  Antibiotics: Treating active infection/contamination beyond 24 hours perioperative coverage  Assessed for rehab: Patient unable to tolerate rehabilitation therapeutic regimen    WALT Score  Discussed patient condition with RN  CRITICAL CARE TIME EXCLUDING PROCEDURES: 20   minutes

## 2017-06-21 NOTE — CONSULTS
DATE OF SERVICE:  06/20/2017    DATE OF CONSULTATION:  06/20/2017    REFERRING PHYSICIAN:  George De La Cruz DO.    REASON FOR CONSULTATION:  Bacteremia.    HISTORY OF PRESENT ILLNESS:  Patient is a 76-year-old white male who has   history of hypertension, glaucoma, who came in to the hospital with diffuse   abdominal pain and chills for few days.  He also had some diarrhea and had a   syncopal episode secondary to profound weakness, hence he came into the   emergency room.  Since he has been here, he was found to have Klebsiella   bacteremia.  His blood cultures were positive on 06/14/2017 as well as on   06/15/2017.  His CAT scan on 06/14/2017 was consistent with severe   cholecystitis.  He had IR cholecystostomy drain put in on 06/15/2017.  His   course was also complicated by non-STEMI.  Also, he has been on ventilator   since 06/15/2017.  He was on pressors, when he came in, but has been off since   06/19/2017.  In view of all these issues, infectious disease consult has been   called.    REVIEW OF SYSTEMS:  Cannot be obtained since patient is intubated and sedated.    ALLERGIES:  No known drug allergies.    PAST MEDICAL HISTORY:  Hypertension.    PAST SURGICAL HISTORY:  Colonoscopy.    SOCIAL HISTORY:  Has 30-pack-year smoking history until 1987.  No alcohol or   drug use.  .    FAMILY HISTORY:  Positive for colon cancer in the mother.  Father had colon   cancer as well.    MEDICATIONS:  Currently, he is on Tylenol, Peridex, Lovenox, Pepcid, Humalog,   Ativan, propofol, Zofran, Zosyn.    LABORATORY DATA:  His WBC count is 11.2, platelets of 90, creatinine is 0.84,   AST is 50, ALT is 76.  Repeat blood cultures are negative from 06/17/2017.    PHYSICAL EXAMINATION:  GENERAL:  Patient is intubated, sedated.  VITAL SIGNS:  T-max is 99.1, blood pressure is 122/63, pulse is 72.  HEAD AND ENT:  Mucosa is dry.  No oral thrush.  CHEST:  Bilateral air entry.  Clear to auscultation.  CARDIOVASCULAR:   Regular, tachycardic.  No murmurs, no gallops heard.  ABDOMEN:  Obese, has a cholecystostomy drain present, soft, nontender.  EXTREMITIES:  Edema present.  CENTRAL NERVOUS SYSTEM:  Sedated at this time.    ASSESSMENT:  1.  Klebsiella oxytoca bacteremia.  2.  Acute cholecystitis.  3.  Ventilator-dependent respiratory failure.  4.  Septic shock, resolved.    RECOMMENDATIONS:  At this time, I would recommend to continue with the Zosyn.    Patient is for surgery.  The thrombocytopenia is actually improving.  Hence,   probably the cause was sepsis.  Continue to monitor.  The endpoint of the   antibiotics is probably 10-14 days after the 2 negative blood cultures.  We   may be able to change to oral once he is ready.  I have reviewed all the   records.  Plan was discussed with internal medicine.       ____________________________________     MIGUEL HANDY MD JD / GREGG    DD:  06/20/2017 16:22:17  DT:  06/20/2017 20:19:43    D#:  [id number]  Job#:  [id number]

## 2017-06-21 NOTE — RESPIRATORY CARE
Ventilator Weaning Update    Patient is on vent day 7.  SBT was tolerated for a minimum of 1  hours on settings of 5/8    Wean parameters for this SBT were:  #FVC / Vital Capacity (liters) : 1.84 (06/21/17 1245)  NIF (cm H2O) : -46 (06/21/17 1245)  Rapid Shallow Breathing Index (RR/VT): 46 (06/21/17 1245)  RR (bpm): 20 (06/21/17 1245)  Spontaneous VE: 8.7 (06/21/17 1245)  Spontaneous VT: 435 (06/21/17 1245)    Recommendation: extubation        Events/Summary/Plan: ABG completed at this time  (06/21/17 3050)

## 2017-06-21 NOTE — PROGRESS NOTES
Infectious Disease Progress Note    Author: Lashell Anthony M.D. Date of service & Time created: 2017  9:57 AM    Chief Complaint:  Chief Complaint   Patient presents with   • Abdominal Pain     abdominal pain since saturday  diaphoretic and weakness  diarrhea yesterday       Interval History:  76 year old male with h/o htn admitted with septic shock due to acute severe cholecystitis  - remains on the vent. No fevers. Wbc 9.4  Labs Reviewed, Medications Reviewed and Radiology Reviewed.    Review of Systems:  Review of Systems   Unable to perform ROS: intubated       Hemodynamics:  No data recorded.  Monitored Temp: 36.6 °C (97.9 °F)  Pulse  Av.8  Min: 30  Max: 134Heart Rate (Monitored): (!) 56  NIBP: 110/62 mmHg  CVP (mm Hg): (!) 11 MM HG    Physical Exam:  Physical Exam   Constitutional: He is sedated and intubated.   HENT:   Mouth/Throat: No oropharyngeal exudate.   ngt to suction   Eyes: No scleral icterus.   Neck: Neck supple.   Cardiovascular: Regular rhythm.    No murmur heard.  Pulmonary/Chest: He is intubated. He has no wheezes. He has no rales.   Abdominal: Soft. There is no tenderness.   RUQ drain present   Musculoskeletal: He exhibits edema.   Neurological:   Sedated    Vitals reviewed.      Meds:    Current facility-administered medications:   •  dexmedetomidine (PRECEDEX) 200 mcg in NS 50 mL infusion, 0.1-0.7 mcg/kg/hr, Intravenous, Continuous, Christ HARVEY M.D., Last Rate: 15.3 mL/hr at 17 0953, 0.6 mcg/kg/hr at 17 0953  •  etomidate (AMIDATE) injection 30.8 mg, 0.3 mg/kg, Intravenous, Once PRN, Che Nash M.D.  •  insulin lispro (HUMALOG) injection 1-6 Units, 1-6 Units, Subcutaneous, Q6HRS, Che Nash M.D., 1 Units at 17 0610  •  digoxin (LANOXIN) injection 125 mcg, 125 mcg, Intravenous, Q12HR, Che Nash M.D., Stopped at 17  •  chlorhexidine (PERIDEX) 0.12 % solution 30 mL, 30 mL, Mouth/Throat, BID, Graham Ivy M.D., 15 mL at  06/21/17 0840  •  levalbuterol (XOPENEX) 1.25 MG/3ML nebulizer solution 1.25 mg, 1.25 mg, Nebulization, Q4HRS (RT), Che Nash M.D., 1.25 mg at 06/21/17 0700  •  famotidine (PEPCID) injection 20 mg, 20 mg, Intravenous, DAILY, Che Nash M.D., 20 mg at 06/21/17 0840  •  Action is required: Protocol 1073 Hypoglycemia has been implemented, , , Once **AND** Protocol 1073 Inclusion Criteria, , , CONTINUOUS **AND** Protocol 1073 NOTIFY, , , Once **AND** Protocol 1073 Initiate protocol immediately if FSBG is less than or equal to 70 mg/dL, , , CONTINUOUS **AND** glucose 4 g chewable tablet 16 g, 16 g, Oral, Q15 MIN PRN **AND** dextrose 50% (D50W) injection 25 mL, 25 mL, Intravenous, Q15 MIN PRN, Che Nash M.D.  •  acetaminophen (TYLENOL) suppository 650 mg, 650 mg, Rectal, Q6HRS PRN, Che Nash M.D., 650 mg at 06/16/17 1615  •  morphine (pf) 4 mg/ml injection 2-4 mg, 2-4 mg, Intravenous, Q2HRS PRN, Che Nash M.D., 4 mg at 06/19/17 0406  •  MD ALERT...Pharmacy to implement PROPOFOL CRITICAL CARE PROTOCOL 1 Each, 1 Each, Other, PRN, Che Nash M.D.  •  Action is required: Protocol 452 Propofol Critical Care has been implemented, , , CONTINUOUS **AND** propofol (DIPRIVAN) injection, 5-80 mcg/kg/min, Intravenous, Continuous, Last Rate: 12.2 mL/hr at 06/21/17 0952, 20 mcg/kg/min at 06/21/17 0952 **AND** Propofol Critical Care Protocol - Patient Must Have an Advanced Airway with Mechanical Ventilation in Use., , , CONTINUOUS **AND** Propofol Critical Care Protocol - Spontaneous breathing trials may be attempted while receiving propofol, , , CONTINUOUS **AND** Propofol Critical Care Protocol - If patient meets extubation criteria, propofol MUST be discontinued prior to extubation, , , CONTINUOUS **AND** Propofol Critical Care Protocol - No allergy to propofol, soybean oil, or eggs, , , CONTINUOUS **AND** Propofol Critical Care Protocol - Do not administer this medication to children under the age  of 12, , , CONTINUOUS **AND** Propofol Critical Care Protocol - Dedicated IV line for administration (vehicle supports rapid growth of microorganisms and incompatibilities cannot be detected), , , CONTINUOUS **AND** Propofol Critical Care Protocol - Administration tubing and any unused emulsion must be changed out and discarded after 12 hours from spiking vial, , , CONTINUOUS **AND** Propofol Critical Care Protocol - RASS guildelines, , , CONTINUOUS **AND** Propofol Critical Care Protocol - Wake-up assessment as ordered by MD, , , CONTINUOUS **AND** Propofol Critical Care Protocol - Propofol is not an analgesic, concurrent analgesia (if patient experiencing pain) should continue while patient is on propofol, , , CONTINUOUS **AND** Triglycerides Starting now and then Every 3 Days, , , Every 3 Days (0300) **AND** Propofol Critical Care Protocol - Notify MD prior to exceeding 80 mcg/kg/min and obtain new order for higher limit, , , CONTINUOUS, Che Nash M.D.  •  norepinephrine (LEVOPHED) 8 mg in  mL Infusion, 0.5-30 mcg/min, Intravenous, Continuous, Che Nash M.D., Stopped at 06/19/17 1611  •  Respiratory Care per Protocol, , Nebulization, Continuous RT, Jose Valdivia M.D.  •  acetaminophen (TYLENOL) tablet 650 mg, 650 mg, Oral, Q6HRS PRN, Jose Valdivia M.D.  •  piperacillin-tazobactam (ZOSYN) 3.375 g in  mL IVPB, 3.375 g, Intravenous, Q6HRS, Jose Valdivia M.D., Last Rate: 200 mL/hr at 06/21/17 0840, 3.375 g at 06/21/17 0840  •  ondansetron (ZOFRAN) syringe/vial injection 4 mg, 4 mg, Intravenous, Q4HRS PRN, Jose Valdivia M.D., 4 mg at 06/19/17 0406  •  ondansetron (ZOFRAN ODT) dispertab 4 mg, 4 mg, Oral, Q4HRS PRN, Jose Valdivia M.D.  •  lorazepam (ATIVAN) tablet 0.5 mg, 0.5 mg, Oral, Q6HRS PRN **OR** lorazepam (ATIVAN) injection 0.5 mg, 0.5 mg, Intravenous, Q6HRS PRN, Jose Valdivia M.D., 0.5 mg at 06/15/17 0998    Labs:  Recent Labs      06/19/17   0500  06/20/17   0348  06/21/17   1040    WBC  12.1*  11.2*  9.4   RBC  3.65*  3.39*  3.53*   HEMOGLOBIN  11.8*  10.8*  11.4*   HEMATOCRIT  35.2*  33.4*  35.0*   MCV  96.4  98.5*  99.2*   MCH  32.3  31.9  32.3   RDW  50.3*  52.0*  52.0*   PLATELETCT  77*  90*  118*   MPV  12.0  12.0  11.9     Recent Labs      06/19/17   0500  06/20/17   0348  06/21/17   0320   SODIUM  146*  146*  148*   POTASSIUM  3.9  3.9  3.9   CHLORIDE  115*  115*  116*   CO2  24  21  23   GLUCOSE  155*  150*  156*   BUN  23*  20  21     Recent Labs      06/19/17   0500  06/20/17   0348  06/21/17   0320   ALBUMIN  1.8*  1.6*  1.7*   TBILIRUBIN  2.2*  2.1*  2.0*   ALKPHOSPHAT  102*  96  88   TOTPROTEIN  5.0*  4.5*  5.1*   ALTSGPT  101*  76*  65*   ASTSGOT  64*  50*  48*   CREATININE  0.74  0.84  0.69       Imaging:  Ct-abdomen-pelvis W/o    6/15/2017  6/15/2017 12:37 PM HISTORY/REASON FOR EXAM:  Abdominal pain. Changes of cholecystitis based on prior CT. Evaluate for possible perforation. TECHNIQUE/EXAM DESCRIPTION AND NUMBER OF VIEWS:  CT scan of the abdomen and pelvis without contrast. Noncontrast helical scanning was obtained from the diaphragmatic domes through the pubic symphysis. Low dose optimization technique was utilized for this CT exam including automated exposure control and adjustment of the mA and/or kV according to patient size. COMPARISON:  6/14/2017 FINDINGS:     Please note that noncontrast CT is significantly limited in sensitivity for detecting solid organ abnormalities. There has been interval development of small amounts of dependent pleural fluid with dependent atelectasis. There is an enteric tube extending into the 1st/2nd portion of the duodenum. There is no acute bony process. CT ABDOMEN: There is no evidence of free intraperitoneal air. The liver is unremarkable. The spleen is unremarkable. The pancreas is unremarkable. Gallbladder again demonstrates distention with surrounding fat stranding inferiorly. There is air within the gallbladder superiorly and  anteriorly. The adrenal glands are not enlarged. The kidneys are symmetric in size. There is atherosclerosis of the abdominal aorta. There is no lymphadenopathy. There is no bowel obstruction. Calcification of the right lower quadrant posterior to the cecum is again seen possibly representing an appendicolith. There is some surrounding stranding now identified in the right paracolic gutter into the periappendiceal location although the appendix is not dilated. Dependent calcification the cecum is again seen. There is stranding again seen in the right upper quadrant inferior to the gallbladder. CT PELVIS: There is no acute inflammatory process. Bladder is partly decompressed with a Laird catheter.     6/15/2017  1.  There is continued gallbladder distention with nondependent air in the gallbladder suspicious for possible emphysematous cholecystitis. There is some surrounding stranding inferior to the gallbladder again seen. 2.  There is an appendicolith again seen with periappendiceal stranding and minimal fluid in the right anterior pararenal space and paracolic gutter. 3.  There are small dependent pleural effusions with dependent atelectasis. 4.  Enteric tube extends into the 1st/2nd portion of the duodenum. 5.  There is no evidence of free intraperitoneal air.    Ct-abdomen-pelvis W/o    6/14/2017 6/14/2017 7:04 PM HISTORY/REASON FOR EXAM:  Right lower quadrant abdominal pain TECHNIQUE/EXAM DESCRIPTION:  CT abdomen and pelvis without IV contrast. Sequential axial CT images were obtained from lung bases to the proximal femurs without contrast. Low dose optimization technique was utilized for this CT exam including automated exposure control and adjustment of the mA and/or kV according to patient size. COMPARISON:  None CT ABDOMEN FINDINGS: Non contrast technique limits evaluation of the solid abdominal organs. Linear densities are seen within the lung bases, appearance favors atelectasis. The liver is normal in  contour, no intrahepatic biliary ductal dilatation is seen.  Hepatomegaly is noted. The gallbladder is distended. There is extensive hazy fat stranding adjacent to the gallbladder seen. There is air seen within the gallbladder lumen non dependently. The spleen is unremarkable. The pancreas is grossly normal. Bilateral adrenal glands appear within normal limits. The kidneys are unremarkable. The ureters are normal in caliber along their visualized course. The colon appears within normal limits. Appendicolith is seen within the appendix, otherwise the appendix appears unremarkable. The small bowel is unremarkable. The bony structures are age appropriate. Atherosclerotic calcification are seen including atherosclerotic coronary artery calcifications. CT PELVIS FINDINGS: The bladder is within normal limits.     6/14/2017  1.  Severe cholecystitis, possible component of emphysematous cholecystitis. 2.  Appendicolith, the appendix otherwise appears within normal limits. Appendicolith however places patient at future risk for development of appendicitis. 3.  Atherosclerosis and atherosclerotic coronary artery disease 4.  Hepatomegaly These findings were discussed with the patient's clinician, Poncho Boudreaux, on 6/14/2017 8:07 PM.    Dx-chest-limited (1 View)    6/17/2017 6/17/2017 4:45 AM HISTORY/REASON FOR EXAM:  Respiratory failure. TECHNIQUE/EXAM DESCRIPTION AND NUMBER OF VIEWS: Single portable view of the chest. COMPARISON: 06/16/2017 FINDINGS: Line and tube placements appear unchanged compared to the prior examination. Appearance of cardiac silhouette is unchanged compared to the prior radiograph. No new pleural fluid collection or pneumothorax is identified. No new infiltrates or consolidations appear to have developed since the prior exam. Ill-defined perihilar opacification appears to have decreased compared to the prior examination.     6/17/2017  Ill-defined pulmonary opacifications have decreased compared to prior  radiograph. This could indicate decrease in pulmonary edema or inflammation.    Dx-chest-portable (1 View)    6/21/2017 6/21/2017 4:39 AM HISTORY/REASON FOR EXAM:  Intubation. TECHNIQUE/EXAM DESCRIPTION AND NUMBER OF VIEWS: Single portable view of the chest. COMPARISON: 6/17/2017 FINDINGS: Cardiomediastinal contour is unchanged. Previously described pulmonary parenchymal opacities persist. No pneumothorax identified. Supportive tubing is unchanged.     6/21/2017  No significant change from prior exam.    Dx-chest-portable (1 View)    6/16/2017 6/16/2017 4:29 AM HISTORY/REASON FOR EXAM: Line placement TECHNIQUE/EXAM DESCRIPTION:  Single AP view of the chest. COMPARISON: Yesterday FINDINGS: Pigtail catheter overlies the right upper quadrant.  Nasogastric tube terminates below the lower margin of the film within the abdomen.  Otherwise medical device position is stable. The cardiac silhouette appears within normal limits. Atherosclerotic calcification of the aorta is noted.  The central  pulmonary vasculature appears prominent and indistinct. Asymmetric elevation of the right hemidiaphragm is noted.  Diffuse scattered hazy pulmonary parenchymal opacities are seen. Blunting of the left costophrenic angle is seen suggesting trace left effusion. The bony structures appear age-appropriate.     6/16/2017  1.  Probable mild pulmonary edema. 2.  Trace left pleural effusion. 3.  Atherosclerosis    Dx-chest-portable (1 View)    6/15/2017  6/15/2017 10:22 AM HISTORY/REASON FOR EXAM:  Post intubation TECHNIQUE/EXAM DESCRIPTION AND NUMBER OF VIEWS: Single portable view of the chest. COMPARISON: 6/15/2017 FINDINGS: Endotracheal tube terminates above the lio. Right IJ catheter is stable projecting over the SVC. The mediastinal and cardiac silhouette is unremarkable. The pulmonary vascularity is within normal limits. There is minimal linear bibasilar atelectasis. There is minimal right perihilar bronchial wall thickening.  There is no significant pleural effusion. There is no visible pneumothorax. Bones are unchanged.     6/15/2017  1.  Satisfactory appearance of ET tube and right IJ catheter without pneumothorax. 2.  There is minimal linear lower lobe atelectasis with possible mild edema.    Dx-chest-portable (1 View)    6/15/2017  6/15/2017 9:34 AM HISTORY/REASON FOR EXAM:  Shortness of Breath. TECHNIQUE/EXAM DESCRIPTION AND NUMBER OF VIEWS: Single portable view of the chest. COMPARISON: 6/14/2017 FINDINGS: Single portable view of the chest demonstrates a stable cardiac silhouette and mediastinal contours. There is mild bibasilar atelectasis. No pleural fluid or pneumothorax. A right internal jugular catheter tip projects over the superior vena cava, unchanged.     6/15/2017  Mild bibasilar atelectasis.    Dx-chest-portable (1 View)    6/14/2017 6/14/2017 9:59 PM HISTORY/REASON FOR EXAM: Line placement TECHNIQUE/EXAM DESCRIPTION:  Single AP view of the chest. COMPARISON: June 14, 2017 FINDINGS: Right internal jugular central line is seen terminating at the right atriocaval junction.   The cardiac silhouette appears within normal limits. The mediastinal contour appears within normal limits.  The central pulmonary vasculature appears normal. Asymmetric elevation of the right hemidiaphragm is noted.  Bilateral lungs are clear. No significant pleural effusions are identified. The bony structures appear age-appropriate.     6/14/2017  1.  No acute cardiopulmonary disease.    Dx-chest-portable (1 View)    6/14/2017 6/14/2017 6:29 PM HISTORY/REASON FOR EXAM:  Hypotension, abdominal pain, diaphoresis. TECHNIQUE/EXAM DESCRIPTION AND NUMBER OF VIEWS: Single portable view of the chest. COMPARISON: None FINDINGS: Cardiomediastinal contour is within normal limits. Lungs show hypoinflation. Right hemidiaphragm is elevated. No pleural fluid collection or pneumothorax. No major bony abnormality is seen.     6/14/2017  Hypoinflation without  other evidence for acute cardiopulmonary disease.    Bv-ydilzic-g/o    6/20/2017 6/20/2017 11:30 AM HISTORY/REASON FOR EXAM:  History of cholecystitis, status post placement of cholecystostomy tube. TECHNIQUE/EXAM DESCRIPTION: Magnetic resonance cholangiopancreatography. Magnetic resonance cholangiopancreatography was performed on with axial, coronal, and sagittal thin and thick section heavily T2-weighted sequences. 3-D cholangiographic multiplanar maximum intensity projection (MIP) images were created on a workstation.. The study was performed on a [employer] 1.5 Candace MRI scanner. COMPARISON: CT 6/15/2017 and 6/14/2017 FINDINGS: Exam limited by patient body habitus and the fact the patient is on a ventilator which causes some motion. The visualized lung bases demonstrate small amount of right pleural fluid with dependent airspace disease bilaterally. The gallbladder is decompressed in comparison to the prior exam with multiple gallstones identified. There is a small amount pericholecystic fluid. There is minimal free fluid along the anterior and inferior margin of the liver. There is a cholecystotomy tube identified in the subcutaneous fat in the right anterior upper abdomen although it is difficult to follow the catheter all the way into the gallbladder. Exact position is uncertain. The common bile duct is normal in caliber without choledocholithiasis. There is no intrahepatic biliary dilatation. Limited visualization of the pancreatic duct is unremarkable. There is a trace of fluid in the left upper abdomen but no gross evidence of pancreatitis is otherwise seen. There are no focal intrahepatic or intrasplenic signal abnormalities. Adrenal glands and visualized kidneys are unremarkable.     6/20/2017  1.  There has been partial decompression of the gallbladder with gallstones and minimal pericholecystic fluid identified. 2.  The cholecystotomy tube is not seen in its entirety and cannot be tracked into the  gallbladder on MRI. Its exact position is uncertain. 3.  There is no biliary dilatation or choledocholithiasis.    Us-drain-cholescystomy    6/15/2017  6/15/2017 12:27 PM HISTORY/REASON FOR EXAM:  Sepsis and emphysematous cholecystitis TECHNIQUE/EXAM DESCRIPTION AND NUMBER OF VIEWS:  Ultrasound-guided Percutaneous cholecystostomy. COMPARISON:  CT scan of the abdomen 6/15/2017 PROCEDURE:  Informed consent was obtained. Conscious sedation with ongoing IV propofol was administered during the procedure with appropriate continuous patient monitoring by the ICU nurse. Sedation duration: 30 minutes. Localizing ultrasound images were performed identifying the gallbladder. The right upper quadrant was prepped and draped in a sterile manner. Following local anesthesia with 7 cc 1% lidocaine, a 19-gauge Seldinger needle was advanced into the gallbladder  via a subcostal transhepatic approach under real-time ultrasound guidance. A bile specimen of 10 cc was collected and will be submitted for culture and sensitivity, Gram stain, and cell count. The bile appearance was brown and malodorous. An Amplatz guidewire was placed. An 8-Croatian dilator was placed. Next, an 8-Croatian locking loop Nextt pigtail catheter was placed. The catheter was secured to the skin and connected to suction bulb drainage. The patient tolerated the procedure well with no evidence of complication. FINDINGS: The gallbladder is identified. Ultrasound images demonstrate the cholecystostomy tube well positioned in the gallbladder.     6/15/2017  1. Ultrasound guided cholecystostomy with placement of an 8-Croatian locking loop catheter.     Echocardiogram Comp W/o Cont    6/15/2017  Transthoracic Echo Report Echocardiography Laboratory CONCLUSIONS No prior study is available for comparison. Left ventricular ejection fraction is visually estimated to be 60%. Grade I diastolic dysfunction. No valvular heart disease. Estimated right ventricular systolic pressure   is 35 mmHg. ANITHA RICHMOND Exam Date:         06/15/2017                    15:21 Exam Location:     Inpatient Priority:          Routine Ordering Physician:        GABRIEL APONTE Referring Physician:       FADI Laura Sonographer:               Papa Sun RDCS Age:    76     Gender:    M MRN:    [id number] :    1940 BSA:    2.11   Ht (in):    70     Wt (lb):    206 Exam Type:     Complete Indications:     CAD ICD Codes:       I25.1 CPT Codes:       26013 BP:   90     /   64     HR: Technical Quality:       Fair MEASUREMENTS  (Male / Female) Normal Values 2D ECHO LV Diastolic Diameter PLAX        4.4 cm                4.2 - 5.9 / 3.9 - 5.3 cm LV Systolic Diameter PLAX         2.7 cm                2.1 - 4.0 cm IVS Diastolic Thickness           1.2 cm                LVPW Diastolic Thickness          1.1 cm                LVOT Diameter                     2 cm                  Estimated LV Ejection Fraction    60 %                  LV Ejection Fraction MOD BP       63.6 %                >= 55  % LV Ejection Fraction MOD 4C       66.8 %                LV Ejection Fraction MOD 2C       60.8 %                IVC Diameter                      1.9 cm                M-MODE Aortic Root Diameter MM           3.7 cm                DOPPLER AV Peak Velocity                  1.3 m/s               AV Peak Gradient                  7.3 mmHg              AV Mean Gradient                  4.1 mmHg              LVOT Peak Velocity                1.1 m/s               AV Area Cont Eq vti               2.8 cm²               Mitral E Point Velocity           0.78 m/s              Mitral E to A Ratio               0.86                  Mitral A Duration                 125 ms                MV Pressure Half Time             75.5 ms               MV Area PHT                       2.9 cm²               MV Deceleration Time              260 ms                Pulmonary Vein Systolic Velocity  0.63 m/s              Pulmonary Vein  Diastolic Velocit  0.48 m/s              Pulmonary Vein S/D Ratio          1.3                   TV Peak E Velocity                0.48 m/s              TR Peak Velocity                  275 cm/s              PV Peak Velocity                  0.79 m/s              PV Peak Gradient                  2.5 mmHg              RVOT Peak Velocity                0.58 m/s              * Indicates values subject to auto-interpretation LV EF:  60    % FINDINGS Left Ventricle Normal left ventricular size and systolic function. Mild concentric left ventricular hypertrophy. Left ventricular ejection fraction is visually estimated to be 60%. Normal regional wall motion. Grade I diastolic dysfunction. Right Ventricle The right ventricle was normal in size and function. Right Atrium Enlarged right atrium. Left Atrium The left atrium is normal in size. Mitral Valve Structurally normal mitral valve. Aortic Valve Aortic sclerosis without stenosis. Tricuspid Valve Structurally normal tricuspid valve. Mild tricuspid regurgitation. Estimated right ventricular systolic pressure  is 35 mmHg. Pulmonic Valve The pulmonic valve is not well visualized. Trace pulmonic insufficiency. Pericardium Normal pericardium without effusion. Aorta The aortic root is normal. Tho Jones (Electronically Signed) Final Date:     15 Rufina 2017                 17:30      Micro:  BLOOD CULTURE   Date Value Ref Range Status   06/17/2017   Preliminary    No Growth    Note: Blood cultures are incubated for 5 days and  are monitored continuously.Positive blood cultures  are called to the RN and reported as soon as  they are identified.          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

## 2017-06-21 NOTE — PROGRESS NOTES
[facility] Hospitalist Progress Note    Date of Service: 2017    Chief Complaint    76 y.o. male admitted 2017 with abdominal pain and SOB, fever, chills, diaphoresis, syncope x4.  CT abdomen revealed acute cholecystitis with lactic acid of 8 admitted to ICU on zosyn IV, sepsis protocol initiated.  Interval Problem Update  Overnight remains off pressors  In sinus  Alert but does not follow per RN and wife  ROS is unobtainable as pt is intubated    Consultants/Specialty  General Surgery Dr Last  Pulmonology Dr Reyna  Cardiology Dr Chua    Disposition  Remains critically ill in ICU        Review of Systems   Unable to perform ROS: intubated      Physical Exam  Laboratory/Imaging   Hemodynamics  No data recorded.   Monitored Temp: 36.5 °C (97.7 °F)  Pulse  Av.5  Min: 30  Max: 134 Heart Rate (Monitored): (!) 58  NIBP: 134/78 mmHg CVP (mm Hg): (!) 12 MM HG    Respiratory  Fuller Vent Mode: Spont, Rate (breaths/min): 16, PEEP/CPAP: 5, PEEP/CPAP: 5, FiO2: 30, P Peak (PIP): 20, P MEAN: 8   Respiration: 15, Pulse Oximetry: 99 %, O2 Daily Delivery Respiratory : Nasal Cannula     Work Of Breathing / Effort: Vented  RUL Breath Sounds: Clear, RML Breath Sounds: Clear;Diminished, RLL Breath Sounds: Diminished, OZZY Breath Sounds: Clear, LLL Breath Sounds: Diminished    Fluids    Intake/Output Summary (Last 24 hours) at 17 1434  Last data filed at 17 1200   Gross per 24 hour   Intake 1352.23 ml   Output   1920 ml   Net -567.77 ml       Nutrition  Orders Placed This Encounter   Procedures   • Diet NPO     Standing Status: Standing      Number of Occurrences: 1      Standing Expiration Date:      Order Specific Question:  Restrict to:     Answer:  Sips with Medications [3]     Physical Exam   Constitutional: He appears well-developed and well-nourished. No distress.   HENT:   Head: Normocephalic and atraumatic.   Eyes: Conjunctivae are normal.   Neck: No JVD present.   Cardiovascular: Normal rate.  Exam  reveals no gallop.    No murmur heard.  Pulmonary/Chest: No stridor. No respiratory distress. He has no wheezes. He has rales.   Intubated/vent   Abdominal: Soft. There is no tenderness. There is no rebound and no guarding.   Musculoskeletal: He exhibits no edema.   Neurological:   Alert, purposeful but not following   Skin: Skin is warm and dry. No rash noted. He is not diaphoretic.   Psychiatric: He has a normal mood and affect. Thought content normal.   Nursing note and vitals reviewed.      Recent Labs      06/19/17   0500  06/20/17   0348  06/21/17   0320   WBC  12.1*  11.2*  9.4   RBC  3.65*  3.39*  3.53*   HEMOGLOBIN  11.8*  10.8*  11.4*   HEMATOCRIT  35.2*  33.4*  35.0*   MCV  96.4  98.5*  99.2*   MCH  32.3  31.9  32.3   MCHC  33.5*  32.3*  32.6*   RDW  50.3*  52.0*  52.0*   PLATELETCT  77*  90*  118*   MPV  12.0  12.0  11.9     Recent Labs      06/19/17   0500  06/20/17   0348  06/21/17   0320   SODIUM  146*  146*  148*   POTASSIUM  3.9  3.9  3.9   CHLORIDE  115*  115*  116*   CO2  24  21  23   GLUCOSE  155*  150*  156*   BUN  23*  20  21   CREATININE  0.74  0.84  0.69   CALCIUM  7.5*  7.3*  7.6*             Recent Labs      06/20/17   0348   TRIGLYCERIDE  347*          Assessment/Plan     * Sepsis (CMS-HCC) (present on admission)  Assessment & Plan  Source:  Infected emphysematous cholecystitis  IVFs  Zosyn  6/14 BC+x2 Klebsiella oxytoca ss zosyn IV  Plan 2 wks total course of Zosyn from date of neg cultures 6/17  Remains stable off pressors      Non-STEMI (non-ST elevated myocardial infarction) (CMS-HCC) (present on admission)  Assessment & Plan  Dr. Chua consulted:  No intervention  Cardiac strain due to sepsis  trops trending down  No wall motion abnormalities on echo  Preserved LVEF, Gd I DHF    Acute respiratory failure with hypoxia (CMS-HCC) (present on admission)  Assessment & Plan  6/15 requiring ET intubation for acute wheeze, acidosis due to sepsis.  xopenex breathing treatments  30%  FiO2  Pulmonology following  weaning      Metabolic acidosis (present on admission)  Assessment & Plan  2/2 severe sepsis  6/16 improving bicarb with drainage of infection, abx, fluids, BP improvement    Acute kidney injury (nontraumatic) (CMS-HCC) (present on admission)  Assessment & Plan  New onset since admission   2/2 sepsis related renal failure with poor perfusion  Resolved  Cont to follow daily    Hyponatremia (present on admission)  Assessment & Plan  Resolved  Follow daily lab    Bacteremia (present on admission)  Assessment & Plan  BC 6/14 x2 with Klebsiella ss zosyn   6/15 BCs x2 +LFGNR  Source:  Acute cholecystitis  Zosyn IV as above  6/17 repeat BCs x2 negative  Off pressors  Consult ID    Acute emphysematous cholecystitis (present on admission)  Assessment & Plan  6/15 u/s cholecystostomy completed  Culture fluid:  Klebsiella pneumonia ss zosyn IV  BCs with Klebsiella  Repeat CT abdomen:  No perforation  Off pressors: get MRCP today  Surgery Dr Last following    Hypotension (arterial) (present on admission)  Assessment & Plan  Since intubated, started propofol drip, drop in BP:  Levophed drip remains  6/17 off vasopressin   6/16 Off neosinephrine drip  6/16 dc'd Bicarb drip  Dc IVF's  Off pressors 6/20    Thrombocytopenia (CMS-[facility]) (present on admission)  Assessment & Plan  Etiology 2/2 severe sepsis, no known heavy alcohol use.  Now >100  Cont to follow    Hypokalemia due to inadequate potassium intake (present on admission)  Assessment & Plan  Follow K and Mg daily  Replace as needed    Atrial fibrillation with RVR (CMS-[facility])  Assessment & Plan  Secondary to acute sepsis and pressors  NO AC per Card's  Now in sinus  cont's on dig      Medications reviewed, EKG reviewed, Labs reviewed and Radiology images reviewed  Laird catheter: Unconscious / Sedated Patient on a Ventilator      DVT Prophylaxis: Heparin  DVT prophylaxis - mechanical: SCDs  Ulcer prophylaxis: Yes  Antibiotics: Treating active  infection/contamination beyond 24 hours perioperative coverage

## 2017-06-21 NOTE — PROGRESS NOTES
[id number] - report at bs from Ángela DILLARD.  Pt resting quietly in bed. wife at bs and is very anxious, update given.  Vented and sedated. Pt opens eyes to voice, does not follow other commands.  Repositioning q2 for comfort ongoing.  Fall precautions in effect. No s/s pain per cpot. Remains npo with ngt to lis.  See flowsheet for assess.  poc reviewed with pt during care, no evidence of learning, reviewed with wife, all questions answered.  Tele = sr,  Vss.

## 2017-06-21 NOTE — RESPIRATORY CARE
Adult Ventilation Update    Total Vent Days: 7    Patient Lines/Drains/Airways Status    Active Airway     Name: Placement date: Placement time: Site: Days:    Airway Group ET Tube Oral 8.0 06/15/17  1020  Oral  5              Current Settings: APV/CMV - RR 16; Vt 500 ; Fio2 30%; PEEP + 8    Sputum/ suction : Productive; scant; white; thick    Mobility: was not performed pt on bed rest    SBT: was not performed this shift, no orders at this time and pt becomes agitated with sedation vacation    Plateau Pressure (Q Shift): 16     Static Compliance (ml / cm H2O): 56     Events/Summary/Plan: ABG completed at this time  (06/21/17 9166)

## 2017-06-21 NOTE — DIETARY
"                                                                Tube Feeding Consult   Damian Larsen is a 76 y.o. male with admitting DX of Sepsis (CMS-HCC), Acute cholecystitis/biliary drain in place, acute respiratory failure, Non-STEMI (non-ST elevated myocardial infarction) (CMS-HCC)  Pertinent History: A fib  Allergies:  Review of patient's allergies indicates no known allergies.  Height: 177.8 cm (5' 10\")  Weight: 103.6 kg (228 lb 6.3 oz)  Weight to Use in Calculations: 93.7 kg (206 lb 9.1 oz)  Ideal Body Weight: 75.297 kg (166 lb)  Body mass index is 32.77 kg/(m^2).     Pertinent Labs:  glucose 156, albumin 1.7, potassium 3.9     magnesium 2.3  6/15 phosphorus 6.7  Last BM: 17  Pertinent Medications: Pepcid, Humalog, Zofran  Pertinent Fluids: propofol rate varies.  Now with weaning trials  Surgery / Procedures: s/p placement of cholecystostomy tube  Skin: Pressure ulcer nose left upper    Estimated Needs:  PS b- MSJ  Total Calories / day: 1800 - 2015.9  (Calories / k - 21.5)  Total Grams Protein / day: 75.5 90.6 (Grams Protein / k - 1.2 based on IBW)  Total Fluids ml / day:  -  based on 1 ml/kcal or per MD         Assessment / Evaluation: Day 7 NPO.  Possible initiation of trickle feedings per MD in rounds this am.  Per nursing no cortrak placed at this time.  Per RN patient remains NPO with ngt to lis.  Also note from MD regarding weaning trials. Possible extubation.  Pt at risk for refeeding syndrome.  Once nutrition support orders received will need to start slow and advance slowly  Plan/recommendation:  RD to monitor nutrition support orders and make recommendation dependent on weaning progress.    RD available PRN        "

## 2017-06-21 NOTE — PROGRESS NOTES
Report received from NISHANT Valdovinos. Pt resting comfortably in bed, resps even with ventilator.  Wife at bedside visiting. POC discussed. BL wrist restraints in place, all drips/lines verified. Will continue to monitor.

## 2017-06-21 NOTE — PROGRESS NOTES
Pulmonary Critical Care Progress        Chief Complaint: Respiratory failure requiring mechanical ventilation.    History of Present Illness:   76 y.o. male was admitted on 6/14 with a chief complaint of abdominal pain, fever and chills. Last felt well 4 days prior to admission. ACT scan of the abdomen showed severe cholecystitis, possible component of emphysematous cholecystitis, an appendicolith, but the appendix otherwise appeared within normal limits. Appendicolith however places patient at future risk for development of appendicitis. Atherosclerosis and atherosclerotic coronary artery disease. Hepatomegaly  He was placed on zosyn, and admitted to the ICU. He developed hypotension, treated with intravenous crystalloid and Levophed. He had increased oxygen requirements which resulted in intubation and mechanical ventilation.    ROS:  unable to perform due to the patient's inability to effectively communicate.    Interval Events:  24 hour interval history reviewed.  Remains on full support mechanical ventilation following assist control set rate of 16 breaths per minute without dyspnea or ventilator dyssynchrony. The peak airway pressure is about 20 cm water at a set tidal volume of 500 mL. and the pressure volume loop does not suggest overdistention. He remains on propofol sedation, now on a lower dose with the addition of low dose dexmedetomidine.. During the sedation holiday he became agitated and tachypnea. His blood pressure remains stable off the vasopressor. The MRI scan late yesterday demonstrated partial decompression of the gallbladder with residual gallstones and minimal pericholecystic fluid and no biliary dilatation.    PFSH:  No change.    Respiratory:  Fuller Vent Mode: Spont, Rate (breaths/min): 16, Vt Target (mL): 500, PEEP/CPAP: 8, FiO2: 30, P Support: 5, Static Compliance (ml / cm H2O): 57, Control VTE (exp VT): 503  Pulse Oximetry: 98 %  Axel Pillai 1: 80 ml     Exam: unlabored  respirations, no intercostal retractions or accessory muscle use  ImagingAvailable data reviewed. The chest radiogram on June 21 demonstrates stable patchy interstitial densities.   Arterial blood gases demonstrate an adequate arterial oxygen saturation with a mild respiratory alkalosis.  Recent Labs      06/19/17   0312  06/20/17   0350  06/21/17   0320   [id number]  7.46  7.45  7.42   [id number]  34.3  30.6  32.6   [id number]  94.0*  97.7*  91.9*   [id number]  97.1  97.2  96.8   ARTHCO3  24 21 21   FIO2  30   --   30   ARTBE  0  -2  -3       HemoDynamics:  Pulse: (!) 54, Heart Rate (Monitored): (!) 54  NIBP: 119/66 mmHg  CVP (mm Hg): (!) 10 MM HG    Exam: sinus tachycardia. Net diuresis of 270 mL over the past 24 hours but he is 7.3 L positive since admission.  Imaging: Available data reviewed. The echocardiogram on Rufina 15, 2017 demonstrated left ventricular systolic ejection fraction of 60% with grade 1 diastolic dysfunction and an estimated right ventricular systolic pressure of 35 mmHg.    Neuro:  GCS Total Jyothi Coma Score: 4  Exam: Heavily sedated on the propofol infusion at a lower dose after the addition of low dose dexmedetomidine.  Imaging: None - Reviewed    Fluids:  Intake/Output       06/19/17 0700 - 06/20/17 0659 06/20/17 0700 - 06/21/17 0659 06/21/17 0700 - 06/22/17 0659      3834-6122 6924-4634 Total 9299-3812 4716-4929 Total 7243-0616 1479-8506 Total       Intake    I.V.  1972.4  1638.7 3611.1  1047.7  617 1664.7  225  -- 225    Precedex Volume -- -- -- -- 62.5 62.5 26.4 -- 26.4    Propofol Volume 249.5 288.7 538.2 347.7 239.5 587.3 68.7 -- 68.7    Norepinephrine Volume 22.9 0 22.9 -- -- -- -- -- --    IV Volume (NS @ 50 ml/hr) 100 -- 100 -- -- -- -- -- --    IV Volume (NS @ 100 ml/hr) 1000 1150 2150 500 115 615 30 -- 30    IV Volume (NS Bolus) 500 -- 500 -- -- -- -- -- --    IV Piggyback Volume 100 200 300 200 200 400 100 -- 100    Other  --  40 40  0  40 40  --  -- --    Flush / Irrigation Volume  () -- 40 40 0 40 40 -- -- --    Enteral  --  90 90  90  90 180  30  -- 30    Free Water / Tube Flush -- 90 90 90 90 180 30 -- 30    Total Intake 1972.4 1768.7 3741.1 1137.7 747 1884.7 255 -- 255       Output    Urine  650  525 1175  565  510 1075  180  -- 180    Indwelling Cathether   180 -- 180    Drains  680  990 1670  500  580 1080  --  -- --    Nasogastric Tube  450 500 950 -- -- --    Axel Pillai 1 80 90 170 50 80 130 -- -- --    Total Output 1330 1515 2845 1065 1090 2155 180 -- 180       Net I/O     642.4 253.7 896.1 72.7 -343 -270.3 75 -- 75        Weight: 103.6 kg (228 lb 6.3 oz)  Recent Labs      17   0500  17   0348  17   0320   SODIUM  146*  146*  148*   POTASSIUM  3.9  3.9  3.9   CHLORIDE  115*  115*  116*   CO2  24  21  23   BUN  23*  20  21   CREATININE  0.74  0.84  0.69   CALCIUM  7.5*  7.3*  7.6*       GI/Nutrition:  Exam: patient sedated  Imaging: Available data reviewed  NPO  Liver Function  Recent Labs      17   0500  17   0348  17   0320   ALTSGPT  101*  76*  65*   ASTSGOT  64*  50*  48*   ALKPHOSPHAT  102*  96  88   TBILIRUBIN  2.2*  2.1*  2.0*   GLUCOSE  155*  150*  156*       Heme:  Recent Labs      17   0500  17   0348  17   0320   RBC  3.65*  3.39*  3.53*   HEMOGLOBIN  11.8*  10.8*  11.4*   HEMATOCRIT  35.2*  33.4*  35.0*   PLATELETCT  77*  90*  118*       Infectious Disease:  Monitored Temp  Av.5 °C (97.7 °F)  Min: 36.1 °C (97 °F)  Max: 36.8 °C (98.2 °F)  Micro: antibiotics reviewed, cultures reviewed and reviewed. Blood and sputum cultures are negative but the bile fluid culture yielded growth of Klebsiella. He remains on Zosyn per infectious disease consultation.  Recent Labs      17   0500  17   0348  17   0320   WBC  12.1*  11.2*  9.4   ASTSGOT  64*  50*  48*   ALTSGPT  101*  76*  65*   ALKPHOSPHAT  102*  96  88   TBILIRUBIN  2.2*  2.1*  2.0*     Current  Facility-Administered Medications   Medication Dose Frequency Provider Last Rate Last Dose   • dexmedetomidine (PRECEDEX) 200 mcg in NS 50 mL infusion  0.1-0.7 mcg/kg/hr Continuous Christ HARVEY M.D. 15.3 mL/hr at 06/21/17 0953 0.6 mcg/kg/hr at 06/21/17 0953   • etomidate (AMIDATE) injection 30.8 mg  0.3 mg/kg Once PRN Che Nash M.D.       • insulin lispro (HUMALOG) injection 1-6 Units  1-6 Units Q6HRS Che Nash M.D.   Stopped at 06/21/17 1200   • digoxin (LANOXIN) injection 125 mcg  125 mcg Q12HR Che Nash M.D.   Stopped at 06/20/17 2015   • chlorhexidine (PERIDEX) 0.12 % solution 30 mL  30 mL BID Graham Ivy M.D.   15 mL at 06/21/17 0840   • famotidine (PEPCID) injection 20 mg  20 mg DAILY Che Nash M.D.   20 mg at 06/21/17 0840   • glucose 4 g chewable tablet 16 g  16 g Q15 MIN PRN Che Nash M.D.        And   • dextrose 50% (D50W) injection 25 mL  25 mL Q15 MIN PRN Che Nash M.D.       • acetaminophen (TYLENOL) suppository 650 mg  650 mg Q6HRS PRN Che Nash M.D.   650 mg at 06/16/17 1615   • morphine (pf) 4 mg/ml injection 2-4 mg  2-4 mg Q2HRS PRN Che Nash M.D.   4 mg at 06/19/17 0406   • MD ALERT...Pharmacy to implement PROPOFOL CRITICAL CARE PROTOCOL 1 Each  1 Each PRN Che Nash M.D.       • propofol (DIPRIVAN) injection  5-80 mcg/kg/min Continuous Che Nash M.D. 6.1 mL/hr at 06/21/17 1111 10 mcg/kg/min at 06/21/17 1111   • norepinephrine (LEVOPHED) 8 mg in  mL Infusion  0.5-30 mcg/min Continuous Che Nash M.D.   Stopped at 06/19/17 1611   • Respiratory Care per Protocol   Continuous RT Jose Valdivia M.D.       • acetaminophen (TYLENOL) tablet 650 mg  650 mg Q6HRS PRN Jose Valdiiva M.D.       • piperacillin-tazobactam (ZOSYN) 3.375 g in  mL IVPB  3.375 g Q6HRS Jose Valdivia M.D.   Stopped at 06/21/17 0910   • ondansetron (ZOFRAN) syringe/vial injection 4 mg  4 mg Q4HRS PRN Jose Valdivia M.D.   4 mg at 06/19/17 0406   •  ondansetron (ZOFRAN ODT) dispertab 4 mg  4 mg Q4HRS PRN Jose Valdivia M.D.       • lorazepam (ATIVAN) tablet 0.5 mg  0.5 mg Q6HRS PRN Jose Valdivia M.D.        Or   • lorazepam (ATIVAN) injection 0.5 mg  0.5 mg Q6HRS PRN Jose Valdivia M.D.   0.5 mg at 06/15/17 0941     Last reviewed on 6/15/2017  8:22 AM by Ayanna Chaudhari T    Quality  Measures:  Radiology images reviewed, Medications reviewed and Labs reviewed                      Problems:  1. Acute respiratory failure requiring full support mechanical ventilation.  2. Acute cholecystitis, now with biliary drain in place. Klebsiella recovered from bile fluid culture.  3. Sepsis, related to cholecystitis. Improved.  4. Shock, improved and now off vasopressor.   5. Abnormal chest radiogram. The findings might suggest extravascular lung water but aspiration is a possibility.  6. Renal insufficiency, nonoliguric, improving by numbers.  7. Mild anemia, stable without evidence of ongoing blood loss.  8. Thrombocytopenia, improving.  9. Paroxysmal atrial fibrillation, now in sinus rhythm.  10. History of systemic arterial hypertension.  11. Encephalopathy, without focal neurologic signs or symptoms, consistent with delirium.      Plan:  Continue twice a day sedation weaning trials. Increased dexmedetomidine dose and initiate ventilator weaning trials when the propofol requirement and agitation have subsided. His NPO status precludes the use of most atypical antipsychotic agents. Continue lung protective ventilation.  Continue antibiotics per infectious disease consultation.  Continue prophylaxis. He will require nutritional support.     Discussed patient condition and risk of morbidity and/or mortality with RN, RT and hospitalist. Discussed with family at the bedside  The patient remains critically ill.  Critical care time = 35 minutes in directly providing and coordinating critical care and extensive data review.  No time overlap and excludes procedures.

## 2017-06-21 NOTE — CARE PLAN
Problem: Ventilation Defect:  Goal: Ability to achieve and maintain unassisted ventilation or tolerate decreased levels of ventilator support  Adult Ventilation Update      Events/Summary/Plan:SBT @ 1015 per DR Reyna cont to titrate sedation and achieve parameters to possible extubate. If not place pt back on full support.

## 2017-06-21 NOTE — DISCHARGE PLANNING
Medical Social Work    Referral: Pt discussed at IDT rounds this AM.    Intervention: Per flowsheet, pt lives with spouse and expects to d.c home.  Pt might be extubated today.  No SS needs identified nor any requests for BEN Hernandez during rounds.      Plan: BEN available for any assistance with d.c planning.

## 2017-06-21 NOTE — PROGRESS NOTES
"Date of Service: 6/21/2017  Chief Complaint:  Chief Complaint   Patient presents with   • Abdominal Pain     abdominal pain since saturday  diaphoretic and weakness  diarrhea yesterday     Interval History:  He has improved; Cardia aspects are stable, now adequate BP and PAF, now in SR  ID note reviewed and Abx course set  Intubated  Aims for surg when much improved  Wife discussed    MRI Abd 6/20/2017:  1.  There has been partial decompression of the gallbladder with gallstones and minimal pericholecystic fluid identified.  2.  The cholecystotomy tube is not seen in its entirety and cannot be tracked into the gallbladder on MRI. Its exact position is uncertain.  3.  There is no biliary dilatation or choledocholithiasis  All recent medication, labs, imaging studies and procedures reviewed    Physical Exam   Blood pressure 136/82, pulse 55, temperature 36.1 °C (97 °F), resp. rate 16, height 1.778 m (5' 10\"), weight 103.6 kg (228 lb 6.3 oz), SpO2 93 %.    Constitutional: Intubated He appears well-developed.   HENT: Normocephalic and atraumatic. No scleral icterus.   Neck: No JVD present.   Cardiovascular: Normal rate. RRR; Exam reveals no gallop and no friction rub. No murmur heard.   Pulmonary/Chest: CTAB coarse   Abdominal: S/NT/ND BS+   Musculoskeletal: He exhibits no edema. Pulses present.  Skin: Skin is warm and dry.       Intake/Output Summary (Last 24 hours) at 06/21/17 0619  Last data filed at 06/21/17 0600   Gross per 24 hour   Intake 1884.73 ml   Output   2155 ml   Net -270.27 ml       LABS:  Lab Results   Component Value Date/Time    CHOLESTEROL, 03/22/2013 09:36 AM    * 03/22/2013 09:36 AM    HDL 32* 03/22/2013 09:36 AM    TRIGLYCERIDES 347* 06/20/2017 03:48 AM       Lab Results   Component Value Date/Time    WBC 9.4 06/21/2017 03:20 AM    RBC 3.53* 06/21/2017 03:20 AM    HEMOGLOBIN 11.4* 06/21/2017 03:20 AM    HEMATOCRIT 35.0* 06/21/2017 03:20 AM    MCV 99.2* 06/21/2017 03:20 AM    " NEUTROPHILS-POLYS 78.00* 06/14/2017 06:20 PM    LYMPHOCYTES 7.00* 06/14/2017 06:20 PM    MONOCYTES 4.00 06/14/2017 06:20 PM    EOSINOPHILS 0.00 06/14/2017 06:20 PM    BASOPHILS 0.00 06/14/2017 06:20 PM     Lab Results   Component Value Date/Time    SODIUM 148* 06/21/2017 03:20 AM    POTASSIUM 3.9 06/21/2017 03:20 AM    CHLORIDE 116* 06/21/2017 03:20 AM    CO2 23 06/21/2017 03:20 AM    GLUCOSE 156* 06/21/2017 03:20 AM    BUN 21 06/21/2017 03:20 AM    CREATININE 0.69 06/21/2017 03:20 AM     Lab Results   Component Value Date    HBA1C 8.2* 06/16/2017      Lab Results   Component Value Date/Time    ALKALINE PHOSPHATASE 88 06/21/2017 03:20 AM    AST(SGOT) 48* 06/21/2017 03:20 AM    ALT(SGPT) 65* 06/21/2017 03:20 AM    TOTAL BILIRUBIN 2.0* 06/21/2017 03:20 AM      No results found for: BNPBTYPENAT   No results found for: TSH  Lab Results   Component Value Date/Time    PT 16.2* 06/15/2017 10:32 AM    INR 1.32* 06/15/2017 10:32 AM        Medications reviewed    Imaging reviewed    ECHO(6/14/2017):  Left ventricular ejection fraction is visually estimated to be 60%.  Grade I diastolic dysfunction.  No valvular heart disease.  Estimated right ventricular systolic pressure  is 35 mmHg.    Impressions:  •     *Sepsis (CMS-HCC) [A41.9]       •     Acute respiratory failure with hypoxia (CMS-HCC) [J96.01]       •     Metabolic acidosis [E87.2]       •     Acute kidney injury (nontraumatic) (CMS-HCC) [N17.9]       •     Hyponatremia [E87.1]       •     Bacteremia [R78.81]       •     Acute emphysematous cholecystitis [K81.0]       •     Hypotension (arterial) [I95.9]       •     Thrombocytopenia (CMS-[facility]) [D69.6]       •     Hypokalemia due to inadequate potassium intake [E87.6]       •     Non-STEMI (non-ST elevated myocardial infarction) (CMS-HCC) [I21.4]                                  Recommendations:  Abx for sepsis and acute cholecystitis; Improving; per ID course of txmt  AFib in and out, on Dig for better rate control; In  SR now  Cardiac aspects now stable  Anticipate surg when better  Case discussed with attending and RN  Cardiology will sign off nowfollow        Thx

## 2017-06-21 NOTE — CARE PLAN
Problem: Ventilation Defect:  Goal: Ability to achieve and maintain unassisted ventilation or tolerate decreased levels of ventilator support  Outcome: MET Date Met:  06/21/17  He has tolerated weaning at this time for  Greater than 2 hours parameters as documented. Will extubate per md order.

## 2017-06-21 NOTE — DOCUMENTATION QUERY
DOCUMENTATION QUERY    PROVIDERS: Please select “Cosign w/ note”to reply to query.    To better represent the severity of illness of your patient, please review the following information and exercise your independent professional judgment in responding to this query.     Preserved LVEF, Gd I DHF  is documented in the Progress Notes. Based upon the clinical findings, risk factors, and treatment, can this diagnosis be further specified?    • Acute Systolic heart failure   • Chronic Systolic heart failure  • Acute on Chronic Systolic heart failure  • Acute Diastolic heart failure   • Chronic Diastolic heart failure  • Acute on Chronic Diastolic heart failure  • Acute Systolic and Diastolic heart failure  • Chronic Systolic and Diastolic heart failure  • Acute on Chronic Systolic and diastolic heart failure  • Other explanation of clinical findings  • Unable to determine         The medical record reflects the following: lk  Clinical Findings  ECHO(6/14/2017):  Left ventricular ejection fraction is visually estimated to be 60%.  Grade I diastolic dysfunction.  No valvular heart disease.  Estimated right ventricular systolic pressure  is 35 mmHg.  Pulmonology note 6/20  Abnormal chest radiogram. The findings might suggest extravascular lung water but aspiration is a possibility.  Hospitalist progress note 6/19 6/16 repeat CXR with minimal pulmonary edema  CXR- 6/16  Probable mild pulmonary edema.  Trace left pleural effusion.  Atherosclerosis   Treatment  lasix, digoxin   Risk Factors Sepsis, HTN, advanced age, smoked for 30 years, 1 pack per day   Location within medical record  History and Physical, Progress Notes and Radiology Results     Thank you,   Brissa Zapien MSN, RN, CNL, CNML  Clinical   [email or web address]

## 2017-06-22 ENCOUNTER — APPOINTMENT (OUTPATIENT)
Dept: RADIOLOGY | Facility: MEDICAL CENTER | Age: 77
DRG: 870 | End: 2017-06-22
Attending: INTERNAL MEDICINE
Payer: MEDICARE

## 2017-06-22 PROBLEM — I10 HYPERTENSION: Status: ACTIVE | Noted: 2017-06-22

## 2017-06-22 LAB
ALBUMIN SERPL BCP-MCNC: 2.2 G/DL (ref 3.2–4.9)
ALBUMIN/GLOB SERPL: 0.6 G/DL
ALP SERPL-CCNC: 109 U/L (ref 30–99)
ALT SERPL-CCNC: 60 U/L (ref 2–50)
ANION GAP SERPL CALC-SCNC: 8 MMOL/L (ref 0–11.9)
AST SERPL-CCNC: 50 U/L (ref 12–45)
BASE EXCESS BLDA CALC-SCNC: -2 MMOL/L (ref -4–3)
BILIRUB SERPL-MCNC: 2 MG/DL (ref 0.1–1.5)
BODY TEMPERATURE: 37.8 CENTIGRADE
BUN SERPL-MCNC: 23 MG/DL (ref 8–22)
CALCIUM SERPL-MCNC: 7.7 MG/DL (ref 8.4–10.2)
CHLORIDE SERPL-SCNC: 117 MMOL/L (ref 96–112)
CO2 SERPL-SCNC: 21 MMOL/L (ref 20–33)
CREAT SERPL-MCNC: 0.72 MG/DL (ref 0.5–1.4)
DIGOXIN SERPL-MCNC: 0.2 NG/ML (ref 0.8–2)
ERYTHROCYTE [DISTWIDTH] IN BLOOD BY AUTOMATED COUNT: 48.8 FL (ref 35.9–50)
GFR SERPL CREATININE-BSD FRML MDRD: >60 ML/MIN/1.73 M 2
GLOBULIN SER CALC-MCNC: 3.6 G/DL (ref 1.9–3.5)
GLUCOSE BLD-MCNC: 118 MG/DL (ref 65–99)
GLUCOSE BLD-MCNC: 136 MG/DL (ref 65–99)
GLUCOSE BLD-MCNC: 142 MG/DL (ref 65–99)
GLUCOSE BLD-MCNC: 143 MG/DL (ref 65–99)
GLUCOSE SERPL-MCNC: 140 MG/DL (ref 65–99)
HCO3 BLDA-SCNC: 21 MMOL/L (ref 17–25)
HCT VFR BLD AUTO: 37.2 % (ref 42–52)
HGB BLD-MCNC: 12.4 G/DL (ref 14–18)
MCH RBC QN AUTO: 32.4 PG (ref 27–33)
MCHC RBC AUTO-ENTMCNC: 33.3 G/DL (ref 33.7–35.3)
MCV RBC AUTO: 97.1 FL (ref 81.4–97.8)
PCO2 BLDA: 29.2 MMHG (ref 26–37)
PCO2 TEMP ADJ BLDA: 30.2 MMHG (ref 26–37)
PH BLDA: 7.47 [PH] (ref 7.4–7.5)
PH TEMP ADJ BLDA: 7.46 [PH] (ref 7.4–7.5)
PLATELET # BLD AUTO: 177 K/UL (ref 164–446)
PMV BLD AUTO: 11.6 FL (ref 9–12.9)
PO2 BLDA: 78.5 MMHG (ref 64–87)
PO2 TEMP ADJ BLDA: 82.8 MMHG (ref 64–87)
POTASSIUM SERPL-SCNC: 3.5 MMOL/L (ref 3.6–5.5)
PROT SERPL-MCNC: 5.8 G/DL (ref 6–8.2)
RBC # BLD AUTO: 3.83 M/UL (ref 4.7–6.1)
SAO2 % BLDA: 95.5 % (ref 93–99)
SODIUM SERPL-SCNC: 146 MMOL/L (ref 135–145)
WBC # BLD AUTO: 10.9 K/UL (ref 4.8–10.8)

## 2017-06-22 PROCEDURE — 85027 COMPLETE CBC AUTOMATED: CPT

## 2017-06-22 PROCEDURE — 99233 SBSQ HOSP IP/OBS HIGH 50: CPT | Performed by: HOSPITALIST

## 2017-06-22 PROCEDURE — 92610 EVALUATE SWALLOWING FUNCTION: CPT

## 2017-06-22 PROCEDURE — A9270 NON-COVERED ITEM OR SERVICE: HCPCS | Performed by: HOSPITALIST

## 2017-06-22 PROCEDURE — 700102 HCHG RX REV CODE 250 W/ 637 OVERRIDE(OP): Performed by: HOSPITALIST

## 2017-06-22 PROCEDURE — G8997 SWALLOW GOAL STATUS: HCPCS | Mod: CI

## 2017-06-22 PROCEDURE — G8978 MOBILITY CURRENT STATUS: HCPCS | Mod: CM

## 2017-06-22 PROCEDURE — 700105 HCHG RX REV CODE 258: Performed by: HOSPITALIST

## 2017-06-22 PROCEDURE — 700111 HCHG RX REV CODE 636 W/ 250 OVERRIDE (IP): Performed by: HOSPITALIST

## 2017-06-22 PROCEDURE — 770022 HCHG ROOM/CARE - ICU (200)

## 2017-06-22 PROCEDURE — 80162 ASSAY OF DIGOXIN TOTAL: CPT

## 2017-06-22 PROCEDURE — G8996 SWALLOW CURRENT STATUS: HCPCS | Mod: CJ

## 2017-06-22 PROCEDURE — 80053 COMPREHEN METABOLIC PANEL: CPT

## 2017-06-22 PROCEDURE — 36600 WITHDRAWAL OF ARTERIAL BLOOD: CPT

## 2017-06-22 PROCEDURE — 82962 GLUCOSE BLOOD TEST: CPT

## 2017-06-22 PROCEDURE — 71010 DX-CHEST-PORTABLE (1 VIEW): CPT

## 2017-06-22 PROCEDURE — G8979 MOBILITY GOAL STATUS: HCPCS | Mod: CJ

## 2017-06-22 PROCEDURE — 700101 HCHG RX REV CODE 250: Performed by: INTERNAL MEDICINE

## 2017-06-22 PROCEDURE — 97162 PT EVAL MOD COMPLEX 30 MIN: CPT

## 2017-06-22 PROCEDURE — 82803 BLOOD GASES ANY COMBINATION: CPT

## 2017-06-22 RX ORDER — LABETALOL HYDROCHLORIDE 5 MG/ML
10 INJECTION, SOLUTION INTRAVENOUS EVERY 4 HOURS PRN
Status: DISCONTINUED | OUTPATIENT
Start: 2017-06-22 | End: 2017-06-28 | Stop reason: HOSPADM

## 2017-06-22 RX ORDER — ENALAPRILAT 1.25 MG/ML
1.25 INJECTION INTRAVENOUS EVERY 6 HOURS PRN
Status: DISCONTINUED | OUTPATIENT
Start: 2017-06-22 | End: 2017-06-24

## 2017-06-22 RX ORDER — LATANOPROST 50 UG/ML
1 SOLUTION/ DROPS OPHTHALMIC NIGHTLY
Status: DISCONTINUED | OUTPATIENT
Start: 2017-06-22 | End: 2017-06-28 | Stop reason: HOSPADM

## 2017-06-22 RX ORDER — AMLODIPINE BESYLATE 5 MG/1
5 TABLET ORAL
Status: DISCONTINUED | OUTPATIENT
Start: 2017-06-22 | End: 2017-06-23

## 2017-06-22 RX ORDER — LISINOPRIL 5 MG/1
10 TABLET ORAL
Status: DISCONTINUED | OUTPATIENT
Start: 2017-06-22 | End: 2017-06-23

## 2017-06-22 RX ADMIN — LABETALOL HYDROCHLORIDE 10 MG: 5 INJECTION, SOLUTION INTRAVENOUS at 18:17

## 2017-06-22 RX ADMIN — AMLODIPINE BESYLATE 5 MG: 5 TABLET ORAL at 13:35

## 2017-06-22 RX ADMIN — LABETALOL HYDROCHLORIDE 10 MG: 5 INJECTION, SOLUTION INTRAVENOUS at 03:34

## 2017-06-22 RX ADMIN — PIPERACILLIN AND TAZOBACTAM 3.38 G: 3; .375 INJECTION, POWDER, FOR SOLUTION INTRAVENOUS at 08:19

## 2017-06-22 RX ADMIN — DIGOXIN 125 MCG: 250 INJECTION, SOLUTION INTRAMUSCULAR; INTRAVENOUS; PARENTERAL at 17:55

## 2017-06-22 RX ADMIN — LISINOPRIL 10 MG: 5 TABLET ORAL at 08:30

## 2017-06-22 RX ADMIN — PIPERACILLIN AND TAZOBACTAM 3.38 G: 3; .375 INJECTION, POWDER, FOR SOLUTION INTRAVENOUS at 14:29

## 2017-06-22 RX ADMIN — ENALAPRILAT 1.25 MG: 1.25 INJECTION, SOLUTION INTRAVENOUS at 13:35

## 2017-06-22 RX ADMIN — PIPERACILLIN AND TAZOBACTAM 3.38 G: 3; .375 INJECTION, POWDER, FOR SOLUTION INTRAVENOUS at 21:00

## 2017-06-22 RX ADMIN — LATANOPROST 1 DROP: 50 SOLUTION OPHTHALMIC at 21:01

## 2017-06-22 RX ADMIN — FAMOTIDINE 20 MG: 10 INJECTION, SOLUTION INTRAVENOUS at 08:19

## 2017-06-22 RX ADMIN — LABETALOL HYDROCHLORIDE 10 MG: 5 INJECTION, SOLUTION INTRAVENOUS at 10:35

## 2017-06-22 RX ADMIN — ENALAPRILAT 1.25 MG: 1.25 INJECTION, SOLUTION INTRAVENOUS at 21:01

## 2017-06-22 RX ADMIN — MORPHINE SULFATE 4 MG: 4 INJECTION INTRAVENOUS at 23:57

## 2017-06-22 RX ADMIN — LATANOPROST 1 DROP: 50 SOLUTION OPHTHALMIC at 12:09

## 2017-06-22 RX ADMIN — MORPHINE SULFATE 2 MG: 4 INJECTION INTRAVENOUS at 01:03

## 2017-06-22 RX ADMIN — LABETALOL HYDROCHLORIDE 10 MG: 5 INJECTION, SOLUTION INTRAVENOUS at 22:21

## 2017-06-22 RX ADMIN — PIPERACILLIN AND TAZOBACTAM 3.38 G: 3; .375 INJECTION, POWDER, FOR SOLUTION INTRAVENOUS at 03:15

## 2017-06-22 RX ADMIN — MORPHINE SULFATE 2 MG: 4 INJECTION INTRAVENOUS at 03:49

## 2017-06-22 ASSESSMENT — PAIN SCALES - GENERAL
PAINLEVEL_OUTOF10: 0
PAINLEVEL_OUTOF10: 8
PAINLEVEL_OUTOF10: 0

## 2017-06-22 ASSESSMENT — GAIT ASSESSMENTS: GAIT LEVEL OF ASSIST: UNABLE TO PARTICIPATE

## 2017-06-22 NOTE — THERAPY
"Physical Therapy Evaluation completed.   Bed Mobility:  Supine to Sit: Unable to Participate  Transfers: Sit to Stand: Unable to Participate  Gait: Level Of Assist: Unable to Participate   Plan of Care: Will benefit from Physical Therapy 5 times per week  Discharge Recommendations: Equipment: Will Continue to Assess for Equipment Needs. Post-acute therapy Discharge to a transitional care facility for continued skilled therapy services.    75 yo male admitted w/ cholecystitis and sepsis 6/14. He was intubated 4 days and extubated yesterday. Vitals currently stable and PT ordered to address his acute debility. Pt was independent and active at baseline but currently tolerates ~12 minutes of supine ther ex and activities prior to fatiguing. He will therefore benefit from further acute and post acute therapy PRIOR to return to home to promote full return to baseline. Plan to progress to sitting up EOB tomorrow, as able. RN, spouse updated and all in agreement with POC.     See \"Rehab Therapy-Acute\" Patient Summary Report for complete documentation.     "

## 2017-06-22 NOTE — PROGRESS NOTES
Pulmonary Critical Care Progress        Chief Complaint: Respiratory failure requiring mechanical ventilation.    History of Present Illness:   76 y.o. male was admitted on 6/14 with a chief complaint of abdominal pain, fever and chills. Last felt well 4 days prior to admission. ACT scan of the abdomen showed severe cholecystitis, possible component of emphysematous cholecystitis, an appendicolith, but the appendix otherwise appeared within normal limits. Appendicolith however places patient at future risk for development of appendicitis. Atherosclerosis and atherosclerotic coronary artery disease. Hepatomegaly  He was placed on zosyn, and admitted to the ICU. He developed hypotension, treated with intravenous crystalloid and Levophed. He had increased oxygen requirements which resulted in intubation and mechanical ventilation.    ROS:  unable to perform due to the patient's inability to effectively communicate.    Interval Events:  24 hour interval history reviewed.  Extubated yesterday and comfortable now on a low flow oxygen nasal cannula. He is still a bit lethargic but responds appropriately to simple questions and commands. He has a low-grade fever with maximum temperature 100° overnight and the wet blood cell, remains normal at 10,900.    PFSH:  No change.    Respiratory:     Pulse Oximetry: 97 %  Axel Pillai 1: 160 ml     Exam: unlabored respirations, no intercostal retractions or accessory muscle use  ImagingAvailable data reviewed. The chest radiogram on June 22 demonstrates stable patchy interstitial densities.   Arterial blood gases demonstrate an adequate arterial oxygen saturation with a mild respiratory alkalosis.  Recent Labs      06/20/17   0350  06/21/17   0320  06/22/17   0300   [id number]  7.45  7.42  7.47   [id number]  30.6  32.6  29.2   [id number]  97.7*  91.9*  78.5   [id number]  97.2  96.8  95.5   ARTHCO3  21  21  21   FIO2   --   30   --    ARTBE  -2  -3  -2       HemoDynamics:  Pulse:  80, Heart Rate (Monitored): 79  Blood Pressure : (!) 188/104 mmHg, NIBP: (!) 190/87 mmHg      Exam: sinus tachycardia. Net diuresis of 1.2 L over the past 24 hours but he is 6 L positive since admission.  Imaging: Available data reviewed. The echocardiogram on Rufina 15, 2017 demonstrated left ventricular systolic ejection fraction of 60% with grade 1 diastolic dysfunction and an estimated right ventricular systolic pressure of 35 mmHg.    Neuro:  GCS Total [place] Coma Score: 15  Exam: no focal deficits noted, responds appropriately to simple questions and commands.   Imaging: None - Reviewed    Fluids:  Intake/Output       06/20/17 0700 - 06/21/17 0659 06/21/17 0700 - 06/22/17 0659 06/22/17 0700 - 06/23/17 0659      7773-5678 9478-7606 Total 5104-3158 5180-7129 Total 3497-4627 6186-4245 Total       Intake    P.O.  --  -- --  --  -- --  300  -- 300    P.O. -- -- -- -- -- -- 300 -- 300    I.V.  1047.7  617 1664.7  529.6  381.5 911.2  175  -- 175    Precedex Volume -- 62.5 62.5 137.5 61.5 199 -- -- --    Propofol Volume 347.7 239.5 587.3 87.2 -- 87.2 -- -- --    IV Volume (NS @ 100 ml/hr) 500 115 615 105 120 225 75 -- 75    IV Piggyback Volume 200 200 400 200 200 400 100 -- 100    Other  0  40 40  --  40 40  --  -- --    Flush / Irrigation Volume (DOMINGO) 0 40 40 -- 40 40 -- -- --    Enteral  90  90 180  90  90 180  30  -- 30    Free Water / Tube Flush 90 90 180 90 90 180 30 -- 30    Total Intake 1137.7 747 1884.7 619.6 511.5 1131.2 505 -- 505       Output    Urine  565  510 1075  510  625 1135  580  -- 580    Indwelling Cathether   580 -- 580    Drains  500  580 1080  420  760 1180  200  -- 200    Nasogastric Tube 450 500 950  200 -- 200    Axel Pillai 1 50 80 130 20 160 180 -- -- --    Stool  --  -- --  --  -- --  --  -- --    Number of Times Stooled -- -- -- -- -- -- 1 x -- 1 x    Total Output 1069 1090 3685  780 -- 780       Net I/O     72.7 -343 -270.3 -310.4 -873.5  -1183.8 -275 -- -275           Recent Labs      17   013   SODIUM  146*  148*  146*   POTASSIUM  3.9  3.9  3.5*   CHLORIDE  115*  116*  117*   CO2    21   BUN  *   CREATININE  0.84  0.69  0.72   CALCIUM  7.3*  7.6*  7.7*       GI/Nutrition:  Exam: patient sedated  Imaging: Available data reviewed  NPO, swallowing evaluation pending.  Liver Function  Recent Labs      17   ALTSGPT  76*  65*  60*   ASTSGOT  50*  48*  50*   ALKPHOSPHAT  96  88  109*   TBILIRUBIN  2.1*  2.0*  2.0*   GLUCOSE  150*  156*  140*       Heme:  Recent Labs      17   RBC  3.39*  3.53*  3.83*   HEMOGLOBIN  10.8*  11.4*  12.4*   HEMATOCRIT  33.4*  35.0*  37.2*   PLATELETCT  90*  118*  177       Infectious Disease:  Monitored Temp  Av.1 °C (98.8 °F)  Min: 36.4 °C (97.5 °F)  Max: 37.8 °C (100 °F)  Micro: antibiotics reviewed, cultures reviewed and reviewed. Sputum cultures are negative but the blood and bile fluid cultures yielded growth of Klebsiella. He remains on Zosyn per infectious disease consultation.  Recent Labs      17   WBC  11.2*  9.4  10.9*   ASTSGOT  50*  48*  50*   ALTSGPT  76*  65*  60*   ALKPHOSPHAT  96  88  109*   TBILIRUBIN  2.1*  2.0*  2.0*     Current Facility-Administered Medications   Medication Dose Frequency Provider Last Rate Last Dose   • labetalol (NORMODYNE,TRANDATE) injection 10 mg  10 mg Q4HRS PRN Dennis Guerra M.D.   10 mg at 17 1035   • lisinopril (PRINIVIL) tablet 10 mg  10 mg Q DAY George De La Cruz D.O.   10 mg at 17 0830   • latanoprost (XALATAN) 0.005 % ophthalmic solution 1 Drop  1 Drop Nightly YAIMA Short.O.   1 Drop at 17 1209   • amlodipine (NORVASC) tablet 5 mg  5 mg Q DAY YAIMA Short.O.   5 mg at 17 1335   • enalaprilat (VASOTEC) injection  1.25 mg  1.25 mg Q6HRS PRN George De La Cruz D.O.   1.25 mg at 06/22/17 1335   • digoxin (LANOXIN) injection 125 mcg  125 mcg DAILY AT 1800 George De La Cruz D.O.   Stopped at 06/21/17 1800   • dexmedetomidine (PRECEDEX) 200 mcg in NS 50 mL infusion  0.1-0.7 mcg/kg/hr Continuous Christ HARVEY M.D.   Stopped at 06/22/17 0200   • insulin lispro (HUMALOG) injection 1-6 Units  1-6 Units Q6HRS Che Nash M.D.   Stopped at 06/21/17 1200   • famotidine (PEPCID) injection 20 mg  20 mg DAILY Che Nash M.D.   20 mg at 06/22/17 0819   • glucose 4 g chewable tablet 16 g  16 g Q15 MIN PRN Che Nash M.D.        And   • dextrose 50% (D50W) injection 25 mL  25 mL Q15 MIN PRN Che Nash M.D.       • acetaminophen (TYLENOL) suppository 650 mg  650 mg Q6HRS PRN Che Nash M.D.   650 mg at 06/16/17 1615   • morphine (pf) 4 mg/ml injection 2-4 mg  2-4 mg Q2HRS PRN Che Nash M.D.   2 mg at 06/22/17 0349   • Respiratory Care per Protocol   Continuous RT Jose Valdivia M.D.       • acetaminophen (TYLENOL) tablet 650 mg  650 mg Q6HRS PRN Jose Valdivia M.D.       • piperacillin-tazobactam (ZOSYN) 3.375 g in  mL IVPB  3.375 g Q6HRS Jose Valdivia M.D.   Stopped at 06/22/17 1510   • ondansetron (ZOFRAN) syringe/vial injection 4 mg  4 mg Q4HRS PRN Jose Valdivia M.D.   4 mg at 06/19/17 0406   • ondansetron (ZOFRAN ODT) dispertab 4 mg  4 mg Q4HRS PRN Jose Valdivia M.D.       • lorazepam (ATIVAN) tablet 0.5 mg  0.5 mg Q6HRS PRN Jose Valdivia M.D.        Or   • lorazepam (ATIVAN) injection 0.5 mg  0.5 mg Q6HRS PRN Jose Valdivia M.D.   0.5 mg at 06/15/17 0941     Last reviewed on 6/15/2017  8:22 AM by Sahara Staton    Quality  Measures:  Radiology images reviewed, Medications reviewed and Labs reviewed                      Problems:  1. Acute respiratory failure, improved and now liberated from mechanical ventilation.  2. Acute cholecystitis, now with biliary drain in place.  Klebsiella recovered from blood and bile fluid cultures.  3. Sepsis, related to cholecystitis. Improved.  4. Shock, improved and now off vasopressor.   5. Abnormal chest radiogram. The findings might suggest extravascular lung water but aspiration is a possibility.  6. Renal insufficiency, nonoliguric, improving by numbers.  7. Mild anemia, stable without evidence of ongoing blood loss.  8. Thrombocytopenia, improving.  9. Paroxysmal atrial fibrillation, now in sinus rhythm.  10. History of systemic arterial hypertension.  11. Encephalopathy, without focal neurologic signs or symptoms, consistent with delirium.      Plan:  Continue titrated supplemental oxygen and respiratory protocols.  Continue antibiotics per infectious disease consultation.  Continue mobilization and prophylaxis. He will require nutritional support; swallowing evaluation is pending.     Discussed patient condition and risk of morbidity and/or mortality with RN, RT and hospitalist. Discussed with family at the bedside

## 2017-06-22 NOTE — PROGRESS NOTES
Report received from NISHANT Valdovinos. Pt resting comfortably in bed. Opens eyes to voice, [facility]. Pt's wife at bedside to visit. BL soft wrist restraints in place as patient remains slightly confused. Pt and wife instructed to call RN with any and all needs and agree. Will continue with care.

## 2017-06-22 NOTE — DISCHARGE PLANNING
Medical Social Work    Referral: Pt discussed at IDT rounds this AM.    Intervention: Per flowsheet, pt lives with spouse and expects to d.c home.  Pt was extubated.  Pt is currently on 1.5L O2.  No SS needs identified nor any requests for BEN Hernandez during rounds.      Plan: SW available for any assistance with d.c planning.

## 2017-06-22 NOTE — PROGRESS NOTES
PHILLIP from Lab called with critical result of positive blood cultures x 1 specimen at 0200. Critical lab result read back to PHILLIP.   This critical lab result is within parameters established by  for this patient

## 2017-06-22 NOTE — PROGRESS NOTES
Infectious Disease Progress Note    Author: Maryse Rivera M.D. Date of service & Time created: 2017  1:31 PM    Chief Complaint:  Chief Complaint   Patient presents with   • Abdominal Pain     abdominal pain since saturday  diaphoretic and weakness  diarrhea yesterday       Interval History:  76 year old male admitted with septic shock due to acute severe cholecystitis  - remains on the vent. No fevers. Wbc 9.4   AF WBC 10.9 Denies pain, even around drain site  Labs Reviewed, Medications Reviewed and Radiology Reviewed.    Review of Systems:  Review of Systems   Constitutional: Negative for fever and chills.   Respiratory: Negative for cough and shortness of breath.    Cardiovascular: Negative for chest pain.   Gastrointestinal: Negative for nausea, vomiting, abdominal pain and diarrhea.   Skin: Negative for rash.   All other systems reviewed and are negative.      Hemodynamics:  No data recorded.  Monitored Temp: 37 °C (98.6 °F)  Pulse  Av.3  Min: 30  Max: 134Heart Rate (Monitored): 80  Blood Pressure : (!) 188/104 mmHg, NIBP: (!) 179/87 mmHg  CVP (mm Hg): (!) 11 MM HG    Physical Exam:  Physical Exam   Constitutional: He appears well-developed. No distress.   HENT:   Head: Normocephalic and atraumatic.   Mouth/Throat: No oropharyngeal exudate.   NGT   Eyes: EOM are normal. Pupils are equal, round, and reactive to light. No scleral icterus.   Neck: Neck supple.   Right IJ   Cardiovascular: Normal rate and regular rhythm.    No murmur heard.  Pulmonary/Chest: Effort normal. No respiratory distress. He has no wheezes. He has no rales.   Abdominal: Soft. He exhibits no distension. There is no tenderness. There is no rebound.   RUQ drain present   Musculoskeletal: He exhibits edema.   Neurological: He is alert.   Vitals reviewed.      Meds:    Current facility-administered medications:   •  labetalol (NORMODYNE,TRANDATE) injection 10 mg, 10 mg, Intravenous, Q4HRS PRN, Dennis Guerra M.D., 10  mg at 06/22/17 1035  •  lisinopril (PRINIVIL) tablet 10 mg, 10 mg, Oral, Q DAY, YAIMA Short.O., 10 mg at 06/22/17 0830  •  latanoprost (XALATAN) 0.005 % ophthalmic solution 1 Drop, 1 Drop, Both Eyes, Nightly, PETRONA ShortO., 1 Drop at 06/22/17 1209  •  amlodipine (NORVASC) tablet 5 mg, 5 mg, Oral, Q DAY, YAIMA Short.O.  •  enalaprilat (VASOTEC) injection 1.25 mg, 1.25 mg, Intravenous, Q6HRS PRN, George De La Cruz D.O.  •  digoxin (LANOXIN) injection 125 mcg, 125 mcg, Intravenous, DAILY AT 1800, PETRONA ShortO., Stopped at 06/21/17 1800  •  dexmedetomidine (PRECEDEX) 200 mcg in NS 50 mL infusion, 0.1-0.7 mcg/kg/hr, Intravenous, Continuous, Christ HARVYE M.D., Stopped at 06/22/17 0200  •  insulin lispro (HUMALOG) injection 1-6 Units, 1-6 Units, Subcutaneous, Q6HRS, Che Nash M.D., Stopped at 06/21/17 1200  •  famotidine (PEPCID) injection 20 mg, 20 mg, Intravenous, DAILY, Che Nash M.D., 20 mg at 06/22/17 0819  •  Action is required: Protocol 1073 Hypoglycemia has been implemented, , , Once **AND** Protocol 1073 Inclusion Criteria, , , CONTINUOUS **AND** Protocol 1073 NOTIFY, , , Once **AND** Protocol 1073 Initiate protocol immediately if FSBG is less than or equal to 70 mg/dL, , , CONTINUOUS **AND** glucose 4 g chewable tablet 16 g, 16 g, Oral, Q15 MIN PRN **AND** dextrose 50% (D50W) injection 25 mL, 25 mL, Intravenous, Q15 MIN PRN, Che Nash M.D.  •  acetaminophen (TYLENOL) suppository 650 mg, 650 mg, Rectal, Q6HRS PRN, Che Nash M.D., 650 mg at 06/16/17 1615  •  morphine (pf) 4 mg/ml injection 2-4 mg, 2-4 mg, Intravenous, Q2HRS PRN, Che Nash M.D., 2 mg at 06/22/17 5379  •  Respiratory Care per Protocol, , Nebulization, Continuous RT, Jose Valdivia M.D.  •  acetaminophen (TYLENOL) tablet 650 mg, 650 mg, Oral, Q6HRS PRN, Jose Valdivia M.D.  •  piperacillin-tazobactam (ZOSYN) 3.375 g in  mL IVPB, 3.375 g,  Intravenous, Q6HRS, Jose Valdivia M.D., Stopped at 06/22/17 0849  •  ondansetron (ZOFRAN) syringe/vial injection 4 mg, 4 mg, Intravenous, Q4HRS PRN, Jose Valdivia M.D., 4 mg at 06/19/17 0406  •  ondansetron (ZOFRAN ODT) dispertab 4 mg, 4 mg, Oral, Q4HRS PRN, Jose Valdivia M.D.  •  lorazepam (ATIVAN) tablet 0.5 mg, 0.5 mg, Oral, Q6HRS PRN **OR** lorazepam (ATIVAN) injection 0.5 mg, 0.5 mg, Intravenous, Q6HRS PRN, Jose Valdivia M.D., 0.5 mg at 06/15/17 0941    Labs:  Recent Labs      06/20/17   0348  06/21/17   0320  06/22/17   0135   WBC  11.2*  9.4  10.9*   RBC  3.39*  3.53*  3.83*   HEMOGLOBIN  10.8*  11.4*  12.4*   HEMATOCRIT  33.4*  35.0*  37.2*   MCV  98.5*  99.2*  97.1   MCH  31.9  32.3  32.4   RDW  52.0*  52.0*  48.8   PLATELETCT  90*  118*  177   MPV  12.0  11.9  11.6     Recent Labs      06/20/17   0348  06/21/17   0320  06/22/17   0135   SODIUM  146*  148*  146*   POTASSIUM  3.9  3.9  3.5*   CHLORIDE  115*  116*  117*   CO2  21 23 21   GLUCOSE  150*  156*  140*   BUN  20  21  23*     Recent Labs      06/20/17   0348  06/21/17   0320  06/22/17   0135   ALBUMIN  1.6*  1.7*  2.2*   TBILIRUBIN  2.1*  2.0*  2.0*   ALKPHOSPHAT  96  88  109*   TOTPROTEIN  4.5*  5.1*  5.8*   ALTSGPT  76*  65*  60*   ASTSGOT  50*  48*  50*   CREATININE  0.84  0.69  0.72       Imaging:  Ct-abdomen-pelvis W/o  6/15/2017  1.  There is continued gallbladder distention with nondependent air in the gallbladder suspicious for possible emphysematous cholecystitis. There is some surrounding stranding inferior to the gallbladder again seen. 2.  There is an appendicolith again seen with periappendiceal stranding and minimal fluid in the right anterior pararenal space and paracolic gutter. 3.  There are small dependent pleural effusions with dependent atelectasis. 4.  Enteric tube extends into the 1st/2nd portion of the duodenum. 5.  There is no evidence of free intraperitoneal air.    Ct-abdomen-pelvis W/o  6/14/2017  1.  Severe  cholecystitis, possible component of emphysematous cholecystitis. 2.  Appendicolith, the appendix otherwise appears within normal limits. Appendicolith however places patient at future risk for development of appendicitis. 3.  Atherosclerosis and atherosclerotic coronary artery disease 4.  Hepatomegaly These findings were discussed with the patient's clinician, Poncho Boudreaux, on 6/14/2017 8:07 PM.    Dx-chest-portable (1 View)  6/21/2017  No significant change from prior exam.    Yy-whzeyuc-o/o  FINDINGS: Exam limited by patient body habitus and the fact the patient is on a ventilator which causes some motion. The visualized lung bases demonstrate small amount of right pleural fluid with dependent airspace disease bilaterally. The gallbladder is decompressed in comparison to the prior exam with multiple gallstones identified. There is a small amount pericholecystic fluid. There is minimal free fluid along the anterior and inferior margin of the liver. There is a cholecystotomy tube identified in the subcutaneous fat in the right anterior upper abdomen although it is difficult to follow the catheter all the way into the gallbladder. Exact position is uncertain. The common bile duct is normal in caliber without choledocholithiasis. There is no intrahepatic biliary dilatation. Limited visualization of the pancreatic duct is unremarkable. There is a trace of fluid in the left upper abdomen but no gross evidence of pancreatitis is otherwise seen. There are no focal intrahepatic or intrasplenic signal abnormalities. Adrenal glands and visualized kidneys are unremarkable.   6/20/2017  1.  There has been partial decompression of the gallbladder with gallstones and minimal pericholecystic fluid identified. 2.  The cholecystotomy tube is not seen in its entirety and cannot be tracked into the gallbladder on MRI. Its exact position is uncertain. 3.  There is no biliary dilatation or  choledocholithiasis.    Us-drain-cholescystomy  6/15/2017  1. Ultrasound guided cholecystostomy with placement of an 8-[place] locking loop catheter.     Echocardiogram Comp W/o Cont  6/15/2017  Transthoracic Echo Report Echocardiography Laboratory CONCLUSIONS No prior study is available for comparison. Left ventricular ejection fraction is visually estimated to be 60%. Grade I diastolic dysfunction. No valvular heart disease. Estimated right ventricular systolic pressure  is 35 mmHg.   FINDINGS Left Ventricle Normal left ventricular size and systolic function. Mild concentric left ventricular hypertrophy. Left ventricular ejection fraction is visually estimated to be 60%. Normal regional wall motion. Grade I diastolic dysfunction. Right Ventricle The right ventricle was normal in size and function. Right Atrium Enlarged right atrium. Left Atrium The left atrium is normal in size. Mitral Valve Structurally normal mitral valve. Aortic Valve Aortic sclerosis without stenosis. Tricuspid Valve Structurally normal tricuspid valve. Mild tricuspid regurgitation. Estimated right ventricular systolic pressure  is 35 mmHg. Pulmonic Valve The pulmonic valve is not well visualized. Trace pulmonic insufficiency. Pericardium Normal pericardium without effusion. Aorta The aortic root is normal. Tho Jones (Electronically Signed) Final Date:     15 Rufina 2017                 17:30      Micro:  BLOOD CULTURE   Date Value Ref Range Status   06/17/2017   Preliminary    No Growth    Note: Blood cultures are incubated for 5 days and  are monitored continuously.Positive blood cultures  are called to the RN and reported as soon as  they are identified.        Results     Procedure Component Value Units Date/Time    BLOOD CULTURE [[id number]]  (Abnormal) Collected:  06/17/17 2681    Order Status:  Completed Specimen Information:  Blood from Peripheral Updated:  06/22/17 0203     Significant Indicator POS (POS)      Source BLD       "Site PERIPHERAL      Blood Culture -- (A)      Result:        Growth detected by Bactec instrument.  06/22/2017  02:06  Gram Stain: Gram negative rods.      Narrative:      CALL  [facility]  ICU tel. ,  CALLED  ICU tel.  06/22/2017, 02:07, RB PERF. RESULTS CALLED TO: RN [id number]  Per Hospital Policy: Only change Specimen Src: to \"Line\" if  specified by physician order.    BLOOD CULTURE [[id number]] Collected:  06/17/17 1810    Order Status:  Completed Specimen Information:  Blood from Peripheral Updated:  06/19/17 0749     Significant Indicator NEG      Source BLD      Site PERIPHERAL      Blood Culture --      Result:        No Growth    Note: Blood cultures are incubated for 5 days and  are monitored continuously.Positive blood cultures  are called to the RN and reported as soon as  they are identified.      Narrative:      Per Hospital Policy: Only change Specimen Src: to \"Line\" if  specified by physician order.    Culture Respiratory W/ GRM STN [[id number]] Collected:  06/16/17 0159    Order Status:  Completed Specimen Information:  Respirate from Sputum Updated:  06/18/17 1220     Gram Stain Result No organisms seen.      Significant Indicator NEG      Source RESP      Site Tracheal Aspirate      Respiratory Culture Rare growth usual upper respiratory tati     Narrative:      Sputum cultures, induced if needed. If not done within the  last 24 hours    BLOOD CULTURE [[id number]]  (Abnormal) Collected:  06/15/17 0532    Order Status:  Completed Specimen Information:  Blood from Peripheral Updated:  06/18/17 0919     Significant Indicator POS (POS)      Source BLD      Site PERIPHERAL      Blood Culture -- (A)      Result:        Blood culture testing and Gram stain, if indicated, are  performed at [facility], [address] [place] [address].  Positive blood cultures are  sent to [facility], [address] [place] [address], for organism identification and  susceptibility " "testing.  Growth detected by Bactec instrument.  06/16/2017  00:41       Blood Culture -- (A)      Result:        Klebsiella pneumoniae  See previous culture for sensitivity report.      Narrative:      CALL  [facility]  ICU tel. ,  CALLED  ICU tel.  06/16/2017, 00:41, RB PERF. RESULTS CALLED TO: Toña, RN  Per Hospital Policy: Only change Specimen Src: to \"Line\" if  specified by physician order.    BLOOD CULTURE [[id number]]  (Abnormal)  (Susceptibility) Collected:  06/15/17 0458    Order Status:  Completed Specimen Information:  Blood from Peripheral Updated:  06/18/17 0919     Significant Indicator POS (POS)      Source BLD      Site PERIPHERAL      Blood Culture -- (A)      Result:        Blood culture testing and Gram stain, if indicated, are  performed at [address]  Positive blood cultures are  sent to [facility], [address] [place] [address], for organism identification and  susceptibility testing.  Growth detected by Bactec instrument.  06/16/2017  00:40       Blood Culture Klebsiella pneumoniae (A)     Narrative:      CALL  [facility]  ICU tel. ,  CALLED  ICU tel.  06/16/2017, 00:40, RB PERF. RESULTS CALLED TO: Toña, RN  Per Hospital Policy: Only change Specimen Src: to \"Line\" if  specified by physician order.    Culture & Susceptibility     KLEBSIELLA PNEUMONIAE     Antibiotic Sensitivity Microscan Unit Status    Ampicillin Resistant >16 mcg/mL Final    Cefepime Sensitive <=8 mcg/mL Final    Cefotaxime Sensitive <=2 mcg/mL Final    Cefotetan Sensitive <=16 mcg/mL Final    Ceftazidime Sensitive <=1 mcg/mL Final    Ceftriaxone Sensitive <=8 mcg/mL Final    Cefuroxime Sensitive <=4 mcg/mL Final    Ciprofloxacin Sensitive <=1 mcg/mL Final    Ertapenem Sensitive <=1 mcg/mL Final    Gentamicin Sensitive <=4 mcg/mL Final    Pip/Tazobactam Sensitive <=16 mcg/mL Final    Tigecycline Sensitive <=2 mcg/mL Final    Tobramycin Sensitive <=4 " mcg/mL Final    Trimeth/Sulfa Sensitive <=2/38 mcg/mL Final                       FLUID CULTURE W/GRAM STAIN [[id number]]  (Abnormal)  (Susceptibility) Collected:  06/15/17 1400    Order Status:  Completed Specimen Information:  Body Fluid from Bile Fluid Updated:  06/17/17 1046     Gram Stain Result -- (A)      Result:        Moderate Gram positive rods.  Moderate Gram negative rods.       Significant Indicator POS (POS)      Source BF      Site BILE FLUID      Culture Result Bdf -- (A)      Culture Result Bdf -- (A)      Result:        Klebsiella pneumoniae  Moderate growth      Narrative:      Collected By:WILLY JESSICA    Culture & Susceptibility     KLEBSIELLA PNEUMONIAE     Antibiotic Sensitivity Microscan Unit Status    Ampicillin Resistant >16 mcg/mL Final    Cefepime Sensitive <=8 mcg/mL Final    Cefotaxime Sensitive <=2 mcg/mL Final    Cefotetan Sensitive <=16 mcg/mL Final    Ceftazidime Sensitive <=1 mcg/mL Final    Ceftriaxone Sensitive <=8 mcg/mL Final    Cefuroxime Sensitive <=4 mcg/mL Final    Ciprofloxacin Sensitive <=1 mcg/mL Final    Ertapenem Sensitive <=1 mcg/mL Final    Gentamicin Sensitive <=4 mcg/mL Final    Pip/Tazobactam Sensitive <=16 mcg/mL Final    Tigecycline Sensitive <=2 mcg/mL Final    Tobramycin Sensitive <=4 mcg/mL Final    Trimeth/Sulfa Sensitive <=2/38 mcg/mL Final                       URINE CULTURE(NEW) [[id number]] Collected:  06/14/17 2115    Order Status:  Completed Specimen Information:  Urine Updated:  06/17/17 0836     Significant Indicator NEG      Source UR      Site --      Urine Culture No growth at 48 hours     Narrative:      Indication for culture:->Emergency Room Patient    BLOOD CULTURE [[id number]]  (Abnormal) Collected:  06/14/17 1820    Order Status:  Completed Specimen Information:  Blood from Peripheral Updated:  06/17/17 0722     Significant Indicator POS (POS)      Source BLD      Site Peripheral      Blood Culture -- (A)      Result:        Blood  "culture testing and Gram stain, if indicated, are  performed at [facility], [address] [place] [address].[place], [place].  Positive blood cultures are  sent to [facility], [address] [place] [address], for organism identification and  susceptibility testing.  Growth detected by Bactec instrument.  06/15/2017  03:54       Blood Culture -- (A)      Result:        Klebsiella oxytoca  See previous culture for sensitivity report.      Narrative:      CALL  [facility]  ICU tel. ,  CALLED  ICU tel.  06/15/2017, 03:54, RB PERF. RESULTS CALLED TO: [id number] RN  Per Hospital Policy: Only change Specimen Src: to \"Line\" if  specified by physician order.    BLOOD CULTURE [[id number]]  (Abnormal)  (Susceptibility) Collected:  06/14/17 1830    Order Status:  Completed Specimen Information:  Blood from Peripheral Updated:  06/17/17 0721     Significant Indicator POS (POS)      Source BLD      Site Peripheral      Blood Culture -- (A)      Result:        Growth detected by Bactec instrument.  06/15/2017  03:50  Blood culture testing and Gram stain, if indicated, are  performed at [facility], [address] [place] [address].[place], [place].  Positive blood cultures are  sent to [facility], [address] [place] [address], for organism identification and  susceptibility testing.       Blood Culture Klebsiella oxytoca (A)     Narrative:      CALL  [facility]  ICU tel. ,  CALLED  ICU tel.  06/15/2017, 03:53, RB PERF. RESULTS CALLED TO:[id number] RN  Per Hospital Policy: Only change Specimen Src: to \"Line\" if  specified by physician order.    Culture & Susceptibility     KLEBSIELLA OXYTOCA     Antibiotic Sensitivity Microscan Unit Status    Ampicillin Resistant >16 mcg/mL Final    Cefepime Sensitive <=8 mcg/mL Final    Cefotaxime Sensitive <=2 mcg/mL Final    Cefotetan Sensitive <=16 mcg/mL Final    Ceftazidime Sensitive <=1 mcg/mL Final    Ceftriaxone Sensitive <=8 mcg/mL Final    " Cefuroxime Resistant >16 mcg/mL Final    Ciprofloxacin Sensitive <=1 mcg/mL Final    Ertapenem Sensitive <=1 mcg/mL Final    Gentamicin Sensitive <=4 mcg/mL Final    Pip/Tazobactam Sensitive <=16 mcg/mL Final    Tigecycline Sensitive <=2 mcg/mL Final    Tobramycin Sensitive <=4 mcg/mL Final    Trimeth/Sulfa Sensitive <=2/38 mcg/mL Final                       GRAM STAIN [[id number]] Collected:  06/16/17 0159    Order Status:  Completed Specimen Information:  Respirate Updated:  06/16/17 1620     Significant Indicator .      Source RESP      Site Tracheal Aspirate      Gram Stain Result No organisms seen.     Narrative:      Sputum cultures, induced if needed. If not done within the  last 24 hours    CULTURE RESPIRATORY W/ GRM STN [[id number]] Collected:  06/16/17 0130    Order Status:  Completed Specimen Information:  Respirate from Sputum     GRAM STAIN [[id number]] Collected:  06/15/17 1400    Order Status:  Completed Specimen Information:  Body Fluid Updated:  06/15/17 2210     Significant Indicator .      Source BF      Site BILE FLUID      Gram Stain Result --      Result:        Moderate Gram positive rods.  Moderate Gram negative rods.      Narrative:      Collected By:WILLY JESSICA    URINE CULTURE-EXISTING-LESS THAN 48 HOURS [[id number]] Collected:  06/15/17 1340    Order Status:  Canceled Specimen Information:  Other from Urine, Clean Catch     Narrative:      ORDER WAS CANCELLED 06/15/2017 13:49,  error. per NISHANT Romero.  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

## 2017-06-22 NOTE — ASSESSMENT & PLAN NOTE
Still hypertensive  lisinopril 40 qday  amlodipine 10 qday  HCTZ 25 qday  ADD HYDRALAZINE and increase to 50 mg tid

## 2017-06-22 NOTE — PROGRESS NOTES
[id number] - report from Ángela DILLARD.  Pt resting comfortably in bed.  A&O x 4.  Very anxious wife at bs. Reposition q2 for comfort ongoing. Fall precautions in effect. Denies pain, sob, dizziness, nausea. Remains npo with ngt to lis.  See flowsheet for assess.  poc reviewed with pt and spouse, pt & spouse vu, all questions answered. Pt will need reinforcement of all teaching. Tele = sr/pac's,  Vss.   1000 - pt resting comfortably.  ngt remains clamped for medication absorption.  Pt jeanie well, no c/o nausea.  1030 - ngt d/c'd, pt jeanie well.  1100 - ST at bs for swallow eval.  [id number] - swallow eval completed, see notes.  Diet tray ordered.  1245 - 1:1 diet initiated, pt jeanie well.

## 2017-06-22 NOTE — CARE PLAN
Problem: Nutritional:  Goal: Achieve adequate nutritional intake  Patient will consume >50% of meals  Outcome: NOT MET  6/22:  Per SLP  [place] thick full liquids.

## 2017-06-22 NOTE — FLOWSHEET NOTE
06/22/17 1342   Incentive Spirometry Group   Breathing Exercises Yes   Incentive Spirometer Volume 1300 mL  (7001-9294 max)   Chest Exam   Respiration 20   Pulse 82   Heart Rate (Monitored) 80   Breath Sounds   RUL Breath Sounds Clear   RML Breath Sounds Clear   RLL Breath Sounds Diminished   OZZY Breath Sounds Clear   LLL Breath Sounds Diminished   Secretions   Cough Moist;Non Productive   Oxygen   Pulse Oximetry 96 %   O2 (LPM) 1.5   O2 Daily Delivery Respiratory  Silicone Nasal Cannula

## 2017-06-22 NOTE — DIETARY
Nutrition services update.  Patient extubated 6/21.  Swallow evaluation recommendations 6/22:  Diet:  Nectar thick full liquids (no straws)  RD to monitor po intake progress and advancement orders as per SLP

## 2017-06-22 NOTE — PROGRESS NOTES
[facility] Hospitalist Progress Note    Date of Service: 2017    Chief Complaint    76 y.o. male admitted 2017 with abdominal pain and SOB, fever, chills, diaphoresis, syncope x4.  CT abdomen revealed acute cholecystitis with lactic acid of 8 admitted to ICU on zosyn IV, sepsis protocol initiated.  Interval Problem Update  Extubated   Tolerating well  Mildly confused per wife    Consultants/Specialty  General Surgery Dr Last  Pulmonology Dr Reyna  Cardiology Dr Chua    Disposition  Remains critically ill in ICU        Review of Systems   Unable to perform ROS: other      Physical Exam  Laboratory/Imaging   Hemodynamics  No data recorded.   Monitored Temp: 37 °C (98.6 °F)  Pulse  Av.4  Min: 30  Max: 134 Heart Rate (Monitored): 77  Blood Pressure : (!) 188/104 mmHg, NIBP: (!) 184/93 mmHg CVP (mm Hg): (!) 11 MM HG    Respiratory  Fuller Vent Mode: Spont, PEEP/CPAP: 5, PEEP/CPAP: 5, P Peak (PIP): 20, P MEAN: 8   Respiration: (!) 22, Pulse Oximetry: 96 %, O2 Daily Delivery Respiratory : Silicone Nasal Cannula     Work Of Breathing / Effort: Mild  RUL Breath Sounds: Clear, RML Breath Sounds: Clear, RLL Breath Sounds: Diminished, OZZY Breath Sounds: Clear, LLL Breath Sounds: Diminished    Fluids    Intake/Output Summary (Last 24 hours) at 17 1224  Last data filed at 17 1200   Gross per 24 hour   Intake 963.06 ml   Output   2475 ml   Net -1511.94 ml       Nutrition  Orders Placed This Encounter   Procedures   • DIET ORDER     Standing Status: Standing      Number of Occurrences: 1      Standing Expiration Date:      Order Specific Question:  Diet:     Answer:  Full Liquid [11]     Order Specific Question:  Consistency/Fluid modifications:     Answer:  Nectar Thick [2]     Order Specific Question:  Miscellaneous modifications:     Answer:  SLP - 1:1 Supervision by Nursing [21]      Comments:  No Straws     Order Specific Question:  Miscellaneous modifications:     Answer:  SLP - Comments [24]      Physical Exam   Constitutional: He appears well-developed and well-nourished. No distress.   HENT:   Head: Normocephalic and atraumatic.   Eyes: Conjunctivae are normal.   Neck: No JVD present.   Cardiovascular: Normal rate.  Exam reveals no gallop.    No murmur heard.  Pulmonary/Chest: Effort normal. No stridor. No respiratory distress. He has no wheezes. He has no rales.   Abdominal: Soft. There is no tenderness. There is no rebound and no guarding.   Musculoskeletal: He exhibits no edema.   Neurological: He is alert.   Mildly confused  Non focal   Skin: Skin is warm and dry. No rash noted. He is not diaphoretic.   Psychiatric: He has a normal mood and affect. Thought content normal.   Nursing note and vitals reviewed.      Recent Labs      06/20/17   0348  06/21/17   0320  06/22/17   0135   WBC  11.2*  9.4  10.9*   RBC  3.39*  3.53*  3.83*   HEMOGLOBIN  10.8*  11.4*  12.4*   HEMATOCRIT  33.4*  35.0*  37.2*   MCV  98.5*  99.2*  97.1   MCH  31.9  32.3  32.4   MCHC  32.3*  32.6*  33.3*   RDW  52.0*  52.0*  48.8   PLATELETCT  90*  118*  177   MPV  12.0  11.9  11.6     Recent Labs      06/20/17   0348  06/21/17   0320  06/22/17   0135   SODIUM  146*  148*  146*   POTASSIUM  3.9  3.9  3.5*   CHLORIDE  115*  116*  117*   CO2  21 23  21   GLUCOSE  150*  156*  140*   BUN  20 21  23*   CREATININE  0.84  0.69  0.72   CALCIUM  7.3*  7.6*  7.7*             Recent Labs      06/20/17   0348   TRIGLYCERIDE  347*          Assessment/Plan     * Sepsis (CMS-[facility]) (present on admission)  Assessment & Plan  Source:  Infected emphysematous cholecystitis  IVFs  Zosyn  6/14 BC+x2 Klebsiella oxytoca ss zosyn IV  Plan 2 wks total course of Zosyn from date of neg cultures 6/17  Remains stable off pressors      Non-STEMI (non-ST elevated myocardial infarction) (CMS-HCC) (present on admission)  Assessment & Plan  Dr. Chua consulted:  No intervention  Cardiac strain due to sepsis  trops trending down  No wall motion abnormalities on  echo  Preserved LVEF, Gd I DHF    Acute respiratory failure with hypoxia (CMS-[facility]) (present on admission)  Assessment & Plan  6/15 requiring ET intubation for acute wheeze, acidosis due to sepsis.  Extubated 6/21  Tolerating well  Pulmonology following        Metabolic acidosis (present on admission)  Assessment & Plan  2/2 severe sepsis      Acute kidney injury (nontraumatic) (CMS-[facility]) (present on admission)  Assessment & Plan  New onset since admission   2/2 sepsis related renal failure with poor perfusion  Resolved  Cont to follow daily    Hyponatremia (present on admission)  Assessment & Plan  Resolved  Follow daily lab    Bacteremia (present on admission)  Assessment & Plan  BC 6/14 x2 with Klebsiella ss zosyn   6/15 BCs x2 +LFGNR  Source:  Acute cholecystitis  Zosyn IV as above  6/17 repeat BCs x2 negative  Off pressors  ID following    Acute emphysematous cholecystitis (present on admission)  Assessment & Plan  6/15 u/s cholecystostomy completed  Culture fluid:  Klebsiella pneumonia ss zosyn IV  BCs with Klebsiella  Repeat CT abdomen:  No perforation  MRCP no CBD stone  Surgery Dr Last following: plan OR once pt has stabilized    Hypotension (arterial) (present on admission)  Assessment & Plan  Secondary to sepsis  resolved    Thrombocytopenia (CMS-[facility]) (present on admission)  Assessment & Plan  Etiology 2/2 severe sepsis, no known heavy alcohol use.  Now >100  Cont to follow    Hypokalemia due to inadequate potassium intake (present on admission)  Assessment & Plan  Follow K and Mg daily  Replace as needed    Atrial fibrillation with RVR (CMS-[facility])  Assessment & Plan  Secondary to acute sepsis and pressors  NO AC per Card's  Now in sinus  cont's on dig    Hypertension  Assessment & Plan  Resume home lisinopril  Add amlodipine 5mg      Medications reviewed, EKG reviewed, Labs reviewed and Radiology images reviewed  Laird catheter: Unconscious / Sedated Patient on a Ventilator      DVT Prophylaxis:  Heparin  DVT prophylaxis - mechanical: SCDs  Ulcer prophylaxis: Yes  Antibiotics: Treating active infection/contamination beyond 24 hours perioperative coverage

## 2017-06-22 NOTE — FLOWSHEET NOTE
06/22/17 0920   Interdisciplinary Plan of Care-Goals (Indications)   Hyperinflation Protocol Indications Atelectasis Documented by Chest X-Ray   Incentive Spirometry Group   Incentive Spirometry Instruction Yes   Incentive Spirometer Volume 1250 mL   Chest Exam   Respiration 16   Pulse 82   Heart Rate (Monitored) 82   Breath Sounds   RUL Breath Sounds Clear   RML Breath Sounds Clear   RLL Breath Sounds Diminished   OZZY Breath Sounds Clear   LLL Breath Sounds Diminished   Secretions   Cough Non Productive   How Sputum Obtained Cough on Request   Oxygen   Pulse Oximetry 97 %   O2 (LPM) 1.5   O2 Daily Delivery Respiratory  Silicone Nasal Cannula

## 2017-06-22 NOTE — PROGRESS NOTES
Call to Dr. Guerra with orders received for PRN labetalol for SBP> 170. Pt alert and oriented to person and place. Pt still very lethargic and weak. Medicated per MAR for pain to R abdomen with relief at this time. Will continue with care.

## 2017-06-22 NOTE — THERAPY
"  Speech Language Therapy Clinical Swallow Evaluation completed.  Functional Status: Bedside swallow evaluation completed this date. Patient awake and alert with wife present. Patient presents with symptoms of mild oral-pharyngeal dysphagia. He was extubated 6/21/17. His voice is soft, but clear and intelligible. Laryngeal elevation upon palpation and swallow onset timing appear WNL. Patient consume trials of nectar thick liquids and pureed solids with good oral phase and no overt signs of aspiration. He exhibited possible signs of aspiration with 2/3 trials of ice chips, including delayed coughing. Patient was unable to feed himself due to weakness/swelling in hands/arms. Recommend Nectar Thick Full Liquid diet with 1:1 assistance and no straws. Patient and wife educated regarding aspiration risk, diet recommendation, and safe swallow strategies.   Recommendations - Diet: Diet / Liquid Recommendation: Nectar Thick Full Liquid; Ice chips okay with supervision (4-5 tsp per hour)                          Strategies: Strict 1:1 feeding , No Straws and Head of Bed at 90 Degrees                          Medication Administration: Medication Administration : Crush all Medications in Puree, Whole with Liquid Wash  Plan of Care: Will benefit from Speech Therapy 5 times per week  Post-Acute Therapy: Discharge to home with outpatient or home health for additional skilled therapy services.    See \"Rehab Therapy-Acute\" Patient Summary Report for complete documentation.   "

## 2017-06-23 PROBLEM — E87.1 HYPONATREMIA: Status: RESOLVED | Noted: 2017-06-15 | Resolved: 2017-06-23

## 2017-06-23 LAB
ALBUMIN SERPL BCP-MCNC: 1.9 G/DL (ref 3.2–4.9)
ALBUMIN/GLOB SERPL: 0.6 G/DL
ALP SERPL-CCNC: 106 U/L (ref 30–99)
ALT SERPL-CCNC: 55 U/L (ref 2–50)
ANION GAP SERPL CALC-SCNC: 6 MMOL/L (ref 0–11.9)
AST SERPL-CCNC: 49 U/L (ref 12–45)
BACTERIA BLD CULT: NORMAL
BILIRUB SERPL-MCNC: 1.4 MG/DL (ref 0.1–1.5)
BUN SERPL-MCNC: 14 MG/DL (ref 8–22)
CALCIUM SERPL-MCNC: 7.7 MG/DL (ref 8.4–10.2)
CHLORIDE SERPL-SCNC: 117 MMOL/L (ref 96–112)
CO2 SERPL-SCNC: 26 MMOL/L (ref 20–33)
CREAT SERPL-MCNC: 0.72 MG/DL (ref 0.5–1.4)
ERYTHROCYTE [DISTWIDTH] IN BLOOD BY AUTOMATED COUNT: 49.1 FL (ref 35.9–50)
GFR SERPL CREATININE-BSD FRML MDRD: >60 ML/MIN/1.73 M 2
GLOBULIN SER CALC-MCNC: 3.3 G/DL (ref 1.9–3.5)
GLUCOSE BLD-MCNC: 122 MG/DL (ref 65–99)
GLUCOSE BLD-MCNC: 132 MG/DL (ref 65–99)
GLUCOSE BLD-MCNC: 149 MG/DL (ref 65–99)
GLUCOSE BLD-MCNC: 151 MG/DL (ref 65–99)
GLUCOSE SERPL-MCNC: 137 MG/DL (ref 65–99)
HCT VFR BLD AUTO: 34.8 % (ref 42–52)
HGB BLD-MCNC: 11.6 G/DL (ref 14–18)
MCH RBC QN AUTO: 32 PG (ref 27–33)
MCHC RBC AUTO-ENTMCNC: 33.3 G/DL (ref 33.7–35.3)
MCV RBC AUTO: 95.9 FL (ref 81.4–97.8)
PLATELET # BLD AUTO: 227 K/UL (ref 164–446)
PMV BLD AUTO: 11 FL (ref 9–12.9)
POTASSIUM SERPL-SCNC: 3.4 MMOL/L (ref 3.6–5.5)
PROT SERPL-MCNC: 5.2 G/DL (ref 6–8.2)
RBC # BLD AUTO: 3.63 M/UL (ref 4.7–6.1)
SIGNIFICANT IND 70042: NORMAL
SITE SITE: NORMAL
SODIUM SERPL-SCNC: 149 MMOL/L (ref 135–145)
SOURCE SOURCE: NORMAL
WBC # BLD AUTO: 10.6 K/UL (ref 4.8–10.8)

## 2017-06-23 PROCEDURE — 700105 HCHG RX REV CODE 258: Performed by: HOSPITALIST

## 2017-06-23 PROCEDURE — 97530 THERAPEUTIC ACTIVITIES: CPT

## 2017-06-23 PROCEDURE — G8987 SELF CARE CURRENT STATUS: HCPCS | Mod: CM

## 2017-06-23 PROCEDURE — 87040 BLOOD CULTURE FOR BACTERIA: CPT | Mod: 91

## 2017-06-23 PROCEDURE — 92526 ORAL FUNCTION THERAPY: CPT

## 2017-06-23 PROCEDURE — 97110 THERAPEUTIC EXERCISES: CPT

## 2017-06-23 PROCEDURE — 770022 HCHG ROOM/CARE - ICU (200)

## 2017-06-23 PROCEDURE — 99233 SBSQ HOSP IP/OBS HIGH 50: CPT | Performed by: HOSPITALIST

## 2017-06-23 PROCEDURE — 80053 COMPREHEN METABOLIC PANEL: CPT

## 2017-06-23 PROCEDURE — 82962 GLUCOSE BLOOD TEST: CPT | Mod: 91

## 2017-06-23 PROCEDURE — 97535 SELF CARE MNGMENT TRAINING: CPT

## 2017-06-23 PROCEDURE — 700111 HCHG RX REV CODE 636 W/ 250 OVERRIDE (IP): Performed by: HOSPITALIST

## 2017-06-23 PROCEDURE — 85027 COMPLETE CBC AUTOMATED: CPT

## 2017-06-23 PROCEDURE — G8988 SELF CARE GOAL STATUS: HCPCS | Mod: CK

## 2017-06-23 PROCEDURE — 700102 HCHG RX REV CODE 250 W/ 637 OVERRIDE(OP): Performed by: HOSPITALIST

## 2017-06-23 PROCEDURE — A9270 NON-COVERED ITEM OR SERVICE: HCPCS | Performed by: HOSPITALIST

## 2017-06-23 PROCEDURE — 700101 HCHG RX REV CODE 250: Performed by: INTERNAL MEDICINE

## 2017-06-23 PROCEDURE — 97166 OT EVAL MOD COMPLEX 45 MIN: CPT

## 2017-06-23 RX ORDER — OXYCODONE HYDROCHLORIDE 5 MG/1
5 TABLET ORAL EVERY 4 HOURS PRN
Status: DISCONTINUED | OUTPATIENT
Start: 2017-06-23 | End: 2017-06-28 | Stop reason: HOSPADM

## 2017-06-23 RX ORDER — OXYCODONE HYDROCHLORIDE 5 MG/1
5-10 TABLET ORAL EVERY 4 HOURS PRN
Status: DISCONTINUED | OUTPATIENT
Start: 2017-06-23 | End: 2017-06-23

## 2017-06-23 RX ORDER — OXYCODONE HYDROCHLORIDE 10 MG/1
10 TABLET ORAL EVERY 4 HOURS PRN
Status: DISCONTINUED | OUTPATIENT
Start: 2017-06-23 | End: 2017-06-28 | Stop reason: HOSPADM

## 2017-06-23 RX ORDER — AMLODIPINE BESYLATE 5 MG/1
10 TABLET ORAL
Status: DISCONTINUED | OUTPATIENT
Start: 2017-06-23 | End: 2017-06-28 | Stop reason: HOSPADM

## 2017-06-23 RX ORDER — LISINOPRIL 20 MG/1
20 TABLET ORAL
Status: DISCONTINUED | OUTPATIENT
Start: 2017-06-23 | End: 2017-06-25

## 2017-06-23 RX ORDER — POLYVINYL ALCOHOL 14 MG/ML
1 SOLUTION/ DROPS OPHTHALMIC PRN
Status: DISCONTINUED | OUTPATIENT
Start: 2017-06-23 | End: 2017-06-28 | Stop reason: HOSPADM

## 2017-06-23 RX ADMIN — ENALAPRILAT 1.25 MG: 1.25 INJECTION, SOLUTION INTRAVENOUS at 18:27

## 2017-06-23 RX ADMIN — LISINOPRIL 20 MG: 20 TABLET ORAL at 09:01

## 2017-06-23 RX ADMIN — PIPERACILLIN AND TAZOBACTAM 3.38 G: 3; .375 INJECTION, POWDER, FOR SOLUTION INTRAVENOUS at 09:01

## 2017-06-23 RX ADMIN — DIGOXIN 125 MCG: 250 INJECTION, SOLUTION INTRAMUSCULAR; INTRAVENOUS; PARENTERAL at 17:53

## 2017-06-23 RX ADMIN — PIPERACILLIN AND TAZOBACTAM 3.38 G: 3; .375 INJECTION, POWDER, FOR SOLUTION INTRAVENOUS at 04:30

## 2017-06-23 RX ADMIN — AMLODIPINE BESYLATE 10 MG: 5 TABLET ORAL at 09:00

## 2017-06-23 RX ADMIN — LABETALOL HYDROCHLORIDE 10 MG: 5 INJECTION, SOLUTION INTRAVENOUS at 15:51

## 2017-06-23 RX ADMIN — LABETALOL HYDROCHLORIDE 10 MG: 5 INJECTION, SOLUTION INTRAVENOUS at 05:34

## 2017-06-23 RX ADMIN — LATANOPROST 1 DROP: 50 SOLUTION OPHTHALMIC at 21:00

## 2017-06-23 RX ADMIN — PIPERACILLIN AND TAZOBACTAM 3.38 G: 3; .375 INJECTION, POWDER, FOR SOLUTION INTRAVENOUS at 14:57

## 2017-06-23 RX ADMIN — ENALAPRILAT 1.25 MG: 1.25 INJECTION, SOLUTION INTRAVENOUS at 03:26

## 2017-06-23 RX ADMIN — INSULIN LISPRO 1 UNITS: 100 INJECTION, SOLUTION INTRAVENOUS; SUBCUTANEOUS at 12:07

## 2017-06-23 RX ADMIN — ENALAPRILAT 1.25 MG: 1.25 INJECTION, SOLUTION INTRAVENOUS at 10:40

## 2017-06-23 RX ADMIN — FAMOTIDINE 20 MG: 10 INJECTION, SOLUTION INTRAVENOUS at 09:01

## 2017-06-23 RX ADMIN — PIPERACILLIN AND TAZOBACTAM 3.38 G: 3; .375 INJECTION, POWDER, FOR SOLUTION INTRAVENOUS at 20:56

## 2017-06-23 ASSESSMENT — COPD QUESTIONNAIRES
COPD SCREENING SCORE: 4
HAVE YOU SMOKED AT LEAST 100 CIGARETTES IN YOUR ENTIRE LIFE: YES
DO YOU EVER COUGH UP ANY MUCUS OR PHLEGM?: NO/ONLY WITH OCCASIONAL COLDS OR INFECTIONS
DURING THE PAST 4 WEEKS HOW MUCH DID YOU FEEL SHORT OF BREATH: NONE/LITTLE OF THE TIME

## 2017-06-23 ASSESSMENT — PAIN SCALES - GENERAL
PAINLEVEL_OUTOF10: 0

## 2017-06-23 ASSESSMENT — ENCOUNTER SYMPTOMS
COUGH: 0
ABDOMINAL PAIN: 0
NAUSEA: 0
DIARRHEA: 0
VOMITING: 0
SHORTNESS OF BREATH: 0
CHILLS: 0
FEVER: 0

## 2017-06-23 ASSESSMENT — ACTIVITIES OF DAILY LIVING (ADL): TOILETING: INDEPENDENT

## 2017-06-23 ASSESSMENT — GAIT ASSESSMENTS: GAIT LEVEL OF ASSIST: UNABLE TO PARTICIPATE

## 2017-06-23 NOTE — PROGRESS NOTES
Pulmonary Critical Care Progress        Chief Complaint: Respiratory failure requiring mechanical ventilation.    History of Present Illness:   76 y.o. male was admitted on 6/14 with a chief complaint of abdominal pain, fever and chills. Last felt well 4 days prior to admission. ACT scan of the abdomen showed severe cholecystitis, possible component of emphysematous cholecystitis, an appendicolith, but the appendix otherwise appeared within normal limits. Appendicolith however places patient at future risk for development of appendicitis. Atherosclerosis and atherosclerotic coronary artery disease. Hepatomegaly  He was placed on zosyn, and admitted to the ICU. He developed hypotension, treated with intravenous crystalloid and Levophed. He had increased oxygen requirements which resulted in intubation and mechanical ventilation.    ROS:  unable to perform due to the patient's inability to effectively communicate.    Interval Events:  24 hour interval history reviewed.  Extubated 6/21/17 and comfortable now on a low flow oxygen nasal cannula. He more alert and responds appropriately to simple questions and commands. His temperature curve has improved with a maximum  temperature of 99.1° overnight and the white blood cell, remains normal at 10,600. His blood pressure is now bit high, consistent with his history of hypertension. Blood cultures on June 17 were still yielding growth of presumed Klebsiella. Not yet out of bed much.    PFSH:  No change.    Respiratory:     Pulse Oximetry: 99 % on the oxygen nasal cannula at 1.5 L/m.  Axel Pillai 1: 230 ml     Exam: unlabored respirations, no intercostal retractions or accessory muscle use  ImagingAvailable data reviewed. The chest radiogram on June 22 demonstrates stable patchy interstitial densities. No film today.  Recent Labs      06/21/17   0320  06/22/17   0300   [id number]  7.42  7.47   [id number]  32.6  29.2   [id number]  91.9*  78.5   [id number]  96.8  95.5    ARTHCO3  21  21   FIO2  30   --    ARTBE  -3  -2       HemoDynamics:  Pulse: 84, Heart Rate (Monitored): 84  Blood Pressure : (!) 175/76 mmHg, NIBP: (!) 169/84 mmHg      Exam: sinus tachycardia. Net diuresis of 1.3 L over the past 24 hours but he is 4.8L positive since admission.  Imaging: Available data reviewed. The echocardiogram on Rufina 15, 2017 demonstrated left ventricular systolic ejection fraction of 60% with grade 1 diastolic dysfunction and an estimated right ventricular systolic pressure of 35 mmHg.    Neuro:  GCS Total Jyothi Coma Score: 15  Exam: no focal deficits noted, responds appropriately to simple questions and commands.   Imaging: None - Reviewed    Fluids:  Intake/Output       06/21/17 0700 - 06/22/17 0659 06/22/17 0700 - 06/23/17 0659 06/23/17 0700 - 06/24/17 0659      9902-3855 5333-7586 Total 9463-5139 8513-7291 Total 2474-9055 5185-4481 Total       Intake    P.O.  --  -- --  400  100 500  400  -- 400    P.O. -- -- -- 400 100 500 400 -- 400    I.V.  529.6  381.5 911.2  310  320 630  160  -- 160    Precedex Volume 137.5 61.5 199 -- -- -- -- -- --    Propofol Volume 87.2 -- 87.2 -- -- -- -- -- --    IV Volume (NS @ 100 ml/hr) 105 120 225 110 120 230 60 -- 60    IV Piggyback Volume 200 200 400 200 200 400 100 -- 100    Other  --  40 40  --  -- --  --  -- --    Flush / Irrigation Volume (DOMINGO) -- 40 40 -- -- -- -- -- --    Enteral  90  90 180  30  -- 30  --  -- --    Free Water / Tube Flush 90 90 180 30 -- 30 -- -- --    Total Intake 619.6 511.5 1131.2  560 -- 560       Output    Urine  510  625 1135  885  955 1840  800  -- 800    Indwelling Cathether   800 -- 800    Drains  420  760 1180  350  230 580  --  -- --    Nasogastric Tube  200 -- 200 -- -- --    Axel Pillai 1 20 160 180 150 230 380 -- -- --    Stool  --  -- --  --  -- --  --  -- --    Number of Times Stooled -- -- -- 1 x 1 x 2 x -- -- --    Total Output 180 0235 9736 1238 0129 9715 845  -- 800       Net I/O     -310.4 -873.5 -1183.8 -495 -765 -1260 -240 -- -240           Recent Labs      17   025   SODIUM  148*  146*  149*   POTASSIUM  3.9  3.5*  3.4*   CHLORIDE  116*  117*  117*   CO2  23  21  26   BUN  21  23*  14   CREATININE  0.69  0.72  0.72   CALCIUM  7.6*  7.7*  7.7*       GI/Nutrition:  Exam: patient sedated  Imaging: Available data reviewed  NPO, starting dysphagia diet after swallowing evaluation.  Liver Function  Recent Labs      17   ALTSGPT  65*  60*  55*   ASTSGOT  48*  50*  49*   ALKPHOSPHAT  88  109*  106*   TBILIRUBIN  2.0*  2.0*  1.4   GLUCOSE  156*  140*  137*       Heme:  Recent Labs      17   RBC  3.53*  3.83*  3.63*   HEMOGLOBIN  11.4*  12.4*  11.6*   HEMATOCRIT  35.0*  37.2*  34.8*   PLATELETCT  118*  177  227       Infectious Disease:  Monitored Temp  Av.9 °C (98.5 °F)  Min: 36.5 °C (97.7 °F)  Max: 37.3 °C (99.1 °F)  Micro: antibiotics reviewed, cultures reviewed and reviewed. Sputum cultures are negative but the blood and bile fluid cultures yielded growth of Klebsiella. He remains on Zosyn per infectious disease consultation.  Recent Labs      17   WBC  9.4  10.9*  10.6   ASTSGOT  48*  50*  49*   ALTSGPT  65*  60*  55*   ALKPHOSPHAT  88  109*  106*   TBILIRUBIN  2.0*  2.0*  1.4     Current Facility-Administered Medications   Medication Dose Frequency Provider Last Rate Last Dose   • amlodipine (NORVASC) tablet 10 mg  10 mg Q DAY George De La Cruz DYamileOYamile   10 mg at 17 0900   • lisinopril (PRINIVIL) tablet 20 mg  20 mg Q DAY PETRONA ShortOYamile   20 mg at 17 0901   • oxycodone immediate-release (ROXICODONE) tablet 5 mg  5 mg Q4HRS PRN PETRONA ShortO.        Or   • oxycodone immediate release (ROXICODONE) tablet 10 mg  10 mg Q4HRS PRN George KIM  Jono D.O.       • artificial tears 1.4 % ophthalmic solution 1 Drop  1 Drop PRN PETRONA ShortO.       • labetalol (NORMODYNE,TRANDATE) injection 10 mg  10 mg Q4HRS PRN Dennis Guerra M.D.   10 mg at 06/23/17 1551   • latanoprost (XALATAN) 0.005 % ophthalmic solution 1 Drop  1 Drop Nightly George De La Cruz D.O.   1 Drop at 06/22/17 2101   • enalaprilat (VASOTEC) injection 1.25 mg  1.25 mg Q6HRS PRN George De La Cruz D.O.   1.25 mg at 06/23/17 1040   • digoxin (LANOXIN) injection 125 mcg  125 mcg DAILY AT 1800 YAIMA Short.O.   125 mcg at 06/22/17 1755   • insulin lispro (HUMALOG) injection 1-6 Units  1-6 Units Q6HRS Che Nash M.D.   1 Units at 06/23/17 1207   • famotidine (PEPCID) injection 20 mg  20 mg DAILY Che Nash M.D.   20 mg at 06/23/17 0901   • glucose 4 g chewable tablet 16 g  16 g Q15 MIN PRN Che Nash M.D.        And   • dextrose 50% (D50W) injection 25 mL  25 mL Q15 MIN PRN Che Nash M.D.       • acetaminophen (TYLENOL) suppository 650 mg  650 mg Q6HRS PRN Che Nash M.D.   650 mg at 06/16/17 1615   • morphine (pf) 4 mg/ml injection 2-4 mg  2-4 mg Q2HRS PRN Che Nash M.D.   4 mg at 06/22/17 2357   • Respiratory Care per Protocol   Continuous RT Jose Valdivia M.D.       • acetaminophen (TYLENOL) tablet 650 mg  650 mg Q6HRS PRN Jose Valdivia M.D.       • piperacillin-tazobactam (ZOSYN) 3.375 g in  mL IVPB  3.375 g Q6HRS Jose Valdivia M.D. 200 mL/hr at 06/23/17 1457 3.375 g at 06/23/17 1457   • ondansetron (ZOFRAN) syringe/vial injection 4 mg  4 mg Q4HRS PRN Jose Valdivia M.D.   4 mg at 06/19/17 0406   • ondansetron (ZOFRAN ODT) dispertab 4 mg  4 mg Q4HRS PRN Jose Valdivia M.D.       • lorazepam (ATIVAN) tablet 0.5 mg  0.5 mg Q6HRS PRN Jose Valdivia M.D.        Or   • lorazepam (ATIVAN) injection 0.5 mg  0.5 mg Q6HRS PRN Jose Valdivia M.D.   0.5 mg at 06/15/17 0941     Last reviewed on 6/15/2017  8:22  AM by Ayanna Chaudhari, T    Quality  Measures:  Radiology images reviewed, Medications reviewed and Labs reviewed                      Problems:  1. Acute respiratory failure, improved and now liberated from mechanical ventilation, comfortable on a low-flow oxygen nasal cannula.  2. Acute cholecystitis, now with biliary drain in place. Klebsiella recovered from blood and bile fluid cultures.  3. Sepsis, related to cholecystitis. Improved.  4. Shock, improved and now off vasopressor.   5. Abnormal chest radiogram. The findings might suggest extravascular lung water but aspiration is a possibility.  6. Renal insufficiency, nonoliguric, improving by numbers.  7. Mild anemia, stable without evidence of ongoing blood loss.  8. Thrombocytopenia, resolved.  9. Paroxysmal atrial fibrillation, now in sinus rhythm.  10. Systemic arterial hypertension.  11. Encephalopathy, without focal neurologic signs or symptoms, consistent with delirium, improving.      Plan:  Continue titrated supplemental oxygen and respiratory protocols.  Continue antibiotics per infectious disease consultation.  Continue mobilization, physical therapy assistance requested. Nutritional support and prophylaxis.     Discussed patient condition and risk of morbidity and/or mortality with RN, RT and hospitalist. Discussed with family at the bedside.

## 2017-06-23 NOTE — PROGRESS NOTES
[facility] Hospitalist Progress Note    Date of Service: 2017    Chief Complaint    76 y.o. male admitted 2017 with abdominal pain and SOB, fever, chills, diaphoresis, syncope x4.  CT abdomen revealed acute cholecystitis with lactic acid of 8 admitted to ICU on zosyn IV, sepsis protocol initiated.  Interval Problem Update  Extubated   Tolerating well  ROS limited by mild confusion however pt with no complaints  Sinus overnight  Up to bedside with assistance    Consultants/Specialty  General Surgery Dr Last  Pulmonology Dr Reyna  Cardiology Dr Chua    Disposition  Remains critically ill in ICU        Review of Systems   Unable to perform ROS: other      Physical Exam  Laboratory/Imaging   Hemodynamics  No data recorded.   Monitored Temp: 37 °C (98.6 °F)  Pulse  Av.4  Min: 30  Max: 134 Heart Rate (Monitored): 80  Blood Pressure : (!) 190/87 mmHg, NIBP: (!) 169/84 mmHg     Respiratory      Respiration: 18, Pulse Oximetry: 96 %, O2 Daily Delivery Respiratory : Silicone Nasal Cannula     Work Of Breathing / Effort:  (Easy)  RUL Breath Sounds: Clear, RML Breath Sounds: Clear, RLL Breath Sounds: Diminished, OZZY Breath Sounds: Clear, LLL Breath Sounds: Diminished    Fluids    Intake/Output Summary (Last 24 hours) at 17 1302  Last data filed at 17 1200   Gross per 24 hour   Intake   1335 ml   Output   2620 ml   Net  -1285 ml       Nutrition  Orders Placed This Encounter   Procedures   • DIET ORDER     Standing Status: Standing      Number of Occurrences: 1      Standing Expiration Date:      Order Specific Question:  Diet:     Answer:  Regular [1]     Order Specific Question:  Macronutrient modifications:     Answer:  Low Protein [3]     Order Specific Question:  Macronutrient modifications:     Answer:  Low Fat [5]     Order Specific Question:  Texture/Fiber modifications:     Answer:  Dysphagia 2(Pureed/Chopped)specify fluid consistency(question 6) [2]     Order Specific Question:   Consistency/Fluid modifications:     Answer:  Thin Liquids [3]     Order Specific Question:  Miscellaneous modifications:     Answer:  SLP - 1:1 Supervision by Nursing [21]      Comments:  No Straws     Order Specific Question:  Miscellaneous modifications:     Answer:  SLP - Comments [24]     Order Specific Question:  Miscellaneous modifications:     Answer:  Vegetarian [13]     Physical Exam   Constitutional: He appears well-developed and well-nourished. No distress.   HENT:   Head: Normocephalic and atraumatic.   Eyes: Conjunctivae are normal.   Neck: No JVD present.   Cardiovascular: Normal rate.  Exam reveals no gallop.    No murmur heard.  Pulmonary/Chest: Effort normal. No stridor. No respiratory distress. He has no wheezes. He has no rales.   Abdominal: Soft. There is no tenderness. There is no rebound and no guarding.   Musculoskeletal: He exhibits edema.   Neurological: He is alert.   Mildly confused  Oriented to place and month.  Confused about recent events  Non focal   Skin: Skin is warm and dry. No rash noted. He is not diaphoretic.   Psychiatric: He has a normal mood and affect. Thought content normal.   Nursing note and vitals reviewed.      Recent Labs      06/21/17   0320  06/22/17   0135  06/23/17   0255   WBC  9.4  10.9*  10.6   RBC  3.53*  3.83*  3.63*   HEMOGLOBIN  11.4*  12.4*  11.6*   HEMATOCRIT  35.0*  37.2*  34.8*   MCV  99.2*  97.1  95.9   MCH  32.3  32.4  32.0   MCHC  32.6*  33.3*  33.3*   RDW  52.0*  48.8  49.1   PLATELETCT  118*  177  227   MPV  11.9  11.6  11.0     Recent Labs      06/21/17   0320  06/22/17   0135  06/23/17   0255   SODIUM  148*  146*  149*   POTASSIUM  3.9  3.5*  3.4*   CHLORIDE  116*  117*  117*   CO2  23  21  26   GLUCOSE  156*  140*  137*   BUN  21  23*  14   CREATININE  0.69  0.72  0.72   CALCIUM  7.6*  7.7*  7.7*                      Assessment/Plan     * Sepsis (CMS-HCC) (present on admission)  Assessment & Plan  Source:  Infected emphysematous  cholecystitis  IVFs  Zosyn  6/14 BC+x2 Klebsiella oxytoca ss zosyn IV  Plan 2 wks total course of Zosyn from date of neg cultures 6/17  Remains stable off pressors      Non-STEMI (non-ST elevated myocardial infarction) (CMS-HCC) (present on admission)  Assessment & Plan  Dr. Chua consulted:  No intervention  Cardiac strain due to sepsis  trops trending down  No wall motion abnormalities on echo  Preserved LVEF, Gd I DHF    Acute respiratory failure with hypoxia (CMS-HCC) (present on admission)  Assessment & Plan  6/15 requiring ET intubation for acute wheeze, acidosis due to sepsis.  Extubated 6/21  Tolerating well  Pulmonology following  Mobilizing      Metabolic acidosis (present on admission)  Assessment & Plan  2/2 severe sepsis  resolved      Acute kidney injury (nontraumatic) (CMS-HCC) (present on admission)  Assessment & Plan  New onset since admission   2/2 sepsis related renal failure with poor perfusion  Resolved  Cont to follow daily    Bacteremia (present on admission)  Assessment & Plan  BC 6/14 x2 with Klebsiella ss zosyn   6/15 BCs x2 +LFGNR  Source:  Acute cholecystitis  Zosyn IV as above  6/17 repeat BCs x2 negative  Off pressors  ID following    Acute emphysematous cholecystitis (present on admission)  Assessment & Plan  6/15 u/s cholecystostomy completed  Culture fluid:  Klebsiella pneumonia ss zosyn IV  BCs with Klebsiella  Repeat CT abdomen:  No perforation  MRCP no CBD stone  Surgery Dr Last following: plan OR once pt has stabilized    Hypotension (arterial) (present on admission)  Assessment & Plan  Secondary to sepsis  resolved    Thrombocytopenia (CMS-HCC) (present on admission)  Assessment & Plan  Etiology 2/2 severe sepsis, no known heavy alcohol use.  Now >100  Cont to follow    Hypokalemia due to inadequate potassium intake (present on admission)  Assessment & Plan  Follow K and Mg daily  Replace as needed    Atrial fibrillation with RVR (CMS-HCC)  Assessment & Plan  Secondary to  acute sepsis and pressors  NO AC per Card's  Now in sinus  cont's on dig    Hypertension  Assessment & Plan  Increase lisinopril to 20  Increase amlodipine to 10      Medications reviewed, EKG reviewed, Labs reviewed and Radiology images reviewed  Laird catheter: Unconscious / Sedated Patient on a Ventilator      DVT Prophylaxis: Heparin  DVT prophylaxis - mechanical: SCDs  Ulcer prophylaxis: Yes  Antibiotics: Treating active infection/contamination beyond 24 hours perioperative coverage

## 2017-06-23 NOTE — CARE PLAN
Problem: Safety  Goal: Will remain free from falls  See risk assessment.  Pt encouraged to call for asst. Bed alarm on    Problem: Knowledge Deficit  Goal: Knowledge of disease process/condition, treatment plan, diagnostic tests, and medications will improve  See education.  Poc to be reviewed with pt daily.  Pt encouraged to call with any questions/concerns.    Problem: Pain Management  Goal: Pain level will decrease to patient’s comfort goal  Pt denies pain at this time, encouraged to call with any pain management concerns.  See mar for any medication management.  Reposition q2 for comfort ongoing.    Problem: Fluid Volume:  Goal: Will maintain balanced intake and output  Monitor I/O's

## 2017-06-23 NOTE — PROGRESS NOTES
0700 - Received bedside report from Niko DILLARD with preceptor Elizabet DILLARD.  Patient is resting comfortably at this time without signs/symptoms of distress or pain.  Safety protocols in place, gtts verified, and POC reviewed with patient/family.  Wife Glenda at bedside.  Will continue to monitor and help mobilize as tolerated.    1000 - Patient mobilized to edge of bed with 2x RN assistance.  Activity was well tolerated.    1200 - Giselle SLP observed patient during lunch.  Under 1:1 supervision patient is progressing and is able to tolerate thin liquids.  Diet order changed.  Will continue to monitor progress closely.  Patient has a substantial appetite.    1430 - PT/OT and this RN mobilized patient to edge of bed and to stand.  See PT & OT progress note for details.    1545 - Patient maintaining blood pressure SBP > 170s.  PRNs still necessary to decrease BP at this time.  See MAR and VS.

## 2017-06-23 NOTE — FLOWSHEET NOTE
06/23/17 0655   Interdisciplinary Plan of Care-Goals (Indications)   Hyperinflation Protocol Indications Atelectasis Documented by Chest X-Ray   Education   Education Yes - Pt. / Family has been Instructed in use of Respiratory Equipment   Incentive Spirometry Group   Breathing Exercises Yes   Incentive Spirometer Volume 1400 mL   Chest Exam   Respiration 16   Pulse (!) 59   Heart Rate (Monitored) 80   Breath Sounds   RUL Breath Sounds Clear   RML Breath Sounds Clear   RLL Breath Sounds Diminished   OZZY Breath Sounds Clear   LLL Breath Sounds Diminished   Secretions   Cough Non Productive   Oxygen   Home O2 Use Prior To Admission? No   Pulse Oximetry 98 %   O2 (LPM) 1.5  (decreased to 1 L)   O2 Daily Delivery Respiratory  Silicone Nasal Cannula

## 2017-06-23 NOTE — CARE PLAN
Problem: Oxygenation:  Goal: Maintain adequate oxygenation dependent on patient condition  Intervention: Levels of oxygenation will improve to baseline  Patient is currently 98% on 1.5 L nasal cannula, not ready to come off oxygen yet with earlier weaning trial this AM.      Problem: Hyperinflation:  Goal: Prevent or improve atelectasis  Outcome: PROGRESSING AS EXPECTED  Incentive Spirometry values [id number] max.  RT to see patient twice a day, as patient can not hold the spirometer well with his swollen hands.  Nursing and patient's wife is also working with him on the IS.

## 2017-06-23 NOTE — CARE PLAN
Problem: Mobility  Goal: Risk for activity intolerance will decrease  Intervention: Encourage patient to increase activity level in collaboration with Interdisciplinary Team  Patient will increase activity / mobilization as tolerated.

## 2017-06-23 NOTE — THERAPY
"Speech Language Therapy dysphagia treatment completed.   Functional Status:  Dysphagia therapy completed this date. Patient awake and more alert with wife present. Patient's voice sounds much better today; loud volume and clear vocal quality. Patient presents with symptoms of mild oral-phayrngeal dysphagia. He consumed PO trials of soft chopped solids and thin liquids with good oral phase and no overt signs of aspiration. Laryngeal elevation upon palpation is still slightly reduced, but swallow onset timing appears WNL. Patient still unable to feed himself due to weak arms/hands.   Recommendations:  Recommend diet upgrade to Dysphagia 2/thin liquids - vegetarian/low fat/low protein - no straws. He still requires 1:1 feeding assistance as well.   Plan of Care: Will benefit from Speech Therapy 5 times per week  Post-Acute Therapy: Discharge to a transitional care facility for continued skilled therapy services for PT/OT services.    See \"Rehab Therapy-Acute\" Patient Summary Report for complete documentation.     "

## 2017-06-23 NOTE — THERAPY
"Physical Therapy Treatment completed.   Bed Mobility:  Supine to Sit: Moderate Assist (of 2)  Transfers: Sit to Stand: Moderate Assist (of 2)  Gait: Level Of Assist: Unable to Participate with No Equipment Needed       Plan of Care: Will benefit from Physical Therapy 5 times per week  Discharge Recommendations: Equipment: Will Continue to Assess for Equipment Needs. Post-acute therapy Discharge to a transitional care facility for continued skilled therapy services.    Able to tolerate sitting EOB today x 10 mins (requires CGA to min assist for seated balance) and stood ~1 minute with moderate assist of 2. Overall improving cognitively, conversational and joking throughout. UEs remains quite weak but improving. Nursing will continue to assist pt to sitting EOB. Will progress patient to bedside chair once strength allows. Continue.      See \"Rehab Therapy-Acute\" Patient Summary Report for complete documentation.       "

## 2017-06-23 NOTE — PROGRESS NOTES
Dr. Guerra notified of hypertensive trend despite labetalol and vasotec administration. He requests to be notified if SBP > 190. No other new orders received at this time.

## 2017-06-23 NOTE — PROGRESS NOTES
Infectious Disease Progress Note    Author: Maryse Rivera M.D. Date of service & Time created: 2017  12:38 PM    Chief Complaint:  Chief Complaint   Patient presents with   • Abdominal Pain     abdominal pain since saturday  diaphoretic and weakness  diarrhea yesterday   FU sepsis/cholecystitis    Interval History:  76 year old male admitted with septic shock due to acute severe cholecystitis  - remains on the vent. No fevers. Wbc 9.4   AF WBC 10.9 Denies pain, even around drain site   AF WBC 10.6 one of  blood cxs +. Happy that NGT is out and able to have PO  Labs Reviewed, Medications Reviewed and Radiology Reviewed.    Review of Systems:  Review of Systems   Constitutional: Negative for fever and chills.   Gastrointestinal: Negative for nausea, vomiting and abdominal pain.   Skin: Negative for rash.   All other systems reviewed and are negative.      Hemodynamics:  No data recorded.  Monitored Temp: 37 °C (98.6 °F)  Pulse  Av.4  Min: 30  Max: 134Heart Rate (Monitored): 80  Blood Pressure : (!) 190/87 mmHg, NIBP: (!) 169/84 mmHg      Physical Exam:  Physical Exam   Constitutional: He appears well-developed. No distress.   HENT:   Head: Normocephalic and atraumatic.   Mouth/Throat: No oropharyngeal exudate.   Eyes: EOM are normal. Pupils are equal, round, and reactive to light. No scleral icterus.   Neck: Neck supple.   Right IJ   Cardiovascular: Normal rate.    No murmur heard.  IRR   Pulmonary/Chest: Effort normal. No respiratory distress. He has no wheezes. He has no rales.   Abdominal: Soft. He exhibits no distension. There is no tenderness. There is no rebound.   RUQ drain present   Musculoskeletal: He exhibits edema.   Neurological: He is alert.   Nursing note and vitals reviewed.      Meds:    Current facility-administered medications:   •  amlodipine (NORVASC) tablet 10 mg, 10 mg, Oral, Q DAY, George De La Cruz D.OYamile, 10 mg at 17 0900  •  lisinopril (PRINIVIL)  tablet 20 mg, 20 mg, Oral, Q DAY, PETRONA ShortO., 20 mg at 06/23/17 0901  •  oxycodone immediate-release (ROXICODONE) tablet 5 mg, 5 mg, Oral, Q4HRS PRN **OR** oxycodone immediate release (ROXICODONE) tablet 10 mg, 10 mg, Oral, Q4HRS PRN, George De La Cruz D.O.  •  labetalol (NORMODYNE,TRANDATE) injection 10 mg, 10 mg, Intravenous, Q4HRS PRN, Dennis Guerra M.D., 10 mg at 06/23/17 0534  •  latanoprost (XALATAN) 0.005 % ophthalmic solution 1 Drop, 1 Drop, Both Eyes, Nightly, PETRONA ShortO., 1 Drop at 06/22/17 2101  •  enalaprilat (VASOTEC) injection 1.25 mg, 1.25 mg, Intravenous, Q6HRS PRN, YAIMA Short.O., 1.25 mg at 06/23/17 1040  •  digoxin (LANOXIN) injection 125 mcg, 125 mcg, Intravenous, DAILY AT 1800, YAIMA Short.O., 125 mcg at 06/22/17 1755  •  insulin lispro (HUMALOG) injection 1-6 Units, 1-6 Units, Subcutaneous, Q6HRS, Che Nash M.D., 1 Units at 06/23/17 1207  •  famotidine (PEPCID) injection 20 mg, 20 mg, Intravenous, DAILY, Che Nash M.D., 20 mg at 06/23/17 0901  •  Action is required: Protocol 1073 Hypoglycemia has been implemented, , , Once **AND** Protocol 1073 Inclusion Criteria, , , CONTINUOUS **AND** Protocol 1073 NOTIFY, , , Once **AND** Protocol 1073 Initiate protocol immediately if FSBG is less than or equal to 70 mg/dL, , , CONTINUOUS **AND** glucose 4 g chewable tablet 16 g, 16 g, Oral, Q15 MIN PRN **AND** dextrose 50% (D50W) injection 25 mL, 25 mL, Intravenous, Q15 MIN PRN, Che Nash M.D.  •  acetaminophen (TYLENOL) suppository 650 mg, 650 mg, Rectal, Q6HRS PRN, Che Nash M.D., 650 mg at 06/16/17 1615  •  morphine (pf) 4 mg/ml injection 2-4 mg, 2-4 mg, Intravenous, Q2HRS PRN, Che Nash M.D., 4 mg at 06/22/17 1317  •  Respiratory Care per Protocol, , Nebulization, Continuous RT, Jose Valdivia M.D.  •  acetaminophen (TYLENOL) tablet 650 mg, 650 mg, Oral, Q6HRS PRN, Jose Valdivia M.D.  •   piperacillin-tazobactam (ZOSYN) 3.375 g in  mL IVPB, 3.375 g, Intravenous, Q6HRS, Jose Valdivia M.D., Stopped at 06/23/17 0931  •  ondansetron (ZOFRAN) syringe/vial injection 4 mg, 4 mg, Intravenous, Q4HRS PRN, Jose Valdivia M.D., 4 mg at 06/19/17 0406  •  ondansetron (ZOFRAN ODT) dispertab 4 mg, 4 mg, Oral, Q4HRS PRN, Jose Valdivia M.D.  •  lorazepam (ATIVAN) tablet 0.5 mg, 0.5 mg, Oral, Q6HRS PRN **OR** lorazepam (ATIVAN) injection 0.5 mg, 0.5 mg, Intravenous, Q6HRS PRN, Jose Valdivia M.D., 0.5 mg at 06/15/17 0941    Labs:  Recent Labs      06/21/17 0320 06/22/17   0135 06/23/17   0255   WBC  9.4  10.9*  10.6   RBC  3.53*  3.83*  3.63*   HEMOGLOBIN  11.4*  12.4*  11.6*   HEMATOCRIT  35.0*  37.2*  34.8*   MCV  99.2*  97.1  95.9   MCH  32.3  32.4  32.0   RDW  52.0*  48.8  49.1   PLATELETCT  118*  177  227   MPV  11.9  11.6  11.0     Recent Labs      06/21/17 0320 06/22/17   0135  06/23/17   0255   SODIUM  148*  146*  149*   POTASSIUM  3.9  3.5*  3.4*   CHLORIDE  116*  117*  117*   CO2  23  21  26   GLUCOSE  156*  140*  137*   BUN  21  23*  14     Recent Labs      06/21/17 0320 06/22/17   0135  06/23/17   0255   ALBUMIN  1.7*  2.2*  1.9*   TBILIRUBIN  2.0*  2.0*  1.4   ALKPHOSPHAT  88  109*  106*   TOTPROTEIN  5.1*  5.8*  5.2*   ALTSGPT  65*  60*  55*   ASTSGOT  48*  50*  49*   CREATININE  0.69  0.72  0.72       Imaging:  Ct-abdomen-pelvis W/o  6/15/2017  1.  There is continued gallbladder distention with nondependent air in the gallbladder suspicious for possible emphysematous cholecystitis. There is some surrounding stranding inferior to the gallbladder again seen. 2.  There is an appendicolith again seen with periappendiceal stranding and minimal fluid in the right anterior pararenal space and paracolic gutter. 3.  There are small dependent pleural effusions with dependent atelectasis. 4.  Enteric tube extends into the 1st/2nd portion of the duodenum. 5.  There is no evidence of free  intraperitoneal air.    Ct-abdomen-pelvis W/o  6/14/2017  1.  Severe cholecystitis, possible component of emphysematous cholecystitis. 2.  Appendicolith, the appendix otherwise appears within normal limits. Appendicolith however places patient at future risk for development of appendicitis. 3.  Atherosclerosis and atherosclerotic coronary artery disease 4.  Hepatomegaly These findings were discussed with the patient's clinician, Poncho Boudreaux, on 6/14/2017 8:07 PM.    Dx-chest-portable (1 View)  6/21/2017  No significant change from prior exam.    At-rysvdut-h/o  FINDINGS: Exam limited by patient body habitus and the fact the patient is on a ventilator which causes some motion. The visualized lung bases demonstrate small amount of right pleural fluid with dependent airspace disease bilaterally. The gallbladder is decompressed in comparison to the prior exam with multiple gallstones identified. There is a small amount pericholecystic fluid. There is minimal free fluid along the anterior and inferior margin of the liver. There is a cholecystotomy tube identified in the subcutaneous fat in the right anterior upper abdomen although it is difficult to follow the catheter all the way into the gallbladder. Exact position is uncertain. The common bile duct is normal in caliber without choledocholithiasis. There is no intrahepatic biliary dilatation. Limited visualization of the pancreatic duct is unremarkable. There is a trace of fluid in the left upper abdomen but no gross evidence of pancreatitis is otherwise seen. There are no focal intrahepatic or intrasplenic signal abnormalities. Adrenal glands and visualized kidneys are unremarkable.   6/20/2017  1.  There has been partial decompression of the gallbladder with gallstones and minimal pericholecystic fluid identified. 2.  The cholecystotomy tube is not seen in its entirety and cannot be tracked into the gallbladder on MRI. Its exact position is uncertain. 3.  There is no  biliary dilatation or choledocholithiasis.    Us-drain-cholescystomy  6/15/2017  1. Ultrasound guided cholecystostomy with placement of an 8-Sami locking loop catheter.     Echocardiogram Comp W/o Cont  6/15/2017  Transthoracic Echo Report Echocardiography Laboratory CONCLUSIONS No prior study is available for comparison. Left ventricular ejection fraction is visually estimated to be 60%. Grade I diastolic dysfunction. No valvular heart disease. Estimated right ventricular systolic pressure  is 35 mmHg.   FINDINGS Left Ventricle Normal left ventricular size and systolic function. Mild concentric left ventricular hypertrophy. Left ventricular ejection fraction is visually estimated to be 60%. Normal regional wall motion. Grade I diastolic dysfunction. Right Ventricle The right ventricle was normal in size and function. Right Atrium Enlarged right atrium. Left Atrium The left atrium is normal in size. Mitral Valve Structurally normal mitral valve. Aortic Valve Aortic sclerosis without stenosis. Tricuspid Valve Structurally normal tricuspid valve. Mild tricuspid regurgitation. Estimated right ventricular systolic pressure  is 35 mmHg. Pulmonic Valve The pulmonic valve is not well visualized. Trace pulmonic insufficiency. Pericardium Normal pericardium without effusion. Aorta The aortic root is normal. Tho Jones (Electronically Signed) Final Date:     15 Rufina 2017                 17:30      Micro:  BLOOD CULTURE   Date Value Ref Range Status   06/17/2017 No growth after 5 days of incubation.  Final      Results     Procedure Component Value Units Date/Time    BLOOD CULTURE [[id number]]  (Abnormal) Collected:  06/17/17 8042    Order Status:  Completed Specimen Information:  Blood from Peripheral Updated:  06/23/17 1034     Significant Indicator POS (POS)      Source BLD      Site PERIPHERAL      Blood Culture        Result:        Growth detected by Bactec instrument.  06/22/2017  02:06 (A)     Blood Culture  "Lactose fermenting Gram negative rhiannon (A)     Narrative:      CALL  [facility]  ICU tel. ,  CALLED  ICU tel.  06/22/2017, 02:07, RB PERF. RESULTS CALLED TO: RN [phone]  Per Hospital Policy: Only change Specimen Src: to \"Line\" if  specified by physician order.    BLOOD CULTURE [[id number]] Collected:  06/17/17 1810    Order Status:  Completed Specimen Information:  Blood from Peripheral Updated:  06/23/17 0617     Significant Indicator NEG      Source BLD      Site PERIPHERAL      Blood Culture No growth after 5 days of incubation.     Narrative:      Per Hospital Policy: Only change Specimen Src: to \"Line\" if  specified by physician order.    Culture Respiratory W/ GRM STN [[id number]] Collected:  06/16/17 0159    Order Status:  Completed Specimen Information:  Respirate from Sputum Updated:  06/18/17 1220     Gram Stain Result No organisms seen.      Significant Indicator NEG      Source RESP      Site Tracheal Aspirate      Respiratory Culture Rare growth usual upper respiratory tati     Narrative:      Sputum cultures, induced if needed. If not done within the  last 24 hours    BLOOD CULTURE [[id number]]  (Abnormal) Collected:  06/15/17 0532    Order Status:  Completed Specimen Information:  Blood from Peripheral Updated:  06/18/17 0919     Significant Indicator POS (POS)      Source BLD      Site PERIPHERAL      Blood Culture -- (A)      Result:        Blood culture testing and Gram stain, if indicated, are  performed at [facility], [address] [place] [address].  Positive blood cultures are  sent to [facility], [address] [place] [address], for organism identification and  susceptibility testing.  Growth detected by Bactec instrument.  06/16/2017  00:41       Blood Culture -- (A)      Result:        Klebsiella pneumoniae  See previous culture for sensitivity report.      Narrative:      CALL  [facility]  ICU tel. ,  CALLED  ICU tel.  06/16/2017, 00:41, RB PERF. RESULTS " "CALLED TO: Toña RN  Per Hospital Policy: Only change Specimen Src: to \"Line\" if  specified by physician order.    BLOOD CULTURE [[id number]]  (Abnormal)  (Susceptibility) Collected:  06/15/17 0458    Order Status:  Completed Specimen Information:  Blood from Peripheral Updated:  06/18/17 0919     Significant Indicator POS (POS)      Source BLD      Site PERIPHERAL      Blood Culture -- (A)      Result:        Blood culture testing and Gram stain, if indicated, are  performed at [facility], [address] [place] [address].  Positive blood cultures are  sent to [facility], [address] [place] [address], for organism identification and  susceptibility testing.  Growth detected by Bactec instrument.  06/16/2017  00:40       Blood Culture Klebsiella pneumoniae (A)     Narrative:      CALL  [facility]  ICU tel. ,  CALLED  ICU tel.  06/16/2017, 00:40, RB PERF. RESULTS CALLED TO: [id number], RN  Per Hospital Policy: Only change Specimen Src: to \"Line\" if  specified by physician order.    Culture & Susceptibility     KLEBSIELLA PNEUMONIAE     Antibiotic Sensitivity Microscan Unit Status    Ampicillin Resistant >16 mcg/mL Final    Cefepime Sensitive <=8 mcg/mL Final    Cefotaxime Sensitive <=2 mcg/mL Final    Cefotetan Sensitive <=16 mcg/mL Final    Ceftazidime Sensitive <=1 mcg/mL Final    Ceftriaxone Sensitive <=8 mcg/mL Final    Cefuroxime Sensitive <=4 mcg/mL Final    Ciprofloxacin Sensitive <=1 mcg/mL Final    Ertapenem Sensitive <=1 mcg/mL Final    Gentamicin Sensitive <=4 mcg/mL Final    Pip/Tazobactam Sensitive <=16 mcg/mL Final    Tigecycline Sensitive <=2 mcg/mL Final    Tobramycin Sensitive <=4 mcg/mL Final    Trimeth/Sulfa Sensitive <=2/38 mcg/mL Final                       FLUID CULTURE W/GRAM STAIN [[id number]]  (Abnormal)  (Susceptibility) Collected:  06/15/17 1400    Order Status:  Completed Specimen Information:  Body Fluid from Bile Fluid Updated:  06/17/17 1046     " Gram Stain Result -- (A)      Result:        Moderate Gram positive rods.  Moderate Gram negative rods.       Significant Indicator POS (POS)      Source BF      Site BILE FLUID      Culture Result Bdf -- (A)      Culture Result Bdf -- (A)      Result:        Klebsiella pneumoniae  Moderate growth      Narrative:      Collected By:WILLY JESSICA    Culture & Susceptibility     KLEBSIELLA PNEUMONIAE     Antibiotic Sensitivity Microscan Unit Status    Ampicillin Resistant >16 mcg/mL Final    Cefepime Sensitive <=8 mcg/mL Final    Cefotaxime Sensitive <=2 mcg/mL Final    Cefotetan Sensitive <=16 mcg/mL Final    Ceftazidime Sensitive <=1 mcg/mL Final    Ceftriaxone Sensitive <=8 mcg/mL Final    Cefuroxime Sensitive <=4 mcg/mL Final    Ciprofloxacin Sensitive <=1 mcg/mL Final    Ertapenem Sensitive <=1 mcg/mL Final    Gentamicin Sensitive <=4 mcg/mL Final    Pip/Tazobactam Sensitive <=16 mcg/mL Final    Tigecycline Sensitive <=2 mcg/mL Final    Tobramycin Sensitive <=4 mcg/mL Final    Trimeth/Sulfa Sensitive <=2/38 mcg/mL Final                       URINE CULTURE(NEW) [[id number]] Collected:  06/14/17 2115    Order Status:  Completed Specimen Information:  Urine Updated:  06/17/17 0836     Significant Indicator NEG      Source UR      Site --      Urine Culture No growth at 48 hours     Narrative:      Indication for culture:->Emergency Room Patient    BLOOD CULTURE [[id number]]  (Abnormal) Collected:  06/14/17 1820    Order Status:  Completed Specimen Information:  Blood from Peripheral Updated:  06/17/17 0722     Significant Indicator POS (POS)      Source BLD      Site Peripheral      Blood Culture -- (A)      Result:        Blood culture testing and Gram stain, if indicated, are  performed at [facility], [address] [place] [address].  Positive blood cultures are  sent to [facility], [address] [place] [address], for organism identification  "and  susceptibility testing.  Growth detected by Bactec instrument.  06/15/2017  03:54       Blood Culture -- (A)      Result:        Klebsiella oxytoca  See previous culture for sensitivity report.      Narrative:      CALL  [facility]  ICU tel. ,  CALLED  ICU tel.  06/15/2017, 03:54, RB PERF. RESULTS CALLED TO: [phone] RN  Per Hospital Policy: Only change Specimen Src: to \"Line\" if  specified by physician order.    BLOOD CULTURE [[id number]]  (Abnormal)  (Susceptibility) Collected:  06/14/17 1830    Order Status:  Completed Specimen Information:  Blood from Peripheral Updated:  06/17/17 0721     Significant Indicator POS (POS)      Source BLD      Site Peripheral      Blood Culture -- (A)      Result:        Growth detected by Bactec instrument.  06/15/2017  03:50  Blood culture testing and Gram stain, if indicated, are  performed at [facility], [address] [place] [address].  Positive blood cultures are  sent to [facility], [address] [place] [address], for organism identification and  susceptibility testing.       Blood Culture Klebsiella oxytoca (A)     Narrative:      CALL  [facility]  ICU tel. ,  CALLED  ICU tel.  06/15/2017, 03:53, RB PERF. RESULTS CALLED TO:[phone] RN  Per Hospital Policy: Only change Specimen Src: to \"Line\" if  specified by physician order.    Culture & Susceptibility     KLEBSIELLA OXYTOCA     Antibiotic Sensitivity Microscan Unit Status    Ampicillin Resistant >16 mcg/mL Final    Cefepime Sensitive <=8 mcg/mL Final    Cefotaxime Sensitive <=2 mcg/mL Final    Cefotetan Sensitive <=16 mcg/mL Final    Ceftazidime Sensitive <=1 mcg/mL Final    Ceftriaxone Sensitive <=8 mcg/mL Final    Cefuroxime Resistant >16 mcg/mL Final    Ciprofloxacin Sensitive <=1 mcg/mL Final    Ertapenem Sensitive <=1 mcg/mL Final    Gentamicin Sensitive <=4 mcg/mL Final    Pip/Tazobactam Sensitive <=16 mcg/mL Final    Tigecycline Sensitive <=2 mcg/mL Final    Tobramycin Sensitive " <=4 mcg/mL Final    Trimeth/Sulfa Sensitive <=2/38 mcg/mL Final                       GRAM STAIN [[id number]] Collected:  06/16/17 0159    Order Status:  Completed Specimen Information:  Respirate Updated:  06/16/17 1620     Significant Indicator .      Source RESP      Site Tracheal Aspirate      Gram Stain Result No organisms seen.     Narrative:      Sputum cultures, induced if needed. 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

## 2017-06-23 NOTE — FLOWSHEET NOTE
06/23/17 1055   Incentive Spirometry Group   Breathing Exercises Yes   Incentive Spirometer Volume 1500 mL  (1700 max)   Chest Exam   Respiration 16   Pulse 84   Heart Rate (Monitored) 83   Breath Sounds   RUL Breath Sounds Clear   RML Breath Sounds Clear   RLL Breath Sounds Diminished   OZZY Breath Sounds Clear   LLL Breath Sounds Diminished   Oxygen   Pulse Oximetry 94 %   O2 (LPM) 105   O2 Daily Delivery Respiratory  Silicone Nasal Cannula

## 2017-06-23 NOTE — THERAPY
"Occupational Therapy Evaluation completed.   Functional Status:  Pt able to reach hand to mouth with B arms, R more precise and consistently than L.  BUE with dependent edema hindering AROM efforts.  Pt able to sit up to EOB with modA x2, stood approx 1 min with modA x2 hand held assist, and worked on posture and EOB sitting for 10 min.  AAROM to BUE in semi upright, and pt using arms propped for sitting balance.  MaxA x2 return to supine.  Pt awake, conversant, making attempts at jokes, but inapprop at times.  Perseverating on wanting some tools to exercise with.  Worked with pt on self feeding ice chips with hand over hand technique and modA.  Educated pt and spouse on how to work together on self feeding for at least 4- 5 bites before family or nursing takes over feeding.  Pt given built up  to help him better manage spoon/silverware, and both yellow and red slow-foam resistance foam cubes for hand exercises.  Pt and spouse given verbal education for hand exercises, keeping arms elevated, and AAROM to BUE with help from spouse.  Pt and spouse in agreement with plan.  If edema still an issue next week after exercise,  AAROM and elevation this weekend, OT will explore options for gentle compression for BUE to help with edema mgmt.  Plan of Care: Will benefit from Occupational Therapy 3 times per week  Discharge Recommendations:  Equipment: Will Continue to Assess for Equipment Needs. Post-acute therapy Discharge to a transitional care facility for continued skilled therapy services vs home health depending on progress made here.    See \"Rehab Therapy-Acute\" Patient Summary Report for complete documentation.    "

## 2017-06-23 NOTE — CARE PLAN
Problem: Safety  Goal: Will remain free from injury  Intervention: Provide assistance with mobility  Patient will remain injury free throughout mobilization.

## 2017-06-23 NOTE — PROGRESS NOTES
Hand off report received from NISHANT Valdovinos. Pt care assumed at this time. Plan of care, active orders and medications reviewed. I will continue to monitor.

## 2017-06-24 LAB
ALBUMIN SERPL BCP-MCNC: 1.8 G/DL (ref 3.2–4.9)
ALBUMIN/GLOB SERPL: 0.6 G/DL
ALP SERPL-CCNC: 94 U/L (ref 30–99)
ALT SERPL-CCNC: 42 U/L (ref 2–50)
ANION GAP SERPL CALC-SCNC: 7 MMOL/L (ref 0–11.9)
AST SERPL-CCNC: 37 U/L (ref 12–45)
BACTERIA BLD CULT: ABNORMAL
BACTERIA BLD CULT: ABNORMAL
BILIRUB SERPL-MCNC: 1.3 MG/DL (ref 0.1–1.5)
BUN SERPL-MCNC: 12 MG/DL (ref 8–22)
CALCIUM SERPL-MCNC: 7.6 MG/DL (ref 8.4–10.2)
CHLORIDE SERPL-SCNC: 110 MMOL/L (ref 96–112)
CO2 SERPL-SCNC: 26 MMOL/L (ref 20–33)
CREAT SERPL-MCNC: 0.68 MG/DL (ref 0.5–1.4)
ERYTHROCYTE [DISTWIDTH] IN BLOOD BY AUTOMATED COUNT: 48.7 FL (ref 35.9–50)
GFR SERPL CREATININE-BSD FRML MDRD: >60 ML/MIN/1.73 M 2
GLOBULIN SER CALC-MCNC: 3.1 G/DL (ref 1.9–3.5)
GLUCOSE BLD-MCNC: 125 MG/DL (ref 65–99)
GLUCOSE BLD-MCNC: 129 MG/DL (ref 65–99)
GLUCOSE BLD-MCNC: 131 MG/DL (ref 65–99)
GLUCOSE BLD-MCNC: 138 MG/DL (ref 65–99)
GLUCOSE SERPL-MCNC: 135 MG/DL (ref 65–99)
HCT VFR BLD AUTO: 33.3 % (ref 42–52)
HGB BLD-MCNC: 11.2 G/DL (ref 14–18)
MCH RBC QN AUTO: 32.6 PG (ref 27–33)
MCHC RBC AUTO-ENTMCNC: 33.6 G/DL (ref 33.7–35.3)
MCV RBC AUTO: 96.8 FL (ref 81.4–97.8)
PLATELET # BLD AUTO: 262 K/UL (ref 164–446)
PMV BLD AUTO: 11.2 FL (ref 9–12.9)
POTASSIUM SERPL-SCNC: 3 MMOL/L (ref 3.6–5.5)
PROT SERPL-MCNC: 4.9 G/DL (ref 6–8.2)
RBC # BLD AUTO: 3.44 M/UL (ref 4.7–6.1)
SIGNIFICANT IND 70042: ABNORMAL
SITE SITE: ABNORMAL
SODIUM SERPL-SCNC: 143 MMOL/L (ref 135–145)
SOURCE SOURCE: ABNORMAL
WBC # BLD AUTO: 11.7 K/UL (ref 4.8–10.8)

## 2017-06-24 PROCEDURE — 99233 SBSQ HOSP IP/OBS HIGH 50: CPT | Performed by: HOSPITALIST

## 2017-06-24 PROCEDURE — 85027 COMPLETE CBC AUTOMATED: CPT

## 2017-06-24 PROCEDURE — 700101 HCHG RX REV CODE 250: Performed by: INTERNAL MEDICINE

## 2017-06-24 PROCEDURE — 700111 HCHG RX REV CODE 636 W/ 250 OVERRIDE (IP): Performed by: HOSPITALIST

## 2017-06-24 PROCEDURE — A9270 NON-COVERED ITEM OR SERVICE: HCPCS | Performed by: HOSPITALIST

## 2017-06-24 PROCEDURE — 700102 HCHG RX REV CODE 250 W/ 637 OVERRIDE(OP): Performed by: HOSPITALIST

## 2017-06-24 PROCEDURE — 80053 COMPREHEN METABOLIC PANEL: CPT

## 2017-06-24 PROCEDURE — 700105 HCHG RX REV CODE 258: Performed by: HOSPITALIST

## 2017-06-24 PROCEDURE — 770020 HCHG ROOM/CARE - TELE (206)

## 2017-06-24 PROCEDURE — 700111 HCHG RX REV CODE 636 W/ 250 OVERRIDE (IP): Performed by: INTERNAL MEDICINE

## 2017-06-24 PROCEDURE — 82962 GLUCOSE BLOOD TEST: CPT

## 2017-06-24 RX ORDER — POTASSIUM CHLORIDE 7.45 MG/ML
10 INJECTION INTRAVENOUS
Status: DISCONTINUED | OUTPATIENT
Start: 2017-06-24 | End: 2017-06-24

## 2017-06-24 RX ORDER — HYDROCHLOROTHIAZIDE 25 MG/1
25 TABLET ORAL
Status: DISCONTINUED | OUTPATIENT
Start: 2017-06-24 | End: 2017-06-28 | Stop reason: HOSPADM

## 2017-06-24 RX ORDER — POTASSIUM CHLORIDE 29.8 MG/ML
40 INJECTION INTRAVENOUS ONCE
Status: COMPLETED | OUTPATIENT
Start: 2017-06-24 | End: 2017-06-24

## 2017-06-24 RX ORDER — ENALAPRILAT 1.25 MG/ML
1.25 INJECTION INTRAVENOUS EVERY 4 HOURS PRN
Status: DISCONTINUED | OUTPATIENT
Start: 2017-06-24 | End: 2017-06-28 | Stop reason: HOSPADM

## 2017-06-24 RX ADMIN — ENALAPRILAT 1.25 MG: 1.25 INJECTION, SOLUTION INTRAVENOUS at 23:37

## 2017-06-24 RX ADMIN — PIPERACILLIN AND TAZOBACTAM 3.38 G: 3; .375 INJECTION, POWDER, FOR SOLUTION INTRAVENOUS at 08:31

## 2017-06-24 RX ADMIN — HYDROCHLOROTHIAZIDE 25 MG: 25 TABLET ORAL at 10:48

## 2017-06-24 RX ADMIN — DIGOXIN 125 MCG: 250 INJECTION, SOLUTION INTRAMUSCULAR; INTRAVENOUS; PARENTERAL at 17:46

## 2017-06-24 RX ADMIN — ACETAMINOPHEN 650 MG: 325 TABLET, FILM COATED ORAL at 23:27

## 2017-06-24 RX ADMIN — POTASSIUM CHLORIDE 40 MEQ: 400 INJECTION, SOLUTION INTRAVENOUS at 11:17

## 2017-06-24 RX ADMIN — ENALAPRILAT 1.25 MG: 1.25 INJECTION, SOLUTION INTRAVENOUS at 05:23

## 2017-06-24 RX ADMIN — PIPERACILLIN AND TAZOBACTAM 3.38 G: 3; .375 INJECTION, POWDER, FOR SOLUTION INTRAVENOUS at 03:11

## 2017-06-24 RX ADMIN — LISINOPRIL 20 MG: 20 TABLET ORAL at 08:31

## 2017-06-24 RX ADMIN — AMLODIPINE BESYLATE 10 MG: 5 TABLET ORAL at 08:30

## 2017-06-24 RX ADMIN — FAMOTIDINE 20 MG: 10 INJECTION, SOLUTION INTRAVENOUS at 08:31

## 2017-06-24 RX ADMIN — LABETALOL HYDROCHLORIDE 10 MG: 5 INJECTION, SOLUTION INTRAVENOUS at 03:18

## 2017-06-24 RX ADMIN — PIPERACILLIN AND TAZOBACTAM 3.38 G: 3; .375 INJECTION, POWDER, FOR SOLUTION INTRAVENOUS at 20:53

## 2017-06-24 RX ADMIN — PIPERACILLIN AND TAZOBACTAM 3.38 G: 3; .375 INJECTION, POWDER, FOR SOLUTION INTRAVENOUS at 15:19

## 2017-06-24 RX ADMIN — LATANOPROST 1 DROP: 50 SOLUTION OPHTHALMIC at 20:53

## 2017-06-24 RX ADMIN — ENALAPRILAT 1.25 MG: 1.25 INJECTION, SOLUTION INTRAVENOUS at 01:21

## 2017-06-24 ASSESSMENT — PAIN SCALES - GENERAL
PAINLEVEL_OUTOF10: 0
PAINLEVEL_OUTOF10: 0
PAINLEVEL_OUTOF10: 3
PAINLEVEL_OUTOF10: 0
PAINLEVEL_OUTOF10: 5
PAINLEVEL_OUTOF10: 0
PAINLEVEL_OUTOF10: 0

## 2017-06-24 ASSESSMENT — ENCOUNTER SYMPTOMS
BACK PAIN: 0
DIARRHEA: 0
CHILLS: 0
DIZZINESS: 0
SHORTNESS OF BREATH: 0
VOMITING: 0
LOSS OF CONSCIOUSNESS: 0
HEADACHES: 0
FEVER: 0
NAUSEA: 0
ABDOMINAL PAIN: 0
COUGH: 0

## 2017-06-24 ASSESSMENT — COPD QUESTIONNAIRES
DURING THE PAST 4 WEEKS HOW MUCH DID YOU FEEL SHORT OF BREATH: NONE/LITTLE OF THE TIME
HAVE YOU SMOKED AT LEAST 100 CIGARETTES IN YOUR ENTIRE LIFE: YES
DO YOU EVER COUGH UP ANY MUCUS OR PHLEGM?: NO/ONLY WITH OCCASIONAL COLDS OR INFECTIONS
COPD SCREENING SCORE: 4

## 2017-06-24 NOTE — CARE PLAN
Problem: Knowledge Deficit  Goal: Knowledge of disease process/condition, treatment plan, diagnostic tests, and medications will improve  POC discussed with patient and wife. All questions answered. Patient and wife verbalized understanding.     Problem: Pain Management  Goal: Pain level will decrease to patient’s comfort goal  Patient denies any pain at this time, RN will continue to monitor.

## 2017-06-24 NOTE — PROGRESS NOTES
Tele note:  Sinus rhythm; freq PVC/PAC/bigem  Episode of SVT up to 150's @ 1640  .16/ .08/ .40     Interpretation of tele strip only. Assessment and monitoring done by primary RN.

## 2017-06-24 NOTE — PROGRESS NOTES
Pt stand pivot from bed to commode with x2 assist. Pt had large, soft, BM. Pt back to bed safely, all lines/drains intact. Wife at bedside. Will continue with care.

## 2017-06-24 NOTE — PROGRESS NOTES
Report received from Nick ICU RN. Pt transferred from ICU to [facility] room 214-1 in bed, all belongings with pt, pt's wife at bedside. Pt A&Ox4, sitting up in bed. No c/o pain, N/V, or SOB at this time. DOMINGO drain to RUQ abdomen in place, dressing CDI, drain hooked up to low intermittent suction. Laird catheter in place, draining well. SCDs on pt. RIJ in place, potassium infusing from ICU. VSS at this time. Pt taught to inform nurse if experiencing pain above comfort goal, SOB, chest pain, ambulating out of bed, or for any further assistance. Fall precautions in place, call light within reach. Will continue with care.

## 2017-06-24 NOTE — PROGRESS NOTES
"[facility] Hospitalist Progress Note    Date of Service: 2017    Chief Complaint    76 y.o. male admitted 2017 with abdominal pain and SOB, fever, chills, diaphoresis, syncope x4.  CT abdomen revealed acute cholecystitis with lactic acid of 8 admitted to ICU on zosyn IV, sepsis protocol initiated.    Interval Problem Update  Extubated   Remains in sinus  Up to bedside and stood briefly yesterday  States he feels \"good\", has no complaints today    Consultants/Specialty  General Surgery Dr Last  Pulmonology Dr Reyna  Cardiology Dr Chua    Disposition  Transfer out of unit today  Plan several more days to recover, Lap brook per Dr Last        Review of Systems   Constitutional: Negative for fever and chills.   Respiratory: Negative for cough and shortness of breath.    Cardiovascular: Negative for chest pain.   Gastrointestinal: Negative for nausea, vomiting, abdominal pain and diarrhea.   Musculoskeletal: Negative for back pain.   Skin: Negative for rash.   Neurological: Negative for dizziness, loss of consciousness and headaches.      Physical Exam  Laboratory/Imaging   Hemodynamics  No data recorded.   Monitored Temp: 37.1 °C (98.8 °F)  Pulse  Av.7  Min: 30  Max: 134 Heart Rate (Monitored): 80  Blood Pressure : (!) 190/89 mmHg, NIBP: (!) 182/89 mmHg     Respiratory      Respiration: 19, Pulse Oximetry: 98 %, O2 Daily Delivery Respiratory : Silicone Nasal Cannula        RUL Breath Sounds: Clear, RML Breath Sounds: Clear, RLL Breath Sounds: Diminished, OZZY Breath Sounds: Clear, LLL Breath Sounds: Diminished    Fluids    Intake/Output Summary (Last 24 hours) at 17 1004  Last data filed at 17 0800   Gross per 24 hour   Intake   3125 ml   Output   1925 ml   Net   1200 ml       Nutrition  Orders Placed This Encounter   Procedures   • DIET ORDER     Standing Status: Standing      Number of Occurrences: 1      Standing Expiration Date:      Order Specific Question:  Diet:     Answer:  " Regular [1]     Order Specific Question:  Macronutrient modifications:     Answer:  Low Fat [5]     Order Specific Question:  Texture/Fiber modifications:     Answer:  Dysphagia 2(Pureed/Chopped)specify fluid consistency(question 6) [2]     Order Specific Question:  Consistency/Fluid modifications:     Answer:  Thin Liquids [3]     Order Specific Question:  Miscellaneous modifications:     Answer:  SLP - 1:1 Supervision by Nursing [21]      Comments:  No Straws     Order Specific Question:  Miscellaneous modifications:     Answer:  SLP - Comments [24]     Order Specific Question:  Miscellaneous modifications:     Answer:  Vegetarian [13]     Physical Exam   Constitutional: He is oriented to person, place, and time. He appears well-developed and well-nourished. No distress.   HENT:   Head: Normocephalic and atraumatic.   Eyes: Conjunctivae are normal.   Neck: No JVD present.   Cardiovascular: Normal rate.  Exam reveals no gallop.    No murmur heard.  Pulmonary/Chest: Effort normal. No stridor. No respiratory distress. He has no wheezes. He has no rales.   Abdominal: Soft. There is no tenderness. There is no rebound and no guarding.   Musculoskeletal: He exhibits edema.   Neurological: He is oriented to person, place, and time.   Mildly confused  Oriented to place and month.  Confused about recent events  Non focal   Skin: Skin is warm and dry. No rash noted. He is not diaphoretic.   Psychiatric: He has a normal mood and affect. Thought content normal.   Nursing note and vitals reviewed.      Recent Labs      06/22/17   0135  06/23/17   0255  06/24/17   0400   WBC  10.9*  10.6  11.7*   RBC  3.83*  3.63*  3.44*   HEMOGLOBIN  12.4*  11.6*  11.2*   HEMATOCRIT  37.2*  34.8*  33.3*   MCV  97.1  95.9  96.8   MCH  32.4  32.0  32.6   MCHC  33.3*  33.3*  33.6*   RDW  48.8  49.1  48.7   PLATELETCT  177  227  262   MPV  11.6  11.0  11.2     Recent Labs      06/22/17   0135  06/23/17   0255  06/24/17   0400   SODIUM  146*  149*   143   POTASSIUM  3.5*  3.4*  3.0*   CHLORIDE  117*  117*  110   CO2  21  26  26   GLUCOSE  140*  137*  135*   BUN  23*  14  12   CREATININE  0.72  0.72  0.68   CALCIUM  7.7*  7.7*  7.6*                      Assessment/Plan     * Sepsis (CMS-[facility]) (present on admission)  Assessment & Plan  Source:  Infected emphysematous cholecystitis  IVFs  Zosyn  6/14 BC+x2 Klebsiella oxytoca ss zosyn IV  Plan 2 wks total course of Zosyn from date of neg cultures 6/17  Remains stable off pressors      Non-STEMI (non-ST elevated myocardial infarction) (CMS-HCC) (present on admission)  Assessment & Plan  Dr. Chua consulted:  No intervention  Cardiac strain due to sepsis  trops trending down  No wall motion abnormalities on echo  Preserved LVEF, Gd I DHF    Acute respiratory failure with hypoxia (CMS-HCC) (present on admission)  Assessment & Plan  6/15 requiring ET intubation for acute wheeze, acidosis due to sepsis.  Extubated 6/21  Tolerating well  Pulmonology following  Mobilizing      Metabolic acidosis (present on admission)  Assessment & Plan  2/2 severe sepsis  resolved      Acute kidney injury (nontraumatic) (CMS-HCC) (present on admission)  Assessment & Plan  New onset since admission   2/2 sepsis related renal failure with poor perfusion  Resolved  Cont to follow daily    Bacteremia (present on admission)  Assessment & Plan  BC 6/14 x2 with Klebsiella ss zosyn   6/15 BCs x2 +LFGNR  Source:  Acute cholecystitis  Zosyn IV as above  6/17 repeat BCs x2 negative  Off pressors  ID following    Acute emphysematous cholecystitis (present on admission)  Assessment & Plan  6/15 u/s cholecystostomy completed  Culture fluid:  Klebsiella pneumonia ss zosyn IV  BCs with Klebsiella  Repeat CT abdomen:  No perforation  MRCP no CBD stone  Surgery Dr Last following: plan OR once pt has stabilized    Hypotension (arterial) (present on admission)  Assessment & Plan  Secondary to sepsis  resolved    Thrombocytopenia (CMS-[facility]) (present on  admission)  Assessment & Plan  Etiology 2/2 severe sepsis, no known heavy alcohol use.  Now >100  Cont to follow    Hypokalemia due to inadequate potassium intake (present on admission)  Assessment & Plan  Follow K and Mg daily  Replace as needed    Atrial fibrillation with RVR (CMS-[facility])  Assessment & Plan  Secondary to acute sepsis and pressors  NO AC per Card's  Now in sinus  cont's on dig    Hypertension  Assessment & Plan  lisinopril to 20  amlodipine to 10  Add HCTZ      Medications reviewed, EKG reviewed, Labs reviewed and Radiology images reviewed  Laird catheter: Unconscious / Sedated Patient on a Ventilator      DVT Prophylaxis: Heparin  DVT prophylaxis - mechanical: SCDs  Ulcer prophylaxis: Yes  Antibiotics: Treating active infection/contamination beyond 24 hours perioperative coverage

## 2017-06-24 NOTE — PROGRESS NOTES
Pulmonary Critical Care Progress        Chief Complaint: Respiratory failure requiring mechanical ventilation.    History of Present Illness:   76 y.o. male was admitted on 6/14 with a chief complaint of abdominal pain, fever and chills. Last felt well 4 days prior to admission. ACT scan of the abdomen showed severe cholecystitis, possible component of emphysematous cholecystitis, an appendicolith, but the appendix otherwise appeared within normal limits. Appendicolith however places patient at future risk for development of appendicitis. Atherosclerosis and atherosclerotic coronary artery disease. Hepatomegaly  He was placed on zosyn, and admitted to the ICU. He developed hypotension, treated with intravenous crystalloid and Levophed. He had increased oxygen requirements which resulted in intubation and mechanical ventilation.    ROS:  unable to perform due to the patient's inability to effectively communicate.    Interval Events:  24 hour interval history reviewed.  Extubated 6/21/17 and comfortable now on a low flow oxygen nasal cannula. He more alert and responds appropriately  Eager for surgery, possibly Monday, resp much better    PFSH:  No change.    Respiratory:     Pulse Oximetry: 97 % on the oxygen nasal cannula at 1.5 L/m.  Axel Pillai 1: 50 ml     Exam: unlabored respirations, no intercostal retractions or accessory muscle use  ImagingAvailable data reviewed. The chest radiogram on June 22 demonstrates stable patchy interstitial densities. No film today.  Recent Labs      06/22/17   0300   [id number]  7.47   [id number]  29.2   [id number]  78.5   [id number]  95.5   ARTHCO3  21   ARTBE  -2       HemoDynamics:  Pulse: 79, Heart Rate (Monitored): 78  Blood Pressure : (!) 183/89 mmHg, NIBP: 158/88 mmHg      Exam: sinus tachycardia. Net diuresis of 1.3 L over the past 24 hours but he is 4.8L positive since admission.  Imaging: Available data reviewed. The echocardiogram on Rufina 15, 2017 demonstrated left  ventricular systolic ejection fraction of 60% with grade 1 diastolic dysfunction and an estimated right ventricular systolic pressure of 35 mmHg.    Neuro:  GCS Total [place] Coma Score: 15  Exam: no focal deficits noted, responds appropriately to simple questions and commands.   Imaging: None - Reviewed    Fluids:  Intake/Output       06/22/17 0700 - 06/23/17 0659 06/23/17 0700 - 06/24/17 0659 06/24/17 0700 - 06/25/17 0659      0700-1859 1900-0659 Total 0700-1859 1900-0659 Total 0700-1859 1900-0659 Total       Intake    P.O.  400  100 500  1450  1250 2700  --  -- --    P.O. 400  1250 2700 -- -- --    I.V.  310  320 630  310  310 620  --  -- --    IV Volume (NS @ 100 ml/hr) 110 120 230 110 110 220 -- -- --    IV Piggyback Volume 200 200 400 200 200 400 -- -- --    Other  --  -- --  --  20 20  --  -- --    Flush / Irrigation Volume (DOMINGO) -- -- -- -- 20 20 -- -- --    Enteral  30  -- 30  --  -- --  --  -- --    Free Water / Tube Flush 30 -- 30 -- -- -- -- -- --    Total Intake  1760 1580 3340 -- -- --       Output    Urine  885  955 1840  1350  785 2135  --  -- --    Indwelling Cathether  1845 514 0391 -- -- --    Drains  350  230 580  75  50 125  --  -- --    Nasogastric Tube 200 -- 200 -- -- -- -- -- --    Axel Pillai 1 150 230 380 75 50 125 -- -- --    Stool  --  -- --  --  -- --  --  -- --    Number of Times Stooled 1 x 1 x 2 x 1 x 1 x 2 x -- -- --    Total Output 1235 1185 2420 2696 899 5582 -- -- --       Net I/O     -495 -041 -8792  -- -- --           Recent Labs      06/22/17   0135  06/23/17   0255  06/24/17   0400   SODIUM  146*  149*  143   POTASSIUM  3.5*  3.4*  3.0*   CHLORIDE  117*  117*  110   CO2  21  26  26   BUN  23*  14  12   CREATININE  0.72  0.72  0.68   CALCIUM  7.7*  7.7*  7.6*       GI/Nutrition:  Exam: patient sedated  Imaging: Available data reviewed  NPO, starting dysphagia diet after swallowing evaluation.  Liver Function  Recent Labs       17   0255  17   0400   ALTSGPT  60*  55*  42   ASTSGOT  50*  49*  37   ALKPHOSPHAT  109*  106*  94   TBILIRUBIN  2.0*  1.4  1.3   GLUCOSE  140*  137*  135*       Heme:  Recent Labs      17   0255  17   0400   RBC  3.83*  3.63*  3.44*   HEMOGLOBIN  12.4*  11.6*  11.2*   HEMATOCRIT  37.2*  34.8*  33.3*   PLATELETCT  177  227  262       Infectious Disease:  Monitored Temp  Av.2 °C (98.9 °F)  Min: 36.7 °C (98.1 °F)  Max: 37.4 °C (99.3 °F)  Micro: antibiotics reviewed, cultures reviewed and reviewed. Sputum cultures are negative but the blood and bile fluid cultures yielded growth of Klebsiella. He remains on Zosyn per infectious disease consultation.  Recent Labs      175  17   0400   WBC  10.9*  10.6  11.7*   ASTSGOT  50*  49*  37   ALTSGPT  60*  55*  42   ALKPHOSPHAT  109*  106*  94   TBILIRUBIN  2.0*  1.4  1.3     Current Facility-Administered Medications   Medication Dose Frequency Provider Last Rate Last Dose   • enalaprilat (VASOTEC) injection 1.25 mg  1.25 mg Q4HRS PRN Dennis Guerra M.D.   1.25 mg at 17 0523   • amlodipine (NORVASC) tablet 10 mg  10 mg Q DAY George De La Cruz, D.O.   10 mg at 17 0900   • lisinopril (PRINIVIL) tablet 20 mg  20 mg Q DAY George De La Cruz D.O.   20 mg at 17 0901   • oxycodone immediate-release (ROXICODONE) tablet 5 mg  5 mg Q4HRS PRN YAIMA Short.O.        Or   • oxycodone immediate release (ROXICODONE) tablet 10 mg  10 mg Q4HRS PRN George De La Cruz D.O.       • artificial tears 1.4 % ophthalmic solution 1 Drop  1 Drop PRN George De La Cruz D.O.       • labetalol (NORMODYNE,TRANDATE) injection 10 mg  10 mg Q4HRS PRN Dennis Guerra M.D.   10 mg at 17 0318   • latanoprost (XALATAN) 0.005 % ophthalmic solution 1 Drop  1 Drop Nightly PETRONA ShortO.   1 Drop at 17 2100   • digoxin (LANOXIN)  injection 125 mcg  125 mcg DAILY AT 1800 George De La Cruz D.O.   125 mcg at 06/23/17 1753   • insulin lispro (HUMALOG) injection 1-6 Units  1-6 Units Q6HRS Che Nash M.D.   Stopped at 06/23/17 1800   • famotidine (PEPCID) injection 20 mg  20 mg DAILY Che Nash M.D.   20 mg at 06/23/17 0901   • glucose 4 g chewable tablet 16 g  16 g Q15 MIN PRN Che Nash M.D.        And   • dextrose 50% (D50W) injection 25 mL  25 mL Q15 MIN PRN Che Nash M.D.       • acetaminophen (TYLENOL) suppository 650 mg  650 mg Q6HRS PRN Che Nash M.D.   650 mg at 06/16/17 1615   • morphine (pf) 4 mg/ml injection 2-4 mg  2-4 mg Q2HRS PRN Che Nash M.D.   4 mg at 06/22/17 2357   • Respiratory Care per Protocol   Continuous RT Jose Valdivia M.D.       • acetaminophen (TYLENOL) tablet 650 mg  650 mg Q6HRS PRN Jose Valdivia M.D.       • piperacillin-tazobactam (ZOSYN) 3.375 g in  mL IVPB  3.375 g Q6HRS Jose Valdivia M.D.   Stopped at 06/24/17 0341   • ondansetron (ZOFRAN) syringe/vial injection 4 mg  4 mg Q4HRS PRN Jose Valdivia M.D.   4 mg at 06/19/17 0406   • ondansetron (ZOFRAN ODT) dispertab 4 mg  4 mg Q4HRS PRN Jose Valdivia M.D.       • lorazepam (ATIVAN) tablet 0.5 mg  0.5 mg Q6HRS PRN Jose Valdivia M.D.        Or   • lorazepam (ATIVAN) injection 0.5 mg  0.5 mg Q6HRS PRN Jose Valdivia M.D.   0.5 mg at 06/15/17 0941     Last reviewed on 6/15/2017  8:22 AM by Ayanna Chaudhari PhT    Quality  Measures:  Radiology images reviewed, Medications reviewed and Labs reviewed                      Problems:  1. Acute respiratory failure, improved and now liberated from mechanical ventilation, comfortable on a low-flow oxygen nasal cannula.  2. Acute cholecystitis, now with biliary drain in place. Klebsiella recovered from blood and bile fluid cultures.  3. Sepsis, related to cholecystitis. Improved.  4. Shock, improved and now off vasopressor.   5. Abnormal chest radiogram. The findings might  suggest extravascular lung water but aspiration is a possibility.  6. Renal insufficiency, nonoliguric, improving by numbers.  7. Mild anemia, stable without evidence of ongoing blood loss.  8. Thrombocytopenia, resolved.  9. Paroxysmal atrial fibrillation, now in sinus rhythm.  10. Systemic arterial hypertension.  11. Encephalopathy, without focal neurologic signs or symptoms, consistent with delirium, improving.      Plan:  Continue titrated supplemental oxygen and respiratory protocols.  Continue antibiotics per infectious disease consultation.  Continue mobilization, physical therapy assistance requested. Nutritional support and prophylaxis.     Eager for surgery, possibly Monday, resp much better

## 2017-06-24 NOTE — PROGRESS NOTES
Infectious Disease Progress Note    Author: Maryse Rivera M.D. Date of service & Time created: 2017  1:28 PM    Chief Complaint:  Chief Complaint   Patient presents with   • Abdominal Pain     abdominal pain since saturday  diaphoretic and weakness  diarrhea yesterday   FU sepsis/cholecystitis    Interval History:  76 year old male admitted with septic shock due to acute severe cholecystitis  - remains on the vent. No fevers. Wbc 9.4   AF WBC 10.9 Denies pain, even around drain site   AF WBC 10.6 one of  blood cxs +. Happy that NGT is out and able to have PO   AF WBC 11 eating some-no complaints-denies pain  Labs Reviewed, Medications Reviewed and Radiology Reviewed.    Review of Systems:  Review of Systems   Constitutional: Negative for fever and chills.   Gastrointestinal: Negative for nausea, vomiting and abdominal pain.   Skin: Negative for rash.   All other systems reviewed and are negative.      Hemodynamics:  Temp (24hrs), Av.1 °C (98.8 °F), Min:37.1 °C (98.8 °F), Max:37.1 °C (98.8 °F)  Temperature: 37.1 °C (98.8 °F), Monitored Temp: 36.9 °C (98.4 °F)  Pulse  Av.7  Min: 30  Max: 134Heart Rate (Monitored): 76  Blood Pressure : 157/69 mmHg, NIBP: 154/81 mmHg      Physical Exam:  Physical Exam   Constitutional: He appears well-developed. No distress.   HENT:   Head: Normocephalic and atraumatic.   Mouth/Throat: No oropharyngeal exudate.   Eyes: EOM are normal. Pupils are equal, round, and reactive to light. No scleral icterus.   Neck: Neck supple.   Right IJ   Cardiovascular: Normal rate.    No murmur heard.  IRR   Pulmonary/Chest: Effort normal. No respiratory distress. He has no wheezes. He has no rales.   Abdominal: Soft. He exhibits no distension. There is no tenderness. There is no rebound.   RUQ drain present   Musculoskeletal: He exhibits edema.   Neurological: He is alert.   Nursing note and vitals reviewed.      Meds:    Current facility-administered medications:    •  enalaprilat (VASOTEC) injection 1.25 mg, 1.25 mg, Intravenous, Q4HRS PRN, Dennis Guerra M.D., 1.25 mg at 06/24/17 0523  •  hydrochlorothiazide (HYDRODIURIL) tablet 25 mg, 25 mg, Oral, Q DAY, George De La Cruz D.O., 25 mg at 06/24/17 1048  •  potassium chloride in water (KCL) ivpb **Administer in ICU only** 40 mEq, 40 mEq, Intravenous, Once, YAIMA Short.O., Last Rate: 25 mL/hr at 06/24/17 1117, 40 mEq at 06/24/17 1117  •  amlodipine (NORVASC) tablet 10 mg, 10 mg, Oral, Q DAY, George De La Cruz D.O., 10 mg at 06/24/17 0830  •  lisinopril (PRINIVIL) tablet 20 mg, 20 mg, Oral, Q DAY, George De La Cruz D.O., 20 mg at 06/24/17 0831  •  oxycodone immediate-release (ROXICODONE) tablet 5 mg, 5 mg, Oral, Q4HRS PRN **OR** oxycodone immediate release (ROXICODONE) tablet 10 mg, 10 mg, Oral, Q4HRS PRN, YAIMA Short.O.  •  artificial tears 1.4 % ophthalmic solution 1 Drop, 1 Drop, Both Eyes, PRN, YAIMA Short.O.  •  labetalol (NORMODYNE,TRANDATE) injection 10 mg, 10 mg, Intravenous, Q4HRS PRN, Dennis Guerra M.D., 10 mg at 06/24/17 0318  •  latanoprost (XALATAN) 0.005 % ophthalmic solution 1 Drop, 1 Drop, Both Eyes, Nightly, YAIMA Short.O., 1 Drop at 06/23/17 2100  •  digoxin (LANOXIN) injection 125 mcg, 125 mcg, Intravenous, DAILY AT 1800, PETRONA ShortO., 125 mcg at 06/23/17 1753  •  insulin lispro (HUMALOG) injection 1-6 Units, 1-6 Units, Subcutaneous, Q6HRS, Che Nash M.D., Stopped at 06/23/17 1800  •  famotidine (PEPCID) injection 20 mg, 20 mg, Intravenous, DAILY, Che Nash M.D., 20 mg at 06/24/17 0831  •  Action is required: Protocol 1073 Hypoglycemia has been implemented, , , Once **AND** Protocol 1073 Inclusion Criteria, , , CONTINUOUS **AND** Protocol 1073 NOTIFY, , , Once **AND** Protocol 1073 Initiate protocol immediately if FSBG is less than or equal to 70 mg/dL, , , CONTINUOUS **AND** glucose 4 g  chewable tablet 16 g, 16 g, Oral, Q15 MIN PRN **AND** dextrose 50% (D50W) injection 25 mL, 25 mL, Intravenous, Q15 MIN PRN, Che Nash M.D.  •  acetaminophen (TYLENOL) suppository 650 mg, 650 mg, Rectal, Q6HRS PRN, Che Nash M.D., 650 mg at 06/16/17 1615  •  morphine (pf) 4 mg/ml injection 2-4 mg, 2-4 mg, Intravenous, Q2HRS PRN, Che Nash M.D., 4 mg at 06/22/17 2357  •  Respiratory Care per Protocol, , Nebulization, Continuous RT, Jose Valdivia M.D.  •  acetaminophen (TYLENOL) tablet 650 mg, 650 mg, Oral, Q6HRS PRN, Jose Valdivia M.D.  •  piperacillin-tazobactam (ZOSYN) 3.375 g in  mL IVPB, 3.375 g, Intravenous, Q6HRS, Jose Valdivia M.D., Stopped at 06/24/17 0901  •  ondansetron (ZOFRAN) syringe/vial injection 4 mg, 4 mg, Intravenous, Q4HRS PRN, Jose Valdivia M.D., 4 mg at 06/19/17 0406  •  ondansetron (ZOFRAN ODT) dispertab 4 mg, 4 mg, Oral, Q4HRS PRN, Jose Valdivia M.D.  •  lorazepam (ATIVAN) tablet 0.5 mg, 0.5 mg, Oral, Q6HRS PRN **OR** lorazepam (ATIVAN) injection 0.5 mg, 0.5 mg, Intravenous, Q6HRS PRN, Jose Valdivia M.D., 0.5 mg at 06/15/17 0941    Labs:  Recent Labs      06/22/17   0135  06/23/17   0255  06/24/17   0400   WBC  10.9*  10.6  11.7*   RBC  3.83*  3.63*  3.44*   HEMOGLOBIN  12.4*  11.6*  11.2*   HEMATOCRIT  37.2*  34.8*  33.3*   MCV  97.1  95.9  96.8   MCH  32.4  32.0  32.6   RDW  48.8  49.1  48.7   PLATELETCT  177  227  262   MPV  11.6  11.0  11.2     Recent Labs      06/22/17   0135  06/23/17   0255  06/24/17   0400   SODIUM  146*  149*  143   POTASSIUM  3.5*  3.4*  3.0*   CHLORIDE  117*  117*  110   CO2  21  26  26   GLUCOSE  140*  137*  135*   BUN  23*  14  12     Recent Labs      06/22/17 0135 06/23/17   0255  06/24/17   0400   ALBUMIN  2.2*  1.9*  1.8*   TBILIRUBIN  2.0*  1.4  1.3   ALKPHOSPHAT  109*  106*  94   TOTPROTEIN  5.8*  5.2*  4.9*   ALTSGPT  60*  55*  42   ASTSGOT  50*  49*  37   CREATININE  0.72  0.72  0.68       Imaging:  Ct-abdomen-pelvis  W/o  6/15/2017  1.  There is continued gallbladder distention with nondependent air in the gallbladder suspicious for possible emphysematous cholecystitis. There is some surrounding stranding inferior to the gallbladder again seen. 2.  There is an appendicolith again seen with periappendiceal stranding and minimal fluid in the right anterior pararenal space and paracolic gutter. 3.  There are small dependent pleural effusions with dependent atelectasis. 4.  Enteric tube extends into the 1st/2nd portion of the duodenum. 5.  There is no evidence of free intraperitoneal air.    Ct-abdomen-pelvis W/o  6/14/2017  1.  Severe cholecystitis, possible component of emphysematous cholecystitis. 2.  Appendicolith, the appendix otherwise appears within normal limits. Appendicolith however places patient at future risk for development of appendicitis. 3.  Atherosclerosis and atherosclerotic coronary artery disease 4.  Hepatomegaly These findings were discussed with the patient's clinician, Poncho Boudreaux, on 6/14/2017 8:07 PM.    Dx-chest-portable (1 View)  6/21/2017  No significant change from prior exam.    Vy-lubeobz-d/o  FINDINGS: Exam limited by patient body habitus and the fact the patient is on a ventilator which causes some motion. The visualized lung bases demonstrate small amount of right pleural fluid with dependent airspace disease bilaterally. The gallbladder is decompressed in comparison to the prior exam with multiple gallstones identified. There is a small amount pericholecystic fluid. There is minimal free fluid along the anterior and inferior margin of the liver. There is a cholecystotomy tube identified in the subcutaneous fat in the right anterior upper abdomen although it is difficult to follow the catheter all the way into the gallbladder. Exact position is uncertain. The common bile duct is normal in caliber without choledocholithiasis. There is no intrahepatic biliary dilatation. Limited visualization of the  pancreatic duct is unremarkable. There is a trace of fluid in the left upper abdomen but no gross evidence of pancreatitis is otherwise seen. There are no focal intrahepatic or intrasplenic signal abnormalities. Adrenal glands and visualized kidneys are unremarkable.   6/20/2017  1.  There has been partial decompression of the gallbladder with gallstones and minimal pericholecystic fluid identified. 2.  The cholecystotomy tube is not seen in its entirety and cannot be tracked into the gallbladder on MRI. Its exact position is uncertain. 3.  There is no biliary dilatation or choledocholithiasis.    Us-drain-cholescystomy  6/15/2017  1. Ultrasound guided cholecystostomy with placement of an 8-Ukrainian locking loop catheter.     Echocardiogram Comp W/o Cont  6/15/2017  Transthoracic Echo Report Echocardiography Laboratory CONCLUSIONS No prior study is available for comparison. Left ventricular ejection fraction is visually estimated to be 60%. Grade I diastolic dysfunction. No valvular heart disease. Estimated right ventricular systolic pressure  is 35 mmHg.   FINDINGS Left Ventricle Normal left ventricular size and systolic function. Mild concentric left ventricular hypertrophy. Left ventricular ejection fraction is visually estimated to be 60%. Normal regional wall motion. Grade I diastolic dysfunction. Right Ventricle The right ventricle was normal in size and function. Right Atrium Enlarged right atrium. Left Atrium The left atrium is normal in size. Mitral Valve Structurally normal mitral valve. Aortic Valve Aortic sclerosis without stenosis. Tricuspid Valve Structurally normal tricuspid valve. Mild tricuspid regurgitation. Estimated right ventricular systolic pressure  is 35 mmHg. Pulmonic Valve The pulmonic valve is not well visualized. Trace pulmonic insufficiency. Pericardium Normal pericardium without effusion. Aorta The aortic root is normal. Tho Jones (Electronically Signed) Final Date:     15 Rufina  "2017                 17:30      Micro:  BLOOD CULTURE   Date Value Ref Range Status   06/23/2017   Preliminary    No Growth    Note: Blood cultures are incubated for 5 days and  are monitored continuously.Positive blood cultures  are called to the RN and reported as soon as  they are identified.  Blood culture testing and Gram stain, if indicated, are  performed at [facility], [address] [place] [address].  Positive blood cultures are  sent to [facility], [address] [place] [address], for organism identification and  susceptibility testing.        Results     Procedure Component Value Units Date/Time    BLOOD CULTURE [[id number]]  (Abnormal)  (Susceptibility) Collected:  06/17/17 1750    Order Status:  Completed Specimen Information:  Blood from Peripheral Updated:  06/24/17 0958     Significant Indicator POS (POS)      Source BLD      Site PERIPHERAL      Blood Culture        Result:        Growth detected by Bactec instrument.  06/22/2017  02:06 (A)     Blood Culture Klebsiella pneumoniae (A)     Narrative:      CALL  [facility]  ICU tel. ,  CALLED  ICU tel.  06/22/2017, 02:07, RB PERF. RESULTS CALLED TO: RN [id number]  Per Hospital Policy: Only change Specimen Src: to \"Line\" if  specified by physician order.    Culture & Susceptibility     KLEBSIELLA PNEUMONIAE     Antibiotic Sensitivity Microscan Unit Status    Ampicillin Intermediate 16 mcg/mL Final    Cefepime Sensitive <=8 mcg/mL Final    Cefotaxime Sensitive <=2 mcg/mL Final    Cefotetan Sensitive <=16 mcg/mL Final    Ceftazidime Sensitive <=1 mcg/mL Final    Ceftriaxone Sensitive <=8 mcg/mL Final    Cefuroxime Sensitive <=4 mcg/mL Final    Ciprofloxacin Sensitive <=1 mcg/mL Final    Ertapenem Sensitive <=1 mcg/mL Final    Gentamicin Sensitive <=4 mcg/mL Final    Pip/Tazobactam Sensitive <=16 mcg/mL Final    Tigecycline Sensitive <=2 mcg/mL Final    Tobramycin Sensitive <=4 mcg/mL Final    Trimeth/Sulfa Sensitive " "<=2/38 mcg/mL Final                       BLOOD CULTURE [[id number]] Collected:  06/23/17 1430    Order Status:  Completed Specimen Information:  Blood from Peripheral Updated:  06/24/17 0641     Significant Indicator NEG      Source BLD      Site Peripheral      Blood Culture --      Result:        No Growth    Note: Blood cultures are incubated for 5 days and  are monitored continuously.Positive blood cultures  are called to the RN and reported as soon as  they are identified.  Blood culture testing and Gram stain, if indicated, are  performed at [facility], [address] [place] [address].  Positive blood cultures are  sent to [facility], [address] [place] [address], for organism identification and  susceptibility testing.      Narrative:      Per Hospital Policy: Only change Specimen Src: to \"Line\" if  specified by physician order.    BLOOD CULTURE [[id number]] Collected:  06/23/17 1435    Order Status:  Completed Specimen Information:  Blood from Peripheral Updated:  06/24/17 0641     Significant Indicator NEG      Source BLD      Site Peripheral      Blood Culture --      Result:        No Growth    Note: Blood cultures are incubated for 5 days and  are monitored continuously.Positive blood cultures  are called to the RN and reported as soon as  they are identified.  Blood culture testing and Gram stain, if indicated, are  performed at [facility], [facility]  [place], [place].  Positive blood cultures are  sent to [facility], [address] [place] [address], for organism identification and  susceptibility testing.      Narrative:      Per Hospital Policy: Only change Specimen Src: to \"Line\" if  specified by physician order.    BLOOD CULTURE [[id number]] Collected:  06/17/17 1810    Order Status:  Completed Specimen Information:  Blood from Peripheral Updated:  06/23/17 0617     Significant Indicator NEG      " "Source BLD      Site PERIPHERAL      Blood Culture No growth after 5 days of incubation.     Narrative:      Per Hospital Policy: Only change Specimen Src: to \"Line\" if  specified by physician order.    Culture Respiratory W/ GRM STN [[id number]] Collected:  06/16/17 0159    Order Status:  Completed Specimen Information:  Respirate from Sputum Updated:  06/18/17 1220     Gram Stain Result No organisms seen.      Significant Indicator NEG      Source RESP      Site Tracheal Aspirate      Respiratory Culture Rare growth usual upper respiratory tati     Narrative:      Sputum cultures, induced if needed. If not done within the  last 24 hours    BLOOD CULTURE [[id number]]  (Abnormal) Collected:  06/15/17 0532    Order Status:  Completed Specimen Information:  Blood from Peripheral Updated:  06/18/17 0919     Significant Indicator POS (POS)      Source BLD      Site PERIPHERAL      Blood Culture -- (A)      Result:        Blood culture testing and Gram stain, if indicated, are  performed at [address].  Positive blood cultures are  sent to [facility], [address] [place] [address], for organism identification and  susceptibility testing.  Growth detected by Bactec instrument.  06/16/2017  00:41       Blood Culture -- (A)      Result:        Klebsiella pneumoniae  See previous culture for sensitivity report.      Narrative:      CALL  [facility]  ICU tel. ,  CALLED  ICU tel.  06/16/2017, 00:41, RB PERF. RESULTS CALLED TO: [id number], RN  Per Hospital Policy: Only change Specimen Src: to \"Line\" if  specified by physician order.    BLOOD CULTURE [[id number]]  (Abnormal)  (Susceptibility) Collected:  06/15/17 0458    Order Status:  Completed Specimen Information:  Blood from Peripheral Updated:  06/18/17 0919     Significant Indicator POS (POS)      Source BLD      Site PERIPHERAL      Blood Culture -- (A)      Result:        Blood culture testing and " "Gram stain, if indicated, are  performed at [facility], [address] [place] [address].  Positive blood cultures are  sent to [facility], [address] [place] [address], for organism identification and  susceptibility testing.  Growth detected by Bactec instrument.  06/16/2017  00:40       Blood Culture Klebsiella pneumoniae (A)     Narrative:      CALL  [facility]  ICU tel. ,  CALLED  ICU tel.  06/16/2017, 00:40, RB PERF. RESULTS CALLED TO: Toña RN  Per Hospital Policy: Only change Specimen Src: to \"Line\" if  specified by physician order.    Culture & Susceptibility     KLEBSIELLA PNEUMONIAE     Antibiotic Sensitivity Microscan Unit Status    Ampicillin Resistant >16 mcg/mL Final    Cefepime Sensitive <=8 mcg/mL Final    Cefotaxime Sensitive <=2 mcg/mL Final    Cefotetan Sensitive <=16 mcg/mL Final    Ceftazidime Sensitive <=1 mcg/mL Final    Ceftriaxone Sensitive <=8 mcg/mL Final    Cefuroxime Sensitive <=4 mcg/mL Final    Ciprofloxacin Sensitive <=1 mcg/mL Final    Ertapenem Sensitive <=1 mcg/mL Final    Gentamicin Sensitive <=4 mcg/mL Final    Pip/Tazobactam Sensitive <=16 mcg/mL Final    Tigecycline Sensitive <=2 mcg/mL Final    Tobramycin Sensitive <=4 mcg/mL Final    Trimeth/Sulfa Sensitive <=2/38 mcg/mL Final                               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

## 2017-06-24 NOTE — PROGRESS NOTES
0700 - Bedside report received from Marjorie DILLARD with preceptor RN.  Patient resting comfortably in bed without signs and symptoms of distress or pain.  Gtts verified, safety protocols in place, and POC reviewed with patient / wife Glenda.    1045 - Patient observed off of supplemental oxygen.  spO2 > 90% for 10 minutes.    1100 - Patient safely mobilized to edge of bed for 8 minutes and to standing for 2 minutes.  2 RN assist required.  Patient tolerated the activity well.    1200 - Patient was able to eat his lunch with a standby assist.  Dexterity in his hands is steadily improving.

## 2017-06-24 NOTE — PROGRESS NOTES
"Bedside report received. Assumed care of patient. All lines verified, DOMINGO to low wall suction. Wife at bedside. Wife states that patient is \"feeling stuffed up on the right side on his nose\", humidification added to patient's oxygen. Wife concerned over patient's eye drops, RN assured wife that they were on order and would be administered as ordered. RN and CNA repositioned patient in bed. Wife concerned that patient turned too far, patient states that he is comfortable, RN told patient to let her know if at any point he becomes uncomfortable. All needs are currently met. All safety precautions in place. Will continue to monitor.   "

## 2017-06-24 NOTE — CARE PLAN
Problem: Mobility  Goal: Risk for activity intolerance will decrease  Intervention: Encourage patient to increase activity level in collaboration with Interdisciplinary Team  Continue to increase mobilization in hopes of eating dinner in a chair or at the edge of bed.

## 2017-06-24 NOTE — DIETARY
Nutrition services update.  Diet order now advanced.   Diet:  Regular, low fat, Dysphagia 2, thin liquids.  (per MD discontinue low protein features)  Spoke to pt and wife.  No specific preferences at this time.  PO intake at breakfast 50-75%.   Plan:  RD to monitor PO intake and clinical course.

## 2017-06-24 NOTE — THERAPY
Physical Therapy session attempted. Pt just back to bed after sitting up EOB with nursing assist. Also stood x 2 minutes. Strength improving and UE edema/strength much improved as well. Pt was able to feed self today. Refused further mobility at this time but will continue to mobilize to EOB and chair w/ nursing as able. Pt, family and staff in agreement. Continue.

## 2017-06-24 NOTE — DISCHARGE PLANNING
Medical Social Work    Referral: Pt discussed at IDT rounds this AM.    Intervention: Pt to have gall bladder surgery on Monday or Tuesday.  PT and OT recommended SNF but re-evaluation will be requested of therapies after the surgery.    Plan: SW available for any assistance with d.c planning.

## 2017-06-25 LAB
ALBUMIN SERPL BCP-MCNC: 1.7 G/DL (ref 3.2–4.9)
ALBUMIN/GLOB SERPL: 0.6 G/DL
ALP SERPL-CCNC: 78 U/L (ref 30–99)
ALT SERPL-CCNC: 34 U/L (ref 2–50)
ANION GAP SERPL CALC-SCNC: 7 MMOL/L (ref 0–11.9)
AST SERPL-CCNC: 29 U/L (ref 12–45)
BILIRUB SERPL-MCNC: 1.1 MG/DL (ref 0.1–1.5)
BUN SERPL-MCNC: 10 MG/DL (ref 8–22)
CALCIUM SERPL-MCNC: 7.3 MG/DL (ref 8.4–10.2)
CHLORIDE SERPL-SCNC: 107 MMOL/L (ref 96–112)
CO2 SERPL-SCNC: 25 MMOL/L (ref 20–33)
CREAT SERPL-MCNC: 0.7 MG/DL (ref 0.5–1.4)
ERYTHROCYTE [DISTWIDTH] IN BLOOD BY AUTOMATED COUNT: 47.7 FL (ref 35.9–50)
GFR SERPL CREATININE-BSD FRML MDRD: >60 ML/MIN/1.73 M 2
GLOBULIN SER CALC-MCNC: 2.9 G/DL (ref 1.9–3.5)
GLUCOSE BLD-MCNC: 114 MG/DL (ref 65–99)
GLUCOSE BLD-MCNC: 123 MG/DL (ref 65–99)
GLUCOSE BLD-MCNC: 137 MG/DL (ref 65–99)
GLUCOSE BLD-MCNC: 159 MG/DL (ref 65–99)
GLUCOSE SERPL-MCNC: 126 MG/DL (ref 65–99)
HCT VFR BLD AUTO: 30.4 % (ref 42–52)
HGB BLD-MCNC: 10.4 G/DL (ref 14–18)
MAGNESIUM SERPL-MCNC: 1.7 MG/DL (ref 1.5–2.5)
MCH RBC QN AUTO: 32.8 PG (ref 27–33)
MCHC RBC AUTO-ENTMCNC: 34.2 G/DL (ref 33.7–35.3)
MCV RBC AUTO: 95.9 FL (ref 81.4–97.8)
PLATELET # BLD AUTO: 292 K/UL (ref 164–446)
PMV BLD AUTO: 10.6 FL (ref 9–12.9)
POTASSIUM SERPL-SCNC: 2.8 MMOL/L (ref 3.6–5.5)
PROT SERPL-MCNC: 4.6 G/DL (ref 6–8.2)
RBC # BLD AUTO: 3.17 M/UL (ref 4.7–6.1)
SODIUM SERPL-SCNC: 139 MMOL/L (ref 135–145)
WBC # BLD AUTO: 10 K/UL (ref 4.8–10.8)

## 2017-06-25 PROCEDURE — 700111 HCHG RX REV CODE 636 W/ 250 OVERRIDE (IP): Performed by: HOSPITALIST

## 2017-06-25 PROCEDURE — 700105 HCHG RX REV CODE 258: Performed by: HOSPITALIST

## 2017-06-25 PROCEDURE — 94760 N-INVAS EAR/PLS OXIMETRY 1: CPT

## 2017-06-25 PROCEDURE — 80053 COMPREHEN METABOLIC PANEL: CPT

## 2017-06-25 PROCEDURE — 82962 GLUCOSE BLOOD TEST: CPT | Mod: 91

## 2017-06-25 PROCEDURE — 770020 HCHG ROOM/CARE - TELE (206)

## 2017-06-25 PROCEDURE — A9270 NON-COVERED ITEM OR SERVICE: HCPCS | Performed by: HOSPITALIST

## 2017-06-25 PROCEDURE — 97116 GAIT TRAINING THERAPY: CPT

## 2017-06-25 PROCEDURE — 99232 SBSQ HOSP IP/OBS MODERATE 35: CPT | Performed by: HOSPITALIST

## 2017-06-25 PROCEDURE — 83735 ASSAY OF MAGNESIUM: CPT

## 2017-06-25 PROCEDURE — 97530 THERAPEUTIC ACTIVITIES: CPT

## 2017-06-25 PROCEDURE — 85027 COMPLETE CBC AUTOMATED: CPT

## 2017-06-25 PROCEDURE — 700102 HCHG RX REV CODE 250 W/ 637 OVERRIDE(OP): Performed by: HOSPITALIST

## 2017-06-25 RX ORDER — POTASSIUM CHLORIDE 7.45 MG/ML
10 INJECTION INTRAVENOUS
Status: COMPLETED | OUTPATIENT
Start: 2017-06-25 | End: 2017-06-25

## 2017-06-25 RX ORDER — LISINOPRIL 20 MG/1
40 TABLET ORAL
Status: DISCONTINUED | OUTPATIENT
Start: 2017-06-26 | End: 2017-06-28 | Stop reason: HOSPADM

## 2017-06-25 RX ADMIN — PIPERACILLIN AND TAZOBACTAM 3.38 G: 3; .375 INJECTION, POWDER, FOR SOLUTION INTRAVENOUS at 15:50

## 2017-06-25 RX ADMIN — PIPERACILLIN AND TAZOBACTAM 3.38 G: 3; .375 INJECTION, POWDER, FOR SOLUTION INTRAVENOUS at 10:10

## 2017-06-25 RX ADMIN — POTASSIUM CHLORIDE 10 MEQ: 7.46 INJECTION, SOLUTION INTRAVENOUS at 05:28

## 2017-06-25 RX ADMIN — LISINOPRIL 20 MG: 20 TABLET ORAL at 09:47

## 2017-06-25 RX ADMIN — POTASSIUM CHLORIDE 10 MEQ: 7.46 INJECTION, SOLUTION INTRAVENOUS at 04:13

## 2017-06-25 RX ADMIN — LATANOPROST 1 DROP: 50 SOLUTION OPHTHALMIC at 21:52

## 2017-06-25 RX ADMIN — AMLODIPINE BESYLATE 10 MG: 5 TABLET ORAL at 09:47

## 2017-06-25 RX ADMIN — POTASSIUM CHLORIDE 10 MEQ: 7.46 INJECTION, SOLUTION INTRAVENOUS at 07:48

## 2017-06-25 RX ADMIN — POTASSIUM CHLORIDE 10 MEQ: 7.46 INJECTION, SOLUTION INTRAVENOUS at 06:32

## 2017-06-25 RX ADMIN — DIGOXIN 125 MCG: 250 INJECTION, SOLUTION INTRAMUSCULAR; INTRAVENOUS; PARENTERAL at 17:51

## 2017-06-25 RX ADMIN — HYDROCHLOROTHIAZIDE 25 MG: 25 TABLET ORAL at 09:47

## 2017-06-25 RX ADMIN — PIPERACILLIN AND TAZOBACTAM 3.38 G: 3; .375 INJECTION, POWDER, FOR SOLUTION INTRAVENOUS at 21:52

## 2017-06-25 RX ADMIN — PIPERACILLIN AND TAZOBACTAM 3.38 G: 3; .375 INJECTION, POWDER, FOR SOLUTION INTRAVENOUS at 03:22

## 2017-06-25 RX ADMIN — INSULIN LISPRO 1 UNITS: 100 INJECTION, SOLUTION INTRAVENOUS; SUBCUTANEOUS at 13:06

## 2017-06-25 RX ADMIN — FAMOTIDINE 20 MG: 10 INJECTION, SOLUTION INTRAVENOUS at 09:47

## 2017-06-25 ASSESSMENT — GAIT ASSESSMENTS
DEVIATION: BRADYKINETIC
ASSISTIVE DEVICE: FRONT WHEEL WALKER
GAIT LEVEL OF ASSIST: CONTACT GUARD ASSIST
DISTANCE (FEET): 15

## 2017-06-25 ASSESSMENT — ENCOUNTER SYMPTOMS
DIARRHEA: 0
FEVER: 0
HEADACHES: 0
ABDOMINAL PAIN: 0
VOMITING: 0
LOSS OF CONSCIOUSNESS: 0
DIZZINESS: 0
BACK PAIN: 0
CHILLS: 0
COUGH: 0
SHORTNESS OF BREATH: 0
NAUSEA: 0

## 2017-06-25 ASSESSMENT — PAIN SCALES - GENERAL
PAINLEVEL_OUTOF10: 0
PAINLEVEL_OUTOF10: 3

## 2017-06-25 NOTE — PROGRESS NOTES
Report received at change of shift from day shift RN Chaitanya, care of pt assumed. Pt awake in bed, c/o mild RUQ soreness but declines need for pain medication. RUQ DOMINGO drain connected to low int wall suction with what appears to be bilious output, dressing at insertion site is clean, dry, and in tact. TKO fluids connected to RIJ lumens per protocol. Laird is in place with adequate output. Wife very anxious at bedside, concerned with repositioning the patient so that he is comfortable and also expresses concerns as to whether he will be checked on in the night. Pt had already been repositioned, states that he feels comfortable at this time and does not want to be repositioned again at this time. Also reassured wife that he will be frequently checked on in the night and taught pt to inform RN for any needs or concerns. Will continue to closely monitor.

## 2017-06-25 NOTE — PROGRESS NOTES
Tele note:  Sinus rhythm; freq PVC's  .14/ .10/ .44     Interpretation of tele strip only. Assessment and monitoring done by primary RN.

## 2017-06-25 NOTE — CARE PLAN
Problem: Infection  Goal: Will remain free from infection  Outcome: PROGRESSING AS EXPECTED  Hand hygiene, gloves, and scrub the hub for 30 seconds prior to every central line access.     Problem: Pain Management  Goal: Pain level will decrease to patient’s comfort goal  Outcome: PROGRESSING AS EXPECTED  Pt medicated with 1 dose of Tylenol overnight for 5/10 RUQ abdominal pain, states that he is feeling much better this morning.

## 2017-06-25 NOTE — CARE PLAN
Problem: Bowel/Gastric:  Goal: Normal bowel function is maintained or improved  Outcome: PROGRESSING AS EXPECTED  Pt had large BM this afternoon, pt instructed to call nursing staff if ambulating up to commode.     Problem: Mobility  Goal: Risk for activity intolerance will decrease  Outcome: PROGRESSING AS EXPECTED  Pt able to stand pivot from bed to commode for bathroom needs, pt tolerated well.

## 2017-06-25 NOTE — PROGRESS NOTES
"[facility] Hospitalist Progress Note    Date of Service: 2017    Chief Complaint    76 y.o. male admitted 2017 with abdominal pain and SOB, fever, chills, diaphoresis, syncope x4.  CT abdomen revealed acute cholecystitis with lactic acid of 8 admitted to ICU on zosyn IV, sepsis protocol initiated.    Interval Problem Update  Extubated   Remains in sinus  States he feels \"good\" today.  Has his glasses and is happy to be able to see today.  No N/V.  Mild RUQ abd pain    Consultants/Specialty  General Surgery Dr Last  Pulmonology Dr Reyna  Cardiology Dr Chua    Disposition  Cont Surgical floor  Mobilize/therapies  Plan eventual [facility] per Dr Last        Review of Systems   Constitutional: Negative for fever and chills.   Respiratory: Negative for cough and shortness of breath.    Cardiovascular: Negative for chest pain.   Gastrointestinal: Negative for nausea, vomiting, abdominal pain and diarrhea.   Musculoskeletal: Negative for back pain.   Skin: Negative for rash.   Neurological: Negative for dizziness, loss of consciousness and headaches.      Physical Exam  Laboratory/Imaging   Hemodynamics  Temp (24hrs), Av.9 °C (98.4 °F), Min:36.7 °C (98.1 °F), Max:37.1 °C (98.8 °F)   Temperature: 37 °C (98.6 °F), Monitored Temp: 36.9 °C (98.4 °F)  Pulse  Av.6  Min: 30  Max: 134 Heart Rate (Monitored): 76  Blood Pressure : (!) 167/82 mmHg, NIBP: 154/81 mmHg     Respiratory      Respiration: 18, Pulse Oximetry: 96 %     Work Of Breathing / Effort: Mild  RUL Breath Sounds: Clear, RML Breath Sounds: Clear, RLL Breath Sounds: Diminished;Clear, OZZY Breath Sounds: Clear, LLL Breath Sounds: Diminished;Clear    Fluids    Intake/Output Summary (Last 24 hours) at 17 1124  Last data filed at 17 0839   Gross per 24 hour   Intake    960 ml   Output   3030 ml   Net  -2070 ml       Nutrition  Orders Placed This Encounter   Procedures   • DIET ORDER     Standing Status: Standing      Number of " Occurrences: 1      Standing Expiration Date:      Order Specific Question:  Diet:     Answer:  Regular [1]     Order Specific Question:  Macronutrient modifications:     Answer:  Low Fat [5]     Order Specific Question:  Texture/Fiber modifications:     Answer:  Dysphagia 2(Pureed/Chopped)specify fluid consistency(question 6) [2]     Order Specific Question:  Consistency/Fluid modifications:     Answer:  Thin Liquids [3]     Order Specific Question:  Miscellaneous modifications:     Answer:  SLP - 1:1 Supervision by Nursing [21]      Comments:  No Straws     Order Specific Question:  Miscellaneous modifications:     Answer:  SLP - Comments [24]     Physical Exam   Constitutional: He is oriented to person, place, and time. He appears well-developed and well-nourished. No distress.   HENT:   Head: Normocephalic and atraumatic.   Eyes: Conjunctivae are normal.   Neck: No JVD present.   Cardiovascular: Normal rate.  Exam reveals no gallop.    No murmur heard.  Pulmonary/Chest: Effort normal. No stridor. No respiratory distress. He has no wheezes. He has no rales.   Abdominal: Soft. There is no tenderness. There is no rebound and no guarding.   Musculoskeletal: He exhibits edema.   Neurological: He is oriented to person, place, and time.   OX4   Skin: Skin is warm and dry. No rash noted. He is not diaphoretic.   Psychiatric: He has a normal mood and affect. Thought content normal.   Nursing note and vitals reviewed.      Recent Labs      06/23/17   0255  06/24/17   0400  06/25/17   0325   WBC  10.6  11.7*  10.0   RBC  3.63*  3.44*  3.17*   HEMOGLOBIN  11.6*  11.2*  10.4*   HEMATOCRIT  34.8*  33.3*  30.4*   MCV  95.9  96.8  95.9   MCH  32.0  32.6  32.8   MCHC  33.3*  33.6*  34.2   RDW  49.1  48.7  47.7   PLATELETCT  227  262  292   MPV  11.0  11.2  10.6     Recent Labs      06/23/17   0255  06/24/17   0400  06/25/17   0325   SODIUM  149*  143  139   POTASSIUM  3.4*  3.0*  2.8*   CHLORIDE  117*  110  107   CO2  26  26  25    GLUCOSE  137*  135*  126*   BUN  14  12  10   CREATININE  0.72  0.68  0.70   CALCIUM  7.7*  7.6*  7.3*                      Assessment/Plan     * Sepsis (CMS-HCC) (present on admission)  Assessment & Plan  Source:  Infected emphysematous cholecystitis  IVFs  Zosyn  6/14 BC+x2 Klebsiella oxytoca ss zosyn IV  Plan 2 wks total course of Zosyn from date of neg cultures 6/17  Remains stable off pressors      Non-STEMI (non-ST elevated myocardial infarction) (CMS-HCC) (present on admission)  Assessment & Plan  Dr. Chua consulted:  No intervention  Cardiac strain due to sepsis  trops trending down  No wall motion abnormalities on echo  Preserved LVEF, Gd I DHF    Acute respiratory failure with hypoxia (CMS-HCC) (present on admission)  Assessment & Plan  6/15 requiring ET intubation for acute wheeze, acidosis due to sepsis.  Extubated 6/21  Tolerating well  Pulmonology following  Mobilizing      Metabolic acidosis (present on admission)  Assessment & Plan  2/2 severe sepsis  resolved      Acute kidney injury (nontraumatic) (CMS-HCC) (present on admission)  Assessment & Plan  New onset since admission   2/2 sepsis related renal failure with poor perfusion  Resolved  Cont to follow daily    Bacteremia (present on admission)  Assessment & Plan  BC 6/14 x2 with Klebsiella ss zosyn   6/15 BCs x2 +LFGNR  Source:  Acute cholecystitis  Zosyn IV as above  6/17 repeat BCs x2 negative  Off pressors  ID following    Acute emphysematous cholecystitis (present on admission)  Assessment & Plan  6/15 u/s cholecystostomy completed  Culture fluid:  Klebsiella pneumonia ss zosyn IV  BCs with Klebsiella  Repeat CT abdomen:  No perforation  MRCP no CBD stone  Surgery Dr Last following: plan OR once pt has stabilized    Hypotension (arterial) (present on admission)  Assessment & Plan  Secondary to sepsis  resolved    Thrombocytopenia (CMS-HCC) (present on admission)  Assessment & Plan  Etiology 2/2 severe sepsis, no known heavy alcohol  use.  Now >100  Cont to follow    Hypokalemia due to inadequate potassium intake (present on admission)  Assessment & Plan  Follow K and Mg daily  Replace as needed    Atrial fibrillation with RVR (CMS-[facility])  Assessment & Plan  Secondary to acute sepsis and pressors  NO AC per Card's  Now in sinus  cont's on dig    Hypertension  Assessment & Plan  lisinopril to 20  amlodipine to 10  Add HCTZ      Medications reviewed, EKG reviewed, Labs reviewed and Radiology images reviewed  Laird catheter: Unconscious / Sedated Patient on a Ventilator      DVT Prophylaxis: Heparin  DVT prophylaxis - mechanical: SCDs  Ulcer prophylaxis: Yes  Antibiotics: Treating active infection/contamination beyond 24 hours perioperative coverage

## 2017-06-25 NOTE — PROGRESS NOTES
Pt feeling much better this morning, smiling, conversant, more energized. Up to chair with assistance from 3 staff members. Pt working on incentive spirometer and hand exercises with foam cubes. Assisted with shaving and getting cleaned up, and now enjoying cup of coffee 1:1 feeding assistance.

## 2017-06-25 NOTE — PROGRESS NOTES
Pt medicated with Tylenol for 5/10 RUQ pain, pills were crushed and floated in applesauce, pt positioned upright at 90° and appeared to swallow without difficulty. Assisted pt with repositioning onto side. DOMINGO drain flushed with 20 mLs normal saline per active order. BP elevated at 161/69 - medicated with enalaprilat per MAR and will reassess in 1 hr.

## 2017-06-25 NOTE — PROGRESS NOTES
Infectious Disease Progress Note    Author: Maryse Rivera M.D. Date of service & Time created: 2017  3:56 PM    Chief Complaint:  Chief Complaint   Patient presents with   • Abdominal Pain     abdominal pain since saturday  diaphoretic and weakness  diarrhea yesterday   FU sepsis/cholecystitis    Interval History:  76 year old male admitted with septic shock due to acute severe cholecystitis  - remains on the vent. No fevers. Wbc 9.4   AF WBC 10.9 Denies pain, even around drain site   AF WBC 10.6 one of  blood cxs +. Happy that NGT is out and able to have PO   AF WBC 11 eating some-no complaints-denies pain   AF WBC 10 transferred out of ICU-no new complaints  Labs Reviewed, Medications Reviewed and Radiology Reviewed.    Review of Systems:  Review of Systems   Constitutional: Negative for fever and chills.   Gastrointestinal: Negative for nausea, vomiting and abdominal pain.   Skin: Negative for rash.   All other systems reviewed and are negative.      Hemodynamics:  Temp (24hrs), Av.8 °C (98.2 °F), Min:36.5 °C (97.7 °F), Max:37 °C (98.6 °F)  Temperature: 36.5 °C (97.7 °F)  Pulse  Av.6  Min: 30  Max: 134   Blood Pressure : (!) 167/67 mmHg      Physical Exam:  Physical Exam   Constitutional: He appears well-developed. No distress.   HENT:   Head: Normocephalic and atraumatic.   Mouth/Throat: No oropharyngeal exudate.   Eyes: EOM are normal. Pupils are equal, round, and reactive to light. No scleral icterus.   Neck: Neck supple.   Right IJ   Cardiovascular: Normal rate.    No murmur heard.  IRR   Pulmonary/Chest: Effort normal. No respiratory distress. He has no wheezes. He has no rales.   Abdominal: Soft. He exhibits no distension. There is no tenderness. There is no rebound.   RUQ drain present   Musculoskeletal: He exhibits edema.   Neurological: He is alert.   Nursing note and vitals reviewed.      Meds:    Current facility-administered medications:   •  [START ON  6/26/2017] lisinopril (PRINIVIL) tablet 40 mg, 40 mg, Oral, Q DAY, YAIMA Short.O.  •  enalaprilat (VASOTEC) injection 1.25 mg, 1.25 mg, Intravenous, Q4HRS PRN, Dennis Guerra M.D., 1.25 mg at 06/24/17 2337  •  hydrochlorothiazide (HYDRODIURIL) tablet 25 mg, 25 mg, Oral, Q DAY, George De La Cruz D.O., 25 mg at 06/25/17 0947  •  amlodipine (NORVASC) tablet 10 mg, 10 mg, Oral, Q DAY, YAIMA Short.O., 10 mg at 06/25/17 0947  •  oxycodone immediate-release (ROXICODONE) tablet 5 mg, 5 mg, Oral, Q4HRS PRN **OR** oxycodone immediate release (ROXICODONE) tablet 10 mg, 10 mg, Oral, Q4HRS PRN, YAIMA Short.O.  •  artificial tears 1.4 % ophthalmic solution 1 Drop, 1 Drop, Both Eyes, PRN, YAIMA Short.O.  •  labetalol (NORMODYNE,TRANDATE) injection 10 mg, 10 mg, Intravenous, Q4HRS PRN, Dennis Guerra M.D., 10 mg at 06/24/17 0318  •  latanoprost (XALATAN) 0.005 % ophthalmic solution 1 Drop, 1 Drop, Both Eyes, Nightly, George De La Cruz D.O., 1 Drop at 06/24/17 2053  •  digoxin (LANOXIN) injection 125 mcg, 125 mcg, Intravenous, DAILY AT 1800, YAIMA Short.O., 125 mcg at 06/24/17 1746  •  insulin lispro (HUMALOG) injection 1-6 Units, 1-6 Units, Subcutaneous, Q6HRS, Che Nash M.D., 1 Units at 06/25/17 1306  •  famotidine (PEPCID) injection 20 mg, 20 mg, Intravenous, DAILY, Che Nash M.D., 20 mg at 06/25/17 0947  •  Action is required: Protocol 1073 Hypoglycemia has been implemented, , , Once **AND** Protocol 1073 Inclusion Criteria, , , CONTINUOUS **AND** Protocol 1073 NOTIFY, , , Once **AND** Protocol 1073 Initiate protocol immediately if FSBG is less than or equal to 70 mg/dL, , , CONTINUOUS **AND** glucose 4 g chewable tablet 16 g, 16 g, Oral, Q15 MIN PRN **AND** dextrose 50% (D50W) injection 25 mL, 25 mL, Intravenous, Q15 MIN PRN, Che Nash M.D.  •  acetaminophen (TYLENOL) suppository 650 mg, 650 mg, Rectal, Q6HRS  PRN, Che Nash M.D., 650 mg at 06/16/17 1615  •  morphine (pf) 4 mg/ml injection 2-4 mg, 2-4 mg, Intravenous, Q2HRS PRN, Che Nash M.D., 4 mg at 06/22/17 4537  •  Respiratory Care per Protocol, , Nebulization, Continuous RT, Jose Valdivia M.D.  •  acetaminophen (TYLENOL) tablet 650 mg, 650 mg, Oral, Q6HRS PRN, Jose Valdivia M.D., 650 mg at 06/24/17 2327  •  piperacillin-tazobactam (ZOSYN) 3.375 g in  mL IVPB, 3.375 g, Intravenous, Q6HRS, Jose Valdivia M.D., Last Rate: 200 mL/hr at 06/25/17 1550, 3.375 g at 06/25/17 1550  •  ondansetron (ZOFRAN) syringe/vial injection 4 mg, 4 mg, Intravenous, Q4HRS PRN, Jose Valdivia M.D., 4 mg at 06/19/17 0406  •  ondansetron (ZOFRAN ODT) dispertab 4 mg, 4 mg, Oral, Q4HRS PRN, Jose Valdivia M.D.  •  lorazepam (ATIVAN) tablet 0.5 mg, 0.5 mg, Oral, Q6HRS PRN **OR** lorazepam (ATIVAN) injection 0.5 mg, 0.5 mg, Intravenous, Q6HRS PRN, Jose Valdivia M.D., 0.5 mg at 06/15/17 0941    Labs:  Recent Labs      06/23/17   0255  06/24/17   0400  06/25/17   0325   WBC  10.6  11.7*  10.0   RBC  3.63*  3.44*  3.17*   HEMOGLOBIN  11.6*  11.2*  10.4*   HEMATOCRIT  34.8*  33.3*  30.4*   MCV  95.9  96.8  95.9   MCH  32.0  32.6  32.8   RDW  49.1  48.7  47.7   PLATELETCT  227  262  292   MPV  11.0  11.2  10.6     Recent Labs      06/23/17   0255  06/24/17   0400  06/25/17   0325   SODIUM  149*  143  139   POTASSIUM  3.4*  3.0*  2.8*   CHLORIDE  117*  110  107   CO2  26  26  25   GLUCOSE  137*  135*  126*   BUN  14  12  10     Recent Labs      06/23/17   0255  06/24/17   0400  06/25/17   0325   ALBUMIN  1.9*  1.8*  1.7*   TBILIRUBIN  1.4  1.3  1.1   ALKPHOSPHAT  106*  94  78   TOTPROTEIN  5.2*  4.9*  4.6*   ALTSGPT  55*  42  34   ASTSGOT  49*  37  29   CREATININE  0.72  0.68  0.70       Imaging:  Ct-abdomen-pelvis W/o  6/15/2017  1.  There is continued gallbladder distention with nondependent air in the gallbladder suspicious for possible emphysematous cholecystitis. There is some  surrounding stranding inferior to the gallbladder again seen. 2.  There is an appendicolith again seen with periappendiceal stranding and minimal fluid in the right anterior pararenal space and paracolic gutter. 3.  There are small dependent pleural effusions with dependent atelectasis. 4.  Enteric tube extends into the 1st/2nd portion of the duodenum. 5.  There is no evidence of free intraperitoneal air.    Ct-abdomen-pelvis W/o  6/14/2017  1.  Severe cholecystitis, possible component of emphysematous cholecystitis. 2.  Appendicolith, the appendix otherwise appears within normal limits. Appendicolith however places patient at future risk for development of appendicitis. 3.  Atherosclerosis and atherosclerotic coronary artery disease 4.  Hepatomegaly These findings were discussed with the patient's clinician, Poncho Boudreaux, on 6/14/2017 8:07 PM.    Dx-chest-portable (1 View)  6/21/2017  No significant change from prior exam.    [id number]/[id number]  FINDINGS: Exam limited by patient body habitus and the fact the patient is on a ventilator which causes some motion. The visualized lung bases demonstrate small amount of right pleural fluid with dependent airspace disease bilaterally. The gallbladder is decompressed in comparison to the prior exam with multiple gallstones identified. There is a small amount pericholecystic fluid. There is minimal free fluid along the anterior and inferior margin of the liver. There is a cholecystotomy tube identified in the subcutaneous fat in the right anterior upper abdomen although it is difficult to follow the catheter all the way into the gallbladder. Exact position is uncertain. The common bile duct is normal in caliber without choledocholithiasis. There is no intrahepatic biliary dilatation. Limited visualization of the pancreatic duct is unremarkable. There is a trace of fluid in the left upper abdomen but no gross evidence of pancreatitis is otherwise seen. There are no focal  intrahepatic or intrasplenic signal abnormalities. Adrenal glands and visualized kidneys are unremarkable.   6/20/2017  1.  There has been partial decompression of the gallbladder with gallstones and minimal pericholecystic fluid identified. 2.  The cholecystotomy tube is not seen in its entirety and cannot be tracked into the gallbladder on MRI. Its exact position is uncertain. 3.  There is no biliary dilatation or choledocholithiasis.    Us-drain-cholescystomy  6/15/2017  1. Ultrasound guided cholecystostomy with placement of an 8-Maltese locking loop catheter.     Echocardiogram Comp W/o Cont  6/15/2017  Transthoracic Echo Report Echocardiography Laboratory CONCLUSIONS No prior study is available for comparison. Left ventricular ejection fraction is visually estimated to be 60%. Grade I diastolic dysfunction. No valvular heart disease. Estimated right ventricular systolic pressure  is 35 mmHg.   FINDINGS Left Ventricle Normal left ventricular size and systolic function. Mild concentric left ventricular hypertrophy. Left ventricular ejection fraction is visually estimated to be 60%. Normal regional wall motion. Grade I diastolic dysfunction. Right Ventricle The right ventricle was normal in size and function. Right Atrium Enlarged right atrium. Left Atrium The left atrium is normal in size. Mitral Valve Structurally normal mitral valve. Aortic Valve Aortic sclerosis without stenosis. Tricuspid Valve Structurally normal tricuspid valve. Mild tricuspid regurgitation. Estimated right ventricular systolic pressure  is 35 mmHg. Pulmonic Valve The pulmonic valve is not well visualized. Trace pulmonic insufficiency. Pericardium Normal pericardium without effusion. Aorta The aortic root is normal. Tho Jones (Electronically Signed) Final Date:     15 Rufina 2017                 17:30      Micro:  BLOOD CULTURE   Date Value Ref Range Status   06/23/2017   Preliminary    No Growth    Note: Blood cultures are incubated  "for 5 days and  are monitored continuously.Positive blood cultures  are called to the RN and reported as soon as  they are identified.  Blood culture testing and Gram stain, if indicated, are  performed at [facility], [address] [place] [address].  Positive blood cultures are  sent to [facility], [address] [place] [address], for organism identification and  susceptibility testing.        Results     Procedure Component Value Units Date/Time    BLOOD CULTURE [[id number]]  (Abnormal)  (Susceptibility) Collected:  06/17/17 1755    Order Status:  Completed Specimen Information:  Blood from Peripheral Updated:  06/24/17 0940     Significant Indicator POS (POS)      Source BLD      Site PERIPHERAL      Blood Culture        Result:        Growth detected by Bactec instrument.  06/22/2017  02:06 (A)     Blood Culture Klebsiella pneumoniae (A)     Narrative:      CALL  [facility]  ICU tel. ,  CALLED  ICU tel.  06/22/2017, 02:07, RB PERF. RESULTS CALLED TO: RN [id number]  Per Hospital Policy: Only change Specimen Src: to \"Line\" if  specified by physician order.    Culture & Susceptibility     KLEBSIELLA PNEUMONIAE     Antibiotic Sensitivity Microscan Unit Status    Ampicillin Intermediate 16 mcg/mL Final    Cefepime Sensitive <=8 mcg/mL Final    Cefotaxime Sensitive <=2 mcg/mL Final    Cefotetan Sensitive <=16 mcg/mL Final    Ceftazidime Sensitive <=1 mcg/mL Final    Ceftriaxone Sensitive <=8 mcg/mL Final    Cefuroxime Sensitive <=4 mcg/mL Final    Ciprofloxacin Sensitive <=1 mcg/mL Final    Ertapenem Sensitive <=1 mcg/mL Final    Gentamicin Sensitive <=4 mcg/mL Final    Pip/Tazobactam Sensitive <=16 mcg/mL Final    Tigecycline Sensitive <=2 mcg/mL Final    Tobramycin Sensitive <=4 mcg/mL Final    Trimeth/Sulfa Sensitive <=2/38 mcg/mL Final                       BLOOD CULTURE [[id number]] Collected:  06/23/17 1430    Order Status:  Completed Specimen Information:  Blood from " "Peripheral Updated:  06/24/17 0641     Significant Indicator NEG      Source BLD      Site Peripheral      Blood Culture --      Result:        No Growth    Note: Blood cultures are incubated for 5 days and  are monitored continuously.Positive blood cultures  are called to the RN and reported as soon as  they are identified.  Blood culture testing and Gram stain, if indicated, are  performed at [facility], [address] [place] [address].  Positive blood cultures are  sent to [facility], [address] [place] [address], for organism identification and  susceptibility testing.      Narrative:      Per Hospital Policy: Only change Specimen Src: to \"Line\" if  specified by physician order.    BLOOD CULTURE [[id number]] Collected:  06/23/17 1435    Order Status:  Completed Specimen Information:  Blood from Peripheral Updated:  06/24/17 0641     Significant Indicator NEG      Source BLD      Site Peripheral      Blood Culture --      Result:        No Growth    Note: Blood cultures are incubated for 5 days and  are monitored continuously.Positive blood cultures  are called to the RN and reported as soon as  they are identified.  Blood culture testing and Gram stain, if indicated, are  performed at [facility], [address] [place] [address].  Positive blood cultures are  sent to [facility], [address] [place] [address], for organism identification and  susceptibility testing.      Narrative:      Per Hospital Policy: Only change Specimen Src: to \"Line\" if  specified by physician order.    BLOOD CULTURE [[id number]] Collected:  06/17/17 1810    Order Status:  Completed Specimen Information:  Blood from Peripheral Updated:  06/23/17 0617     Significant Indicator NEG      Source BLD      Site PERIPHERAL      Blood Culture No growth after 5 days of incubation.     Narrative:      Per Hospital Policy: Only change Specimen Src: " "to \"Line\" if  specified by physician order.            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]   • Non-STEMI (non-ST elevated myocardial infarction) (CMS-HCC) [I21.4]       Plan:  Gram negative sepsis due to Klebsiella  Low grade fever resolved  Mild leukocytosis, resolved  Demand ischemia -resolved  Lactic acidosis resolved  Blood c/s + 6/14 and 6/15/17  1 of 2 BCx  6/17+  Repeat blood cxs 6/23 neg so far  Continue zosyn  Aim for 2 weeks of abx after neg blood c/s    Acute emphysematous cholecystitis  S/p cholecystostomy drain 6/15  Bile cx +Klebsiella   Surgery once patient stable-hopefully early next week        "

## 2017-06-25 NOTE — DISCHARGE PLANNING
Medical SW    Referral: Pt discussed in AM IDT Rounds.     Intervention: Pt to have surgery. No d/c at this time. No social needs reported.    Plan: Sw to assist w/ d/c planning as needed.

## 2017-06-25 NOTE — PROGRESS NOTES
Telemetry Report: pt has been SR.  HR: 70s-90s  Measurements: (14/08/40)  Ectopy: f PAC, rare PVC    Measurements and updates per [facility]. This RN is responsible solely for telemetry strip interpretation and primary RN is responsible for acute changes in telemetry monitoring.

## 2017-06-25 NOTE — PROGRESS NOTES
Spoke with Dr. Bejarano regarding patient's low potassium of 2.8. New orders received for Potassium Chloride IVPB 10 mEq x4 for a total of 40 mEq. In addition, check morning magnesium result.

## 2017-06-26 LAB
ALBUMIN SERPL BCP-MCNC: 1.9 G/DL (ref 3.2–4.9)
ALBUMIN/GLOB SERPL: 0.6 G/DL
ALP SERPL-CCNC: 82 U/L (ref 30–99)
ALT SERPL-CCNC: 32 U/L (ref 2–50)
ANION GAP SERPL CALC-SCNC: 5 MMOL/L (ref 0–11.9)
AST SERPL-CCNC: 26 U/L (ref 12–45)
BILIRUB SERPL-MCNC: 0.9 MG/DL (ref 0.1–1.5)
BUN SERPL-MCNC: 11 MG/DL (ref 8–22)
CALCIUM SERPL-MCNC: 7.4 MG/DL (ref 8.4–10.2)
CHLORIDE SERPL-SCNC: 108 MMOL/L (ref 96–112)
CO2 SERPL-SCNC: 24 MMOL/L (ref 20–33)
CREAT SERPL-MCNC: 0.63 MG/DL (ref 0.5–1.4)
ERYTHROCYTE [DISTWIDTH] IN BLOOD BY AUTOMATED COUNT: 47.7 FL (ref 35.9–50)
GFR SERPL CREATININE-BSD FRML MDRD: >60 ML/MIN/1.73 M 2
GLOBULIN SER CALC-MCNC: 3.1 G/DL (ref 1.9–3.5)
GLUCOSE BLD-MCNC: 116 MG/DL (ref 65–99)
GLUCOSE BLD-MCNC: 137 MG/DL (ref 65–99)
GLUCOSE BLD-MCNC: 139 MG/DL (ref 65–99)
GLUCOSE BLD-MCNC: 151 MG/DL (ref 65–99)
GLUCOSE BLD-MCNC: 172 MG/DL (ref 65–99)
GLUCOSE SERPL-MCNC: 127 MG/DL (ref 65–99)
HCT VFR BLD AUTO: 32.4 % (ref 42–52)
HGB BLD-MCNC: 11 G/DL (ref 14–18)
MAGNESIUM SERPL-MCNC: 1.7 MG/DL (ref 1.5–2.5)
MCH RBC QN AUTO: 32.5 PG (ref 27–33)
MCHC RBC AUTO-ENTMCNC: 34 G/DL (ref 33.7–35.3)
MCV RBC AUTO: 95.9 FL (ref 81.4–97.8)
PLATELET # BLD AUTO: 345 K/UL (ref 164–446)
PMV BLD AUTO: 10.4 FL (ref 9–12.9)
POTASSIUM SERPL-SCNC: 3 MMOL/L (ref 3.6–5.5)
PROT SERPL-MCNC: 5 G/DL (ref 6–8.2)
RBC # BLD AUTO: 3.38 M/UL (ref 4.7–6.1)
SODIUM SERPL-SCNC: 137 MMOL/L (ref 135–145)
WBC # BLD AUTO: 8.4 K/UL (ref 4.8–10.8)

## 2017-06-26 PROCEDURE — 700102 HCHG RX REV CODE 250 W/ 637 OVERRIDE(OP): Performed by: HOSPITALIST

## 2017-06-26 PROCEDURE — 80053 COMPREHEN METABOLIC PANEL: CPT

## 2017-06-26 PROCEDURE — 99233 SBSQ HOSP IP/OBS HIGH 50: CPT | Performed by: HOSPITALIST

## 2017-06-26 PROCEDURE — 700111 HCHG RX REV CODE 636 W/ 250 OVERRIDE (IP): Performed by: HOSPITALIST

## 2017-06-26 PROCEDURE — 85027 COMPLETE CBC AUTOMATED: CPT

## 2017-06-26 PROCEDURE — A9270 NON-COVERED ITEM OR SERVICE: HCPCS | Performed by: HOSPITALIST

## 2017-06-26 PROCEDURE — 770020 HCHG ROOM/CARE - TELE (206)

## 2017-06-26 PROCEDURE — 82962 GLUCOSE BLOOD TEST: CPT

## 2017-06-26 PROCEDURE — 700101 HCHG RX REV CODE 250: Performed by: INTERNAL MEDICINE

## 2017-06-26 PROCEDURE — 94760 N-INVAS EAR/PLS OXIMETRY 1: CPT

## 2017-06-26 PROCEDURE — 92526 ORAL FUNCTION THERAPY: CPT

## 2017-06-26 PROCEDURE — 700105 HCHG RX REV CODE 258: Performed by: HOSPITALIST

## 2017-06-26 PROCEDURE — 83735 ASSAY OF MAGNESIUM: CPT

## 2017-06-26 RX ORDER — TRIAMCINOLONE ACETONIDE 1 MG/G
CREAM TOPICAL 2 TIMES DAILY
Status: DISCONTINUED | OUTPATIENT
Start: 2017-06-27 | End: 2017-06-28 | Stop reason: HOSPADM

## 2017-06-26 RX ORDER — HYDRALAZINE HYDROCHLORIDE 25 MG/1
25 TABLET, FILM COATED ORAL EVERY 8 HOURS
Status: DISCONTINUED | OUTPATIENT
Start: 2017-06-26 | End: 2017-06-28

## 2017-06-26 RX ORDER — POTASSIUM CHLORIDE 7.45 MG/ML
10 INJECTION INTRAVENOUS
Status: COMPLETED | OUTPATIENT
Start: 2017-06-26 | End: 2017-06-26

## 2017-06-26 RX ORDER — MAGNESIUM SULFATE HEPTAHYDRATE 40 MG/ML
2 INJECTION, SOLUTION INTRAVENOUS ONCE
Status: COMPLETED | OUTPATIENT
Start: 2017-06-26 | End: 2017-06-26

## 2017-06-26 RX ORDER — TRIAMCINOLONE ACETONIDE 1 MG/G
CREAM TOPICAL 2 TIMES DAILY
Status: DISCONTINUED | OUTPATIENT
Start: 2017-06-26 | End: 2017-06-26

## 2017-06-26 RX ADMIN — LISINOPRIL 40 MG: 20 TABLET ORAL at 08:29

## 2017-06-26 RX ADMIN — INSULIN LISPRO 1 UNITS: 100 INJECTION, SOLUTION INTRAVENOUS; SUBCUTANEOUS at 22:20

## 2017-06-26 RX ADMIN — DIGOXIN 125 MCG: 250 INJECTION, SOLUTION INTRAMUSCULAR; INTRAVENOUS; PARENTERAL at 18:12

## 2017-06-26 RX ADMIN — POTASSIUM CHLORIDE 10 MEQ: 7.46 INJECTION, SOLUTION INTRAVENOUS at 11:36

## 2017-06-26 RX ADMIN — PIPERACILLIN AND TAZOBACTAM 3.38 G: 3; .375 INJECTION, POWDER, FOR SOLUTION INTRAVENOUS at 21:55

## 2017-06-26 RX ADMIN — MAGNESIUM SULFATE IN WATER 2 G: 40 INJECTION, SOLUTION INTRAVENOUS at 09:09

## 2017-06-26 RX ADMIN — POTASSIUM CHLORIDE 10 MEQ: 7.46 INJECTION, SOLUTION INTRAVENOUS at 10:13

## 2017-06-26 RX ADMIN — LATANOPROST 1 DROP: 50 SOLUTION OPHTHALMIC at 21:55

## 2017-06-26 RX ADMIN — HYDRALAZINE HYDROCHLORIDE 25 MG: 25 TABLET ORAL at 09:09

## 2017-06-26 RX ADMIN — POTASSIUM CHLORIDE 10 MEQ: 7.46 INJECTION, SOLUTION INTRAVENOUS at 12:41

## 2017-06-26 RX ADMIN — LABETALOL HYDROCHLORIDE 10 MG: 5 INJECTION, SOLUTION INTRAVENOUS at 20:51

## 2017-06-26 RX ADMIN — PIPERACILLIN AND TAZOBACTAM 3.38 G: 3; .375 INJECTION, POWDER, FOR SOLUTION INTRAVENOUS at 03:10

## 2017-06-26 RX ADMIN — PIPERACILLIN AND TAZOBACTAM 3.38 G: 3; .375 INJECTION, POWDER, FOR SOLUTION INTRAVENOUS at 08:30

## 2017-06-26 RX ADMIN — OXYCODONE HYDROCHLORIDE 5 MG: 5 TABLET ORAL at 19:09

## 2017-06-26 RX ADMIN — FAMOTIDINE 20 MG: 10 INJECTION, SOLUTION INTRAVENOUS at 08:40

## 2017-06-26 RX ADMIN — INSULIN LISPRO 1 UNITS: 100 INJECTION, SOLUTION INTRAVENOUS; SUBCUTANEOUS at 06:38

## 2017-06-26 RX ADMIN — HYDRALAZINE HYDROCHLORIDE 25 MG: 25 TABLET ORAL at 14:22

## 2017-06-26 RX ADMIN — AMLODIPINE BESYLATE 10 MG: 5 TABLET ORAL at 08:30

## 2017-06-26 RX ADMIN — PIPERACILLIN AND TAZOBACTAM 3.38 G: 3; .375 INJECTION, POWDER, FOR SOLUTION INTRAVENOUS at 14:22

## 2017-06-26 RX ADMIN — HYDROCHLOROTHIAZIDE 25 MG: 25 TABLET ORAL at 08:30

## 2017-06-26 RX ADMIN — POTASSIUM CHLORIDE 10 MEQ: 7.46 INJECTION, SOLUTION INTRAVENOUS at 09:09

## 2017-06-26 RX ADMIN — HYDRALAZINE HYDROCHLORIDE 25 MG: 25 TABLET ORAL at 21:55

## 2017-06-26 ASSESSMENT — ENCOUNTER SYMPTOMS
PALPITATIONS: 0
COUGH: 0
INSOMNIA: 0
HEADACHES: 0
SORE THROAT: 0
NECK PAIN: 0
NAUSEA: 0
VOMITING: 0
BLURRED VISION: 0
DIARRHEA: 0
CHILLS: 0
EYE PAIN: 0
ABDOMINAL PAIN: 0
DEPRESSION: 0
DIZZINESS: 0
BACK PAIN: 0
TINGLING: 0
SHORTNESS OF BREATH: 0
LOSS OF CONSCIOUSNESS: 0
FEVER: 0

## 2017-06-26 ASSESSMENT — PAIN SCALES - GENERAL
PAINLEVEL_OUTOF10: 0
PAINLEVEL_OUTOF10: 7
PAINLEVEL_OUTOF10: 0

## 2017-06-26 NOTE — PROGRESS NOTES
[facility]    Date of Service: 2017    Chief Complaint    76 y.o. male admitted 2017 with abdominal pain and SOB, fever, chills, diaphoresis, syncope x4.  CT abdomen revealed acute cholecystitis found to have Ascending cholangitis, nstemi now s/p vent  and cholecystostomy. Berhane planning surgery in a few weeks.     Interval Problem Update  Feeling well. Occasional confusion. Wife here and all questions answered.     Consultants/Specialty  General Surgery Dr Last  Pulmonology Dr Reyna  Cardiology Dr Chua    Disposition  Mobilize/therapies and likely snf needed  Laird and central pulled today.   Plan eventual lap brook per Dr Last    K replaced  Mag replaced   Bp control- added hydralazine        Review of Systems   Constitutional: Negative for fever and chills.   HENT: Negative for sore throat.    Eyes: Negative for blurred vision and pain.   Respiratory: Negative for cough and shortness of breath.    Cardiovascular: Negative for chest pain and palpitations.   Gastrointestinal: Negative for nausea, vomiting, abdominal pain and diarrhea.   Genitourinary: Negative for dysuria and urgency.   Musculoskeletal: Negative for back pain and neck pain.   Skin: Negative for itching and rash.   Neurological: Negative for dizziness, tingling, loss of consciousness and headaches.   Psychiatric/Behavioral: Negative for depression. The patient does not have insomnia.         Occ confusion   All other systems reviewed and are negative.     Physical Exam  Laboratory/Imaging   Hemodynamics  Temp (24hrs), Av.7 °C (98 °F), Min:36.5 °C (97.7 °F), Max:37 °C (98.6 °F)   Temperature: 37 °C (98.6 °F)  Pulse  Av.5  Min: 30  Max: 134    Blood Pressure : (!) 166/70 mmHg     Respiratory      Respiration: 18, Pulse Oximetry: 92 %, O2 Daily Delivery Respiratory : Silicone Nasal Cannula     Work Of Breathing / Effort: Mild  RUL Breath Sounds: Clear, RML Breath Sounds: Clear, RLL Breath Sounds:  Diminished;Clear, OZZY Breath Sounds: Clear, LLL Breath Sounds: Diminished;Clear    Fluids    Intake/Output Summary (Last 24 hours) at 06/26/17 0816  Last data filed at 06/26/17 0529   Gross per 24 hour   Intake   3210 ml   Output   4900 ml   Net  -1690 ml       Nutrition  Orders Placed This Encounter   Procedures   • DIET ORDER     Standing Status: Standing      Number of Occurrences: 1      Standing Expiration Date:      Order Specific Question:  Diet:     Answer:  Regular [1]     Order Specific Question:  Macronutrient modifications:     Answer:  Low Fat [5]     Order Specific Question:  Texture/Fiber modifications:     Answer:  Dysphagia 2(Pureed/Chopped)specify fluid consistency(question 6) [2]     Order Specific Question:  Consistency/Fluid modifications:     Answer:  Thin Liquids [3]     Order Specific Question:  Miscellaneous modifications:     Answer:  SLP - 1:1 Supervision by Nursing [21]      Comments:  No Straws     Order Specific Question:  Miscellaneous modifications:     Answer:  SLP - Comments [24]     Physical Exam   Constitutional: He is oriented to person, place, and time. He appears well-developed and well-nourished. No distress.   HENT:   Right Ear: External ear normal.   Left Ear: External ear normal.   Nose: Nose normal.   Eyes: Conjunctivae are normal. Right eye exhibits no discharge. Left eye exhibits no discharge. No scleral icterus.   Neck: No JVD present. No tracheal deviation present.   Cardiovascular: Normal rate, normal heart sounds and intact distal pulses.  Exam reveals no gallop.    No murmur heard.  Pulmonary/Chest: Effort normal and breath sounds normal. No stridor. No respiratory distress. He has no wheezes. He has no rales.   Abdominal: Soft. Bowel sounds are normal. He exhibits no distension. There is no tenderness. There is no guarding.   Musculoskeletal: He exhibits edema. He exhibits no tenderness.   Neurological: He is alert and oriented to person, place, and time.   OX4    Skin: Skin is warm and dry. No rash noted. He is not diaphoretic. No erythema.   Psychiatric: He has a normal mood and affect. His behavior is normal. Thought content normal.   Nursing note and vitals reviewed.      Recent Labs      06/24/17   0400  06/25/17   0325  06/26/17   0110   WBC  11.7*  10.0  8.4   RBC  3.44*  3.17*  3.38*   HEMOGLOBIN  11.2*  10.4*  11.0*   HEMATOCRIT  33.3*  30.4*  32.4*   MCV  96.8  95.9  95.9   MCH  32.6  32.8  32.5   MCHC  33.6*  34.2  34.0   RDW  48.7  47.7  47.7   PLATELETCT  262  292  345   MPV  11.2  10.6  10.4     Recent Labs      06/24/17   0400  06/25/17   0325  06/26/17   0110   SODIUM  143  139  137   POTASSIUM  3.0*  2.8*  3.0*   CHLORIDE  110  107  108   CO2  26  25  24   GLUCOSE  135*  126*  127*   BUN  12  10  11   CREATININE  0.68  0.70  0.63   CALCIUM  7.6*  7.3*  7.4*                      Assessment/Plan     * Sepsis (CMS-[facility]) (present on admission)  Assessment & Plan  Source:  Infected emphysematous cholecystitis  IVFs  Zosyn  6/14 BC+x2 Klebsiella oxytoca ss zosyn IV  Plan 2 wks total course of Zosyn from date of neg cultures 6/17  Remains stable off pressors      Non-STEMI (non-ST elevated myocardial infarction) (CMS-HCC) (present on admission)  Assessment & Plan  Dr. Chua consulted:  No intervention  Type 2 2/2  sepsis  No wall motion abnormalities on echo  Preserved LVEF, Gd I DHF    Acute respiratory failure with hypoxia (CMS-HCC) (present on admission)  Assessment & Plan  Now resolving.   6/15 ET intubation, Extubated 6/21  2/2  to sepsis.  Pulmonology following        Metabolic acidosis (present on admission)  Assessment & Plan  2/2 severe sepsis  resolved      Acute kidney injury (nontraumatic) (CMS-[facility]) (present on admission)  Assessment & Plan    2/2 sepsis related renal failure with poor perfusion  Resolved  Cont to follow daily    Bacteremia (present on admission)  Assessment & Plan  BC 6/14 x2 with Klebsiella ss zosyn       Acute emphysematous  cholecystitis (present on admission)  Assessment & Plan  S/p cholecystostomy completed  Cx with Klebsiella pneumonia- continue zosyn IV and ID following.   Surgery Dr Last following: plan OR once pt has stabilized- likely 4-6 weeks. Discussed with her today.     Hypotension (arterial) (present on admission)  Assessment & Plan  Secondary to sepsis  resolved    Thrombocytopenia (CMS-HCC) (present on admission)  Assessment & Plan  Etiology 2/2 severe sepsis, no known heavy alcohol use.  Now >100  Cont to follow    Hypokalemia due to inadequate potassium intake (present on admission)  Assessment & Plan  Follow K and Mg daily  Replaced today.     Atrial fibrillation with RVR (CMS-[facility])  Assessment & Plan  Secondary to acute sepsis   NO AC per Card's  Now converted to sinus  cont's on digoxin for now    Hypertension  Assessment & Plan  lisinopril 40  amlodipine 10  HCTZ  ADD HYDRALAZINE      Medications reviewed, EKG reviewed, Labs reviewed and Radiology images reviewed  Laird catheter: Unconscious / Sedated Patient on a Ventilator      DVT Prophylaxis: Heparin  DVT prophylaxis - mechanical: SCDs  Ulcer prophylaxis: Yes  Antibiotics: Treating active infection/contamination beyond 24 hours perioperative coverage  Assessed for rehab: Patient was assess for and/or received rehabilitation services during this hospitalization

## 2017-06-26 NOTE — THERAPY
"Speech Language Therapy dysphagia treatment completed.   Functional Status:  Dysphagia therapy completed this date. Patient awake and alert with wife present. He consumed thin liquids with no overt signs of aspiration. He refused solids, stating he wasn't hungry. Laryngeal elevation upon palpation and swallow onset timing appear WNL. Patient may tolerate PO trials of chopped or regular solids. However, due to likely surgery during the next few weeks, patient and wife prefer to keep diet modified. Patient is able to feed himself with a little assistance today. He uses safe swallow strategies.   Recommendations:  Recommend continue Dysphagia 2/thin liquid diet.  Plan of Care: Will benefit from Speech Therapy 3 times per week  Post-Acute Therapy: Discharge to a transitional care facility for continued skilled PT and OT therapy services. He may benefit from follow up visit from speech therapist after surgery.    See \"Rehab Therapy-Acute\" Patient Summary Report for complete documentation.     "

## 2017-06-26 NOTE — PROGRESS NOTES
Infectious Disease Progress Note    Author: Maryse Rivera M.D. Date of service & Time created: 2017  2:38 PM    Chief Complaint:  Chief Complaint   Patient presents with   • Abdominal Pain     abdominal pain since saturday  diaphoretic and weakness  diarrhea yesterday   FU sepsis/cholecystitis    Interval History:  76 year old male admitted with septic shock due to acute severe cholecystitis  - remains on the vent. No fevers. Wbc 9.4   AF WBC 10.9 Denies pain, even around drain site   AF WBC 10.6 one of  blood cxs +. Happy that NGT is out and able to have PO   AF WBC 11 eating some-no complaints-denies pain   AF WBC 10 transferred out of ICU-no new complaints   AF WBC 6.4 up to chair-no feverm CP, SOB  Labs Reviewed, Medications Reviewed and Radiology Reviewed.    Review of Systems:  Review of Systems   Constitutional: Negative for fever and chills.   Gastrointestinal: Negative for nausea, vomiting and abdominal pain.   Skin: Negative for rash.   All other systems reviewed and are negative.      Hemodynamics:  Temp (24hrs), Av.7 °C (98.1 °F), Min:36.5 °C (97.7 °F), Max:37 °C (98.6 °F)  Temperature: 36.8 °C (98.2 °F)  Pulse  Av.6  Min: 30  Max: 134   Blood Pressure : 148/72 mmHg      Physical Exam:  Physical Exam   Constitutional: He appears well-developed. No distress.   HENT:   Head: Normocephalic and atraumatic.   Mouth/Throat: No oropharyngeal exudate.   Eyes: EOM are normal. Pupils are equal, round, and reactive to light. No scleral icterus.   Neck: Neck supple.   Right IJ   Cardiovascular: Normal rate.    No murmur heard.  IRR   Pulmonary/Chest: Effort normal. No respiratory distress. He has no wheezes. He has no rales.   Abdominal: Soft. He exhibits no distension. There is no tenderness. There is no rebound.   RUQ drain present   Musculoskeletal: He exhibits edema.   Neurological: He is alert.   Nursing note and vitals reviewed.      Meds:    Current  facility-administered medications:   •  hydrALAZINE (APRESOLINE) tablet 25 mg, 25 mg, Oral, Q8HRS, Damian Monroe M.D., 25 mg at 06/26/17 1422  •  lisinopril (PRINIVIL) tablet 40 mg, 40 mg, Oral, Q DAY, George De La Cruz D.O., 40 mg at 06/26/17 0829  •  enalaprilat (VASOTEC) injection 1.25 mg, 1.25 mg, Intravenous, Q4HRS PRN, Dennis Guerra M.D., 1.25 mg at 06/24/17 2337  •  hydrochlorothiazide (HYDRODIURIL) tablet 25 mg, 25 mg, Oral, Q DAY, YAIMA Short.O., 25 mg at 06/26/17 0830  •  amlodipine (NORVASC) tablet 10 mg, 10 mg, Oral, Q DAY, YAIMA Short.O., 10 mg at 06/26/17 0830  •  oxycodone immediate-release (ROXICODONE) tablet 5 mg, 5 mg, Oral, Q4HRS PRN **OR** oxycodone immediate release (ROXICODONE) tablet 10 mg, 10 mg, Oral, Q4HRS PRN, YAIMA Short.O.  •  artificial tears 1.4 % ophthalmic solution 1 Drop, 1 Drop, Both Eyes, PRN, George De La Cruz D.O.  •  labetalol (NORMODYNE,TRANDATE) injection 10 mg, 10 mg, Intravenous, Q4HRS PRN, Dennis Guerra M.D., 10 mg at 06/24/17 0318  •  latanoprost (XALATAN) 0.005 % ophthalmic solution 1 Drop, 1 Drop, Both Eyes, Nightly, YAIMA Short.O., 1 Drop at 06/25/17 2152  •  digoxin (LANOXIN) injection 125 mcg, 125 mcg, Intravenous, DAILY AT 1800, YAIMA Short.O., 125 mcg at 06/25/17 1751  •  insulin lispro (HUMALOG) injection 1-6 Units, 1-6 Units, Subcutaneous, Q6HRS, Che Nash M.D., Stopped at 06/26/17 1200  •  famotidine (PEPCID) injection 20 mg, 20 mg, Intravenous, DAILY, Che Nash M.D., 20 mg at 06/26/17 0840  •  Action is required: Protocol 1073 Hypoglycemia has been implemented, , , Once **AND** Protocol 1073 Inclusion Criteria, , , CONTINUOUS **AND** Protocol 1073 NOTIFY, , , Once **AND** Protocol 1073 Initiate protocol immediately if FSBG is less than or equal to 70 mg/dL, , , CONTINUOUS **AND** glucose 4 g chewable tablet 16 g, 16 g, Oral, Q15 MIN PRN **AND**  dextrose 50% (D50W) injection 25 mL, 25 mL, Intravenous, Q15 MIN PRN, Che Nash M.D.  •  acetaminophen (TYLENOL) suppository 650 mg, 650 mg, Rectal, Q6HRS PRN, Che Nash M.D., 650 mg at 06/16/17 1615  •  morphine (pf) 4 mg/ml injection 2-4 mg, 2-4 mg, Intravenous, Q2HRS PRN, Che Nash M.D., 4 mg at 06/22/17 2357  •  Respiratory Care per Protocol, , Nebulization, Continuous RT, Jose Valdivia M.D.  •  acetaminophen (TYLENOL) tablet 650 mg, 650 mg, Oral, Q6HRS PRN, Jose Valdivia M.D., 650 mg at 06/24/17 2327  •  piperacillin-tazobactam (ZOSYN) 3.375 g in  mL IVPB, 3.375 g, Intravenous, Q6HRS, Jose Valdivia M.D., Last Rate: 200 mL/hr at 06/26/17 1422, 3.375 g at 06/26/17 1422  •  ondansetron (ZOFRAN) syringe/vial injection 4 mg, 4 mg, Intravenous, Q4HRS PRN, Jose Valdivia M.D., 4 mg at 06/19/17 0406  •  ondansetron (ZOFRAN ODT) dispertab 4 mg, 4 mg, Oral, Q4HRS PRN, Jose Valdivia M.D.  •  lorazepam (ATIVAN) tablet 0.5 mg, 0.5 mg, Oral, Q6HRS PRN **OR** lorazepam (ATIVAN) injection 0.5 mg, 0.5 mg, Intravenous, Q6HRS PRN, Jose Valdivia M.D., 0.5 mg at 06/15/17 0941    Labs:  Recent Labs      06/24/17   0400  06/25/17   0325  06/26/17   0110   WBC  11.7*  10.0  8.4   RBC  3.44*  3.17*  3.38*   HEMOGLOBIN  11.2*  10.4*  11.0*   HEMATOCRIT  33.3*  30.4*  32.4*   MCV  96.8  95.9  95.9   MCH  32.6  32.8  32.5   RDW  48.7  47.7  47.7   PLATELETCT  262  292  345   MPV  11.2  10.6  10.4     Recent Labs      06/24/17   0400  06/25/17   0325  06/26/17   0110   SODIUM  143  139  137   POTASSIUM  3.0*  2.8*  3.0*   CHLORIDE  110  107  108   CO2  26  25  24   GLUCOSE  135*  126*  127*   BUN  12  10  11     Recent Labs      06/24/17   0400  06/25/17   0325  06/26/17   0110   ALBUMIN  1.8*  1.7*  1.9*   TBILIRUBIN  1.3  1.1  0.9   ALKPHOSPHAT  94  78  82   TOTPROTEIN  4.9*  4.6*  5.0*   ALTSGPT  42  34  32   ASTSGOT  37  29  26   CREATININE  0.68  0.70  0.63       Imaging:  Ct-abdomen-pelvis W/o  6/15/2017   1.  There is continued gallbladder distention with nondependent air in the gallbladder suspicious for possible emphysematous cholecystitis. There is some surrounding stranding inferior to the gallbladder again seen. 2.  There is an appendicolith again seen with periappendiceal stranding and minimal fluid in the right anterior pararenal space and paracolic gutter. 3.  There are small dependent pleural effusions with dependent atelectasis. 4.  Enteric tube extends into the 1st/2nd portion of the duodenum. 5.  There is no evidence of free intraperitoneal air.    Ct-abdomen-pelvis W/o  6/14/2017  1.  Severe cholecystitis, possible component of emphysematous cholecystitis. 2.  Appendicolith, the appendix otherwise appears within normal limits. Appendicolith however places patient at future risk for development of appendicitis. 3.  Atherosclerosis and atherosclerotic coronary artery disease 4.  Hepatomegaly These findings were discussed with the patient's clinician, Poncho Boudreaux, on 6/14/2017 8:07 PM.    Dx-chest-portable (1 View)  6/21/2017  No significant change from prior exam.    Bd-sflphli-x/o  FINDINGS: Exam limited by patient body habitus and the fact the patient is on a ventilator which causes some motion. The visualized lung bases demonstrate small amount of right pleural fluid with dependent airspace disease bilaterally. The gallbladder is decompressed in comparison to the prior exam with multiple gallstones identified. There is a small amount pericholecystic fluid. There is minimal free fluid along the anterior and inferior margin of the liver. There is a cholecystotomy tube identified in the subcutaneous fat in the right anterior upper abdomen although it is difficult to follow the catheter all the way into the gallbladder. Exact position is uncertain. The common bile duct is normal in caliber without choledocholithiasis. There is no intrahepatic biliary dilatation. Limited visualization of the pancreatic duct  is unremarkable. There is a trace of fluid in the left upper abdomen but no gross evidence of pancreatitis is otherwise seen. There are no focal intrahepatic or intrasplenic signal abnormalities. Adrenal glands and visualized kidneys are unremarkable.   6/20/2017  1.  There has been partial decompression of the gallbladder with gallstones and minimal pericholecystic fluid identified. 2.  The cholecystotomy tube is not seen in its entirety and cannot be tracked into the gallbladder on MRI. Its exact position is uncertain. 3.  There is no biliary dilatation or choledocholithiasis.    Us-drain-cholescystomy  6/15/2017  1. Ultrasound guided cholecystostomy with placement of an 8-Kazakh locking loop catheter.     Echocardiogram Comp W/o Cont  6/15/2017  Transthoracic Echo Report Echocardiography Laboratory CONCLUSIONS No prior study is available for comparison. Left ventricular ejection fraction is visually estimated to be 60%. Grade I diastolic dysfunction. No valvular heart disease. Estimated right ventricular systolic pressure  is 35 mmHg.   FINDINGS Left Ventricle Normal left ventricular size and systolic function. Mild concentric left ventricular hypertrophy. Left ventricular ejection fraction is visually estimated to be 60%. Normal regional wall motion. Grade I diastolic dysfunction. Right Ventricle The right ventricle was normal in size and function. Right Atrium Enlarged right atrium. Left Atrium The left atrium is normal in size. Mitral Valve Structurally normal mitral valve. Aortic Valve Aortic sclerosis without stenosis. Tricuspid Valve Structurally normal tricuspid valve. Mild tricuspid regurgitation. Estimated right ventricular systolic pressure  is 35 mmHg. Pulmonic Valve The pulmonic valve is not well visualized. Trace pulmonic insufficiency. Pericardium Normal pericardium without effusion. Aorta The aortic root is normal. Tho Jones (Electronically Signed) Final Date:     15 Rufina 2017              "    17:30      Micro:  BLOOD CULTURE   Date Value Ref Range Status   06/23/2017   Preliminary    No Growth    Note: Blood cultures are incubated for 5 days and  are monitored continuously.Positive blood cultures  are called to the RN and reported as soon as  they are identified.  Blood culture testing and Gram stain, if indicated, are  performed at [facility], [address] [place] [address].  Positive blood cultures are  sent to [facility], [address] [place] [address], for organism identification and  susceptibility testing.        Results     Procedure Component Value Units Date/Time    BLOOD CULTURE [[id number]]  (Abnormal)  (Susceptibility) Collected:  06/17/17 1755    Order Status:  Completed Specimen Information:  Blood from Peripheral Updated:  06/24/17 0958     Significant Indicator POS (POS)      Source BLD      Site PERIPHERAL      Blood Culture        Result:        Growth detected by Bactec instrument.  06/22/2017  02:06 (A)     Blood Culture Klebsiella pneumoniae (A)     Narrative:      CALL  [facility]  ICU tel. ,  CALLED  ICU tel.  06/22/2017, 02:07, RB PERF. RESULTS CALLED TO: RN [id number]  Per Hospital Policy: Only change Specimen Src: to \"Line\" if  specified by physician order.    Culture & Susceptibility     KLEBSIELLA PNEUMONIAE     Antibiotic Sensitivity Microscan Unit Status    Ampicillin Intermediate 16 mcg/mL Final    Cefepime Sensitive <=8 mcg/mL Final    Cefotaxime Sensitive <=2 mcg/mL Final    Cefotetan Sensitive <=16 mcg/mL Final    Ceftazidime Sensitive <=1 mcg/mL Final    Ceftriaxone Sensitive <=8 mcg/mL Final    Cefuroxime Sensitive <=4 mcg/mL Final    Ciprofloxacin Sensitive <=1 mcg/mL Final    Ertapenem Sensitive <=1 mcg/mL Final    Gentamicin Sensitive <=4 mcg/mL Final    Pip/Tazobactam Sensitive <=16 mcg/mL Final    Tigecycline Sensitive <=2 mcg/mL Final    Tobramycin Sensitive <=4 mcg/mL Final    Trimeth/Sulfa Sensitive <=2/38 mcg/mL " "Final                       BLOOD CULTURE [[id number]] Collected:  06/23/17 1430    Order Status:  Completed Specimen Information:  Blood from Peripheral Updated:  06/24/17 0641     Significant Indicator NEG      Source BLD      Site Peripheral      Blood Culture --      Result:        No Growth    Note: Blood cultures are incubated for 5 days and  are monitored continuously.Positive blood cultures  are called to the RN and reported as soon as  they are identified.  Blood culture testing and Gram stain, if indicated, are  performed at [facility], [address] [place] [address].  Positive blood cultures are  sent to [facility], [address] [place] [address], for organism identification and  susceptibility testing.      Narrative:      Per Hospital Policy: Only change Specimen Src: to \"Line\" if  specified by physician order.    BLOOD CULTURE [[id number]] Collected:  06/23/17 1435    Order Status:  Completed Specimen Information:  Blood from Peripheral Updated:  06/24/17 0641     Significant Indicator NEG      Source BLD      Site Peripheral      Blood Culture --      Result:        No Growth    Note: Blood cultures are incubated for 5 days and  are monitored continuously.Positive blood cultures  are called to the RN and reported as soon as  they are identified.  Blood culture testing and Gram stain, if indicated, are  performed at [facility], [facility]  [employer] [place], [place].  Positive blood cultures are  sent to [facility], [address] [place] [address], for organism identification and  susceptibility testing.      Narrative:      Per Hospital Policy: Only change Specimen Src: to \"Line\" if  specified by physician order.    BLOOD CULTURE [[id number]] Collected:  06/17/17 1810    Order Status:  Completed Specimen Information:  Blood from Peripheral Updated:  06/23/17 0617     Significant Indicator NEG      Source BLD  " "    Site PERIPHERAL      Blood Culture No growth after 5 days of incubation.     Narrative:      Per Hospital Policy: Only change Specimen Src: to \"Line\" if  specified by physician order.            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]   • Non-STEMI (non-ST elevated myocardial infarction) (CMS-HCC) [I21.4]       Plan:  Gram negative sepsis due to Klebsiella  Low grade fever resolved  Mild leukocytosis, resolved  Lactic acidosis resolved  Blood c/s + 6/14; 6/15; 6/17  Repeat blood cxs 6/23 neg so far  Continue zosyn  Aim for 2 weeks of abx after neg blood c/s  PICC OK to place now  Stop date 7/7/2017-can do once daily ceftriaxone if SNF cannot do abx every 6 hours    Acute emphysematous cholecystitis  S/p cholecystostomy drain 6/15  Bile cx +Klebsiella   Surgery on hold until cardiac issues resolved    DW IM dr Monroe    "

## 2017-06-26 NOTE — PROGRESS NOTES
Pulmonary Critical Care Progress        Chief Complaint: Respiratory failure requiring mechanical ventilation.    History of Present Illness:   76 y.o. male was admitted on 6/14 with a chief complaint of abdominal pain, fever and chills. Last felt well 4 days prior to admission. ACT scan of the abdomen showed severe cholecystitis, possible component of emphysematous cholecystitis, an appendicolith, but the appendix otherwise appeared within normal limits. Appendicolith however places patient at future risk for development of appendicitis. Atherosclerosis and atherosclerotic coronary artery disease. Hepatomegaly  He was placed on zosyn, and admitted to the ICU. He developed hypotension, treated with intravenous crystalloid and Levophed. He had increased oxygen requirements which resulted in intubation and mechanical ventilation.    ROS:  unable to perform due to the patient's inability to effectively communicate.    Interval Events:  24 hour interval history reviewed.  Extubated 6/21/17 and comfortable now on a low flow oxygen nasal cannula. He more alert and responds appropriately to simple questions and commands. His temperature curve has improved with a maximum  temperature of 99.1° overnight and the white blood cell, remains normal at 10,600. His blood pressure is now bit high, consistent with his history of hypertension. Blood cultures on June 17 were still yielding growth of presumed Klebsiella. Not yet out of bed much.    6/26 - on low flow O2, no respiratory deterioration; awaiting surgery; remains on zosyn, last BC 6/23 was negative; WBC normal, potassium low at 3.0, alb 1.9.      PFSH:  No change.    Respiratory:     Pulse Oximetry: 98 % on the oxygen nasal cannula at 1.5 L/m.  Axel Pillai 1: 50 ml     Exam: unlabored respirations, no intercostal retractions or accessory muscle use  ImagingAvailable data reviewed. The chest radiogram on June 22 demonstrates stable patchy interstitial densities. No  film today.        Invalid input(s): [id number]    HemoDynamics:  Pulse: 74  Blood Pressure : 150/71 mmHg      Exam: sinus tachycardia. Net diuresis of 1.3 L over the past 24 hours but he is 4.8L positive since admission.  Imaging: Available data reviewed. The echocardiogram on Rufina 15, 2017 demonstrated left ventricular systolic ejection fraction of 60% with grade 1 diastolic dysfunction and an estimated right ventricular systolic pressure of 35 mmHg.    Neuro:  GCS    Exam: no focal deficits noted, responds appropriately to simple questions and commands.   Imaging: None - Reviewed    Fluids:  Intake/Output       06/24/17 0700 - 06/25/17 0659 06/25/17 0700 - 06/26/17 0659 06/26/17 0700 - 06/27/17 0659      1778-2557 8435-0279 Total 3178-4989 1563-8785 Total 4909-8562 2599-7692 Total       Intake    P.O.  590  300 890  920  240 1160  --  -- --    P.O. 590 300 890  -- -- --    I.V.  345  -- 345  1300  1190 2490  --  -- --    IV Volume (Normal Saline) -- -- --  -- -- --    IV Volume (Potassium) 100 -- 100 200 -- 200 -- -- --    IV Volume (NS @ 100 ml/hr) 45 -- 45 -- -- -- -- -- --    IV Piggyback Volume (Zosyn) 100 -- 100 200 200 400 -- -- --    IV Piggyback Volume 100 -- 100 -- -- -- -- -- --    Other  40  -- 40  --  20 20  --  -- --    Flush / Irrigation Volume (DOMINGO) 40 -- 40 -- 20 20 -- -- --    Total Intake  2220 1450 3670 -- -- --       Output    Urine  1355  900 2255  2800  2050 4850  --  -- --    Indwelling Cathether 8853 139 7765 2800 2050 4850 -- -- --    Drains  150  -- 150  50  -- 50  --  -- --    Axel Pillai 1 150 -- 150 50 -- 50 -- -- --    Stool  --  -- --  --  -- --  --  -- --    Number of Times Stooled 1 x -- 1 x 3 x -- 3 x -- -- --    Total Output 6086 948 0646 2850 2050 4900 -- -- --       Net I/O     -530 -600 -1130 -630 -600 -9016 -- -- --        Weight: 98.6 kg (217 lb 6 oz)  Recent Labs      06/24/17   0400  06/25/17   0325  06/26/17   0110   SODIUM  143   139  137   POTASSIUM  3.0*  2.8*  3.0*   CHLORIDE  110  107  108   CO2     BUN  12  10  11   CREATININE  0.68  0.70  0.63   MAGNESIUM   --   1.7  1.7   CALCIUM  7.6*  7.3*  7.4*       GI/Nutrition:  Exam: patient sedated  Imaging: Available data reviewed  NPO, starting dysphagia diet after swallowing evaluation.  Liver Function  Recent Labs      17   0400  175  17   0110   ALTSGPT  42  34  32   ASTSGOT  37  29  26   ALKPHOSPHAT  94  78  82   TBILIRUBIN  1.3  1.1  0.9   GLUCOSE  135*  126*  127*       Heme:  Recent Labs      17   0400  175  17   0110   RBC  3.44*  3.17*  3.38*   HEMOGLOBIN  11.2*  10.4*  11.0*   HEMATOCRIT  33.3*  30.4*  32.4*   PLATELETCT  262  292  345       Infectious Disease:  Temp  Av.6 °C (97.9 °F)  Min: 36.5 °C (97.7 °F)  Max: 36.8 °C (98.2 °F)  Micro: antibiotics reviewed, cultures reviewed and reviewed. Sputum cultures are negative but the blood and bile fluid cultures yielded growth of Klebsiella. He remains on Zosyn per infectious disease consultation.  Recent Labs      17   011   WBC  11.7*  10.0  8.4   ASTSGOT  37  29  26   ALTSGPT  42  34  32   ALKPHOSPHAT  94  78  82   TBILIRUBIN  1.3  1.1  0.9     Current Facility-Administered Medications   Medication Dose Frequency Provider Last Rate Last Dose   • lisinopril (PRINIVIL) tablet 40 mg  40 mg Q DAY George De La Cruz D.O.       • enalaprilat (VASOTEC) injection 1.25 mg  1.25 mg Q4HRS PRN Dennis Guerra M.D.   1.25 mg at 17 2337   • hydrochlorothiazide (HYDRODIURIL) tablet 25 mg  25 mg Q DAY PETRONA ShortO.   25 mg at 17 0947   • amlodipine (NORVASC) tablet 10 mg  10 mg Q DAY PETRONA ShortOYamile   10 mg at 17 0947   • oxycodone immediate-release (ROXICODONE) tablet 5 mg  5 mg Q4HRS PRN George De La Cruz D.O.        Or   • oxycodone immediate release (ROXICODONE) tablet 10 mg   10 mg Q4HRS PRN YAIMA Short.O.       • artificial tears 1.4 % ophthalmic solution 1 Drop  1 Drop PRN YAIMA Short.RODRIGUEZ.       • labetalol (NORMODYNE,TRANDATE) injection 10 mg  10 mg Q4HRS PRN Dennis Guerra M.D.   10 mg at 06/24/17 0318   • latanoprost (XALATAN) 0.005 % ophthalmic solution 1 Drop  1 Drop Nightly George De La Cruz D.O.   1 Drop at 06/25/17 2152   • digoxin (LANOXIN) injection 125 mcg  125 mcg DAILY AT 1800 YAIMA Short.OYamile   125 mcg at 06/25/17 1751   • insulin lispro (HUMALOG) injection 1-6 Units  1-6 Units Q6HRS Che Nash M.D.   1 Units at 06/26/17 0638   • famotidine (PEPCID) injection 20 mg  20 mg DAILY Che Nash M.D.   20 mg at 06/25/17 0947   • glucose 4 g chewable tablet 16 g  16 g Q15 MIN PRN Che Nash M.D.        And   • dextrose 50% (D50W) injection 25 mL  25 mL Q15 MIN PRN Che Nash M.D.       • acetaminophen (TYLENOL) suppository 650 mg  650 mg Q6HRS PRN Che Nash M.D.   650 mg at 06/16/17 1615   • morphine (pf) 4 mg/ml injection 2-4 mg  2-4 mg Q2HRS PRN Che Nash M.D.   4 mg at 06/22/17 2357   • Respiratory Care per Protocol   Continuous RT Jose Valdivia M.D.       • acetaminophen (TYLENOL) tablet 650 mg  650 mg Q6HRS PRN Jose Valdivia M.D.   650 mg at 06/24/17 2327   • piperacillin-tazobactam (ZOSYN) 3.375 g in  mL IVPB  3.375 g Q6HRS Jose Valdivia M.D.   Stopped at 06/26/17 0340   • ondansetron (ZOFRAN) syringe/vial injection 4 mg  4 mg Q4HRS PRN Jose Valdivia M.D.   4 mg at 06/19/17 0406   • ondansetron (ZOFRAN ODT) dispertab 4 mg  4 mg Q4HRS PRN Jose Valdivia M.D.       • lorazepam (ATIVAN) tablet 0.5 mg  0.5 mg Q6HRS PRN Jose Valdivia M.D.        Or   • lorazepam (ATIVAN) injection 0.5 mg  0.5 mg Q6HRS PRN Jose Valdivia M.D.   0.5 mg at 06/15/17 0941     Last reviewed on 6/15/2017  8:22 AM by Sahara Staton    Quality  Measures:  Radiology images reviewed, Medications  reviewed and Labs reviewed                      Problems:  1. Acute respiratory failure, improved and now liberated from mechanical ventilation, comfortable on a low-flow oxygen nasal cannula.  2. Acute cholecystitis, now with biliary drain in place. Klebsiella recovered from blood and bile fluid cultures.  3. Sepsis, related to cholecystitis. Improved.  4. Shock, improved and now off vasopressor.   5. Abnormal chest radiogram. The findings might suggest extravascular lung water but aspiration is a possibility.  6. Renal insufficiency, nonoliguric, improving by numbers.  7. Mild anemia, stable without evidence of ongoing blood loss.  8. Thrombocytopenia, resolved.  9. Paroxysmal atrial fibrillation, now in sinus rhythm.  10. Systemic arterial hypertension.  11. Encephalopathy, without focal neurologic signs or symptoms, consistent with delirium, improving.      Plan:  Continue titrated supplemental oxygen and respiratory protocols.  Continue antibiotics per infectious disease consultation.  Continue mobilization, physical therapy assistance requested. Nutritional support and prophylaxis.     Discussed patient condition and risk of morbidity and/or mortality with RN, RT and hospitalist. Discussed with family at the bedside.

## 2017-06-26 NOTE — PROGRESS NOTES
6/25/17    1900    Report taken, assumed care of Pt. Pt denies pain or discomfort at this time (c/o mild pain when coughing, declined medication, showed pt how to splint) . PT up in chair, wife BS.  Reviewed POC for the shift and all questions answered. Call light and possessions within reach. Pt denies needs at this time. Will continue to monitor.

## 2017-06-26 NOTE — DISCHARGE PLANNING
Medical SW    Referral: Sw to acquire ordered SNF choice.    Intervention: Sw met w/ pt and wife at bedside. Wife is not able to make a choice at this time. Wife accepted list of local SNF choiced from [facility]. Wife will research and let SW know w/in the next day or two.       Plan: Sw to assist w/ d/c planning as needed.

## 2017-06-26 NOTE — CARE PLAN
Problem: Oxygenation:  Goal: Maintain adequate oxygenation dependent on patient condition  Outcome: PROGRESSING SLOWER THAN EXPECTED  Titrate and maintain saturation by pulse oximetry >90%. Current fiO2 2  lpm nasal cannula.        Problem: Hyperinflation:  Goal: Prevent or improve atelectasis  Outcome: PROGRESSING SLOWER THAN EXPECTED  Absence of or improvement in signs of atelectasis    Improved Vital capacity(     Improvement in CXR    Improved inspiratory muscle performance.      Pt encouraged to perform I.S. Independently and effectively. Pt encouraged to perform I.S. 10 times per hr while awake  Intervention: Instruct incentive spirometry usage   Pt sitting in chair for I.S. With 1500ml consistently

## 2017-06-26 NOTE — PROGRESS NOTES
Tele strip at 0742 shows SR w/ HR of 92  Measurements: .12/.10/.38    Tele Shift Summary:  Rhythm: SR/ST  Rate: 80's-100's  Ectopy: Per CCT Monica, pt had frequent PAC/PVC.    Telemetry monitoring strips faxed to primary RN. Tele strip summary only. Further assessment and monitoring to be done by primary RN.

## 2017-06-26 NOTE — FLOWSHEET NOTE
06/25/17 1845   Events/Summary/Plan   Events/Summary/Plan I.S>  completed    Interdisciplinary Plan of Care-Goals (Indications)   Hyperinflation Protocol Indications Atelectasis Documented by Chest X-Ray   Interdisciplinary Plan of Care-Outcomes    Hyperinflation Protocol Goals/Outcome Greater Than 60% of Predicted I.S. Volume x 24 hrs   Education   Education Yes - Pt. / Family has been Instructed in use of Respiratory Equipment   Incentive Spirometry Group   Incentive Spirometry Instruction Yes   Breathing Exercises Yes   Incentive Spirometer Volume 1500 mL   Respiratory WDL   Respiratory (WDL) X   Chest Exam   Work Of Breathing / Effort Mild   Respiration 18   Pulse 82   Breath Sounds   Pre/Post Intervention Pre Intervention Assessment   RUL Breath Sounds Clear   RML Breath Sounds Clear   RLL Breath Sounds Diminished;Fine Crackles   OZZY Breath Sounds Clear   LLL Breath Sounds Diminished   Secretions   Cough (none noted at this time )   Oximetry   #Pulse Oximetry (Single Determination) Yes   Oxygen   Pulse Oximetry 96 %   O2 (LPM) 2   O2 Daily Delivery Respiratory  Silicone Nasal Cannula
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
## 2017-06-26 NOTE — PROGRESS NOTES
Spoke with perry Waddell to keep RIJ central line in place until pt able to have PICC line placed.

## 2017-06-26 NOTE — PROGRESS NOTES
Tele note:    Sinus rhythm 60-80's bpm with prolonged QT and frequent PAC's going to rare PAC's at this time  0.14/0.08/0.42    Interpretation only. Assessed and monitored by primary RN.

## 2017-06-26 NOTE — PROGRESS NOTES
Received report from NOC RN; assumed pt care. Pt resting comfortably in recliner. Pt denies pain. Wife at bedside and brought in pt's hearing aids and contacts. Generalized edema noted. DOMINGO drain, draining to low intermittent suction. Central line dressing reinforced. Pt notified to call for assistance.

## 2017-06-27 ENCOUNTER — APPOINTMENT (OUTPATIENT)
Dept: RADIOLOGY | Facility: MEDICAL CENTER | Age: 77
DRG: 870 | End: 2017-06-27
Attending: HOSPITALIST
Payer: MEDICARE

## 2017-06-27 LAB
ALBUMIN SERPL BCP-MCNC: 1.9 G/DL (ref 3.2–4.9)
ALBUMIN/GLOB SERPL: 0.6 G/DL
ALP SERPL-CCNC: 69 U/L (ref 30–99)
ALT SERPL-CCNC: 28 U/L (ref 2–50)
ANION GAP SERPL CALC-SCNC: 7 MMOL/L (ref 0–11.9)
AST SERPL-CCNC: 25 U/L (ref 12–45)
BASOPHILS # BLD AUTO: 0.4 % (ref 0–1.8)
BASOPHILS # BLD: 0.04 K/UL (ref 0–0.12)
BILIRUB SERPL-MCNC: 0.9 MG/DL (ref 0.1–1.5)
BUN SERPL-MCNC: 8 MG/DL (ref 8–22)
CALCIUM SERPL-MCNC: 7.4 MG/DL (ref 8.4–10.2)
CHLORIDE SERPL-SCNC: 106 MMOL/L (ref 96–112)
CO2 SERPL-SCNC: 24 MMOL/L (ref 20–33)
CREAT SERPL-MCNC: 0.69 MG/DL (ref 0.5–1.4)
EOSINOPHIL # BLD AUTO: 0 K/UL (ref 0–0.51)
EOSINOPHIL NFR BLD: 0 % (ref 0–6.9)
ERYTHROCYTE [DISTWIDTH] IN BLOOD BY AUTOMATED COUNT: 48 FL (ref 35.9–50)
GFR SERPL CREATININE-BSD FRML MDRD: >60 ML/MIN/1.73 M 2
GLOBULIN SER CALC-MCNC: 3.1 G/DL (ref 1.9–3.5)
GLUCOSE BLD-MCNC: 135 MG/DL (ref 65–99)
GLUCOSE SERPL-MCNC: 127 MG/DL (ref 65–99)
HCT VFR BLD AUTO: 30.1 % (ref 42–52)
HGB BLD-MCNC: 10.3 G/DL (ref 14–18)
IMM GRANULOCYTES # BLD AUTO: 0.04 K/UL (ref 0–0.11)
IMM GRANULOCYTES NFR BLD AUTO: 0.4 % (ref 0–0.9)
LYMPHOCYTES # BLD AUTO: 1.45 K/UL (ref 1–4.8)
LYMPHOCYTES NFR BLD: 15.5 % (ref 22–41)
MAGNESIUM SERPL-MCNC: 1.8 MG/DL (ref 1.5–2.5)
MCH RBC QN AUTO: 33 PG (ref 27–33)
MCHC RBC AUTO-ENTMCNC: 34.2 G/DL (ref 33.7–35.3)
MCV RBC AUTO: 96.5 FL (ref 81.4–97.8)
MONOCYTES # BLD AUTO: 0.89 K/UL (ref 0–0.85)
MONOCYTES NFR BLD AUTO: 9.5 % (ref 0–13.4)
NEUTROPHILS # BLD AUTO: 6.95 K/UL (ref 1.82–7.42)
NEUTROPHILS NFR BLD: 74.2 % (ref 44–72)
NRBC # BLD AUTO: 0 K/UL
NRBC BLD AUTO-RTO: 0 /100 WBC
PLATELET # BLD AUTO: 391 K/UL (ref 164–446)
PMV BLD AUTO: 10.9 FL (ref 9–12.9)
POTASSIUM SERPL-SCNC: 3.1 MMOL/L (ref 3.6–5.5)
PROT SERPL-MCNC: 5 G/DL (ref 6–8.2)
RBC # BLD AUTO: 3.12 M/UL (ref 4.7–6.1)
SODIUM SERPL-SCNC: 137 MMOL/L (ref 135–145)
WBC # BLD AUTO: 9.4 K/UL (ref 4.8–10.8)

## 2017-06-27 PROCEDURE — 85025 COMPLETE CBC W/AUTO DIFF WBC: CPT

## 2017-06-27 PROCEDURE — 36569 INSJ PICC 5 YR+ W/O IMAGING: CPT

## 2017-06-27 PROCEDURE — 700111 HCHG RX REV CODE 636 W/ 250 OVERRIDE (IP): Performed by: HOSPITALIST

## 2017-06-27 PROCEDURE — 83735 ASSAY OF MAGNESIUM: CPT

## 2017-06-27 PROCEDURE — 97535 SELF CARE MNGMENT TRAINING: CPT

## 2017-06-27 PROCEDURE — 99232 SBSQ HOSP IP/OBS MODERATE 35: CPT | Performed by: INTERNAL MEDICINE

## 2017-06-27 PROCEDURE — 700111 HCHG RX REV CODE 636 W/ 250 OVERRIDE (IP): Performed by: INTERNAL MEDICINE

## 2017-06-27 PROCEDURE — 700102 HCHG RX REV CODE 250 W/ 637 OVERRIDE(OP): Performed by: INTERNAL MEDICINE

## 2017-06-27 PROCEDURE — A9270 NON-COVERED ITEM OR SERVICE: HCPCS | Performed by: HOSPITALIST

## 2017-06-27 PROCEDURE — 80053 COMPREHEN METABOLIC PANEL: CPT

## 2017-06-27 PROCEDURE — 700102 HCHG RX REV CODE 250 W/ 637 OVERRIDE(OP): Performed by: HOSPITALIST

## 2017-06-27 PROCEDURE — 76937 US GUIDE VASCULAR ACCESS: CPT

## 2017-06-27 PROCEDURE — 82962 GLUCOSE BLOOD TEST: CPT

## 2017-06-27 PROCEDURE — 770020 HCHG ROOM/CARE - TELE (206)

## 2017-06-27 PROCEDURE — 700105 HCHG RX REV CODE 258: Performed by: HOSPITALIST

## 2017-06-27 PROCEDURE — A9270 NON-COVERED ITEM OR SERVICE: HCPCS | Performed by: INTERNAL MEDICINE

## 2017-06-27 RX ORDER — POTASSIUM CHLORIDE 20 MEQ/1
40 TABLET, EXTENDED RELEASE ORAL ONCE
Status: COMPLETED | OUTPATIENT
Start: 2017-06-27 | End: 2017-06-27

## 2017-06-27 RX ORDER — DIGOXIN 125 MCG
125 TABLET ORAL DAILY
Status: DISCONTINUED | OUTPATIENT
Start: 2017-06-27 | End: 2017-06-28 | Stop reason: HOSPADM

## 2017-06-27 RX ORDER — MAGNESIUM SULFATE HEPTAHYDRATE 40 MG/ML
2 INJECTION, SOLUTION INTRAVENOUS ONCE
Status: COMPLETED | OUTPATIENT
Start: 2017-06-27 | End: 2017-06-27

## 2017-06-27 RX ADMIN — HYDRALAZINE HYDROCHLORIDE 25 MG: 25 TABLET ORAL at 06:09

## 2017-06-27 RX ADMIN — PIPERACILLIN AND TAZOBACTAM 3.38 G: 3; .375 INJECTION, POWDER, FOR SOLUTION INTRAVENOUS at 21:47

## 2017-06-27 RX ADMIN — DIGOXIN 125 MCG: 125 TABLET ORAL at 19:06

## 2017-06-27 RX ADMIN — FAMOTIDINE 20 MG: 10 INJECTION, SOLUTION INTRAVENOUS at 09:08

## 2017-06-27 RX ADMIN — HYDRALAZINE HYDROCHLORIDE 25 MG: 25 TABLET ORAL at 14:53

## 2017-06-27 RX ADMIN — LISINOPRIL 40 MG: 20 TABLET ORAL at 09:08

## 2017-06-27 RX ADMIN — PIPERACILLIN AND TAZOBACTAM 3.38 G: 3; .375 INJECTION, POWDER, FOR SOLUTION INTRAVENOUS at 14:57

## 2017-06-27 RX ADMIN — PIPERACILLIN AND TAZOBACTAM 3.38 G: 3; .375 INJECTION, POWDER, FOR SOLUTION INTRAVENOUS at 09:08

## 2017-06-27 RX ADMIN — AMLODIPINE BESYLATE 10 MG: 5 TABLET ORAL at 09:08

## 2017-06-27 RX ADMIN — HYDRALAZINE HYDROCHLORIDE 25 MG: 25 TABLET ORAL at 21:47

## 2017-06-27 RX ADMIN — POTASSIUM CHLORIDE 40 MEQ: 1500 TABLET, EXTENDED RELEASE ORAL at 14:53

## 2017-06-27 RX ADMIN — MAGNESIUM SULFATE IN WATER 2 G: 40 INJECTION, SOLUTION INTRAVENOUS at 15:55

## 2017-06-27 RX ADMIN — PIPERACILLIN AND TAZOBACTAM 3.38 G: 3; .375 INJECTION, POWDER, FOR SOLUTION INTRAVENOUS at 03:11

## 2017-06-27 RX ADMIN — HYDROCHLOROTHIAZIDE 25 MG: 25 TABLET ORAL at 09:08

## 2017-06-27 ASSESSMENT — ENCOUNTER SYMPTOMS
TINGLING: 0
INSOMNIA: 0
FEVER: 0
EYE PAIN: 0
ABDOMINAL PAIN: 0
BLURRED VISION: 0
DEPRESSION: 0
BACK PAIN: 0
HEADACHES: 0
COUGH: 0
NECK PAIN: 0
VOMITING: 0
CHILLS: 0
PALPITATIONS: 0
NAUSEA: 0
SORE THROAT: 0
DIZZINESS: 0
LOSS OF CONSCIOUSNESS: 0
SHORTNESS OF BREATH: 0
DIARRHEA: 0

## 2017-06-27 ASSESSMENT — PAIN SCALES - GENERAL
PAINLEVEL_OUTOF10: 0

## 2017-06-27 NOTE — PROGRESS NOTES
Infectious Disease Progress Note    Author: Maryse Rivera M.D. Date of service & Time created: 2017  11:45 AM    Chief Complaint:  Chief Complaint   Patient presents with   • Abdominal Pain     abdominal pain since saturday  diaphoretic and weakness  diarrhea yesterday   FU sepsis/cholecystitis    Interval History:  76 year old male admitted with septic shock due to acute severe cholecystitis  - remains on the vent. No fevers. Wbc 9.4   AF WBC 10.9 Denies pain, even around drain site   AF WBC 10.6 one of  blood cxs +. Happy that NGT is out and able to have PO   AF WBC 11 eating some-no complaints-denies pain   AF WBC 10 transferred out of ICU-no new complaints   AF WBC 6.4 up to chair-no feverm CP, SOB   AF WBC 9.4 eating better-plan for PICC today Denies SE abx  Labs Reviewed, Medications Reviewed and Radiology Reviewed.    Review of Systems:  Review of Systems   Constitutional: Negative for fever and chills.   Gastrointestinal: Negative for nausea, vomiting and abdominal pain.   Skin: Negative for rash.   All other systems reviewed and are negative.      Hemodynamics:  Temp (24hrs), Av.9 °C (98.5 °F), Min:36.7 °C (98.1 °F), Max:37.2 °C (98.9 °F)  Temperature: 36.7 °C (98.1 °F)  Pulse  Av.6  Min: 30  Max: 134   Blood Pressure : 147/82 mmHg      Physical Exam:  Physical Exam   Constitutional: He appears well-developed. No distress.   HENT:   Head: Normocephalic and atraumatic.   Mouth/Throat: No oropharyngeal exudate.   Eyes: EOM are normal. Pupils are equal, round, and reactive to light. No scleral icterus.   Neck: Neck supple.   Right IJ   Cardiovascular: Normal rate.    No murmur heard.  IRR   Pulmonary/Chest: Effort normal. No respiratory distress. He has no wheezes. He has no rales.   Abdominal: Soft. He exhibits no distension. There is no tenderness. There is no rebound.   RUQ drain present   Musculoskeletal: He exhibits edema.   Neurological: He is alert.    Nursing note and vitals reviewed.      Meds:    Current facility-administered medications:   •  hydrALAZINE (APRESOLINE) tablet 25 mg, 25 mg, Oral, Q8HRS, Damian Monroe M.D., 25 mg at 06/27/17 0609  •  triamcinolone acetonide (KENALOG) 0.1 % cream, , Topical, BID, Damian Monroe M.D.  •  lisinopril (PRINIVIL) tablet 40 mg, 40 mg, Oral, Q DAY, George De La Cruz D.O., 40 mg at 06/27/17 0908  •  enalaprilat (VASOTEC) injection 1.25 mg, 1.25 mg, Intravenous, Q4HRS PRN, Dennis Guerra M.D., 1.25 mg at 06/24/17 2337  •  hydrochlorothiazide (HYDRODIURIL) tablet 25 mg, 25 mg, Oral, Q DAY, George De La Cruz D.O., 25 mg at 06/27/17 0908  •  amlodipine (NORVASC) tablet 10 mg, 10 mg, Oral, Q DAY, George De La Cruz D.O., 10 mg at 06/27/17 0908  •  oxycodone immediate-release (ROXICODONE) tablet 5 mg, 5 mg, Oral, Q4HRS PRN, 5 mg at 06/26/17 1909 **OR** oxycodone immediate release (ROXICODONE) tablet 10 mg, 10 mg, Oral, Q4HRS PRN, George De La Cruz D.O.  •  artificial tears 1.4 % ophthalmic solution 1 Drop, 1 Drop, Both Eyes, PRN, George De La Cruz D.O.  •  labetalol (NORMODYNE,TRANDATE) injection 10 mg, 10 mg, Intravenous, Q4HRS PRN, Dennis Guerra M.D., 10 mg at 06/26/17 2051  •  latanoprost (XALATAN) 0.005 % ophthalmic solution 1 Drop, 1 Drop, Both Eyes, Nightly, George De La Cruz D.O., 1 Drop at 06/26/17 2155  •  digoxin (LANOXIN) injection 125 mcg, 125 mcg, Intravenous, DAILY AT 1800, George De La Cruz D.O., 125 mcg at 06/26/17 1812  •  insulin lispro (HUMALOG) injection 1-6 Units, 1-6 Units, Subcutaneous, Q6HRS, Che Nash M.D., Stopped at 06/27/17 0600  •  famotidine (PEPCID) injection 20 mg, 20 mg, Intravenous, DAILY, Che Nash M.D., 20 mg at 06/27/17 0908  •  Action is required: Protocol 1073 Hypoglycemia has been implemented, , , Once **AND** Protocol 1073 Inclusion Criteria, , , CONTINUOUS **AND** Protocol 1073 NOTIFY, , , Once **AND**  Protocol 1073 Initiate protocol immediately if FSBG is less than or equal to 70 mg/dL, , , CONTINUOUS **AND** glucose 4 g chewable tablet 16 g, 16 g, Oral, Q15 MIN PRN **AND** dextrose 50% (D50W) injection 25 mL, 25 mL, Intravenous, Q15 MIN PRN, Che Nash M.D.  •  acetaminophen (TYLENOL) suppository 650 mg, 650 mg, Rectal, Q6HRS PRN, Che Nash M.D., 650 mg at 06/16/17 1615  •  morphine (pf) 4 mg/ml injection 2-4 mg, 2-4 mg, Intravenous, Q2HRS PRN, Che Nash M.D., 4 mg at 06/22/17 2357  •  Respiratory Care per Protocol, , Nebulization, Continuous RT, Jose Valdivia M.D.  •  acetaminophen (TYLENOL) tablet 650 mg, 650 mg, Oral, Q6HRS PRN, Jose Valdivia M.D., 650 mg at 06/24/17 2327  •  piperacillin-tazobactam (ZOSYN) 3.375 g in  mL IVPB, 3.375 g, Intravenous, Q6HRS, Jose Valdivia M.D., Stopped at 06/27/17 0938  •  ondansetron (ZOFRAN) syringe/vial injection 4 mg, 4 mg, Intravenous, Q4HRS PRN, Jose Valdivia M.D., 4 mg at 06/19/17 0406  •  ondansetron (ZOFRAN ODT) dispertab 4 mg, 4 mg, Oral, Q4HRS PRN, Jose Valdivia M.D.  •  lorazepam (ATIVAN) tablet 0.5 mg, 0.5 mg, Oral, Q6HRS PRN **OR** lorazepam (ATIVAN) injection 0.5 mg, 0.5 mg, Intravenous, Q6HRS PRN, Jose Valdivia M.D., 0.5 mg at 06/15/17 0941    Labs:  Recent Labs      06/25/17   0325  06/26/17   0110  06/27/17   0420   WBC  10.0  8.4  9.4   RBC  3.17*  3.38*  3.12*   HEMOGLOBIN  10.4*  11.0*  10.3*   HEMATOCRIT  30.4*  32.4*  30.1*   MCV  95.9  95.9  96.5   MCH  32.8  32.5  33.0   RDW  47.7  47.7  48.0   PLATELETCT  292  345  391   MPV  10.6  10.4  10.9   NEUTSPOLYS   --    --   74.20*   LYMPHOCYTES   --    --   15.50*   MONOCYTES   --    --   9.50   EOSINOPHILS   --    --   0.00   BASOPHILS   --    --   0.40     Recent Labs      06/25/17   0325  06/26/17   0110  06/27/17   0420   SODIUM  139  137  137   POTASSIUM  2.8*  3.0*  3.1*   CHLORIDE  107  108  106   CO2  25  24  24   GLUCOSE  126*  127*  127*   BUN  10  11  8     Recent Labs       06/25/17   0325  06/26/17   0110  06/27/17   0420   ALBUMIN  1.7*  1.9*  1.9*   TBILIRUBIN  1.1  0.9  0.9   ALKPHOSPHAT  78  82  69   TOTPROTEIN  4.6*  5.0*  5.0*   ALTSGPT  34  32  28   ASTSGOT  29  26  25   CREATININE  0.70  0.63  0.69       Imaging:  Ct-abdomen-pelvis W/o  6/15/2017  1.  There is continued gallbladder distention with nondependent air in the gallbladder suspicious for possible emphysematous cholecystitis. There is some surrounding stranding inferior to the gallbladder again seen. 2.  There is an appendicolith again seen with periappendiceal stranding and minimal fluid in the right anterior pararenal space and paracolic gutter. 3.  There are small dependent pleural effusions with dependent atelectasis. 4.  Enteric tube extends into the 1st/2nd portion of the duodenum. 5.  There is no evidence of free intraperitoneal air.    Ct-abdomen-pelvis W/o  6/14/2017  1.  Severe cholecystitis, possible component of emphysematous cholecystitis. 2.  Appendicolith, the appendix otherwise appears within normal limits. Appendicolith however places patient at future risk for development of appendicitis. 3.  Atherosclerosis and atherosclerotic coronary artery disease 4.  Hepatomegaly These findings were discussed with the patient's clinician, Poncho Boudreaux, on 6/14/2017 8:07 PM.    Dx-chest-portable (1 View)  6/21/2017  No significant change from prior exam.    Ow-asxovht-i/o  FINDINGS: Exam limited by patient body habitus and the fact the patient is on a ventilator which causes some motion. The visualized lung bases demonstrate small amount of right pleural fluid with dependent airspace disease bilaterally. The gallbladder is decompressed in comparison to the prior exam with multiple gallstones identified. There is a small amount pericholecystic fluid. There is minimal free fluid along the anterior and inferior margin of the liver. There is a cholecystotomy tube identified in the subcutaneous fat in the right  anterior upper abdomen although it is difficult to follow the catheter all the way into the gallbladder. Exact position is uncertain. The common bile duct is normal in caliber without choledocholithiasis. There is no intrahepatic biliary dilatation. Limited visualization of the pancreatic duct is unremarkable. There is a trace of fluid in the left upper abdomen but no gross evidence of pancreatitis is otherwise seen. There are no focal intrahepatic or intrasplenic signal abnormalities. Adrenal glands and visualized kidneys are unremarkable.   6/20/2017  1.  There has been partial decompression of the gallbladder with gallstones and minimal pericholecystic fluid identified. 2.  The cholecystotomy tube is not seen in its entirety and cannot be tracked into the gallbladder on MRI. Its exact position is uncertain. 3.  There is no biliary dilatation or choledocholithiasis.    Us-drain-cholescystomy  6/15/2017  1. Ultrasound guided cholecystostomy with placement of an 8-Slovak locking loop catheter.     Echocardiogram Comp W/o Cont  6/15/2017  Transthoracic Echo Report Echocardiography Laboratory CONCLUSIONS No prior study is available for comparison. Left ventricular ejection fraction is visually estimated to be 60%. Grade I diastolic dysfunction. No valvular heart disease. Estimated right ventricular systolic pressure  is 35 mmHg.   FINDINGS Left Ventricle Normal left ventricular size and systolic function. Mild concentric left ventricular hypertrophy. Left ventricular ejection fraction is visually estimated to be 60%. Normal regional wall motion. Grade I diastolic dysfunction. Right Ventricle The right ventricle was normal in size and function. Right Atrium Enlarged right atrium. Left Atrium The left atrium is normal in size. Mitral Valve Structurally normal mitral valve. Aortic Valve Aortic sclerosis without stenosis. Tricuspid Valve Structurally normal tricuspid valve. Mild tricuspid regurgitation. Estimated right  "ventricular systolic pressure  is 35 mmHg. Pulmonic Valve The pulmonic valve is not well visualized. Trace pulmonic insufficiency. Pericardium Normal pericardium without effusion. Aorta The aortic root is normal. Tho Jones (Electronically Signed) Final Date:     15 Rufina 2017                 17:30      Micro:  BLOOD CULTURE   Date Value Ref Range Status   06/23/2017   Preliminary    No Growth    Note: Blood cultures are incubated for 5 days and  are monitored continuously.Positive blood cultures  are called to the RN and reported as soon as  they are identified.  Blood culture testing and Gram stain, if indicated, are  performed at [facility], [address] [place] [address].  Positive blood cultures are  sent to [facility], [address] [place] [address], for organism identification and  susceptibility testing.        Results     Procedure Component Value Units Date/Time    BLOOD CULTURE [[id number]]  (Abnormal)  (Susceptibility) Collected:  06/17/17 1755    Order Status:  Completed Specimen Information:  Blood from Peripheral Updated:  06/24/17 0958     Significant Indicator POS (POS)      Source BLD      Site PERIPHERAL      Blood Culture        Result:        Growth detected by Bactec instrument.  06/22/2017  02:06 (A)     Blood Culture Klebsiella pneumoniae (A)     Narrative:      CALL  [facility]  ICU tel. ,  CALLED  ICU tel.  06/22/2017, 02:07, RB PERF. RESULTS CALLED TO: RN [id number]  Per Hospital Policy: Only change Specimen Src: to \"Line\" if  specified by physician order.    Culture & Susceptibility     KLEBSIELLA PNEUMONIAE     Antibiotic Sensitivity Microscan Unit Status    Ampicillin Intermediate 16 mcg/mL Final    Cefepime Sensitive <=8 mcg/mL Final    Cefotaxime Sensitive <=2 mcg/mL Final    Cefotetan Sensitive <=16 mcg/mL Final    Ceftazidime Sensitive <=1 mcg/mL Final    Ceftriaxone Sensitive <=8 mcg/mL Final    Cefuroxime Sensitive <=4 mcg/mL Final " "   Ciprofloxacin Sensitive <=1 mcg/mL Final    Ertapenem Sensitive <=1 mcg/mL Final    Gentamicin Sensitive <=4 mcg/mL Final    Pip/Tazobactam Sensitive <=16 mcg/mL Final    Tigecycline Sensitive <=2 mcg/mL Final    Tobramycin Sensitive <=4 mcg/mL Final    Trimeth/Sulfa Sensitive <=2/38 mcg/mL Final                       BLOOD CULTURE [[id number]] Collected:  06/23/17 1430    Order Status:  Completed Specimen Information:  Blood from Peripheral Updated:  06/24/17 0641     Significant Indicator NEG      Source BLD      Site Peripheral      Blood Culture --      Result:        No Growth    Note: Blood cultures are incubated for 5 days and  are monitored continuously.Positive blood cultures  are called to the RN and reported as soon as  they are identified.  Blood culture testing and Gram stain, if indicated, are  performed at [facility], [facility]  [employer].[place], [place].  Positive blood cultures are  sent to [facility], [address] [place] [address], for organism identification and  susceptibility testing.      Narrative:      Per Hospital Policy: Only change Specimen Src: to \"Line\" if  specified by physician order.    BLOOD CULTURE [[id number]] Collected:  06/23/17 1435    Order Status:  Completed Specimen Information:  Blood from Peripheral Updated:  06/24/17 0641     Significant Indicator NEG      Source BLD      Site Peripheral      Blood Culture --      Result:        No Growth    Note: Blood cultures are incubated for 5 days and  are monitored continuously.Positive blood cultures  are called to the RN and reported as soon as  they are identified.  Blood culture testing and Gram stain, if indicated, are  performed at [facility], [facility]  [facility].[place], [place].  Positive blood cultures are  sent to [facility], [address] [place] [address], for organism identification and  susceptibility testing.      Narrative:   " "   Per Hospital Policy: Only change Specimen Src: to \"Line\" if  specified by physician order.    BLOOD CULTURE [[id number]] Collected:  06/17/17 1810    Order Status:  Completed Specimen Information:  Blood from Peripheral Updated:  06/23/17 0617     Significant Indicator NEG      Source BLD      Site PERIPHERAL      Blood Culture No growth after 5 days of incubation.     Narrative:      Per Hospital Policy: Only change Specimen Src: to \"Line\" if  specified by physician order.            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]   • Non-STEMI (non-ST elevated myocardial infarction) (CMS-HCC) [I21.4]       Plan:  Gram negative sepsis due to Klebsiella  Low grade fever resolved  Mild leukocytosis, resolved  Lactic acidosis resolved  Blood c/s + 6/14; 6/15; 6/17  Repeat blood cxs 6/23 neg   PICC planned for today with dc TLC  Continue zosyn  Aim for 2 weeks of abx after neg blood c/s  Stop date 7/7/2017-can do once daily ceftriaxone if SNF cannot do abx every 6 hours    Acute emphysematous cholecystitis  S/p cholecystostomy drain 6/15  Bile cx +Klebsiella   Surgery on hold until cardiac issues resolved    MARIANNE IM d    "

## 2017-06-27 NOTE — PROGRESS NOTES
Tele monitor:    Sinus rhythm  .14/.08/.32  70s-90s  Rare PAC's  Occasional PVC's  Occasional couplets

## 2017-06-27 NOTE — THERAPY
Occupational Therapy-  Attempted OT treatment 2x today- Pt was down in procedure in am, this pm just got back to bed and reports he is too tired to get up and do further therapy at this time.  Pt did report that he has been doing his UE therex, and his BUE edema is noted to be greatly improved, and BUE strength and coordination improved.  He can feed himself now, and reports he was able to walk to toilet in BR this am with RN and FWW, and participate in toilet hygiene.  Pt educated to continue with UE therex and ROM t/o the night tonight when awake.  Also educated on role of therapy in SNF which Spouse is out looking at currently.  Will follow for therapy tomorrow as approp.

## 2017-06-27 NOTE — DISCHARGE PLANNING
Per report left from BEN Chavez, pt's wife was given SNF choice forms and will let SW know once she has made a choice.  BEN Mary to follow up.

## 2017-06-27 NOTE — CARE PLAN
Problem: Safety  Goal: Will remain free from falls  Outcome: PROGRESSING AS EXPECTED  PT WILL BE FREE FROM INJURY, INSTRUCTION FOR USE OF CALL/BR CALL LIGHT GIVEN.    Problem: Knowledge Deficit  Goal: Knowledge of disease process/condition, treatment plan, diagnostic tests, and medications will improve  Outcome: PROGRESSING AS EXPECTED  PT AND FAMILY MEMBER  UPDATED ON PTS PLAN OF CARE,NURSES COMMUNICATIONS WITH DOCTORS.

## 2017-06-27 NOTE — CARE PLAN
Problem: Safety  Goal: Will remain free from falls  Outcome: PROGRESSING AS EXPECTED  Pt notified to call for assistance, call light within reach, wife at bedside.     Problem: Fluid Volume:  Goal: Will maintain balanced intake and output  Outcome: PROGRESSING AS EXPECTED  D/C blackburn, pt able to void independently w/o difficulty. Generalized edema noted.

## 2017-06-27 NOTE — THERAPY
"Speech Language Therapy dysphagia treatment attempted.  Functional Status:  Patient in procedure getting PICC line. Per nurse and wife, patient tolerating Dysphagia 2/thin liquid diet without s/s aspiration. Patient is also able to feed himself independently. Therefore, 1:1 supervision/assistance by nursing no longer necessary. Discussed chopped or regular solids PO trials with wife, but she said he prefers to keep diet Dysphagia 2 for easy digestion. Oral pharyngeal swallow function appears WNL. Patient would benefit from 1 more follow up visit to ensure carryover of safe strategy use.   Recommendations: Dysphagia 2/thin   Plan of Care: Will benefit from Speech Therapy 3 times per week  Post-Acute Therapy: Discharge to a transitional care facility for continued skilled PT/OT therapy services.    See \"Rehab Therapy-Acute\" Patient Summary Report for complete documentation.     "

## 2017-06-27 NOTE — PROGRESS NOTES
[facility] Hospitalist Progress Note    Date of Service: 2017    Chief Complaint    76 y.o. male admitted 2017 with abdominal pain and SOB, fever, chills, diaphoresis, syncope x4.  CT abdomen revealed acute cholecystitis found to have Ascending cholangitis, nstemi now s/p vent  and cholecystostomy. Berhane planning surgery in a few weeks.     Interval Problem Update  Feeling better, no acute issue overnight.    Consultants/Specialty  General Surgery Dr Last  Pulmonology Dr Reyna  Cardiology Dr Chua    Disposition  Mobilize/therapies and likely snf needed  Laird and central pulled today.   Plan eventual lap brook per Dr Last        Review of Systems   Constitutional: Negative for fever and chills.   HENT: Negative for sore throat.    Eyes: Negative for blurred vision and pain.   Respiratory: Negative for cough and shortness of breath.    Cardiovascular: Negative for chest pain and palpitations.   Gastrointestinal: Negative for nausea, vomiting, abdominal pain and diarrhea.   Genitourinary: Negative for dysuria and urgency.   Musculoskeletal: Negative for back pain and neck pain.   Skin: Negative for itching and rash.   Neurological: Negative for dizziness, tingling, loss of consciousness and headaches.   Psychiatric/Behavioral: Negative for depression. The patient does not have insomnia.         Occ confusion   All other systems reviewed and are negative.     Physical Exam  Laboratory/Imaging   Hemodynamics  Temp (24hrs), Av.9 °C (98.5 °F), Min:36.7 °C (98.1 °F), Max:37.2 °C (98.9 °F)   Temperature: 36.9 °C (98.4 °F)  Pulse  Av.6  Min: 30  Max: 134    Blood Pressure : 149/77 mmHg     Respiratory      Respiration: 18, Pulse Oximetry: 96 %        RUL Breath Sounds: Clear, RML Breath Sounds: Clear, RLL Breath Sounds: Clear;Diminished, OZZY Breath Sounds: Clear, LLL Breath Sounds: Clear;Diminished    Fluids    Intake/Output Summary (Last 24 hours) at 17 1502  Last data filed at 17 1400    Gross per 24 hour   Intake   1140 ml   Output   2950 ml   Net  -1810 ml       Nutrition  Orders Placed This Encounter   Procedures   • DIET ORDER     Standing Status: Standing      Number of Occurrences: 1      Standing Expiration Date:      Order Specific Question:  Diet:     Answer:  Regular [1]     Order Specific Question:  Macronutrient modifications:     Answer:  Low Fat [5]     Order Specific Question:  Texture/Fiber modifications:     Answer:  Dysphagia 2(Pureed/Chopped)specify fluid consistency(question 6) [2]     Order Specific Question:  Consistency/Fluid modifications:     Answer:  Thin Liquids [3]     Physical Exam   Constitutional: He is oriented to person, place, and time. He appears well-developed and well-nourished. No distress.   HENT:   Right Ear: External ear normal.   Left Ear: External ear normal.   Nose: Nose normal.   Eyes: Conjunctivae are normal. Right eye exhibits no discharge. Left eye exhibits no discharge. No scleral icterus.   Neck: No JVD present. No tracheal deviation present.   Cardiovascular: Normal rate, normal heart sounds and intact distal pulses.  Exam reveals no gallop.    No murmur heard.  Pulmonary/Chest: Effort normal and breath sounds normal. No stridor. No respiratory distress. He has no wheezes. He has no rales.   Abdominal: Soft. Bowel sounds are normal. He exhibits no distension. There is no tenderness. There is no guarding.   Musculoskeletal: He exhibits edema. He exhibits no tenderness.   Neurological: He is alert and oriented to person, place, and time.   OX4   Skin: Skin is warm and dry. No rash noted. He is not diaphoretic. No erythema.   Psychiatric: He has a normal mood and affect. His behavior is normal. Thought content normal.   Nursing note and vitals reviewed.      Recent Labs      06/25/17   0325  06/26/17   0110  06/27/17   0420   WBC  10.0  8.4  9.4   RBC  3.17*  3.38*  3.12*   HEMOGLOBIN  10.4*  11.0*  10.3*   HEMATOCRIT  30.4*  32.4*  30.1*   MCV  95.9   95.9  96.5   MCH  32.8  32.5  33.0   MCHC  34.2  34.0  34.2   RDW  47.7  47.7  48.0   PLATELETCT  292  345  391   MPV  10.6  10.4  10.9     Recent Labs      06/25/17   0325  06/26/17   0110  06/27/17   0420   SODIUM  139  137  137   POTASSIUM  2.8*  3.0*  3.1*   CHLORIDE  107  108  106   CO2  25  24  24   GLUCOSE  126*  127*  127*   BUN  10  11  8   CREATININE  0.70  0.63  0.69   CALCIUM  7.3*  7.4*  7.4*                      Assessment/Plan     * Sepsis (CMS-HCC) (present on admission)  Assessment & Plan  Source:  Infected emphysematous cholecystitis  IVFs  PICC line placed  Zosyn stop date 7/7  Need elective cholecystectomy as outpatient      Non-STEMI (non-ST elevated myocardial infarction) (CMS-HCC) (present on admission)  Assessment & Plan  Dr. Chua consulted:  No intervention  Type 2 2/2  sepsis  No wall motion abnormalities on echo  Preserved LVEF, Gd I DHF    Acute respiratory failure with hypoxia (CMS-HCC) (present on admission)  Assessment & Plan  Now resolving.   6/15 ET intubation, Extubated 6/21  2/2  to sepsis.          Metabolic acidosis (present on admission)  Assessment & Plan  2/2 severe sepsis  resolved      Acute kidney injury (nontraumatic) (CMS-[facility]) (present on admission)  Assessment & Plan    2/2 sepsis related renal failure with poor perfusion  Resolved  Cont to follow daily    Bacteremia (present on admission)  Assessment & Plan  BC 6/14 x2 with Klebsiella ss zosyn       Acute emphysematous cholecystitis (present on admission)  Assessment & Plan  S/p cholecystostomy completed  Cx with Klebsiella pneumonia- continue zosyn IV and ID following.   Surgery Dr Last following: plan OR once pt has stabilized- likely 4-6 weeks.      Hypotension (arterial) (present on admission)  Assessment & Plan  Secondary to sepsis  resolved    Thrombocytopenia (CMS-HCC) (present on admission)  Assessment & Plan  Etiology 2/2 severe sepsis, no known heavy alcohol use.  Now >100  Cont to follow    Hypokalemia due  to inadequate potassium intake (present on admission)  Assessment & Plan  Follow K and Mg daily  Replaced today.     Atrial fibrillation with RVR (CMS-HCC)  Assessment & Plan  Secondary to acute sepsis   NO AC per Card's  Now converted to sinus  cont's on digoxin for now    Hypertension  Assessment & Plan  lisinopril 40  amlodipine 10  HCTZ  ADD HYDRALAZINE      Medications reviewed, EKG reviewed, Labs reviewed and Radiology images reviewed  Laird catheter: Unconscious / Sedated Patient on a Ventilator      DVT Prophylaxis: Heparin  DVT prophylaxis - mechanical: SCDs  Ulcer prophylaxis: Yes  Antibiotics: Treating active infection/contamination beyond 24 hours perioperative coverage  Assessed for rehab: Patient was assess for and/or received rehabilitation services during this hospitalization

## 2017-06-27 NOTE — PROGRESS NOTES
Telemetry monitoring:    Rhythm: Sinus rhythm with occasional PVC's  Rate: 80's    AK: 0.14  QRS: 0.08  QT: 0.36      Telemetry monitoring strip faxed to primary RN. Tele strip summary only. Assessment and monitoring to be completed by primary RN.

## 2017-06-28 VITALS
TEMPERATURE: 97.5 F | WEIGHT: 217.37 LBS | HEART RATE: 88 BPM | RESPIRATION RATE: 17 BRPM | DIASTOLIC BLOOD PRESSURE: 63 MMHG | OXYGEN SATURATION: 96 % | BODY MASS INDEX: 31.12 KG/M2 | HEIGHT: 70 IN | SYSTOLIC BLOOD PRESSURE: 145 MMHG

## 2017-06-28 LAB
ALBUMIN SERPL BCP-MCNC: 2.5 G/DL (ref 3.2–4.9)
ALBUMIN/GLOB SERPL: 0.7 G/DL
ALP SERPL-CCNC: 76 U/L (ref 30–99)
ALT SERPL-CCNC: 25 U/L (ref 2–50)
ANION GAP SERPL CALC-SCNC: 8 MMOL/L (ref 0–11.9)
AST SERPL-CCNC: 21 U/L (ref 12–45)
BACTERIA BLD CULT: NORMAL
BACTERIA BLD CULT: NORMAL
BILIRUB SERPL-MCNC: 1.2 MG/DL (ref 0.1–1.5)
BUN SERPL-MCNC: 9 MG/DL (ref 8–22)
CALCIUM SERPL-MCNC: 7.8 MG/DL (ref 8.4–10.2)
CHLORIDE SERPL-SCNC: 106 MMOL/L (ref 96–112)
CO2 SERPL-SCNC: 22 MMOL/L (ref 20–33)
CREAT SERPL-MCNC: 0.62 MG/DL (ref 0.5–1.4)
GFR SERPL CREATININE-BSD FRML MDRD: >60 ML/MIN/1.73 M 2
GLOBULIN SER CALC-MCNC: 3.6 G/DL (ref 1.9–3.5)
GLUCOSE SERPL-MCNC: 150 MG/DL (ref 65–99)
POTASSIUM SERPL-SCNC: 3.4 MMOL/L (ref 3.6–5.5)
PROT SERPL-MCNC: 6.1 G/DL (ref 6–8.2)
SIGNIFICANT IND 70042: NORMAL
SIGNIFICANT IND 70042: NORMAL
SITE SITE: NORMAL
SITE SITE: NORMAL
SODIUM SERPL-SCNC: 136 MMOL/L (ref 135–145)
SOURCE SOURCE: NORMAL
SOURCE SOURCE: NORMAL

## 2017-06-28 PROCEDURE — 99239 HOSP IP/OBS DSCHRG MGMT >30: CPT | Performed by: INTERNAL MEDICINE

## 2017-06-28 PROCEDURE — 700102 HCHG RX REV CODE 250 W/ 637 OVERRIDE(OP): Performed by: HOSPITALIST

## 2017-06-28 PROCEDURE — A9270 NON-COVERED ITEM OR SERVICE: HCPCS | Performed by: HOSPITALIST

## 2017-06-28 PROCEDURE — 700105 HCHG RX REV CODE 258: Performed by: HOSPITALIST

## 2017-06-28 PROCEDURE — 36415 COLL VENOUS BLD VENIPUNCTURE: CPT

## 2017-06-28 PROCEDURE — A9270 NON-COVERED ITEM OR SERVICE: HCPCS | Performed by: INTERNAL MEDICINE

## 2017-06-28 PROCEDURE — 80053 COMPREHEN METABOLIC PANEL: CPT

## 2017-06-28 PROCEDURE — 700102 HCHG RX REV CODE 250 W/ 637 OVERRIDE(OP): Performed by: INTERNAL MEDICINE

## 2017-06-28 PROCEDURE — 700111 HCHG RX REV CODE 636 W/ 250 OVERRIDE (IP): Performed by: HOSPITALIST

## 2017-06-28 PROCEDURE — 97535 SELF CARE MNGMENT TRAINING: CPT

## 2017-06-28 RX ORDER — FAMOTIDINE 20 MG/1
20 TABLET, FILM COATED ORAL DAILY
Status: DISCONTINUED | OUTPATIENT
Start: 2017-06-29 | End: 2017-06-28 | Stop reason: HOSPADM

## 2017-06-28 RX ORDER — HYDRALAZINE HYDROCHLORIDE 25 MG/1
50 TABLET, FILM COATED ORAL EVERY 8 HOURS
Status: DISCONTINUED | OUTPATIENT
Start: 2017-06-28 | End: 2017-06-28 | Stop reason: HOSPADM

## 2017-06-28 RX ORDER — HYDROCHLOROTHIAZIDE 25 MG/1
25 TABLET ORAL DAILY
Qty: 30 TAB
Start: 2017-06-28 | End: 2017-08-08 | Stop reason: SDUPTHER

## 2017-06-28 RX ORDER — ONDANSETRON 4 MG/1
4 TABLET, ORALLY DISINTEGRATING ORAL EVERY 4 HOURS PRN
Qty: 10 TAB | Refills: 0
Start: 2017-06-28 | End: 2017-08-15

## 2017-06-28 RX ORDER — AMLODIPINE BESYLATE 10 MG/1
10 TABLET ORAL DAILY
Qty: 30 TAB
Start: 2017-06-28 | End: 2017-08-08 | Stop reason: SDUPTHER

## 2017-06-28 RX ORDER — LISINOPRIL 40 MG/1
40 TABLET ORAL DAILY
Qty: 30 TAB
Start: 2017-06-28 | End: 2017-08-08 | Stop reason: SDUPTHER

## 2017-06-28 RX ORDER — HYDRALAZINE HYDROCHLORIDE 50 MG/1
50 TABLET, FILM COATED ORAL EVERY 8 HOURS
Qty: 90 TAB
Start: 2017-06-28 | End: 2017-08-15 | Stop reason: SDUPTHER

## 2017-06-28 RX ORDER — OXYCODONE HYDROCHLORIDE 5 MG/1
5 TABLET ORAL EVERY 4 HOURS PRN
Qty: 30 TAB | Refills: 0
Start: 2017-06-28 | End: 2017-08-21

## 2017-06-28 RX ORDER — ACETAMINOPHEN 650 MG/1
650 SUPPOSITORY RECTAL EVERY 6 HOURS PRN
Refills: 0
Start: 2017-06-28 | End: 2017-08-21

## 2017-06-28 RX ORDER — DIGOXIN 125 MCG
125 TABLET ORAL DAILY
Qty: 30 TAB
Start: 2017-06-28 | End: 2017-08-15

## 2017-06-28 RX ADMIN — PIPERACILLIN AND TAZOBACTAM 3.38 G: 3; .375 INJECTION, POWDER, FOR SOLUTION INTRAVENOUS at 08:55

## 2017-06-28 RX ADMIN — HYDRALAZINE HYDROCHLORIDE 25 MG: 25 TABLET ORAL at 06:12

## 2017-06-28 RX ADMIN — LISINOPRIL 40 MG: 20 TABLET ORAL at 08:53

## 2017-06-28 RX ADMIN — ACETAMINOPHEN 650 MG: 325 TABLET, FILM COATED ORAL at 14:09

## 2017-06-28 RX ADMIN — TRIAMCINOLONE ACETONIDE: 1 CREAM TOPICAL at 08:55

## 2017-06-28 RX ADMIN — PIPERACILLIN AND TAZOBACTAM 3.38 G: 3; .375 INJECTION, POWDER, FOR SOLUTION INTRAVENOUS at 15:18

## 2017-06-28 RX ADMIN — FAMOTIDINE 20 MG: 10 INJECTION, SOLUTION INTRAVENOUS at 08:55

## 2017-06-28 RX ADMIN — HYDROCHLOROTHIAZIDE 25 MG: 25 TABLET ORAL at 08:53

## 2017-06-28 RX ADMIN — HYDRALAZINE HYDROCHLORIDE 50 MG: 25 TABLET ORAL at 14:00

## 2017-06-28 RX ADMIN — AMLODIPINE BESYLATE 10 MG: 5 TABLET ORAL at 08:53

## 2017-06-28 RX ADMIN — PIPERACILLIN AND TAZOBACTAM 3.38 G: 3; .375 INJECTION, POWDER, FOR SOLUTION INTRAVENOUS at 03:27

## 2017-06-28 ASSESSMENT — ENCOUNTER SYMPTOMS
INSOMNIA: 0
FEVER: 0
DIARRHEA: 0
TINGLING: 0
NAUSEA: 0
PALPITATIONS: 0
NECK PAIN: 0
EYE PAIN: 0
COUGH: 0
DEPRESSION: 0
DIZZINESS: 0
ABDOMINAL PAIN: 0
LOSS OF CONSCIOUSNESS: 0
CHILLS: 0
BLURRED VISION: 0
HEADACHES: 0
VOMITING: 0
BACK PAIN: 0
SHORTNESS OF BREATH: 0

## 2017-06-28 ASSESSMENT — PAIN SCALES - GENERAL
PAINLEVEL_OUTOF10: 0
PAINLEVEL_OUTOF10: 0

## 2017-06-28 NOTE — PROGRESS NOTES
Telemetry Summary    Rhythm SR/ST  HR   Ectopy o PAC  GA 0.12  QRS 0.08  QT 0.40    This telemetry strip has been recorded only. Primary RN responsible for pt care and notifying doctor if needed.

## 2017-06-28 NOTE — PROGRESS NOTES
Infectious Disease Progress Note    Author: Maryse Rivera M.D. Date of service & Time created: 2017  1:40 PM    Chief Complaint:  Chief Complaint   Patient presents with   • Abdominal Pain     abdominal pain since saturday  diaphoretic and weakness  diarrhea yesterday   FU sepsis/cholecystitis    Interval History:  76 year old male admitted with septic shock due to acute severe cholecystitis  - remains on the vent. No fevers. Wbc 9.4   AF WBC 10.9 Denies pain, even around drain site   AF WBC 10.6 one of  blood cxs +. Happy that NGT is out and able to have PO   AF WBC 11 eating some-no complaints-denies pain   AF WBC 10 transferred out of ICU-no new complaints   AF WBC 6.4 up to chair-no feverm CP, SOB   AF WBC 9.4 eating better-plan for PICC today Denies SE abx   AF WBC 9.4 no new complaints  Labs Reviewed, Medications Reviewed and Radiology Reviewed.    Review of Systems:  Review of Systems   Constitutional: Negative for fever and chills.   Gastrointestinal: Negative for nausea, vomiting and abdominal pain.   Skin: Negative for rash.   All other systems reviewed and are negative.      Hemodynamics:  Temp (24hrs), Av.1 °C (98.8 °F), Min:36.9 °C (98.4 °F), Max:37.4 °C (99.3 °F)  Temperature: 36.9 °C (98.4 °F)  Pulse  Av.6  Min: 30  Max: 134   Blood Pressure : 149/62 mmHg      Physical Exam:  Physical Exam   Constitutional: He appears well-developed. No distress.   HENT:   Head: Normocephalic and atraumatic.   Mouth/Throat: No oropharyngeal exudate.   Eyes: EOM are normal. Pupils are equal, round, and reactive to light. No scleral icterus.   Neck: Neck supple.   Right IJ   Cardiovascular: Normal rate.    No murmur heard.  IRR   Pulmonary/Chest: Effort normal. No respiratory distress. He has no wheezes. He has no rales.   Abdominal: Soft. He exhibits no distension. There is no tenderness. There is no rebound.   RUQ drain present   Musculoskeletal: He exhibits  edema.   Neurological: He is alert.   Nursing note and vitals reviewed.      Meds:    Current facility-administered medications:   •  hydrALAZINE (APRESOLINE) tablet 50 mg, 50 mg, Oral, Q8HRS, Ward Snider M.D.  •  [START ON 6/29/2017] famotidine (PEPCID) tablet 20 mg, 20 mg, Oral, DAILY, Ward Snider M.D.  •  digoxin (LANOXIN) tablet 125 mcg, 125 mcg, Oral, DAILY AT 1800, Ward Snider M.D., 125 mcg at 06/27/17 1906  •  triamcinolone acetonide (KENALOG) 0.1 % cream, , Topical, BID, Damian Monroe M.D.  •  lisinopril (PRINIVIL) tablet 40 mg, 40 mg, Oral, Q DAY, George De La Cruz D.O., 40 mg at 06/28/17 0853  •  enalaprilat (VASOTEC) injection 1.25 mg, 1.25 mg, Intravenous, Q4HRS PRN, Dennis Guerra M.D., 1.25 mg at 06/24/17 2337  •  hydrochlorothiazide (HYDRODIURIL) tablet 25 mg, 25 mg, Oral, Q DAY, George De La Cruz D.O., 25 mg at 06/28/17 0853  •  amlodipine (NORVASC) tablet 10 mg, 10 mg, Oral, Q DAY, George De La Cruz D.O., 10 mg at 06/28/17 0853  •  oxycodone immediate-release (ROXICODONE) tablet 5 mg, 5 mg, Oral, Q4HRS PRN, 5 mg at 06/26/17 1909 **OR** oxycodone immediate release (ROXICODONE) tablet 10 mg, 10 mg, Oral, Q4HRS PRN, George De La Cruz D.O.  •  artificial tears 1.4 % ophthalmic solution 1 Drop, 1 Drop, Both Eyes, PRN, George De La Cruz D.O.  •  labetalol (NORMODYNE,TRANDATE) injection 10 mg, 10 mg, Intravenous, Q4HRS PRN, Dennis Guerra M.D., 10 mg at 06/26/17 2051  •  latanoprost (XALATAN) 0.005 % ophthalmic solution 1 Drop, 1 Drop, Both Eyes, Nightly, George De La Cruz D.O., 1 Drop at 06/26/17 2155  •  Action is required: Protocol 1073 Hypoglycemia has been implemented, , , Once **AND** Protocol 1073 Inclusion Criteria, , , CONTINUOUS **AND** Protocol 1073 NOTIFY, , , Once **AND** Protocol 1073 Initiate protocol immediately if FSBG is less than or equal to 70 mg/dL, , , CONTINUOUS **AND** glucose 4 g chewable tablet 16 g, 16 g, Oral, Q15 MIN PRN  **AND** dextrose 50% (D50W) injection 25 mL, 25 mL, Intravenous, Q15 MIN PRN, Che Nash M.D.  •  acetaminophen (TYLENOL) suppository 650 mg, 650 mg, Rectal, Q6HRS PRN, Che Nash M.D., 650 mg at 06/16/17 1615  •  morphine (pf) 4 mg/ml injection 2-4 mg, 2-4 mg, Intravenous, Q2HRS PRN, Che Nash M.D., 4 mg at 06/22/17 2357  •  Respiratory Care per Protocol, , Nebulization, Continuous RT, Jose Valdivia M.D.  •  acetaminophen (TYLENOL) tablet 650 mg, 650 mg, Oral, Q6HRS PRN, Jose Valdivia M.D., 650 mg at 06/24/17 2327  •  piperacillin-tazobactam (ZOSYN) 3.375 g in  mL IVPB, 3.375 g, Intravenous, Q6HRS, Jose Valdivia M.D., Stopped at 06/28/17 0925  •  ondansetron (ZOFRAN) syringe/vial injection 4 mg, 4 mg, Intravenous, Q4HRS PRN, Jose Valdivia M.D., 4 mg at 06/19/17 0406  •  ondansetron (ZOFRAN ODT) dispertab 4 mg, 4 mg, Oral, Q4HRS PRN, Jose Valdivia M.D.  •  lorazepam (ATIVAN) tablet 0.5 mg, 0.5 mg, Oral, Q6HRS PRN **OR** lorazepam (ATIVAN) injection 0.5 mg, 0.5 mg, Intravenous, Q6HRS PRN, Jose Valdivia M.D., 0.5 mg at 06/15/17 0941    Labs:  Recent Labs      06/26/17   0110  06/27/17   0420   WBC  8.4  9.4   RBC  3.38*  3.12*   HEMOGLOBIN  11.0*  10.3*   HEMATOCRIT  32.4*  30.1*   MCV  95.9  96.5   MCH  32.5  33.0   RDW  47.7  48.0   PLATELETCT  345  391   MPV  10.4  10.9   NEUTSPOLYS   --   74.20*   LYMPHOCYTES   --   15.50*   MONOCYTES   --   9.50   EOSINOPHILS   --   0.00   BASOPHILS   --   0.40     Recent Labs      06/26/17   0110  06/27/17   0420  06/28/17   0535   SODIUM  137  137  136   POTASSIUM  3.0*  3.1*  3.4*   CHLORIDE  108  106  106   CO2  24  24  22   GLUCOSE  127*  127*  150*   BUN  11  8  9     Recent Labs      06/26/17 0110 06/27/17 0420  06/28/17   0535   ALBUMIN  1.9*  1.9*  2.5*   TBILIRUBIN  0.9  0.9  1.2   ALKPHOSPHAT  82  69  76   TOTPROTEIN  5.0*  5.0*  6.1   ALTSGPT  32  28  25   ASTSGOT  26  25  21   CREATININE  0.63  0.69  0.62        Imaging:  Ct-abdomen-pelvis W/o  6/15/2017  1.  There is continued gallbladder distention with nondependent air in the gallbladder suspicious for possible emphysematous cholecystitis. There is some surrounding stranding inferior to the gallbladder again seen. 2.  There is an appendicolith again seen with periappendiceal stranding and minimal fluid in the right anterior pararenal space and paracolic gutter. 3.  There are small dependent pleural effusions with dependent atelectasis. 4.  Enteric tube extends into the 1st/2nd portion of the duodenum. 5.  There is no evidence of free intraperitoneal air.    Ct-abdomen-pelvis W/o  6/14/2017  1.  Severe cholecystitis, possible component of emphysematous cholecystitis. 2.  Appendicolith, the appendix otherwise appears within normal limits. Appendicolith however places patient at future risk for development of appendicitis. 3.  Atherosclerosis and atherosclerotic coronary artery disease 4.  Hepatomegaly These findings were discussed with the patient's clinician, Poncho Boudreaux, on 6/14/2017 8:07 PM.    Dx-chest-portable (1 View)  6/21/2017  No significant change from prior exam.    Oy-goilkrb-z/o  FINDINGS: Exam limited by patient body habitus and the fact the patient is on a ventilator which causes some motion. The visualized lung bases demonstrate small amount of right pleural fluid with dependent airspace disease bilaterally. The gallbladder is decompressed in comparison to the prior exam with multiple gallstones identified. There is a small amount pericholecystic fluid. There is minimal free fluid along the anterior and inferior margin of the liver. There is a cholecystotomy tube identified in the subcutaneous fat in the right anterior upper abdomen although it is difficult to follow the catheter all the way into the gallbladder. Exact position is uncertain. The common bile duct is normal in caliber without choledocholithiasis. There is no intrahepatic biliary  dilatation. Limited visualization of the pancreatic duct is unremarkable. There is a trace of fluid in the left upper abdomen but no gross evidence of pancreatitis is otherwise seen. There are no focal intrahepatic or intrasplenic signal abnormalities. Adrenal glands and visualized kidneys are unremarkable.   6/20/2017  1.  There has been partial decompression of the gallbladder with gallstones and minimal pericholecystic fluid identified. 2.  The cholecystotomy tube is not seen in its entirety and cannot be tracked into the gallbladder on MRI. Its exact position is uncertain. 3.  There is no biliary dilatation or choledocholithiasis.    Us-drain-cholescystomy  6/15/2017  1. Ultrasound guided cholecystostomy with placement of an 8-Polish locking loop catheter.     Echocardiogram Comp W/o Cont  6/15/2017  Transthoracic Echo Report Echocardiography Laboratory CONCLUSIONS No prior study is available for comparison. Left ventricular ejection fraction is visually estimated to be 60%. Grade I diastolic dysfunction. No valvular heart disease. Estimated right ventricular systolic pressure  is 35 mmHg.   FINDINGS Left Ventricle Normal left ventricular size and systolic function. Mild concentric left ventricular hypertrophy. Left ventricular ejection fraction is visually estimated to be 60%. Normal regional wall motion. Grade I diastolic dysfunction. Right Ventricle The right ventricle was normal in size and function. Right Atrium Enlarged right atrium. Left Atrium The left atrium is normal in size. Mitral Valve Structurally normal mitral valve. Aortic Valve Aortic sclerosis without stenosis. Tricuspid Valve Structurally normal tricuspid valve. Mild tricuspid regurgitation. Estimated right ventricular systolic pressure  is 35 mmHg. Pulmonic Valve The pulmonic valve is not well visualized. Trace pulmonic insufficiency. Pericardium Normal pericardium without effusion. Aorta The aortic root is normal. Tho Jones  "(Electronically Signed) Final Date:     15 Rufina 2017                 17:30      Micro:  BLOOD CULTURE   Date Value Ref Range Status   06/23/2017   Preliminary    No Growth    Note: Blood cultures are incubated for 5 days and  are monitored continuously.Positive blood cultures  are called to the RN and reported as soon as  they are identified.  Blood culture testing and Gram stain, if indicated, are  performed at [facility], [address] [place] [address].  Positive blood cultures are  sent to [facility], [address] [place] [address], for organism identification and  susceptibility testing.        Results     Procedure Component Value Units Date/Time    BLOOD CULTURE [[id number]]  (Abnormal)  (Susceptibility) Collected:  06/17/17 1620    Order Status:  Completed Specimen Information:  Blood from Peripheral Updated:  06/24/17 0958     Significant Indicator POS (POS)      Source BLD      Site PERIPHERAL      Blood Culture        Result:        Growth detected by Bactec instrument.  06/22/2017  02:06 (A)     Blood Culture Klebsiella pneumoniae (A)     Narrative:      CALL  [facility]  ICU tel. ,  CALLED  ICU tel.  06/22/2017, 02:07, RB PERF. RESULTS CALLED TO: RN [id number]  Per Hospital Policy: Only change Specimen Src: to \"Line\" if  specified by physician order.    Culture & Susceptibility     KLEBSIELLA PNEUMONIAE     Antibiotic Sensitivity Microscan Unit Status    Ampicillin Intermediate 16 mcg/mL Final    Cefepime Sensitive <=8 mcg/mL Final    Cefotaxime Sensitive <=2 mcg/mL Final    Cefotetan Sensitive <=16 mcg/mL Final    Ceftazidime Sensitive <=1 mcg/mL Final    Ceftriaxone Sensitive <=8 mcg/mL Final    Cefuroxime Sensitive <=4 mcg/mL Final    Ciprofloxacin Sensitive <=1 mcg/mL Final    Ertapenem Sensitive <=1 mcg/mL Final    Gentamicin Sensitive <=4 mcg/mL Final    Pip/Tazobactam Sensitive <=16 mcg/mL Final    Tigecycline Sensitive <=2 mcg/mL Final    Tobramycin " "Sensitive <=4 mcg/mL Final    Trimeth/Sulfa Sensitive <=2/38 mcg/mL Final                       BLOOD CULTURE [[id number]] Collected:  06/23/17 1430    Order Status:  Completed Specimen Information:  Blood from Peripheral Updated:  06/24/17 0641     Significant Indicator NEG      Source BLD      Site Peripheral      Blood Culture --      Result:        No Growth    Note: Blood cultures are incubated for 5 days and  are monitored continuously.Positive blood cultures  are called to the RN and reported as soon as  they are identified.  Blood culture testing and Gram stain, if indicated, are  performed at [facility], [address] [place] [address].  Positive blood cultures are  sent to [facility], [address] [place] [address], for organism identification and  susceptibility testing.      Narrative:      Per Hospital Policy: Only change Specimen Src: to \"Line\" if  specified by physician order.    BLOOD CULTURE [[id number]] Collected:  06/23/17 1435    Order Status:  Completed Specimen Information:  Blood from Peripheral Updated:  06/24/17 0641     Significant Indicator NEG      Source BLD      Site Peripheral      Blood Culture --      Result:        No Growth    Note: Blood cultures are incubated for 5 days and  are monitored continuously.Positive blood cultures  are called to the RN and reported as soon as  they are identified.  Blood culture testing and Gram stain, if indicated, are  performed at [facility], [address] [place] [address].  Positive blood cultures are  sent to [facility], [address] [place] [address], for organism identification and  susceptibility testing.      Narrative:      Per Hospital Policy: Only change Specimen Src: to \"Line\" if  specified by physician order.    BLOOD CULTURE [[id number]] Collected:  06/17/17 1810    Order Status:  Completed Specimen Information:  Blood from Peripheral " "Updated:  06/23/17 0617     Significant Indicator NEG      Source BLD      Site PERIPHERAL      Blood Culture No growth after 5 days of incubation.     Narrative:      Per Hospital Policy: Only change Specimen Src: to \"Line\" if  specified by physician order.            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]   • Non-STEMI (non-ST elevated myocardial infarction) (CMS-HCC) [I21.4]       Plan:  Gram negative sepsis due to Klebsiella  Low grade fever resolved  Mild leukocytosis, resolved  Lactic acidosis resolved  Blood c/s + 6/14; 6/15; 6/17  Repeat blood cxs 6/23 neg   PICC planned for today with dc TLC  Continue zosyn  Aim for 2 weeks of abx after neg blood c/s  Stop date 7/7/2017-can do once daily ceftriaxone if SNF cannot do abx every 6 hours    Acute emphysematous cholecystitis  S/p cholecystostomy drain 6/15  Bile cx +Klebsiella   Surgery on hold until cardiac issues resolved    Plan for transfer to SNF today  FU ID clinic as needed    MARIANNE Snider    "

## 2017-06-28 NOTE — PROGRESS NOTES
Bedside report received. POC explained. Patient verbalizes understanding. Wife at bedside. Vital signs stable. Unlabored breathing. Denies pain. Appears calm. Call light within reach. Will cont with care.

## 2017-06-28 NOTE — PROGRESS NOTES
Report called to [facility]. Wife and patient aware of impending transfer to [facility].

## 2017-06-28 NOTE — PROGRESS NOTES
[facility] Hospitalist Progress Note    Date of Service: 2017    Chief Complaint    76 y.o. male admitted 2017 with abdominal pain and SOB, fever, chills, diaphoresis, syncope x4.  CT abdomen revealed acute cholecystitis found to have Ascending cholangitis, nstemi now s/p vent  and cholecystostomy. Berhane planning surgery in a few weeks.     Interval Problem Update  Feeling better, no acute issue overnight. Not been getting up and walk around. Encouraged to do so.    Consultants/Specialty  General Surgery Dr Last  Pulmonology Dr Reyna  Cardiology Dr Chua    Disposition  Mobilize/therapies and likely snf needed  Laird and central pulled today.   Plan eventual lap brook per Dr Last        Review of Systems   Constitutional: Negative for fever and chills.   Eyes: Negative for blurred vision and pain.   Respiratory: Negative for cough and shortness of breath.    Cardiovascular: Negative for chest pain and palpitations.   Gastrointestinal: Negative for nausea, vomiting and diarrhea.   Genitourinary: Negative for dysuria and urgency.   Musculoskeletal: Negative for back pain and neck pain.   Skin: Negative for itching and rash.   Neurological: Negative for dizziness, tingling, loss of consciousness and headaches.   Psychiatric/Behavioral: Negative for depression. The patient does not have insomnia.         Occ confusion   All other systems reviewed and are negative.     Physical Exam  Laboratory/Imaging   Hemodynamics  Temp (24hrs), Av.1 °C (98.8 °F), Min:36.9 °C (98.4 °F), Max:37.4 °C (99.3 °F)   Temperature: 37.4 °C (99.3 °F)  Pulse  Av.6  Min: 30  Max: 134    Blood Pressure : (!) 163/73 mmHg     Respiratory      Respiration: 18, Pulse Oximetry: 92 %     Work Of Breathing / Effort: Mild  RUL Breath Sounds: Diminished, RML Breath Sounds: Diminished, RLL Breath Sounds: Diminished, OZZY Breath Sounds: Diminished, LLL Breath Sounds: Diminished    Fluids    Intake/Output Summary (Last 24 hours) at  06/28/17 0744  Last data filed at 06/28/17 0614   Gross per 24 hour   Intake    910 ml   Output   3000 ml   Net  -2090 ml       Nutrition  Orders Placed This Encounter   Procedures   • DIET ORDER     Standing Status: Standing      Number of Occurrences: 1      Standing Expiration Date:      Order Specific Question:  Diet:     Answer:  Regular [1]     Order Specific Question:  Macronutrient modifications:     Answer:  Low Fat [5]     Order Specific Question:  Texture/Fiber modifications:     Answer:  Dysphagia 2(Pureed/Chopped)specify fluid consistency(question 6) [2]     Order Specific Question:  Consistency/Fluid modifications:     Answer:  Thin Liquids [3]     Physical Exam   Constitutional: He is oriented to person, place, and time. He appears well-developed and well-nourished. No distress.   HENT:   Right Ear: External ear normal.   Left Ear: External ear normal.   Nose: Nose normal.   Eyes: Conjunctivae are normal. Right eye exhibits no discharge. Left eye exhibits no discharge.   Neck: No tracheal deviation present.   Cardiovascular: Normal rate, normal heart sounds and intact distal pulses.    No murmur heard.  Pulmonary/Chest: Effort normal and breath sounds normal. No stridor. No respiratory distress. He has no wheezes.   Abdominal: Soft. Bowel sounds are normal. He exhibits no distension. There is no tenderness. There is no guarding.   Musculoskeletal: He exhibits edema. He exhibits no tenderness.   Neurological: He is alert and oriented to person, place, and time.   OX4   Skin: Skin is warm and dry. No rash noted. He is not diaphoretic. No erythema.   Psychiatric: He has a normal mood and affect. His behavior is normal. Thought content normal.   Nursing note and vitals reviewed.      Recent Labs      06/26/17   0110  06/27/17   0420   WBC  8.4  9.4   RBC  3.38*  3.12*   HEMOGLOBIN  11.0*  10.3*   HEMATOCRIT  32.4*  30.1*   MCV  95.9  96.5   MCH  32.5  33.0   MCHC  34.0  34.2   RDW  47.7  48.0   PLATELETCT   345  391   MPV  10.4  10.9     Recent Labs      06/26/17   0110  06/27/17   0420   SODIUM  137  137   POTASSIUM  3.0*  3.1*   CHLORIDE  108  106   CO2  24  24   GLUCOSE  127*  127*   BUN  11  8   CREATININE  0.63  0.69   CALCIUM  7.4*  7.4*                      Assessment/Plan     * Sepsis (CMS-HCC) (present on admission)  Assessment & Plan  Source:  Infected emphysematous cholecystitis  IVFs  PICC line placed  Zosyn stop date 7/7  Need elective cholecystectomy as outpatient      Non-STEMI (non-ST elevated myocardial infarction) (CMS-HCC) (present on admission)  Assessment & Plan  Dr. Chua consulted:  No intervention  Type 2 2/2  sepsis  No wall motion abnormalities on echo  Preserved LVEF, Gd I DHF    Acute respiratory failure with hypoxia (CMS-HCC) (present on admission)  Assessment & Plan  Now resolving.   6/15 ET intubation, Extubated 6/21  2/2  to sepsis.    Metabolic acidosis (present on admission)  Assessment & Plan  2/2 severe sepsis  resolved    Acute kidney injury (nontraumatic) (CMS-HCC) (present on admission)  Assessment & Plan    2/2 sepsis related renal failure with poor perfusion  Resolved  Cont to follow daily    Bacteremia (present on admission)  Assessment & Plan  BC 6/14 x2 with Klebsiella ss zosyn     Acute emphysematous cholecystitis (present on admission)  Assessment & Plan  S/p cholecystostomy completed  Cx with Klebsiella pneumonia- continue zosyn IV and ID following.   Surgery Dr Last following: plan OR once pt has stabilized- likely 4-6 weeks.      Hypotension (arterial) (present on admission)  Assessment & Plan  Secondary to sepsis  resolved    Thrombocytopenia (CMS-HCC) (present on admission)  Assessment & Plan  Etiology 2/2 severe sepsis, no known heavy alcohol use.  Now >100  Cont to follow    Hypokalemia due to inadequate potassium intake (present on admission)  Assessment & Plan  Follow K and Mg daily  Replaced today.     Atrial fibrillation with RVR (CMS-HCC)  Assessment &  Plan  Secondary to acute sepsis   NO AC per Card's  Now converted to sinus  cont's on digoxin for now    Hypertension  Assessment & Plan  Still hypertensive  lisinopril 40 qday  amlodipine 10 qday  HCTZ 25 qday  ADD HYDRALAZINE and increase to 50 mg tid      Medications reviewed, EKG reviewed, Labs reviewed and Radiology images reviewed  Laird catheter: Unconscious / Sedated Patient on a Ventilator      DVT Prophylaxis: Heparin  DVT prophylaxis - mechanical: SCDs  Ulcer prophylaxis: Yes  Antibiotics: Treating active infection/contamination beyond 24 hours perioperative coverage  Assessed for rehab: Patient was assess for and/or received rehabilitation services during this hospitalization

## 2017-06-28 NOTE — DISCHARGE PLANNING
[facility] will  pt at 1600.   notified violeta Silverman RN Renetta, pt and pt's wife.  Completed COBRA given to violeta Jackson.  PASRR# [id number]

## 2017-06-28 NOTE — PROGRESS NOTES
Discharged to [facility] with transporter.  Wife to follow in car.  All belongings with patient.

## 2017-06-28 NOTE — CARE PLAN
Problem: Infection  Goal: Will remain free from infection  Intervention: Assess signs and symptoms of infection  Labs and vitals assessed. ABX in uses. Call light within reach. Will cont with care.

## 2017-06-28 NOTE — CARE PLAN
Problem: Nutritional:  Goal: Achieve adequate nutritional intake  Patient will consume >50% of meals   Outcome: MET Date Met:  06/28/17

## 2017-06-28 NOTE — DIETARY
Nutrition Services follow-up:  Diet Rx:  Regular/Low Fat (dysphagia 2 w/thin liquids). Patient is eating 50-75% of meals on average. SLP is following patient and has been advancing diet.  Nutrition Services is working with patient for food preferences.  Plan:  Monitor PO prn.

## 2017-06-28 NOTE — DISCHARGE PLANNING
BEN Hernandez met with pt's wife who chose [facility].  BEN faxed choice form to LUCRETIA Geronimo for referral. Pt's wife is requesting a private room.

## 2017-06-28 NOTE — DISCHARGE SUMMARY
Discharge summary    Admission date: 6/14/2017    Discharge date: 6/28/2016    Patient's Name: Damian Larsen  MRN: [id number]    Discharge diagnosis  Sepsis: resolved  Infected emphysematous cholecystitis: post cholecystostomy  NSTEMI: medical management only per cardiology  Ventilator dependent respiratory failure: resolved  Acute kidney injury: resolved  Bacteremia with klebsiella  Atrial fibrillation paroxysmal      Consultant:   Dr. Mccord: cardio  Dr. cool surgery  Dr. Rivera: ID    Condition on discharge: stable    Disposition: SNF    Diet: regular     Hospital summary:  Please refer to H&P for details. In short 76 y.o. male admitted 6/14/2017 with abdominal pain and SOB, fever, chills, diaphoresis, syncope x4.  CT abdomen revealed acute cholecystitis found to have Ascending cholangitis. Patient was started on IV abx. But later he developed sepsis and hypotension, required pressors. Also he developed acute respiratory failure and required intubation. Extubated 6/21 and improving since then. Surgery was consulted and put cholecystostomy and recommended to get cholecystectomy 4-6 weeks after discharge. Also she was found to have klebsiella bacteremia: ID was consulted and started on zosyn: last dose 7/7/17. Also he was found to have NSTEMI, Afib with RVR: cardiology was consulted and recommended medical management only.     Things to follow at [facility]  # to complete zosyn on 7/7/17 (per Dr. Freeman: can do ceftriaxone IV once a day until 7/7 as alternative)  # to follow up with Dr. Cool in 4-6 weeks for elective cholecystectomy  # to follow up on BP and adjust meds as needed  # Cardiology follow up.         Medication List      START taking these medications       Instructions    acetaminophen 650 MG Supp   Last time this was given:  650 mg on 6/16/2017  4:15 PM   Commonly known as:  TYLENOL    Insert 1 Suppository in rectum every 6 hours as needed for Fever or Mild Pain.   Dose:  650 mg       amlodipine 10 MG Tabs    Last time this was given:  10 mg on 6/28/2017  8:53 AM   Commonly known as:  NORVASC    Take 1 Tab by mouth every day.   Dose:  10 mg       digoxin 125 MCG Tabs   Last time this was given:  125 mcg on 6/27/2017  7:06 PM   Commonly known as:  LANOXIN    Take 1 Tab by mouth every day at 6 PM.   Dose:  125 mcg       hydrALAZINE 50 MG Tabs   Last time this was given:  50 mg on 6/28/2017  2:00 PM   Commonly known as:  APRESOLINE    Take 1 Tab by mouth every 8 hours.   Dose:  50 mg       hydrochlorothiazide 25 MG Tabs   Last time this was given:  25 mg on 6/28/2017  8:53 AM   Commonly known as:  HYDRODIURIL    Take 1 Tab by mouth every day.   Dose:  25 mg       lisinopril 40 MG tablet   Last time this was given:  40 mg on 6/28/2017  8:53 AM   Commonly known as:  PRINIVIL, ZESTRIL    Take 1 Tab by mouth every day.   Dose:  40 mg       NS SOLN 100 mL with piperacillin-tazobactam 3.375 (3-0.375) G SOLR 3.375 g   Last time this was given:  3.375 g on 6/28/2017  8:55 AM    3.375 g by Intravenous route every 6 hours for 9 days.   Dose:  3.375 g       ondansetron 4 MG Tbdp   Commonly known as:  ZOFRAN ODT    Take 1 Tab by mouth every four hours as needed for Nausea/Vomiting (give PO if IV route is unavailable. May give per feeding tube.).   Dose:  4 mg       oxycodone immediate-release 5 MG Tabs   Last time this was given:  5 mg on 6/26/2017  7:09 PM   Commonly known as:  ROXICODONE    Take 1 Tab by mouth every four hours as needed.   Dose:  5 mg         CONTINUE taking these medications       Instructions    ascorbic acid 500 MG Tabs   Commonly known as:  ascorbic acid    Take 500 mg by mouth every day.   Dose:  500 mg       aspirin 81 MG Chew chewable tablet   Commonly known as:  ASA    Take 81 mg by mouth every day.   Dose:  81 mg       cyanocobalamin 100 MCG Tabs   Commonly known as:  VITAMIN B-12    Take 100 mcg by mouth every day.   Dose:  100 mcg       fish oil 1000 MG Caps capsule    Take 1,000 mg by mouth every day.    Dose:  1000 mg       latanoprost 0.005 % Soln   Last time this was given:  1 Drop on 6/26/2017  9:55 PM   Commonly known as:  XALATAN    Place 1 Drop in both eyes every evening.   Dose:  1 Drop       loratadine 10 MG Tabs   Commonly known as:  CLARITIN    Take 10 mg by mouth every day.   Dose:  10 mg       omeprazole 20 MG delayed-release capsule   Commonly known as:  PRILOSEC    Take 20 mg by mouth every day.   Dose:  20 mg       therapeutic multivitamin-minerals Tabs    Take 1 Tab by mouth every day.   Dose:  1 Tab       vitamin D 1000 UNIT Tabs   Commonly known as:  cholecalciferol    Take 1,000 Units by mouth every day.   Dose:  1000 Units       vitamin e 400 UNIT Caps   Commonly known as:  VITAMIN E    Take 400 Units by mouth every day.   Dose:  400 Units         STOP taking these medications          lisinopril-hydrochlorothiazide 10-12.5 MG per tablet   Commonly known as:  PRINZIDE, ZESTORETIC             Lab Results   Component Value Date/Time    SODIUM 136 06/28/2017 05:35 AM    POTASSIUM 3.4* 06/28/2017 05:35 AM    CHLORIDE 106 06/28/2017 05:35 AM    CO2 22 06/28/2017 05:35 AM    GLUCOSE 150* 06/28/2017 05:35 AM    BUN 9 06/28/2017 05:35 AM    CREATININE 0.62 06/28/2017 05:35 AM        Lab Results   Component Value Date/Time    WBC 9.4 06/27/2017 04:20 AM    RBC 3.12* 06/27/2017 04:20 AM    HEMOGLOBIN 10.3* 06/27/2017 04:20 AM    HEMATOCRIT 30.1* 06/27/2017 04:20 AM    MCV 96.5 06/27/2017 04:20 AM    MCH 33.0 06/27/2017 04:20 AM    MCHC 34.2 06/27/2017 04:20 AM    MPV 10.9 06/27/2017 04:20 AM    NEUTROPHILS-POLYS 74.20* 06/27/2017 04:20 AM    LYMPHOCYTES 15.50* 06/27/2017 04:20 AM    MONOCYTES 9.50 06/27/2017 04:20 AM    EOSINOPHILS 0.00 06/27/2017 04:20 AM    BASOPHILS 0.40 06/27/2017 04:20 AM        Procedure      Imaging        INSTRUCTIONS:     The patient was instructed to return to the ER in the event of worsening symptoms. I have counseled the patient on the importance of compliance and the  patient has agreed to proceed with all medical recommendations and follow up plan indicated above.   The patient understands that all medications come with benefits and risks. Risks may include permanent injury or death and these risks can be minimized with close reassessment and monitoring.        PCP in 2 weeks  Follow up with Dr. Last in 4 weeks  Follow up with cardiology in 2-4 weeks      Time spent on discharge day patient visit, preparing discharge paperwork and arranging for patient follow up 35 mins.

## 2017-06-29 NOTE — PROGRESS NOTES
Tele note:  Sinus rhythm/Sinus tachy; freq PVC/PAC  .14/ .08/ .40     Interpretation of tele strip only. Assessment and monitoring done by primary RN.

## 2017-07-19 ENCOUNTER — OFFICE VISIT (OUTPATIENT)
Dept: INFECTIOUS DISEASES | Facility: MEDICAL CENTER | Age: 77
End: 2017-07-19
Payer: MEDICARE

## 2017-07-19 VITALS
WEIGHT: 189.8 LBS | TEMPERATURE: 97.9 F | DIASTOLIC BLOOD PRESSURE: 60 MMHG | HEART RATE: 97 BPM | HEIGHT: 70 IN | BODY MASS INDEX: 27.17 KG/M2 | OXYGEN SATURATION: 94 % | SYSTOLIC BLOOD PRESSURE: 104 MMHG | RESPIRATION RATE: 16 BRPM

## 2017-07-19 DIAGNOSIS — R78.81 BACTEREMIA: ICD-10-CM

## 2017-07-19 DIAGNOSIS — I21.4 NON-STEMI (NON-ST ELEVATED MYOCARDIAL INFARCTION) (HCC): ICD-10-CM

## 2017-07-19 DIAGNOSIS — A41.59 KLEBSIELLA SEPSIS (HCC): ICD-10-CM

## 2017-07-19 DIAGNOSIS — K81.0 ACUTE EMPHYSEMATOUS CHOLECYSTITIS: ICD-10-CM

## 2017-07-19 PROCEDURE — 99203 OFFICE O/P NEW LOW 30 MIN: CPT | Performed by: NURSE PRACTITIONER

## 2017-07-19 NOTE — PROGRESS NOTES
[facility]    Subjective:     Chief Complaint   Patient presents with   • Hospital Follow-up     Klebsiella sepsis/bacteremia     This is my first time meeting Mr. Larsen.  He is accompanied by his wife, Vicky.    Interval History: 76 y.o. white male who has history of hypertension and glaucoma.  Hospitalized from 6/14- 6/28/17, admitted for diffuse abdominal pain, chills, diarrhea and a syncopal episode secondary to profound weakness.  Bcx on 6/14, 6/15 & 6/17 +Klebsiella oxytoca.  CT abd pelvis on 6/14 was consistent with severe cholecystitis.  On 6/15, he had an IR cholecystostomy drain placed.  Bile cx on 6/15 +Klebsiella pneumoniae.  His hospitalization was also complicated by non-STEMI, intubation and Sepsis on 6/15.  Pt was discharged to [facility] on IV Ceftriaxone, estimated end date 7/7/17.     Hospital records reviewed    Today, 7/19/2017: Patient reports feeling well.  He left [facility] on 7/12 after completing the IV Ceftriaxone, which he states he tolerated without issue.  PICC was removed.  He was not sent home on any PO abx.  Since being off the abx, he denies feeling generally ill, fevers/chills, general malaise, headache, n/v/d, abdominal pain, chest pain or shortness of breath.  He still has a bit of fatigue, but it is improving each day.  Pt was seen by Dr. Last on 7/12, at which time she removed his DOMINGO drain as it was no longer in his gallbladder.  He is suppose to FU up with Dr. Last again on 7/26 to discuss removal of his gallbladder; however, he has to get clearance from Cardiology first d/t the NSTEMI he had during his hospitalization.  He is not scheduled to see Cardiology until 8/3.    ROS  As documented above in my HPI.    Past Medical History   Diagnosis Date   • HTN (hypertension)    • Glaucoma    • Smoker history   • Cholecystitis with cholelithiasis 2017   • Non-STEMI (non-ST elevated myocardial infarction) (CMS-HCC) 2017       Social History   Substance Use Topics  "  • Smoking status: Former Smoker     Quit date: 06/14/1987   • Smokeless tobacco: None   • Alcohol Use: No       Allergies: Review of patient's allergies indicates no known allergies.    Pt's medication and problem list reviewed.     Objective:     PE:  /60 mmHg  Pulse 97  Temp(Src) 36.6 °C (97.9 °F)  Resp 16  Ht 1.778 m (5' 10\")  Wt 86.093 kg (189 lb 12.8 oz)  BMI 27.23 kg/m2  SpO2 94%    Vital signs reviewed    Constitutional: Appears well-developed and well-nourished. No acute distress.  Speech fluent.    Eyes: Conjunctivae normal and EOM are normal. Pupils are equal, round, and reactive to light.   Neck: Trachea midline. Normal range of motion. No JVD.  Cardiovascular: Normal rate, irregular rhythm, normal heart sounds. No murmur, gallop, or friction rub. No edema.  Respiratory: No respiratory distress, unlabored respiratory effort.  Lungs clear to auscultation bilaterally. No wheezes or rales.   Abdomen: Soft, non tender, non-distended. BS + x 4. No masses.  RUQ DOMINGO site- DOMINGO removed, well approximated, no drainage, minimal surrounding erythema.  Musculoskeletal: Steady gait.  Normal range of motion.  No joint or bone tenderness, swelling, erythema or deformity.   Skin: Warm and dry. Good turgor. No visible rashes or lesions.  Neurological: No cranial nerve deficit. Coordination normal.   Psychiatric: Alert and oriented to person, place, and time. Normal mood, calm affect.      Assessment and Plan:   The following treatment plan was discussed with patient at length:    1. Klebsiella sepsis (CMS-HCC)      Completed IV Ceftriaxone on 7/11/17.  No additional abx needed at this time.  Continue to monitor for s/sx of worsening off abx: breakdown of DOMINGO site, fevers, chills, malaise, feeling generally ill, etc.   2. Bacteremia      As above.   3. Acute emphysematous cholecystitis      As above.  Continue FU with Dr. Last for planned cholecystectomy.   4. Non-STEMI (non-ST elevated myocardial infarction) " (CMS-[facility])      FU with Cardiology as directed.     Follow up: PRN, RTC if needed. FU with PCP for ongoing chronic medical conditions.     Federica Guardado A.P.R.N.       >15 min spent face to face with patient, >50% of time spent counseling, coordinating care, reviewing records, discussing POC, educating patient.

## 2017-07-19 NOTE — MR AVS SNAPSHOT
"        Damian Darci Larsen   2017 4:00 PM   Office Visit   MRN: [id number]    Department:  [facility]   Dept Phone:  [phone]    Description:  Male : 1940   Provider:  GLENIS Angulo           Reason for Visit     Hospital Follow-up           Allergies as of 2017     No Known Allergies      Vital Signs     Blood Pressure Pulse Temperature Respirations Height Weight    104/60 mmHg 97 36.6 °C (97.9 °F) 16 1.778 m (5' 10\") 86.093 kg (189 lb 12.8 oz)    Body Mass Index Oxygen Saturation Smoking Status             27.23 kg/m2 94% Former Smoker         Basic Information     Date Of Birth Sex Race Ethnicity Preferred Language    1940 Male White Non- English      Your appointments     Aug 04, 2017  8:45 AM   HOSPITAL FOLLOW UP with Humza Chua MD,[facility] (--)    [address]  [address]  [facility] [id number] [phone]              Problem List              ICD-10-CM Priority Class Noted - Resolved    Sepsis (CMS-HCC) A41.9   2017 - Present    Non-STEMI (non-ST elevated myocardial infarction) (CMS-HCC) I21.4   2017 - Present    Acute respiratory failure with hypoxia (CMS-[facility]) J96.01   6/15/2017 - Present    Metabolic acidosis E87.2   6/15/2017 - Present    Acute kidney injury (nontraumatic) (CMS-HCC) N17.9   6/15/2017 - Present    Bacteremia R78.81   6/15/2017 - Present    Acute emphysematous cholecystitis K81.0   6/15/2017 - Present    Hypotension (arterial) I95.9   6/15/2017 - Present    Thrombocytopenia (CMS-HCC) D69.6   6/15/2017 - Present    Hypokalemia due to inadequate potassium intake E87.6   6/15/2017 - Present    Atrial fibrillation with RVR (CMS-HCC) I48.91   2017 - Present    Hypertension I10   2017 - Present      Health Maintenance     Patient has no pending health maintenance at this time      Current Immunizations     Influenza Vaccine Quad Inj (Pf) 2014   "      Below and/or attached are the medications your provider expects you to take. Review all of your home medications and newly ordered medications with your provider and/or pharmacist. Follow medication instructions as directed by your provider and/or pharmacist. Please keep your medication list with you and share with your provider. Update the information when medications are discontinued, doses are changed, or new medications (including over-the-counter products) are added; and carry medication information at all times in the event of emergency situations     Allergies:  No Known Allergies          Medications  Valid as of: July 20, 2017 -  8:16 AM    Generic Name Brand Name Tablet Size Instructions for use    Acetaminophen (Suppos) TYLENOL 650 MG Insert 1 Suppository in rectum every 6 hours as needed for Fever or Mild Pain.        AmLODIPine Besylate (Tab) NORVASC 10 MG Take 1 Tab by mouth every day.        Ascorbic Acid (Tab) ascorbic acid 500 MG Take 500 mg by mouth every day.        Aspirin (Chew Tab) ASA 81 MG Take 81 mg by mouth every day.        Cholecalciferol (Tab) cholecalciferol 1000 UNIT Take 1,000 Units by mouth every day.        Cyanocobalamin (Tab) VITAMIN B-12 100 MCG Take 100 mcg by mouth every day.        Digoxin (Tab) LANOXIN 125 MCG Take 1 Tab by mouth every day at 6 PM.        HydrALAZINE HCl (Tab) APRESOLINE 50 MG Take 1 Tab by mouth every 8 hours.        HydroCHLOROthiazide (Tab) HYDRODIURIL 25 MG Take 1 Tab by mouth every day.        Latanoprost (Solution) XALATAN 0.005 % Place 1 Drop in both eyes every evening.        Lisinopril (Tab) PRINIVIL, ZESTRIL 40 MG Take 1 Tab by mouth every day.        Loratadine (Tab) CLARITIN 10 MG Take 10 mg by mouth every day.        Multiple Vitamins-Minerals (Tab) THERAGRAN-M  Take 1 Tab by mouth every day.        Omega-3 Fatty Acids (Cap) fish oil 1000 MG Take 1,000 mg by mouth every day.        Omeprazole (CAPSULE DELAYED RELEASE) PRILOSEC 20 MG Take 20  mg by mouth every day.        Ondansetron (TABLET DISPERSIBLE) ZOFRAN ODT 4 MG Take 1 Tab by mouth every four hours as needed for Nausea/Vomiting (give PO if IV route is unavailable. May give per feeding tube.).        OxyCODONE HCl (Tab) ROXICODONE 5 MG Take 1 Tab by mouth every four hours as needed.        Vitamin E (Cap) VITAMIN E 400 UNIT Take 400 Units by mouth every day.        .                 Medicines prescribed today were sent to:     [facility] [address] [place] [address], [place] - [address]    [address] [place] [address]    Phone: [phone] Fax: [phone]    Open 24 Hours?: No      Medication refill instructions:       If your prescription bottle indicates you have medication refills left, it is not necessary to call your provider’s office. Please contact your pharmacy and they will refill your medication.    If your prescription bottle indicates you do not have any refills left, you may request refills at any time through one of the following ways: The online [employer] system (except Urgent Care), by calling your provider’s office, or by asking your pharmacy to contact your provider’s office with a refill request. Medication refills are processed only during regular business hours and may not be available until the next business day. Your provider may request additional information or to have a follow-up visit with you prior to refilling your medication.   *Please Note: Medication refills are assigned a new Rx number when refilled electronically. Your pharmacy may indicate that no refills were authorized even though a new prescription for the same medication is available at the pharmacy. Please request the medicine by name with the pharmacy before contacting your provider for a refill.           Sp[employer] Access Code: Activation code not generated  Current [employer] Status: Active

## 2017-07-27 ENCOUNTER — OFFICE VISIT (OUTPATIENT)
Dept: CARDIOLOGY | Facility: MEDICAL CENTER | Age: 77
End: 2017-07-27
Payer: MEDICARE

## 2017-07-27 VITALS
HEIGHT: 70 IN | BODY MASS INDEX: 27.35 KG/M2 | OXYGEN SATURATION: 94 % | DIASTOLIC BLOOD PRESSURE: 70 MMHG | WEIGHT: 191 LBS | SYSTOLIC BLOOD PRESSURE: 128 MMHG | HEART RATE: 98 BPM

## 2017-07-27 DIAGNOSIS — R55 SYNCOPE AND COLLAPSE: ICD-10-CM

## 2017-07-27 DIAGNOSIS — I10 ESSENTIAL HYPERTENSION: ICD-10-CM

## 2017-07-27 DIAGNOSIS — A41.9 SEPSIS, DUE TO UNSPECIFIED ORGANISM: ICD-10-CM

## 2017-07-27 DIAGNOSIS — I21.4 NON-STEMI (NON-ST ELEVATED MYOCARDIAL INFARCTION) (HCC): ICD-10-CM

## 2017-07-27 PROCEDURE — 99214 OFFICE O/P EST MOD 30 MIN: CPT | Performed by: INTERNAL MEDICINE

## 2017-07-27 NOTE — MR AVS SNAPSHOT
"        Damian Bejarano Chava   2017 8:45 AM   Office Visit   MRN: [id number]    Department:  [facility]   Dept Phone:  [phone]    Description:  Male : 1940   Provider:  Humza Chua MD,[facility]           Reason for Visit     Follow-Up hospital f/u--preop      Allergies as of 2017     No Known Allergies      You were diagnosed with     Syncope and collapse   [780.2.ICD-9-CM]       Non-STEMI (non-ST elevated myocardial infarction) (CMS-[facility])   [355236]       Sepsis, due to unspecified organism (CMS-[facility])   [6588860]       Essential hypertension   [2712242]         Vital Signs     Blood Pressure Pulse Height Weight Body Mass Index Oxygen Saturation    128/70 mmHg 98 1.778 m (5' 10\") 86.637 kg (191 lb) 27.41 kg/m2 94%    Smoking Status                   Former Smoker           Basic Information     Date Of Birth Sex Race Ethnicity Preferred Language    1940 Male White Non- English      Your appointments     Oct 31, 2017 10:45 AM   Treadmill with [facility]   [facility] (--)    [address]  [place] [id number] [phone]            2017  8:45 AM   FOLLOW UP with [facility]ua MD,[facility] ([facility]    [address]  [address]  [place] [id number] [phone]              Problem List              ICD-10-CM Priority Class Noted - Resolved    Sepsis (CMS-[facility]) A41.9   2017 - Present    Non-STEMI (non-ST elevated myocardial infarction) (CMS-[facility]) I21.4   2017 - Present    Acute respiratory failure with hypoxia (CMS-[facility]) J96.01   6/15/2017 - Present    Metabolic acidosis E87.2   6/15/2017 - Present    Acute kidney injury (nontraumatic) (CMS-[facility]) N17.9   6/15/2017 - Present    Bacteremia R78.81   6/15/2017 - Present    Acute emphysematous cholecystitis K81.0   6/15/2017 - Present    Hypotension (arterial) I95.9   6/15/2017 - Present   " Thrombocytopenia (CMS-HCC) D69.6   6/15/2017 - Present    Hypokalemia due to inadequate potassium intake E87.6   6/15/2017 - Present    Atrial fibrillation with RVR (CMS-HCC) I48.91   6/16/2017 - Present    Hypertension I10   6/22/2017 - Present      Health Maintenance        Date Due Completion Dates    IMM DTaP/Tdap/Td Vaccine (1 - Tdap) 8/14/1960 ---    COLONOSCOPY 8/14/1991 ---    IMM ZOSTER VACCINE 8/14/2001 ---    IMM PNEUMOCOCCAL 65+ (ADULT) LOW/MEDIUM RISK SERIES (1 of 2 - PCV13) 8/14/2006 ---    IMM INFLUENZA (1) 9/1/2017 9/27/2014            Current Immunizations     Influenza Vaccine Quad Inj (Pf) 9/27/2014      Below and/or attached are the medications your provider expects you to take. Review all of your home medications and newly ordered medications with your provider and/or pharmacist. Follow medication instructions as directed by your provider and/or pharmacist. Please keep your medication list with you and share with your provider. Update the information when medications are discontinued, doses are changed, or new medications (including over-the-counter products) are added; and carry medication information at all times in the event of emergency situations     Allergies:  No Known Allergies          Medications  Valid as of: July 27, 2017 -  9:01 AM    Generic Name Brand Name Tablet Size Instructions for use    Acetaminophen (Suppos) TYLENOL 650 MG Insert 1 Suppository in rectum every 6 hours as needed for Fever or Mild Pain.        AmLODIPine Besylate (Tab) NORVASC 10 MG Take 1 Tab by mouth every day.        Ascorbic Acid (Tab) ascorbic acid 500 MG Take 500 mg by mouth every day.        Aspirin (Chew Tab) ASA 81 MG Take 81 mg by mouth every day.        Cholecalciferol (Tab) cholecalciferol 1000 UNIT Take 1,000 Units by mouth every day.        Cyanocobalamin (Tab) VITAMIN B-12 100 MCG Take 100 mcg by mouth every day.        Digoxin (Tab) LANOXIN 125 MCG Take 1 Tab by mouth every day at 6 PM.         HydrALAZINE HCl (Tab) APRESOLINE 50 MG Take 1 Tab by mouth every 8 hours.        HydroCHLOROthiazide (Tab) HYDRODIURIL 25 MG Take 1 Tab by mouth every day.        Latanoprost (Solution) XALATAN 0.005 % Place 1 Drop in both eyes every evening.        Lisinopril (Tab) PRINIVIL, ZESTRIL 40 MG Take 1 Tab by mouth every day.        Loratadine (Tab) CLARITIN 10 MG Take 10 mg by mouth every day.        Multiple Vitamins-Minerals (Tab) THERAGRAN-M  Take 1 Tab by mouth every day.        Omega-3 Fatty Acids (Cap) fish oil 1000 MG Take 1,000 mg by mouth every day.        Omeprazole (CAPSULE DELAYED RELEASE) PRILOSEC 20 MG Take 20 mg by mouth every day.        Ondansetron (TABLET DISPERSIBLE) ZOFRAN ODT 4 MG Take 1 Tab by mouth every four hours as needed for Nausea/Vomiting (give PO if IV route is unavailable. May give per feeding tube.).        OxyCODONE HCl (Tab) ROXICODONE 5 MG Take 1 Tab by mouth every four hours as needed.        Vitamin E (Cap) VITAMIN E 400 UNIT Take 400 Units by mouth every day.        .                 Medicines prescribed today were sent to:     [facility] [address] [place] [address], [place] - [address]    [address] [facility] [address]    Phone: [phone] Fax: [phone]    Open 24 Hours?: No      Medication refill instructions:       If your prescription bottle indicates you have medication refills left, it is not necessary to call your provider’s office. Please contact your pharmacy and they will refill your medication.    If your prescription bottle indicates you do not have any refills left, you may request refills at any time through one of the following ways: The online [employer] system (except Urgent Care), by calling your provider’s office, or by asking your pharmacy to contact your provider’s office with a refill request. Medication refills are processed only during regular business hours and may not be available until the next business day. Your provider may request additional  information or to have a follow-up visit with you prior to refilling your medication.   *Please Note: Medication refills are assigned a new Rx number when refilled electronically. Your pharmacy may indicate that no refills were authorized even though a new prescription for the same medication is available at the pharmacy. Please request the medicine by name with the pharmacy before contacting your provider for a refill.           [employer] Access Code: Activation code not generated  Current [employer] Status: Active

## 2017-07-27 NOTE — PROGRESS NOTES
Cardiology Consult Note:    Humza Chua  Date & Time note created:    7/27/2017   8:54 AM       Patient ID:  Name:             Damian Larsen   YOB: 1940  Age:                 76 y.o.  male   MRN:               [id number]                                                             Chief Complaint:   Chief Complaint   Patient presents with   • Follow-Up     hospital f/u--preop             History of Present Illness:   Damian Larsen 76 y.o. white male who has history of hypertension and glaucoma.  Hospitalized from 6/14- 6/28/17, admitted for diffuse abdominal pain, chills, diarrhea and a syncopal episode secondary to profound weakness.  Bcx on 6/14, 6/15 & 6/17 +Klebsiella oxytoca; Also NSTEMI during his hospitalization. Currently denies cardiac sx's;     6/15/2017 Echo:  No prior study is available for comparison.   Left ventricular ejection fraction is visually estimated to be 60%.  Grade I diastolic dysfunction.  No valvular heart disease.  Estimated right ventricular systolic pressure  is 35 mmHg.    Review of Systems:     Constitutional: Denies fevers, Denies weight changes  Eyes: Denies changes in vision, no eye pain  Ears/Nose/Throat/Mouth: Denies nasal congestion or sore throat   Cardiovascular: Denies chest pain or palpitations   Respiratory: Denies shortness of breath , Denies cough  Gastrointestinal/Hepatic: Denies abdominal pain, nausea, vomiting, diarrhea, constipation or GI bleeding   Genitourinary: Denies bladder dysfunction, dysuria or frequency  Musculoskeletal/Rheum: Denies  joint pain and swelling   Skin/Breast: Denies rash, denies breast lumps or discharge  Neurological: Denies headache, confusion, memory loss or focal weakness/parasthesias  Psychiatric: denies mood disorder   Endocrine: denies hx of diabetes or thyroid dysfunction  Heme/Oncology/Lymph Nodes: Denies enlarged lymph nodes, denies bruising or known bleeding disorder  Allergic/Immunologic: Denies hx of allergies       All other systems were reviewed and are negative (AMA/CMS criteria)    Past Medical History:   Past Medical History   Diagnosis Date   • HTN (hypertension)    • Glaucoma    • Smoker history   • Cholecystitis with cholelithiasis 2017   • Non-STEMI (non-ST elevated myocardial infarction) (CMS-HCC) 2017         Past Surgical History:  History reviewed. No pertinent past surgical history.    Hospital Medications:    Current outpatient prescriptions:   •  hydrALAZINE (APRESOLINE) 50 MG Tab, Take 1 Tab by mouth every 8 hours., Disp: 90 Tab, Rfl:   •  lisinopril (PRINIVIL, ZESTRIL) 40 MG tablet, Take 1 Tab by mouth every day., Disp: 30 Tab, Rfl:   •  amlodipine (NORVASC) 10 MG Tab, Take 1 Tab by mouth every day., Disp: 30 Tab, Rfl:   •  digoxin (LANOXIN) 125 MCG Tab, Take 1 Tab by mouth every day at 6 PM., Disp: 30 Tab, Rfl:   •  hydrochlorothiazide (HYDRODIURIL) 25 MG Tab, Take 1 Tab by mouth every day., Disp: 30 Tab, Rfl:   •  latanoprost (XALATAN) 0.005 % Solution, Place 1 Drop in both eyes every evening., Disp: , Rfl:   •  omeprazole (PRILOSEC) 20 MG delayed-release capsule, Take 20 mg by mouth every day., Disp: , Rfl:   •  ascorbic acid (ASCORBIC ACID) 500 MG Tab, Take 500 mg by mouth every day., Disp: , Rfl:   •  vitamin D (CHOLECALCIFEROL) 1000 UNIT Tab, Take 1,000 Units by mouth every day., Disp: , Rfl:   •  cyanocobalamin (VITAMIN B-12) 100 MCG Tab, Take 100 mcg by mouth every day., Disp: , Rfl:   •  vitamin e (VITAMIN E) 400 UNIT Cap, Take 400 Units by mouth every day., Disp: , Rfl:   •  therapeutic multivitamin-minerals (THERAGRAN-M) Tab, Take 1 Tab by mouth every day., Disp: , Rfl:   •  Omega-3 Fatty Acids (FISH OIL) 1000 MG Cap capsule, Take 1,000 mg by mouth every day., Disp: , Rfl:   •  aspirin (ASA) 81 MG Chew Tab chewable tablet, Take 81 mg by mouth every day., Disp: , Rfl:   •  loratadine (CLARITIN) 10 MG Tab, Take 10 mg by mouth every day., Disp: , Rfl:   •  oxycodone immediate-release (ROXICODONE)  5 MG Tab, Take 1 Tab by mouth every four hours as needed., Disp: 30 Tab, Rfl: 0  •  acetaminophen (TYLENOL) 650 MG Suppos, Insert 1 Suppository in rectum every 6 hours as needed for Fever or Mild Pain., Disp: , Rfl: 0  •  ondansetron (ZOFRAN ODT) 4 MG TABLET DISPERSIBLE, Take 1 Tab by mouth every four hours as needed for Nausea/Vomiting (give PO if IV route is unavailable. May give per feeding tube.)., Disp: 10 Tab, Rfl: 0    Current Outpatient Medications:  Current Outpatient Prescriptions   Medication Sig Dispense Refill   • hydrALAZINE (APRESOLINE) 50 MG Tab Take 1 Tab by mouth every 8 hours. 90 Tab    • lisinopril (PRINIVIL, ZESTRIL) 40 MG tablet Take 1 Tab by mouth every day. 30 Tab    • amlodipine (NORVASC) 10 MG Tab Take 1 Tab by mouth every day. 30 Tab    • digoxin (LANOXIN) 125 MCG Tab Take 1 Tab by mouth every day at 6 PM. 30 Tab    • hydrochlorothiazide (HYDRODIURIL) 25 MG Tab Take 1 Tab by mouth every day. 30 Tab    • latanoprost (XALATAN) 0.005 % Solution Place 1 Drop in both eyes every evening.     • omeprazole (PRILOSEC) 20 MG delayed-release capsule Take 20 mg by mouth every day.     • ascorbic acid (ASCORBIC ACID) 500 MG Tab Take 500 mg by mouth every day.     • vitamin D (CHOLECALCIFEROL) 1000 UNIT Tab Take 1,000 Units by mouth every day.     • cyanocobalamin (VITAMIN B-12) 100 MCG Tab Take 100 mcg by mouth every day.     • vitamin e (VITAMIN E) 400 UNIT Cap Take 400 Units by mouth every day.     • therapeutic multivitamin-minerals (THERAGRAN-M) Tab Take 1 Tab by mouth every day.     • Omega-3 Fatty Acids (FISH OIL) 1000 MG Cap capsule Take 1,000 mg by mouth every day.     • aspirin (ASA) 81 MG Chew Tab chewable tablet Take 81 mg by mouth every day.     • loratadine (CLARITIN) 10 MG Tab Take 10 mg by mouth every day.     • oxycodone immediate-release (ROXICODONE) 5 MG Tab Take 1 Tab by mouth every four hours as needed. 30 Tab 0   • acetaminophen (TYLENOL) 650 MG Suppos Insert 1 Suppository in  "rectum every 6 hours as needed for Fever or Mild Pain.  0   • ondansetron (ZOFRAN ODT) 4 MG TABLET DISPERSIBLE Take 1 Tab by mouth every four hours as needed for Nausea/Vomiting (give PO if IV route is unavailable. May give per feeding tube.). 10 Tab 0     No current facility-administered medications for this visit.         Medication Allergy/Sensitivities:  No Known Allergies    Family History:  History reviewed. No pertinent family history. Cancers    Social History:  Social History     Social History   • Marital Status:      Spouse Name: N/A   • Number of Children: N/A   • Years of Education: N/A     Occupational History   • Not on file.     Social History Main Topics   • Smoking status: Former Smoker -- 0.50 packs/day for 20 years     Types: Cigarettes     Quit date: 06/14/1987   • Smokeless tobacco: Never Used   • Alcohol Use: No   • Drug Use: No   • Sexual Activity: Not on file      Comment:      Other Topics Concern   • Not on file     Social History Narrative         Physical Exam:  Vitals  Weight/BMI: Body mass index is 27.41 kg/(m^2).  Blood pressure 128/70, pulse 98, height 1.778 m (5' 10\"), weight 86.637 kg (191 lb), SpO2 94 %.  Filed Vitals:    07/27/17 0829   BP: 128/70   Pulse: 98   Height: 1.778 m (5' 10\")   Weight: 86.637 kg (191 lb)   SpO2: 94%     Oxygen Therapy:  Pulse Oximetry: 94 %  General Appearance:   Well developed, Well nourished, No acute distress, Non-toxic appearance.   HENT:  Normocephalic, Atraumatic, Oropharynx moist mucous membranes, Dentition: , Nose normal.    Eyes:  PERRLA, EOMI, Conjunctiva normal, No discharge.  Neck:  Normal range of motion, No cervical tenderness, Supple, No stridor, no JVD .  No thyromegaly.  No carotid bruit.  Cardiovascular:  Normal heart rate, Normal rhythm,  S1, S2, no S3,  S4; No gallops; No murmurs, No rubs, .   Extremitites with intact distal pulses, no cyanosis, clubbing or edema.  No heaves, thrills, HJR;  Peripheral pulses: carotid " 2+, brachial 2+, radial 2+, ulnar 2+, femoral 2+, popliteal 2+, PT 2+, DP 2+;  Lungs:  Respiratory effort is normal. Normal breath sounds, breath sounds clear to auscultation bilaterally,  no rales, no rhonchi, no wheezing.   Abdomen: Bowel sounds normal, Soft, No tenderness, No guarding, No rebound, No masses, No hepatosplenomegaly.  Skin: Warm, Dry, No erythema, No rash, no induration or crepitus.  Neurologic: Alert & oriented x 3, Normal motor function, Normal sensory function, No focal deficits noted, cranial nerves II through XII are normal,  normal gait.  Psychiatric: Affect normal, Judgment normal, Mood normal.      Data Review:     Records reviewed and summarized in current documentation    Lab Data Review:  Lab Results   Component Value Date/Time    SODIUM 136 06/28/2017 05:35 AM    POTASSIUM 3.4* 06/28/2017 05:35 AM    CHLORIDE 106 06/28/2017 05:35 AM    CO2 22 06/28/2017 05:35 AM    GLUCOSE 150* 06/28/2017 05:35 AM    BUN 9 06/28/2017 05:35 AM    CREATININE 0.62 06/28/2017 05:35 AM      Lab Results   Component Value Date/Time    PT 16.2* 06/15/2017 10:32 AM    INR 1.32* 06/15/2017 10:32 AM      Lab Results   Component Value Date/Time    WBC 9.4 06/27/2017 04:20 AM    RBC 3.12* 06/27/2017 04:20 AM    HEMOGLOBIN 10.3* 06/27/2017 04:20 AM    HEMATOCRIT 30.1* 06/27/2017 04:20 AM    MCV 96.5 06/27/2017 04:20 AM    MCH 33.0 06/27/2017 04:20 AM    MCHC 34.2 06/27/2017 04:20 AM    MPV 10.9 06/27/2017 04:20 AM    NEUTROPHILS-POLYS 74.20* 06/27/2017 04:20 AM    LYMPHOCYTES 15.50* 06/27/2017 04:20 AM    MONOCYTES 9.50 06/27/2017 04:20 AM    EOSINOPHILS 0.00 06/27/2017 04:20 AM    BASOPHILS 0.40 06/27/2017 04:20 AM        Imaging/Procedures Review:    To my review shows:Echo    EKG To my review shows: SR    Assessment and Plan.   76 y.o. male has NSTEMI with recent sepsis and syncope; Pls see orders for eval; TMST ordered    1. Syncope and collapse  See above, hypotension with sepsis    2. Non-STEMI (non-ST elevated  myocardial infarction) (CMS-[facility])  reviewed    3. Sepsis, due to unspecified organism (CMS-[facility])  See HPI      Return to clinic in  3  months  1. Syncope and collapse  CARDIAC STRESS TEST [facility] ONLY   2. Non-STEMI (non-ST elevated myocardial infarction) (CMS-[facility])  CARDIAC STRESS TEST [facility] ONLY    LIPID PANEL   3. Sepsis, due to unspecified organism (CMS-[facility])     4. Essential hypertension

## 2017-07-27 NOTE — Clinical Note
[facility]   [address]   [address]  [place] [address]  Phone: [phone]  Fax: [phone]              Damian Larsen  1940    Encounter Date: 7/27/2017    Graham Barcenas M.D.    Thank you for the referral. I had the pleasure of seeing Damian Larsen today in cardiology clinic. I've attached my visit note below. If you have any questions please feel free to give me a call anytime.      Humza Chua MD, PhD, [facility]  Cardiology and Lipidology  [facility]                                                                  PROGRESS NOTE:  Cardiology Consult Note:    Humza Chua  Date & Time note created:    7/27/2017   8:54 AM       Patient ID:  Name:             Damian Larsen   YOB: 1940  Age:                 76 y.o.  male   MRN:               [id number]                                                             Chief Complaint:   Chief Complaint   Patient presents with   • Follow-Up     hospital f/u--preop             History of Present Illness:   Damian Larsen 76 y.o. white male who has history of hypertension and glaucoma.  Hospitalized from 6/14- 6/28/17, admitted for diffuse abdominal pain, chills, diarrhea and a syncopal episode secondary to profound weakness.  Bcx on 6/14, 6/15 & 6/17 +Klebsiella oxytoca; Also NSTEMI during his hospitalization. Currently denies cardiac sx's;     6/15/2017 Echo:  No prior study is available for comparison.   Left ventricular ejection fraction is visually estimated to be 60%.  Grade I diastolic dysfunction.  No valvular heart disease.  Estimated right ventricular systolic pressure  is 35 mmHg.    Review of Systems:     Constitutional: Denies fevers, Denies weight changes  Eyes: Denies changes in vision, no eye pain  Ears/Nose/Throat/Mouth: Denies nasal congestion or sore throat   Cardiovascular: Denies chest pain or palpitations      Respiratory: Denies shortness of breath , Denies cough  Gastrointestinal/Hepatic: Denies abdominal pain, nausea, vomiting, diarrhea, constipation or GI bleeding   Genitourinary: Denies bladder dysfunction, dysuria or frequency  Musculoskeletal/Rheum: Denies  joint pain and swelling   Skin/Breast: Denies rash, denies breast lumps or discharge  Neurological: Denies headache, confusion, memory loss or focal weakness/parasthesias  Psychiatric: denies mood disorder   Endocrine: denies hx of diabetes or thyroid dysfunction  Heme/Oncology/Lymph Nodes: Denies enlarged lymph nodes, denies bruising or known bleeding disorder  Allergic/Immunologic: Denies hx of allergies      All other systems were reviewed and are negative (AMA/CMS criteria)    Past Medical History:   Past Medical History   Diagnosis Date   • HTN (hypertension)    • Glaucoma    • Smoker history   • Cholecystitis with cholelithiasis 2017   • Non-STEMI (non-ST elevated myocardial infarction) (CMS-HCC) 2017         Past Surgical History:  History reviewed. No pertinent past surgical history.    Hospital Medications:    Current outpatient prescriptions:   •  hydrALAZINE (APRESOLINE) 50 MG Tab, Take 1 Tab by mouth every 8 hours., Disp: 90 Tab, Rfl:   •  lisinopril (PRINIVIL, ZESTRIL) 40 MG tablet, Take 1 Tab by mouth every day., Disp: 30 Tab, Rfl:   •  amlodipine (NORVASC) 10 MG Tab, Take 1 Tab by mouth every day., Disp: 30 Tab, Rfl:   •  digoxin (LANOXIN) 125 MCG Tab, Take 1 Tab by mouth every day at 6 PM., Disp: 30 Tab, Rfl:   •  hydrochlorothiazide (HYDRODIURIL) 25 MG Tab, Take 1 Tab by mouth every day., Disp: 30 Tab, Rfl:   •  latanoprost (XALATAN) 0.005 % Solution, Place 1 Drop in both eyes every evening., Disp: , Rfl:   •  omeprazole (PRILOSEC) 20 MG delayed-release capsule, Take 20 mg by mouth every day., Disp: , Rfl:   •  ascorbic acid (ASCORBIC ACID) 500 MG Tab, Take 500 mg by mouth every day., Disp: , Rfl:   •  vitamin D (CHOLECALCIFEROL) 1000 UNIT  Tab, Take 1,000 Units by mouth every day., Disp: , Rfl:   •  cyanocobalamin (VITAMIN B-12) 100 MCG Tab, Take 100 mcg by mouth every day., Disp: , Rfl:   •  vitamin e (VITAMIN E) 400 UNIT Cap, Take 400 Units by mouth every day., Disp: , Rfl:   •  therapeutic multivitamin-minerals (THERAGRAN-M) Tab, Take 1 Tab by mouth every day., Disp: , Rfl:   •  Omega-3 Fatty Acids (FISH OIL) 1000 MG Cap capsule, Take 1,000 mg by mouth every day., Disp: , Rfl:   •  aspirin (ASA) 81 MG Chew Tab chewable tablet, Take 81 mg by mouth every day., Disp: , Rfl:   •  loratadine (CLARITIN) 10 MG Tab, Take 10 mg by mouth every day., Disp: , Rfl:   •  oxycodone immediate-release (ROXICODONE) 5 MG Tab, Take 1 Tab by mouth every four hours as needed., Disp: 30 Tab, Rfl: 0  •  acetaminophen (TYLENOL) 650 MG Suppos, Insert 1 Suppository in rectum every 6 hours as needed for Fever or Mild Pain., Disp: , Rfl: 0  •  ondansetron (ZOFRAN ODT) 4 MG TABLET DISPERSIBLE, Take 1 Tab by mouth every four hours as needed for Nausea/Vomiting (give PO if IV route is unavailable. May give per feeding tube.)., Disp: 10 Tab, Rfl: 0    Current Outpatient Medications:  Current Outpatient Prescriptions   Medication Sig Dispense Refill   • hydrALAZINE (APRESOLINE) 50 MG Tab Take 1 Tab by mouth every 8 hours. 90 Tab    • lisinopril (PRINIVIL, ZESTRIL) 40 MG tablet Take 1 Tab by mouth every day. 30 Tab    • amlodipine (NORVASC) 10 MG Tab Take 1 Tab by mouth every day. 30 Tab    • digoxin (LANOXIN) 125 MCG Tab Take 1 Tab by mouth every day at 6 PM. 30 Tab    • hydrochlorothiazide (HYDRODIURIL) 25 MG Tab Take 1 Tab by mouth every day. 30 Tab    • latanoprost (XALATAN) 0.005 % Solution Place 1 Drop in both eyes every evening.     • omeprazole (PRILOSEC) 20 MG delayed-release capsule Take 20 mg by mouth every day.     • ascorbic acid (ASCORBIC ACID) 500 MG Tab Take 500 mg by mouth every day.     • vitamin D (CHOLECALCIFEROL) 1000 UNIT Tab Take 1,000 Units by mouth every  "day.     • cyanocobalamin (VITAMIN B-12) 100 MCG Tab Take 100 mcg by mouth every day.     • vitamin e (VITAMIN E) 400 UNIT Cap Take 400 Units by mouth every day.     • therapeutic multivitamin-minerals (THERAGRAN-M) Tab Take 1 Tab by mouth every day.     • Omega-3 Fatty Acids (FISH OIL) 1000 MG Cap capsule Take 1,000 mg by mouth every day.     • aspirin (ASA) 81 MG Chew Tab chewable tablet Take 81 mg by mouth every day.     • loratadine (CLARITIN) 10 MG Tab Take 10 mg by mouth every day.     • oxycodone immediate-release (ROXICODONE) 5 MG Tab Take 1 Tab by mouth every four hours as needed. 30 Tab 0   • acetaminophen (TYLENOL) 650 MG Suppos Insert 1 Suppository in rectum every 6 hours as needed for Fever or Mild Pain.  0   • ondansetron (ZOFRAN ODT) 4 MG TABLET DISPERSIBLE Take 1 Tab by mouth every four hours as needed for Nausea/Vomiting (give PO if IV route is unavailable. May give per feeding tube.). 10 Tab 0     No current facility-administered medications for this visit.         Medication Allergy/Sensitivities:  No Known Allergies    Family History:  History reviewed. No pertinent family history. Cancers    Social History:  Social History     Social History   • Marital Status:      Spouse Name: N/A   • Number of Children: N/A   • Years of Education: N/A     Occupational History   • Not on file.     Social History Main Topics   • Smoking status: Former Smoker -- 0.50 packs/day for 20 years     Types: Cigarettes     Quit date: 06/14/1987   • Smokeless tobacco: Never Used   • Alcohol Use: No   • Drug Use: No   • Sexual Activity: Not on file      Comment:      Other Topics Concern   • Not on file     Social History Narrative         Physical Exam:  Vitals  Weight/BMI: Body mass index is 27.41 kg/(m^2).  Blood pressure 128/70, pulse 98, height 1.778 m (5' 10\"), weight 86.637 kg (191 lb), SpO2 94 %.  Filed Vitals:    07/27/17 0829   BP: 128/70   Pulse: 98   Height: 1.778 m (5' 10\")   Weight: 86.637 kg " (191 lb)   SpO2: 94%     Oxygen Therapy:  Pulse Oximetry: 94 %  General Appearance:   Well developed, Well nourished, No acute distress, Non-toxic appearance.   HENT:  Normocephalic, Atraumatic, Oropharynx moist mucous membranes, Dentition: , Nose normal.    Eyes:  PERRLA, EOMI, Conjunctiva normal, No discharge.  Neck:  Normal range of motion, No cervical tenderness, Supple, No stridor, no JVD .  No thyromegaly.  No carotid bruit.  Cardiovascular:  Normal heart rate, Normal rhythm,  S1, S2, no S3,  S4; No gallops; No murmurs, No rubs, .   Extremitites with intact distal pulses, no cyanosis, clubbing or edema.  No heaves, thrills, HJR;  Peripheral pulses: carotid 2+, brachial 2+, radial 2+, ulnar 2+, femoral 2+, popliteal 2+, PT 2+, DP 2+;  Lungs:  Respiratory effort is normal. Normal breath sounds, breath sounds clear to auscultation bilaterally,  no rales, no rhonchi, no wheezing.   Abdomen: Bowel sounds normal, Soft, No tenderness, No guarding, No rebound, No masses, No hepatosplenomegaly.  Skin: Warm, Dry, No erythema, No rash, no induration or crepitus.  Neurologic: Alert & oriented x 3, Normal motor function, Normal sensory function, No focal deficits noted, cranial nerves II through XII are normal,  normal gait.  Psychiatric: Affect normal, Judgment normal, Mood normal.      Data Review:     Records reviewed and summarized in current documentation    Lab Data Review:  Lab Results   Component Value Date/Time    SODIUM 136 06/28/2017 05:35 AM    POTASSIUM 3.4* 06/28/2017 05:35 AM    CHLORIDE 106 06/28/2017 05:35 AM    CO2 22 06/28/2017 05:35 AM    GLUCOSE 150* 06/28/2017 05:35 AM    BUN 9 06/28/2017 05:35 AM    CREATININE 0.62 06/28/2017 05:35 AM      Lab Results   Component Value Date/Time    PT 16.2* 06/15/2017 10:32 AM    INR 1.32* 06/15/2017 10:32 AM      Lab Results   Component Value Date/Time    WBC 9.4 06/27/2017 04:20 AM    RBC 3.12* 06/27/2017 04:20 AM    HEMOGLOBIN 10.3* 06/27/2017 04:20 AM     HEMATOCRIT 30.1* 06/27/2017 04:20 AM    MCV 96.5 06/27/2017 04:20 AM    MCH 33.0 06/27/2017 04:20 AM    MCHC 34.2 06/27/2017 04:20 AM    MPV 10.9 06/27/2017 04:20 AM    NEUTROPHILS-POLYS 74.20* 06/27/2017 04:20 AM    LYMPHOCYTES 15.50* 06/27/2017 04:20 AM    MONOCYTES 9.50 06/27/2017 04:20 AM    EOSINOPHILS 0.00 06/27/2017 04:20 AM    BASOPHILS 0.40 06/27/2017 04:20 AM        Imaging/Procedures Review:    To my review shows:Echo    EKG To my review shows: SR    Assessment and Plan.   76 y.o. male has NSTEMI with recent sepsis and syncope; Pls see orders for eval; TMST ordered    1. Syncope and collapse  See above, hypotension with sepsis    2. Non-STEMI (non-ST elevated myocardial infarction) (CMS-HCC)  reviewed    3. Sepsis, due to unspecified organism (CMS-[facility])  See HPI      Return to clinic in  3  months  1. Syncope and collapse  CARDIAC STRESS TEST TREADMILL ONLY   2. Non-STEMI (non-ST elevated myocardial infarction) (CMS-[facility])  CARDIAC STRESS TEST TREADMILL ONLY    LIPID PANEL   3. Sepsis, due to unspecified organism (CMS-[facility])     4. Essential hypertension           Graham Barcenas M.D.  [address] [address]  VIA Facsimile: [phone]

## 2017-08-03 ENCOUNTER — TELEPHONE (OUTPATIENT)
Dept: CARDIOLOGY | Facility: MEDICAL CENTER | Age: 77
End: 2017-08-03

## 2017-08-03 NOTE — TELEPHONE ENCOUNTER
----- Message from Jasmin Verduzco sent at 8/3/2017  3:34 PM PDT -----  Regarding: Doctor's office needs call back asap about surgical clearance  DENILSON/Stefany    Please call Maryse at Dr Kae Last's office at [phone] asap. They received Dr Chua's office note from 7/27, but need to get something in writing for surgical clearance. They're seeing the patient tomorrow to schedule the surgery.

## 2017-08-03 NOTE — TELEPHONE ENCOUNTER
Dr. Chua, pt needs clearance for gall bladder surgery.  Clearance is not mentioned in your recent note.            Please fax clearance to:[phone]

## 2017-08-04 ENCOUNTER — NON-PROVIDER VISIT (OUTPATIENT)
Dept: CARDIOLOGY | Facility: MEDICAL CENTER | Age: 77
End: 2017-08-04
Payer: MEDICARE

## 2017-08-04 ENCOUNTER — HOSPITAL ENCOUNTER (OUTPATIENT)
Dept: LAB | Facility: MEDICAL CENTER | Age: 77
End: 2017-08-04
Attending: INTERNAL MEDICINE
Payer: MEDICARE

## 2017-08-04 VITALS
HEART RATE: 83 BPM | OXYGEN SATURATION: 97 % | HEIGHT: 70 IN | BODY MASS INDEX: 27.35 KG/M2 | WEIGHT: 191 LBS | DIASTOLIC BLOOD PRESSURE: 70 MMHG | SYSTOLIC BLOOD PRESSURE: 134 MMHG

## 2017-08-04 DIAGNOSIS — R55 SYNCOPE AND COLLAPSE: ICD-10-CM

## 2017-08-04 DIAGNOSIS — I10 ESSENTIAL HYPERTENSION: ICD-10-CM

## 2017-08-04 DIAGNOSIS — I21.4 NON-STEMI (NON-ST ELEVATED MYOCARDIAL INFARCTION) (HCC): ICD-10-CM

## 2017-08-04 LAB
CHOLEST SERPL-MCNC: 147 MG/DL (ref 100–199)
HDLC SERPL-MCNC: 23 MG/DL
LDLC SERPL CALC-MCNC: 78 MG/DL
TREADMILL STRESS: NORMAL
TRIGL SERPL-MCNC: 230 MG/DL (ref 0–149)

## 2017-08-04 PROCEDURE — 93015 CV STRESS TEST SUPVJ I&R: CPT | Performed by: INTERNAL MEDICINE

## 2017-08-04 PROCEDURE — 36415 COLL VENOUS BLD VENIPUNCTURE: CPT

## 2017-08-04 PROCEDURE — 80061 LIPID PANEL: CPT

## 2017-08-04 NOTE — TELEPHONE ENCOUNTER
Humza Chua MD,[facility]  Stefany Fraser R.N.        Caller: Unspecified (Yesterday, 3:47 PM)                     TMST not done.       Advised Maryse that pt must have treadmill stress test for clearance and recommended he call scheduling to move his test up as it is scheduled in October.

## 2017-08-08 ENCOUNTER — OFFICE VISIT (OUTPATIENT)
Dept: CARDIOLOGY | Facility: MEDICAL CENTER | Age: 77
End: 2017-08-08
Payer: MEDICARE

## 2017-08-08 VITALS
DIASTOLIC BLOOD PRESSURE: 70 MMHG | SYSTOLIC BLOOD PRESSURE: 112 MMHG | WEIGHT: 193 LBS | HEIGHT: 70 IN | OXYGEN SATURATION: 95 % | HEART RATE: 84 BPM | BODY MASS INDEX: 27.63 KG/M2

## 2017-08-08 DIAGNOSIS — R55 SYNCOPE AND COLLAPSE: ICD-10-CM

## 2017-08-08 DIAGNOSIS — I21.4 NON-STEMI (NON-ST ELEVATED MYOCARDIAL INFARCTION) (HCC): ICD-10-CM

## 2017-08-08 DIAGNOSIS — Z01.810 PRE-OPERATIVE CARDIOVASCULAR EXAMINATION: ICD-10-CM

## 2017-08-08 DIAGNOSIS — I48.91 ATRIAL FIBRILLATION WITH RVR (HCC): ICD-10-CM

## 2017-08-08 DIAGNOSIS — I10 ESSENTIAL HYPERTENSION: ICD-10-CM

## 2017-08-08 LAB — EKG IMPRESSION: NORMAL

## 2017-08-08 PROCEDURE — 93000 ELECTROCARDIOGRAM COMPLETE: CPT | Performed by: INTERNAL MEDICINE

## 2017-08-08 PROCEDURE — 99214 OFFICE O/P EST MOD 30 MIN: CPT | Performed by: INTERNAL MEDICINE

## 2017-08-08 RX ORDER — HYDROCHLOROTHIAZIDE 25 MG/1
25 TABLET ORAL DAILY
Qty: 30 TAB | Refills: 3 | Status: SHIPPED | OUTPATIENT
Start: 2017-08-08 | End: 2017-12-06 | Stop reason: SDUPTHER

## 2017-08-08 RX ORDER — LISINOPRIL 40 MG/1
40 TABLET ORAL DAILY
Qty: 30 TAB | Refills: 3 | Status: SHIPPED | OUTPATIENT
Start: 2017-08-08 | End: 2017-12-06 | Stop reason: SDUPTHER

## 2017-08-08 RX ORDER — AMLODIPINE BESYLATE 10 MG/1
10 TABLET ORAL DAILY
Qty: 30 TAB | Refills: 3 | Status: SHIPPED | OUTPATIENT
Start: 2017-08-08 | End: 2017-12-06 | Stop reason: SDUPTHER

## 2017-08-08 NOTE — PROGRESS NOTES
Chief Complaint   Patient presents with   • Follow-Up     Pre-OP    This patient is an established male who is here today to discuss:  GB surg; No abd pain , TMST result review; Denies cardiac sx's; Walking and feeling better Rx's renew with dosing adjustment;    Patient Active Problem List    Diagnosis Date Noted   • Hypertension 06/22/2017   • Atrial fibrillation with RVR (CMS-HCC) 06/16/2017   • Acute respiratory failure with hypoxia (CMS-HCC) 06/15/2017   • Metabolic acidosis 06/15/2017   • Acute kidney injury (nontraumatic) (CMS-HCC) 06/15/2017   • Bacteremia 06/15/2017   • Acute emphysematous cholecystitis 06/15/2017   • Hypotension (arterial) 06/15/2017   • Thrombocytopenia (CMS-HCC) 06/15/2017   • Hypokalemia due to inadequate potassium intake 06/15/2017   • Sepsis (CMS-HCC) 06/14/2017   • Non-STEMI (non-ST elevated myocardial infarction) (CMS-HCC) 06/14/2017       Past Medical History   Diagnosis Date   • HTN (hypertension)    • Glaucoma    • Smoker history   • Cholecystitis with cholelithiasis 2017   • Non-STEMI (non-ST elevated myocardial infarction) (CMS-HCC) 2017     History reviewed. No pertinent past surgical history.  Social History     Social History   • Marital Status:      Spouse Name: N/A   • Number of Children: N/A   • Years of Education: N/A     Social History Main Topics   • Smoking status: Former Smoker -- 0.50 packs/day for 20 years     Types: Cigarettes     Quit date: 06/14/1987   • Smokeless tobacco: Never Used   • Alcohol Use: No   • Drug Use: No   • Sexual Activity: Not Asked      Comment:      Other Topics Concern   • None     Social History Narrative     History reviewed. No pertinent family history.    Current Outpatient Prescriptions   Medication Sig Dispense Refill   • oxycodone immediate-release (ROXICODONE) 5 MG Tab Take 1 Tab by mouth every four hours as needed. 30 Tab 0   • acetaminophen (TYLENOL) 650 MG Suppos Insert 1 Suppository in rectum every 6 hours as needed  for Fever or Mild Pain.  0   • ondansetron (ZOFRAN ODT) 4 MG TABLET DISPERSIBLE Take 1 Tab by mouth every four hours as needed for Nausea/Vomiting (give PO if IV route is unavailable. May give per feeding tube.). 10 Tab 0   • lisinopril (PRINIVIL, ZESTRIL) 40 MG tablet Take 1 Tab by mouth every day. 30 Tab    • amlodipine (NORVASC) 10 MG Tab Take 1 Tab by mouth every day. 30 Tab    • digoxin (LANOXIN) 125 MCG Tab Take 1 Tab by mouth every day at 6 PM. 30 Tab    • hydrochlorothiazide (HYDRODIURIL) 25 MG Tab Take 1 Tab by mouth every day. 30 Tab    • latanoprost (XALATAN) 0.005 % Solution Place 1 Drop in both eyes every evening.     • omeprazole (PRILOSEC) 20 MG delayed-release capsule Take 20 mg by mouth every day.     • ascorbic acid (ASCORBIC ACID) 500 MG Tab Take 500 mg by mouth every day.     • vitamin D (CHOLECALCIFEROL) 1000 UNIT Tab Take 1,000 Units by mouth every day.     • cyanocobalamin (VITAMIN B-12) 100 MCG Tab Take 100 mcg by mouth every day.     • vitamin e (VITAMIN E) 400 UNIT Cap Take 400 Units by mouth every day.     • therapeutic multivitamin-minerals (THERAGRAN-M) Tab Take 1 Tab by mouth every day.     • Omega-3 Fatty Acids (FISH OIL) 1000 MG Cap capsule Take 1,000 mg by mouth every day.     • aspirin (ASA) 81 MG Chew Tab chewable tablet Take 81 mg by mouth every day.     • loratadine (CLARITIN) 10 MG Tab Take 10 mg by mouth every day.     • hydrALAZINE (APRESOLINE) 50 MG Tab Take 1 Tab by mouth every 8 hours. 90 Tab      No current facility-administered medications for this visit.     Review of patient's allergies indicates no known allergies.    Review of Systems:     Constitutional: Denies fevers, Denies weight changes  Eyes: Denies changes in vision, no eye pain  Ears/Nose/Throat/Mouth: Denies nasal congestion or sore throat   Cardiovascular: Denies chest pain or palpitations   Respiratory: Denies shortness of breath , Denies cough  Gastrointestinal/Hepatic: Denies abdominal pain, nausea,  "vomiting, diarrhea, constipation or GI bleeding   Genitourinary: Denies bladder dysfunction, dysuria or frequency  Musculoskeletal/Rheum: Denies  joint pain and swelling   Skin/Breast: Denies rash, denies breast lumps or discharge  Neurological: Denies headache, confusion, memory loss or focal weakness/parasthesias  Psychiatric: denies mood disorder   Endocrine: denies hx of diabetes or thyroid dysfunction  Heme/Oncology/Lymph Nodes: Denies enlarged lymph nodes, denies brusing or known bleeding disorder  Allergic/Immunologic: Denies hx of allergies      All other systems were reviewed and are negative (AMA/CMS criteria)      Blood pressure 112/70, pulse 84, height 1.778 m (5' 10\"), weight 87.544 kg (193 lb), SpO2 95 %.  General Appearance:   Well developed, Well nourished, No acute distress, Non-toxic appearance.   HENT:  Normocephalic, Atraumatic, Oropharynx moist mucous membranes, Dentition: , Nose normal.    Eyes:  PERRLA, EOMI, Conjunctiva normal, No discharge.  Neck:  Normal range of motion, No cervical tenderness, Supple, No stridor, no JVD .  No thyromegaly.  No carotid bruit.  Cardiovascular:  Normal heart rate, Normal rhythm,  S1, S2, no S3,  S4; No gallops; No murmurs, No rubs, .   Extremitites with intact distal pulses, no cyanosis, clubbing or edema.  No heaves, thrills, HJR;  Peripheral pulses: carotid 2+, brachial 2+, radial 2+, ulnar 2+, femoral 2+, popliteal 2+, PT 2+, DP 2+;  Lungs:  Respiratory effort is normal. Normal breath sounds, breath sounds clear to auscultation bilaterally,  no rales, no rhonchi, no wheezing.   Abdomen: Bowel sounds normal, Soft, No tenderness, No guarding, No rebound, No masses, No hepatosplenomegaly.  Skin: Warm, Dry, No erythema, No rash, no induration or crepitus.  Neurologic: Alert & oriented x 3, Normal motor function, Normal sensory function, No focal deficits noted, cranial nerves II through XII are normal,  normal gait.  Psychiatric: Affect normal, Judgment normal, " Mood normal.    6/15/2017 Echocardiography Laboratory    No prior study is available for comparison.   Left ventricular ejection fraction is visually estimated to be 60%.  Grade I diastolic dysfunction.  No valvular heart disease.  Estimated right ventricular systolic pressure  is 35 mmHg.    TMST:poor exercise tolerance of 4.6 METs withfatigue limitation  Appropriate heart and blood pressure response  No chronotropic incompetence; no heart rate recovery delay  Posittive ischemia at this work level  No dysrhythmia  Lau score -6.7  Consider further evaluation    Results for YI ANITHA CHAU (MRN [id number]) as of 8/8/2017 10:08   Ref. Range 6/27/2017 12:37 6/28/2017 05:35 8/4/2017 14:48   Sodium Latest Ref Range: 135-145 mmol/L  136    Potassium Latest Ref Range: 3.6-5.5 mmol/L  3.4 (L)    Chloride Latest Ref Range:  mmol/L  106    Co2 Latest Ref Range: 20-33 mmol/L  22    Anion Gap Latest Ref Range: 0.0-11.9   8.0    Glucose Latest Ref Range: 65-99 mg/dL  150 (H)    Bun Latest Ref Range: 8-22 mg/dL  9    Creatinine Latest Ref Range: 0.50-1.40 mg/dL  0.62    GFR If  Latest Ref Range: >60 mL/min/1.73 m 2  >60    GFR If Non  Latest Ref Range: >60 mL/min/1.73 m 2  >60    Calcium Latest Ref Range: 8.4-10.2 mg/dL  7.8 (L)    AST(SGOT) Latest Ref Range: 12-45 U/L  21    ALT(SGPT) Latest Ref Range: 2-50 U/L  25    Alkaline Phosphatase Latest Ref Range: 30-99 U/L  76    Total Bilirubin Latest Ref Range: 0.1-1.5 mg/dL  1.2    Albumin Latest Ref Range: 3.2-4.9 g/dL  2.5 (L)    Total Protein Latest Ref Range: 6.0-8.2 g/dL  6.1    Globulin Latest Ref Range: 1.9-3.5 g/dL  3.6 (H)    A-G Ratio Latest Units: g/dL  0.7    Cholesterol,Tot Latest Ref Range: 100-199 mg/dL   147   Triglycerides Latest Ref Range: 0-149 mg/dL   230 (H)   HDL Latest Ref Range: >=40 mg/dL   23 (A)   LDL Latest Ref Range: <100 mg/dL   78     Assessment and Plan.   76 y.o. male has recent STEMI likely relates to his  sepsis; Lexiscan ordered to be done ASAP  GB surg was told urgent; Recent AFib w/RVR  Today EKG: SR 81 bpm; OK to d/c Digoxin and lower HCTX to 25 mg/d    1. Pre-operative cardiovascular examination  See above    2. Non-STEMI (non-ST elevated myocardial infarction) (CMS-HCC)  See above    3. Essential hypertension  controlled    4. Syncope and collapse  none      Return to clinic in  2 weeks,    1. Pre-operative cardiovascular examination     2. Non-STEMI (non-ST elevated myocardial infarction) (CMS-HCC)     3. Essential hypertension     4. Syncope and collapse

## 2017-08-08 NOTE — MR AVS SNAPSHOT
"        Damian Bejarano Chava   2017 10:00 AM   Office Visit   MRN: [id number]    Department:  [facility]   Dept Phone:  [phone]    Description:  Male : 1940   Provider:  Humza Chua MD,[facility]           Reason for Visit     Follow-Up           Allergies as of 2017     No Known Allergies      You were diagnosed with     Pre-operative cardiovascular examination   [V72.81.ICD-9-CM]       Non-STEMI (non-ST elevated myocardial infarction) (CMS-[facility])   [431946]       Essential hypertension   [0873387]       Syncope and collapse   [780.2.ICD-9-CM]       Atrial fibrillation with RVR (CMS-[facility])   [148794]         Vital Signs     Blood Pressure Pulse Height Weight Body Mass Index Oxygen Saturation    112/70 mmHg 84 1.778 m (5' 10\") 87.544 kg (193 lb) 27.69 kg/m2 95%    Smoking Status                   Former Smoker           Basic Information     Date Of Birth Sex Race Ethnicity Preferred Language    1940 Male White Non- English      Problem List              ICD-10-CM Priority Class Noted - Resolved    Sepsis (CMS-[facility]) A41.9   2017 - Present    Non-STEMI (non-ST elevated myocardial infarction) (CMS-[facility]) I21.4   2017 - Present    Acute respiratory failure with hypoxia (CMS-[facility]) J96.01   6/15/2017 - Present    Metabolic acidosis E87.2   6/15/2017 - Present    Acute kidney injury (nontraumatic) (CMS-H[facility]) N17.9   6/15/2017 - Present    Bacteremia R78.81   6/15/2017 - Present    Acute emphysematous cholecystitis K81.0   6/15/2017 - Present    Hypotension (arterial) I95.9   6/15/2017 - Present    Thrombocytopenia (CMS-[facility]) D69.6   6/15/2017 - Present    Hypokalemia due to inadequate potassium intake E87.6   6/15/2017 - Present    Atrial fibrillation with RVR (CMS-HCC) I48.91   2017 - Present    Hypertension I10   2017 - Present      Health Maintenance        Date Due Completion Dates    IMM DTaP/Tdap/Td Vaccine (1 - Tdap) 1960 ---    COLONOSCOPY 1991 ---    IMM " ZOSTER VACCINE 8/14/2001 ---    IMM PNEUMOCOCCAL 65+ (ADULT) LOW/MEDIUM RISK SERIES (1 of 2 - PCV13) 8/14/2006 ---    IMM INFLUENZA (1) 9/1/2017 9/27/2014            Results       Current Immunizations     Influenza Vaccine Quad Inj (Pf) 9/27/2014      Below and/or attached are the medications your provider expects you to take. Review all of your home medications and newly ordered medications with your provider and/or pharmacist. Follow medication instructions as directed by your provider and/or pharmacist. Please keep your medication list with you and share with your provider. Update the information when medications are discontinued, doses are changed, or new medications (including over-the-counter products) are added; and carry medication information at all times in the event of emergency situations     Allergies:  No Known Allergies          Medications  Valid as of: August 08, 2017 - 10:45 AM    Generic Name Brand Name Tablet Size Instructions for use    Acetaminophen (Suppos) TYLENOL 650 MG Insert 1 Suppository in rectum every 6 hours as needed for Fever or Mild Pain.        AmLODIPine Besylate (Tab) NORVASC 10 MG Take 1 Tab by mouth every day.        Ascorbic Acid (Tab) ascorbic acid 500 MG Take 500 mg by mouth every day.        Aspirin (Chew Tab) ASA 81 MG Take 81 mg by mouth every day.        Cholecalciferol (Tab) cholecalciferol 1000 UNIT Take 1,000 Units by mouth every day.        Cyanocobalamin (Tab) VITAMIN B-12 100 MCG Take 100 mcg by mouth every day.        Digoxin (Tab) LANOXIN 125 MCG Take 1 Tab by mouth every day at 6 PM.        HydrALAZINE HCl (Tab) APRESOLINE 50 MG Take 1 Tab by mouth every 8 hours.        HydroCHLOROthiazide (Tab) HYDRODIURIL 25 MG Take 1 Tab by mouth every day.        Latanoprost (Solution) XALATAN 0.005 % Place 1 Drop in both eyes every evening.        Lisinopril (Tab) PRINIVIL, ZESTRIL 40 MG Take 1 Tab by mouth every day.        Loratadine (Tab) CLARITIN 10 MG Take 10 mg by  mouth every day.        Multiple Vitamins-Minerals (Tab) THERAGRAN-M  Take 1 Tab by mouth every day.        Omega-3 Fatty Acids (Cap) fish oil 1000 MG Take 1,000 mg by mouth every day.        Omeprazole (CAPSULE DELAYED RELEASE) PRILOSEC 20 MG Take 20 mg by mouth every day.        Ondansetron (TABLET DISPERSIBLE) ZOFRAN ODT 4 MG Take 1 Tab by mouth every four hours as needed for Nausea/Vomiting (give PO if IV route is unavailable. May give per feeding tube.).        OxyCODONE HCl (Tab) ROXICODONE 5 MG Take 1 Tab by mouth every four hours as needed.        Vitamin E (Cap) VITAMIN E 400 UNIT Take 400 Units by mouth every day.        .                 Medicines prescribed today were sent to:     [facility] [address] [place] [address], [place] - [address]    [address] [place] [address]    Phone: [phone] Fax: [phone]    Open 24 Hours?: No      Medication refill instructions:       If your prescription bottle indicates you have medication refills left, it is not necessary to call your provider’s office. Please contact your pharmacy and they will refill your medication.    If your prescription bottle indicates you do not have any refills left, you may request refills at any time through one of the following ways: The online userfox system (except Urgent Care), by calling your provider’s office, or by asking your pharmacy to contact your provider’s office with a refill request. Medication refills are processed only during regular business hours and may not be available until the next business day. Your provider may request additional information or to have a follow-up visit with you prior to refilling your medication.   *Please Note: Medication refills are assigned a new Rx number when refilled electronically. Your pharmacy may indicate that no refills were authorized even though a new prescription for the same medication is available at the pharmacy. Please request the medicine by name with the pharmacy  before contacting your provider for a refill.        Your To Do List     Future Labs/Procedures Complete By Expires    NM-CARDIAC STRESS TEST  As directed 8/8/2018         [employer] [employer] Access Code: Activation code not generated  Current [employer] Status: Active

## 2017-08-08 NOTE — Clinical Note
[facility]   [address]   [address]  [place] [address]  Phone: [phone]  Fax: [phone]              Damian Larsen  1940    Encounter Date: 8/8/2017    Graham Barcenas M.D.    Thank you for the referral. I had the pleasure of seeing Damian Larsen today in cardiology clinic. I've attached my visit note below. If you have any questions please feel free to give me a call anytime.      Humza Chua MD, PhD, [facility]  Cardiology and Lipidology  [facility]                                                                  PROGRESS NOTE:  Chief Complaint   Patient presents with   • Follow-Up     Pre-OP    This patient is an established male who is here today to discuss:  GB surg; No abd pain , TMST result review; Denies cardiac sx's; Walking and feeling better Rx's renew with dosing adjustment;    Patient Active Problem List    Diagnosis Date Noted   • Hypertension 06/22/2017   • Atrial fibrillation with RVR (CMS-HCC) 06/16/2017   • Acute respiratory failure with hypoxia (CMS-HCC) 06/15/2017   • Metabolic acidosis 06/15/2017   • Acute kidney injury (nontraumatic) (CMS-HCC) 06/15/2017   • Bacteremia 06/15/2017   • Acute emphysematous cholecystitis 06/15/2017   • Hypotension (arterial) 06/15/2017   • Thrombocytopenia (CMS-HCC) 06/15/2017   • Hypokalemia due to inadequate potassium intake 06/15/2017   • Sepsis (CMS-HCC) 06/14/2017   • Non-STEMI (non-ST elevated myocardial infarction) (CMS-HCC) 06/14/2017       Past Medical History   Diagnosis Date   • HTN (hypertension)    • Glaucoma    • Smoker history   • Cholecystitis with cholelithiasis 2017   • Non-STEMI (non-ST elevated myocardial infarction) (CMS-HCC) 2017     History reviewed. No pertinent past surgical history.  Social History     Social History   • Marital Status:      Spouse Name: N/A   • Number of Children: N/A   • Years of Education:  N/A     Social History Main Topics   • Smoking status: Former Smoker -- 0.50 packs/day for 20 years     Types: Cigarettes     Quit date: 06/14/1987   • Smokeless tobacco: Never Used   • Alcohol Use: No   • Drug Use: No   • Sexual Activity: Not Asked      Comment:      Other Topics Concern   • None     Social History Narrative     History reviewed. No pertinent family history.    Current Outpatient Prescriptions   Medication Sig Dispense Refill   • oxycodone immediate-release (ROXICODONE) 5 MG Tab Take 1 Tab by mouth every four hours as needed. 30 Tab 0   • acetaminophen (TYLENOL) 650 MG Suppos Insert 1 Suppository in rectum every 6 hours as needed for Fever or Mild Pain.  0   • ondansetron (ZOFRAN ODT) 4 MG TABLET DISPERSIBLE Take 1 Tab by mouth every four hours as needed for Nausea/Vomiting (give PO if IV route is unavailable. May give per feeding tube.). 10 Tab 0   • lisinopril (PRINIVIL, ZESTRIL) 40 MG tablet Take 1 Tab by mouth every day. 30 Tab    • amlodipine (NORVASC) 10 MG Tab Take 1 Tab by mouth every day. 30 Tab    • digoxin (LANOXIN) 125 MCG Tab Take 1 Tab by mouth every day at 6 PM. 30 Tab    • hydrochlorothiazide (HYDRODIURIL) 25 MG Tab Take 1 Tab by mouth every day. 30 Tab    • latanoprost (XALATAN) 0.005 % Solution Place 1 Drop in both eyes every evening.     • omeprazole (PRILOSEC) 20 MG delayed-release capsule Take 20 mg by mouth every day.     • ascorbic acid (ASCORBIC ACID) 500 MG Tab Take 500 mg by mouth every day.     • vitamin D (CHOLECALCIFEROL) 1000 UNIT Tab Take 1,000 Units by mouth every day.     • cyanocobalamin (VITAMIN B-12) 100 MCG Tab Take 100 mcg by mouth every day.     • vitamin e (VITAMIN E) 400 UNIT Cap Take 400 Units by mouth every day.     • therapeutic multivitamin-minerals (THERAGRAN-M) Tab Take 1 Tab by mouth every day.     • Omega-3 Fatty Acids (FISH OIL) 1000 MG Cap capsule Take 1,000 mg by mouth every day.     • aspirin (ASA) 81 MG Chew Tab chewable tablet Take 81 mg  "by mouth every day.     • loratadine (CLARITIN) 10 MG Tab Take 10 mg by mouth every day.     • hydrALAZINE (APRESOLINE) 50 MG Tab Take 1 Tab by mouth every 8 hours. 90 Tab      No current facility-administered medications for this visit.     Review of patient's allergies indicates no known allergies.    Review of Systems:     Constitutional: Denies fevers, Denies weight changes  Eyes: Denies changes in vision, no eye pain  Ears/Nose/Throat/Mouth: Denies nasal congestion or sore throat   Cardiovascular: Denies chest pain or palpitations   Respiratory: Denies shortness of breath , Denies cough  Gastrointestinal/Hepatic: Denies abdominal pain, nausea, vomiting, diarrhea, constipation or GI bleeding   Genitourinary: Denies bladder dysfunction, dysuria or frequency  Musculoskeletal/Rheum: Denies  joint pain and swelling   Skin/Breast: Denies rash, denies breast lumps or discharge  Neurological: Denies headache, confusion, memory loss or focal weakness/parasthesias  Psychiatric: denies mood disorder   Endocrine: denies hx of diabetes or thyroid dysfunction  Heme/Oncology/Lymph Nodes: Denies enlarged lymph nodes, denies brusing or known bleeding disorder  Allergic/Immunologic: Denies hx of allergies      All other systems were reviewed and are negative (AMA/CMS criteria)      Blood pressure 112/70, pulse 84, height 1.778 m (5' 10\"), weight 87.544 kg (193 lb), SpO2 95 %.  General Appearance:   Well developed, Well nourished, No acute distress, Non-toxic appearance.   HENT:  Normocephalic, Atraumatic, Oropharynx moist mucous membranes, Dentition: , Nose normal.    Eyes:  PERRLA, EOMI, Conjunctiva normal, No discharge.  Neck:  Normal range of motion, No cervical tenderness, Supple, No stridor, no JVD .  No thyromegaly.  No carotid bruit.  Cardiovascular:  Normal heart rate, Normal rhythm,  S1, S2, no S3,  S4; No gallops; No murmurs, No rubs, .   Extremitites with intact distal pulses, no cyanosis, clubbing or edema.  No " heaves, thrills, HJR;  Peripheral pulses: carotid 2+, brachial 2+, radial 2+, ulnar 2+, femoral 2+, popliteal 2+, PT 2+, DP 2+;  Lungs:  Respiratory effort is normal. Normal breath sounds, breath sounds clear to auscultation bilaterally,  no rales, no rhonchi, no wheezing.   Abdomen: Bowel sounds normal, Soft, No tenderness, No guarding, No rebound, No masses, No hepatosplenomegaly.  Skin: Warm, Dry, No erythema, No rash, no induration or crepitus.  Neurologic: Alert & oriented x 3, Normal motor function, Normal sensory function, No focal deficits noted, cranial nerves II through XII are normal,  normal gait.  Psychiatric: Affect normal, Judgment normal, Mood normal.    6/15/2017 Echocardiography Laboratory    No prior study is available for comparison.   Left ventricular ejection fraction is visually estimated to be 60%.  Grade I diastolic dysfunction.  No valvular heart disease.  Estimated right ventricular systolic pressure  is 35 mmHg.    TMST:poor exercise tolerance of 4.6 METs withfatigue limitation  Appropriate heart and blood pressure response  No chronotropic incompetence; no heart rate recovery delay  Posittive ischemia at this work level  No dysrhythmia  Lau score -6.7  Consider further evaluation    Results for RICHMOND ANITHA KANDI (MRN [id number]) as of 8/8/2017 10:08   Ref. Range 6/27/2017 12:37 6/28/2017 05:35 8/4/2017 14:48   Sodium Latest Ref Range: 135-145 mmol/L  136    Potassium Latest Ref Range: 3.6-5.5 mmol/L  3.4 (L)    Chloride Latest Ref Range:  mmol/L  106    Co2 Latest Ref Range: 20-33 mmol/L  22    Anion Gap Latest Ref Range: 0.0-11.9   8.0    Glucose Latest Ref Range: 65-99 mg/dL  150 (H)    Bun Latest Ref Range: 8-22 mg/dL  9    Creatinine Latest Ref Range: 0.50-1.40 mg/dL  0.62    GFR If  Latest Ref Range: >60 mL/min/1.73 m 2  >60    GFR If Non  Latest Ref Range: >60 mL/min/1.73 m 2  >60    Calcium Latest Ref Range: 8.4-10.2 mg/dL  7.8 (L)    AST(SGOT)  Latest Ref Range: 12-45 U/L  21    ALT(SGPT) Latest Ref Range: 2-50 U/L  25    Alkaline Phosphatase Latest Ref Range: 30-99 U/L  76    Total Bilirubin Latest Ref Range: 0.1-1.5 mg/dL  1.2    Albumin Latest Ref Range: 3.2-4.9 g/dL  2.5 (L)    Total Protein Latest Ref Range: 6.0-8.2 g/dL  6.1    Globulin Latest Ref Range: 1.9-3.5 g/dL  3.6 (H)    A-G Ratio Latest Units: g/dL  0.7    Cholesterol,Tot Latest Ref Range: 100-199 mg/dL   147   Triglycerides Latest Ref Range: 0-149 mg/dL   230 (H)   HDL Latest Ref Range: >=40 mg/dL   23 (A)   LDL Latest Ref Range: <100 mg/dL   78     Assessment and Plan.   76 y.o. male has recent STEMI likely relates to his sepsis; Lexiscan ordered to be done ASAP  GB surg was told urgent; Recent AFib w/RVR  Today EKG: SR 81 bpm; OK to d/c Digoxin and lower HCTX to 25 mg/d    1. Pre-operative cardiovascular examination  See above    2. Non-STEMI (non-ST elevated myocardial infarction) (CMS-HCC)  See above    3. Essential hypertension  controlled    4. Syncope and collapse  none      Return to clinic in  2 weeks,    1. Pre-operative cardiovascular examination     2. Non-STEMI (non-ST elevated myocardial infarction) (CMS-HCC)     3. Essential hypertension     4. Syncope and collapse               Graham Barcenas M.D.  [address] [address]  VIA Facsimile: [phone]

## 2017-08-09 ENCOUNTER — HOSPITAL ENCOUNTER (OUTPATIENT)
Dept: RADIOLOGY | Facility: MEDICAL CENTER | Age: 77
End: 2017-08-09
Attending: INTERNAL MEDICINE
Payer: MEDICARE

## 2017-08-09 DIAGNOSIS — Z01.810 PRE-OPERATIVE CARDIOVASCULAR EXAMINATION: ICD-10-CM

## 2017-08-09 DIAGNOSIS — I21.4 NON-STEMI (NON-ST ELEVATED MYOCARDIAL INFARCTION) (HCC): ICD-10-CM

## 2017-08-09 PROCEDURE — A9502 TC99M TETROFOSMIN: HCPCS

## 2017-08-09 PROCEDURE — 700111 HCHG RX REV CODE 636 W/ 250 OVERRIDE (IP)

## 2017-08-09 RX ORDER — REGADENOSON 0.08 MG/ML
INJECTION, SOLUTION INTRAVENOUS
Status: COMPLETED
Start: 2017-08-09 | End: 2017-08-09

## 2017-08-09 RX ADMIN — REGADENOSON 0.4 MG: 0.08 INJECTION, SOLUTION INTRAVENOUS at 10:08

## 2017-08-09 NOTE — PROGRESS NOTES
Nursing care plan includes knowledge deficit, potential for discomfort, potential for compromised cardiac output.  POC includes teaching, comfort measures and reassurance, and access to code cart, cardiology stand by and availability of rapid response team.  Pt verbalizes good understanding of benefits and risks of pharmacological cardiac stress test.  Informed consent obtained.  Lexiscan given, pt developed the following signs/symptoms:flush, sob.    VS stable, symptoms resolved.  To waiting room, fluids and/or snack given, awaiting second scan.  Nursing goals met.

## 2017-08-15 ENCOUNTER — OFFICE VISIT (OUTPATIENT)
Dept: CARDIOLOGY | Facility: MEDICAL CENTER | Age: 77
End: 2017-08-15
Payer: MEDICARE

## 2017-08-15 ENCOUNTER — TELEPHONE (OUTPATIENT)
Dept: CARDIOLOGY | Facility: MEDICAL CENTER | Age: 77
End: 2017-08-15

## 2017-08-15 VITALS
HEIGHT: 70 IN | WEIGHT: 192 LBS | DIASTOLIC BLOOD PRESSURE: 56 MMHG | HEART RATE: 86 BPM | OXYGEN SATURATION: 95 % | BODY MASS INDEX: 27.49 KG/M2 | SYSTOLIC BLOOD PRESSURE: 100 MMHG

## 2017-08-15 DIAGNOSIS — K81.0 ACUTE EMPHYSEMATOUS CHOLECYSTITIS: ICD-10-CM

## 2017-08-15 DIAGNOSIS — A41.9 SEPSIS, DUE TO UNSPECIFIED ORGANISM: ICD-10-CM

## 2017-08-15 DIAGNOSIS — I10 ESSENTIAL HYPERTENSION: ICD-10-CM

## 2017-08-15 DIAGNOSIS — I21.4 NON-STEMI (NON-ST ELEVATED MYOCARDIAL INFARCTION) (HCC): ICD-10-CM

## 2017-08-15 DIAGNOSIS — J96.01 ACUTE RESPIRATORY FAILURE WITH HYPOXIA (HCC): ICD-10-CM

## 2017-08-15 PROCEDURE — 99214 OFFICE O/P EST MOD 30 MIN: CPT | Performed by: NURSE PRACTITIONER

## 2017-08-15 RX ORDER — HYDRALAZINE HYDROCHLORIDE 50 MG/1
50 TABLET, FILM COATED ORAL EVERY 8 HOURS
Qty: 90 TAB | Refills: 6 | Status: SHIPPED | OUTPATIENT
Start: 2017-08-15 | End: 2018-03-03 | Stop reason: SDUPTHER

## 2017-08-15 ASSESSMENT — ENCOUNTER SYMPTOMS
BRUISES/BLEEDS EASILY: 0
ORTHOPNEA: 0
MYALGIAS: 0
PALPITATIONS: 0
FEVER: 0
DIZZINESS: 0
NAUSEA: 0
LOSS OF CONSCIOUSNESS: 0
SHORTNESS OF BREATH: 0
ABDOMINAL PAIN: 0
HEADACHES: 0
COUGH: 0
PND: 0
CHILLS: 0

## 2017-08-15 NOTE — Clinical Note
[facility]   [address]   [address]  [place] [address]  Phone: [phone]  Fax: [phone]              Damian Larsen  1940    Encounter Date: 8/15/2017    RALPH Blevins          PROGRESS NOTE:  Subjective:   Damian Larsen is a 77 y.o. male who presents today for cardiac pre-operative clearance for cholecystectomy    Damian is a 77 year old male with history of hypertension and glaucoma. In June 2017, he was admitted to [facility] for abdominal pain which evolved into sepsis and cholecystitis.  He did have elevated troponins indicative of NSTEMI, which was managed medically; he did also have some atrial fibrillation with RVR, also managed medically. He did eventually improve and was discharged to rehab on IV antibiotics.    He does need cardiac clearance for cholecystectomy with Dr. Last.  He feels fine: no chest pain, pressure or discomfort; no palpitations; no shortness of breath, orthopnea or PND; no dizziness or syncope; no edema. No fever or chills; no further abdominal pain.    Past Medical History   Diagnosis Date   • HTN (hypertension)    • Glaucoma    • Smoker       Previous smoke   • Cholecystitis with cholelithiasis 2017   • Non-STEMI (non-ST elevated myocardial infarction) (CMS-HCC) 2017     Secondary to sepsis   • Sepsis (CMS-[facility]) June 2017   • Cholecystitis June 2017     History reviewed. No pertinent past surgical history.  History reviewed. No pertinent family history.  History   Smoking status   • Former Smoker -- 0.50 packs/day for 20 years   • Types: Cigarettes   • Quit date: 06/14/1987   Smokeless tobacco   • Never Used     No Known Allergies  Outpatient Encounter Prescriptions as of 8/15/2017   Medication Sig Dispense Refill   • hydrALAZINE (APRESOLINE) 50 MG Tab Take 1 Tab by mouth every 8 hours. 90 Tab 6   • amlodipine (NORVASC) 10 MG Tab Take 1 Tab by mouth every day. 30 Tab 3   • hydrochlorothiazide  (HYDRODIURIL) 25 MG Tab Take 1 Tab by mouth every day. 30 Tab 3   • lisinopril (PRINIVIL, ZESTRIL) 40 MG tablet Take 1 Tab by mouth every day. 30 Tab 3   • oxycodone immediate-release (ROXICODONE) 5 MG Tab Take 1 Tab by mouth every four hours as needed. 30 Tab 0   • acetaminophen (TYLENOL) 650 MG Suppos Insert 1 Suppository in rectum every 6 hours as needed for Fever or Mild Pain.  0   • latanoprost (XALATAN) 0.005 % Solution Place 1 Drop in both eyes every evening.     • omeprazole (PRILOSEC) 20 MG delayed-release capsule Take 20 mg by mouth every day.     • ascorbic acid (ASCORBIC ACID) 500 MG Tab Take 500 mg by mouth every day.     • vitamin D (CHOLECALCIFEROL) 1000 UNIT Tab Take 1,000 Units by mouth every day.     • cyanocobalamin (VITAMIN B-12) 100 MCG Tab Take 100 mcg by mouth every day.     • vitamin e (VITAMIN E) 400 UNIT Cap Take 400 Units by mouth every day.     • therapeutic multivitamin-minerals (THERAGRAN-M) Tab Take 1 Tab by mouth every day.     • Omega-3 Fatty Acids (FISH OIL) 1000 MG Cap capsule Take 1,000 mg by mouth every day.     • aspirin (ASA) 81 MG Chew Tab chewable tablet Take 81 mg by mouth every day.     • loratadine (CLARITIN) 10 MG Tab Take 10 mg by mouth every day.     • [DISCONTINUED] ondansetron (ZOFRAN ODT) 4 MG TABLET DISPERSIBLE Take 1 Tab by mouth every four hours as needed for Nausea/Vomiting (give PO if IV route is unavailable. May give per feeding tube.). 10 Tab 0   • [DISCONTINUED] hydrALAZINE (APRESOLINE) 50 MG Tab Take 1 Tab by mouth every 8 hours. 90 Tab    • [DISCONTINUED] digoxin (LANOXIN) 125 MCG Tab Take 1 Tab by mouth every day at 6 PM. 30 Tab      No facility-administered encounter medications on file as of 8/15/2017.     Review of Systems   Constitutional: Negative for fever and chills.   HENT: Negative for congestion.    Respiratory: Negative for cough and shortness of breath.    Cardiovascular: Negative for chest pain, palpitations, orthopnea, leg swelling and PND.    "  Gastrointestinal: Negative for nausea and abdominal pain.   Musculoskeletal: Negative for myalgias.   Skin: Negative for rash.   Neurological: Negative for dizziness, loss of consciousness and headaches.   Endo/Heme/Allergies: Does not bruise/bleed easily.        Objective:   /56 mmHg  Pulse 86  Ht 1.778 m (5' 10\")  Wt 87.091 kg (192 lb)  BMI 27.55 kg/m2  SpO2 95%    Physical Exam   Constitutional: He is oriented to person, place, and time. He appears well-developed and well-nourished. No distress.   HENT:   Head: Normocephalic.   Eyes: Conjunctivae and EOM are normal.   Neck: Normal range of motion. Neck supple. No JVD present. No thyromegaly present.   Cardiovascular: Normal rate, regular rhythm, normal heart sounds and intact distal pulses.  Exam reveals no gallop and no friction rub.    No murmur heard.  Pulmonary/Chest: Effort normal and breath sounds normal. No respiratory distress.   Abdominal: Soft. Bowel sounds are normal. There is no tenderness.   Musculoskeletal: Normal range of motion. He exhibits no edema.   Neurological: He is alert and oriented to person, place, and time.   Skin: Skin is warm and dry. No rash noted.   Psychiatric: He has a normal mood and affect. His behavior is normal.     NUCLEAR IMAGING INTERPRETATION OF 8/9/2017 - REVIEWED WITH PATIENT:   Normal myocardial perfusion with no ischemia.   Normal left ventricular wall motion.  LV ejection fraction = 74%.   ECG INTERPRETATION   Negative stress ECG for ischemia.    Lab Results   Component Value Date/Time    WBC 9.4 06/27/2017 04:20 AM    RBC 3.12* 06/27/2017 04:20 AM    HEMOGLOBIN 10.3* 06/27/2017 04:20 AM    HEMATOCRIT 30.1* 06/27/2017 04:20 AM    MCV 96.5 06/27/2017 04:20 AM    MCH 33.0 06/27/2017 04:20 AM    MCHC 34.2 06/27/2017 04:20 AM    MPV 10.9 06/27/2017 04:20 AM      Lab Results   Component Value Date/Time    SODIUM 136 06/28/2017 05:35 AM    POTASSIUM 3.4* 06/28/2017 05:35 AM    CHLORIDE 106 06/28/2017 05:35 AM "    CO2 22 06/28/2017 05:35 AM    GLUCOSE 150* 06/28/2017 05:35 AM    BUN 9 06/28/2017 05:35 AM    CREATININE 0.62 06/28/2017 05:35 AM       Assessment:     1. Sepsis, due to unspecified organism (CMS-[facility])     2. Acute respiratory failure with hypoxia (CMS-[facility])     3. Acute emphysematous cholecystitis     4. Non-STEMI (non-ST elevated myocardial infarction) (CMS-[facility])     5. Essential hypertension  hydrALAZINE (APRESOLINE) 50 MG Tab       Medical Decision Making:  Today's Assessment / Status / Plan:     1. History of sepsis, with respiratory failure and cholecystitis, resolved, with cholecystectomy pending.  He is cleared from a cardiac standpoint to proceed.    2. NSTEMI, probably related to sepsis, with negative MPI.    3. Hypertension, treated and stable.  BP is good today, and he monitors this at home.    Same medications. He can proceed with surgery.  He is to keep FU with other providers; he can follow-up with us as needed.    Collaborating MD: Angeli Haider

## 2017-08-15 NOTE — MR AVS SNAPSHOT
"        Damian Bejarano Chava   8/15/2017 8:00 AM   Office Visit   MRN: [id number]    Department:  [facility]   Dept Phone:  [phone]    Description:  Male : 1940   Provider:  RALPH Blevins           Reason for Visit     Follow-Up           Allergies as of 8/15/2017     No Known Allergies      You were diagnosed with     Sepsis, due to unspecified organism (CMS-HCC)   [7051638]       Acute respiratory failure with hypoxia (CMS-HCC)   [651938]       Acute emphysematous cholecystitis   [295462]       Non-STEMI (non-ST elevated myocardial infarction) (CMS-HCC)   [134102]       Essential hypertension   [4651985]         Vital Signs     Blood Pressure Pulse Height Weight Body Mass Index Oxygen Saturation    100/56 mmHg 86 1.778 m (5' 10\") 87.091 kg (192 lb) 27.55 kg/m2 95%    Smoking Status                   Former Smoker           Basic Information     Date Of Birth Sex Race Ethnicity Preferred Language    1940 Male White Non- English      Problem List              ICD-10-CM Priority Class Noted - Resolved    Sepsis (CMS-HCC) A41.9   2017 - Present    Non-STEMI (non-ST elevated myocardial infarction) (CMS-HCC) I21.4   2017 - Present    Acute respiratory failure with hypoxia (CMS-HCC) J96.01   6/15/2017 - Present    Metabolic acidosis E87.2   6/15/2017 - Present    Acute kidney injury (nontraumatic) (CMS-HCC) N17.9   6/15/2017 - Present    Bacteremia R78.81   6/15/2017 - Present    Acute emphysematous cholecystitis K81.0   6/15/2017 - Present    Hypotension (arterial) I95.9   6/15/2017 - Present    Thrombocytopenia (CMS-HCC) D69.6   6/15/2017 - Present    Hypokalemia due to inadequate potassium intake E87.6   6/15/2017 - Present    Atrial fibrillation with RVR (CMS-HCC) I48.91   2017 - Present    Hypertension I10   2017 - Present      Health Maintenance        Date Due Completion Dates    IMM DTaP/Tdap/Td Vaccine (1 - Tdap) 1960 ---    COLONOSCOPY 1991 ---   " IMM ZOSTER VACCINE 8/14/2001 ---    IMM PNEUMOCOCCAL 65+ (ADULT) LOW/MEDIUM RISK SERIES (1 of 2 - PCV13) 8/14/2006 ---    IMM INFLUENZA (1) 9/1/2017 9/27/2014            Current Immunizations     Influenza Vaccine Quad Inj (Pf) 9/27/2014      Below and/or attached are the medications your provider expects you to take. Review all of your home medications and newly ordered medications with your provider and/or pharmacist. Follow medication instructions as directed by your provider and/or pharmacist. Please keep your medication list with you and share with your provider. Update the information when medications are discontinued, doses are changed, or new medications (including over-the-counter products) are added; and carry medication information at all times in the event of emergency situations     Allergies:  No Known Allergies          Medications  Valid as of: August 15, 2017 -  8:37 AM    Generic Name Brand Name Tablet Size Instructions for use    Acetaminophen (Suppos) TYLENOL 650 MG Insert 1 Suppository in rectum every 6 hours as needed for Fever or Mild Pain.        AmLODIPine Besylate (Tab) NORVASC 10 MG Take 1 Tab by mouth every day.        Ascorbic Acid (Tab) ascorbic acid 500 MG Take 500 mg by mouth every day.        Aspirin (Chew Tab) ASA 81 MG Take 81 mg by mouth every day.        Cholecalciferol (Tab) cholecalciferol 1000 UNIT Take 1,000 Units by mouth every day.        Cyanocobalamin (Tab) VITAMIN B-12 100 MCG Take 100 mcg by mouth every day.        HydrALAZINE HCl (Tab) APRESOLINE 50 MG Take 1 Tab by mouth every 8 hours.        HydroCHLOROthiazide (Tab) HYDRODIURIL 25 MG Take 1 Tab by mouth every day.        Latanoprost (Solution) XALATAN 0.005 % Place 1 Drop in both eyes every evening.        Lisinopril (Tab) PRINIVIL, ZESTRIL 40 MG Take 1 Tab by mouth every day.        Loratadine (Tab) CLARITIN 10 MG Take 10 mg by mouth every day.        Multiple Vitamins-Minerals (Tab) THERAGRAN-M  Take 1 Tab by  mouth every day.        Omega-3 Fatty Acids (Cap) fish oil 1000 MG Take 1,000 mg by mouth every day.        Omeprazole (CAPSULE DELAYED RELEASE) PRILOSEC 20 MG Take 20 mg by mouth every day.        OxyCODONE HCl (Tab) ROXICODONE 5 MG Take 1 Tab by mouth every four hours as needed.        Vitamin E (Cap) VITAMIN E 400 UNIT Take 400 Units by mouth every day.        .                 Medicines prescribed today were sent to:     [facility] [address] [place] [address], [place] - [address]    [address] [place] [address]    Phone: [phone] Fax: [phone]    Open 24 Hours?: No      Medication refill instructions:       If your prescription bottle indicates you have medication refills left, it is not necessary to call your provider’s office. Please contact your pharmacy and they will refill your medication.    If your prescription bottle indicates you do not have any refills left, you may request refills at any time through one of the following ways: The online [employer] system (except Urgent Care), by calling your provider’s office, or by asking your pharmacy to contact your provider’s office with a refill request. Medication refills are processed only during regular business hours and may not be available until the next business day. Your provider may request additional information or to have a follow-up visit with you prior to refilling your medication.   *Please Note: Medication refills are assigned a new Rx number when refilled electronically. Your pharmacy may indicate that no refills were authorized even though a new prescription for the same medication is available at the pharmacy. Please request the medicine by name with the pharmacy before contacting your provider for a refill.           [employer] Access Code: Activation code not generated  Current [employer] Status: Active

## 2017-08-15 NOTE — Clinical Note
PROCEDURE/SURGERY CLEARANCE FORM      Encounter Date: 8/15/2017    Patient: Damian Larsen  YOB: 1940    CARDIOLOGIST:  RALPH Blevins    REFERRING DOCTOR:  MD.D. Gabriela      The above patient is considered a LOW risk and is cleared to have the following procedure/surgery: cholecytectomy                                           Additional comments: attached is a copy of most recent MPI, which was normal.                 RALPH Maria

## 2017-08-15 NOTE — PROGRESS NOTES
Subjective:   Damian Larsen is a 77 y.o. male who presents today for cardiac pre-operative clearance for cholecystectomy    Damian is a 77 year old male with history of hypertension and glaucoma. In June 2017, he was admitted to [facility] for abdominal pain which evolved into sepsis and cholecystitis.  He did have elevated troponins indicative of NSTEMI, which was managed medically; he did also have some atrial fibrillation with RVR, also managed medically. He did eventually improve and was discharged to rehab on IV antibiotics.    He does need cardiac clearance for cholecystectomy with Dr. Last.  He feels fine: no chest pain, pressure or discomfort; no palpitations; no shortness of breath, orthopnea or PND; no dizziness or syncope; no edema. No fever or chills; no further abdominal pain.    Past Medical History   Diagnosis Date   • HTN (hypertension)    • Glaucoma    • Smoker       Previous smoke   • Cholecystitis with cholelithiasis 2017   • Non-STEMI (non-ST elevated myocardial infarction) (CMS-HCC) 2017     Secondary to sepsis   • Sepsis (CMS-HCC) June 2017   • Cholecystitis June 2017     History reviewed. No pertinent past surgical history.  History reviewed. No pertinent family history.  History   Smoking status   • Former Smoker -- 0.50 packs/day for 20 years   • Types: Cigarettes   • Quit date: 06/14/1987   Smokeless tobacco   • Never Used     No Known Allergies  Outpatient Encounter Prescriptions as of 8/15/2017   Medication Sig Dispense Refill   • hydrALAZINE (APRESOLINE) 50 MG Tab Take 1 Tab by mouth every 8 hours. 90 Tab 6   • amlodipine (NORVASC) 10 MG Tab Take 1 Tab by mouth every day. 30 Tab 3   • hydrochlorothiazide (HYDRODIURIL) 25 MG Tab Take 1 Tab by mouth every day. 30 Tab 3   • lisinopril (PRINIVIL, ZESTRIL) 40 MG tablet Take 1 Tab by mouth every day. 30 Tab 3   • oxycodone immediate-release (ROXICODONE) 5 MG Tab Take 1 Tab by mouth every four hours as needed. 30 Tab 0   • acetaminophen (TYLENOL)  650 MG Suppos Insert 1 Suppository in rectum every 6 hours as needed for Fever or Mild Pain.  0   • latanoprost (XALATAN) 0.005 % Solution Place 1 Drop in both eyes every evening.     • omeprazole (PRILOSEC) 20 MG delayed-release capsule Take 20 mg by mouth every day.     • ascorbic acid (ASCORBIC ACID) 500 MG Tab Take 500 mg by mouth every day.     • vitamin D (CHOLECALCIFEROL) 1000 UNIT Tab Take 1,000 Units by mouth every day.     • cyanocobalamin (VITAMIN B-12) 100 MCG Tab Take 100 mcg by mouth every day.     • vitamin e (VITAMIN E) 400 UNIT Cap Take 400 Units by mouth every day.     • therapeutic multivitamin-minerals (THERAGRAN-M) Tab Take 1 Tab by mouth every day.     • Omega-3 Fatty Acids (FISH OIL) 1000 MG Cap capsule Take 1,000 mg by mouth every day.     • aspirin (ASA) 81 MG Chew Tab chewable tablet Take 81 mg by mouth every day.     • loratadine (CLARITIN) 10 MG Tab Take 10 mg by mouth every day.     • [DISCONTINUED] ondansetron (ZOFRAN ODT) 4 MG TABLET DISPERSIBLE Take 1 Tab by mouth every four hours as needed for Nausea/Vomiting (give PO if IV route is unavailable. May give per feeding tube.). 10 Tab 0   • [DISCONTINUED] hydrALAZINE (APRESOLINE) 50 MG Tab Take 1 Tab by mouth every 8 hours. 90 Tab    • [DISCONTINUED] digoxin (LANOXIN) 125 MCG Tab Take 1 Tab by mouth every day at 6 PM. 30 Tab      No facility-administered encounter medications on file as of 8/15/2017.     Review of Systems   Constitutional: Negative for fever and chills.   HENT: Negative for congestion.    Respiratory: Negative for cough and shortness of breath.    Cardiovascular: Negative for chest pain, palpitations, orthopnea, leg swelling and PND.   Gastrointestinal: Negative for nausea and abdominal pain.   Musculoskeletal: Negative for myalgias.   Skin: Negative for rash.   Neurological: Negative for dizziness, loss of consciousness and headaches.   Endo/Heme/Allergies: Does not bruise/bleed easily.        Objective:   /56  "mmHg  Pulse 86  Ht 1.778 m (5' 10\")  Wt 87.091 kg (192 lb)  BMI 27.55 kg/m2  SpO2 95%    Physical Exam   Constitutional: He is oriented to person, place, and time. He appears well-developed and well-nourished. No distress.   HENT:   Head: Normocephalic.   Eyes: Conjunctivae and EOM are normal.   Neck: Normal range of motion. Neck supple. No JVD present. No thyromegaly present.   Cardiovascular: Normal rate, regular rhythm, normal heart sounds and intact distal pulses.  Exam reveals no gallop and no friction rub.    No murmur heard.  Pulmonary/Chest: Effort normal and breath sounds normal. No respiratory distress.   Abdominal: Soft. Bowel sounds are normal. There is no tenderness.   Musculoskeletal: Normal range of motion. He exhibits no edema.   Neurological: He is alert and oriented to person, place, and time.   Skin: Skin is warm and dry. No rash noted.   Psychiatric: He has a normal mood and affect. His behavior is normal.     NUCLEAR IMAGING INTERPRETATION OF 8/9/2017 - REVIEWED WITH PATIENT:   Normal myocardial perfusion with no ischemia.   Normal left ventricular wall motion.  LV ejection fraction = 74%.   ECG INTERPRETATION   Negative stress ECG for ischemia.    Lab Results   Component Value Date/Time    WBC 9.4 06/27/2017 04:20 AM    RBC 3.12* 06/27/2017 04:20 AM    HEMOGLOBIN 10.3* 06/27/2017 04:20 AM    HEMATOCRIT 30.1* 06/27/2017 04:20 AM    MCV 96.5 06/27/2017 04:20 AM    MCH 33.0 06/27/2017 04:20 AM    MCHC 34.2 06/27/2017 04:20 AM    MPV 10.9 06/27/2017 04:20 AM      Lab Results   Component Value Date/Time    SODIUM 136 06/28/2017 05:35 AM    POTASSIUM 3.4* 06/28/2017 05:35 AM    CHLORIDE 106 06/28/2017 05:35 AM    CO2 22 06/28/2017 05:35 AM    GLUCOSE 150* 06/28/2017 05:35 AM    BUN 9 06/28/2017 05:35 AM    CREATININE 0.62 06/28/2017 05:35 AM       Assessment:     1. Sepsis, due to unspecified organism (CMS-HCC)     2. Acute respiratory failure with hypoxia (CMS-HCC)     3. Acute emphysematous " cholecystitis     4. Non-STEMI (non-ST elevated myocardial infarction) (CMS-HCC)     5. Essential hypertension  hydrALAZINE (APRESOLINE) 50 MG Tab       Medical Decision Making:  Today's Assessment / Status / Plan:     1. History of sepsis, with respiratory failure and cholecystitis, resolved, with cholecystectomy pending.  He is cleared from a cardiac standpoint to proceed.    2. NSTEMI, probably related to sepsis, with negative MPI.    3. Hypertension, treated and stable.  BP is good today, and he monitors this at home.    Same medications. He can proceed with surgery.  He is to keep FU with other providers; he can follow-up with us as needed.    Collaborating MD: Angeli

## 2017-08-16 NOTE — TELEPHONE ENCOUNTER
Surgical clearance faxed to Dr. Pari Last at [phone] - received fax transmission confirmation

## 2017-08-21 DIAGNOSIS — Z01.812 PRE-OPERATIVE LABORATORY EXAMINATION: ICD-10-CM

## 2017-08-21 LAB
ANION GAP SERPL CALC-SCNC: 7 MMOL/L (ref 0–11.9)
BUN SERPL-MCNC: 13 MG/DL (ref 8–22)
CALCIUM SERPL-MCNC: 9.5 MG/DL (ref 8.5–10.5)
CHLORIDE SERPL-SCNC: 100 MMOL/L (ref 96–112)
CO2 SERPL-SCNC: 25 MMOL/L (ref 20–33)
CREAT SERPL-MCNC: 0.9 MG/DL (ref 0.5–1.4)
ERYTHROCYTE [DISTWIDTH] IN BLOOD BY AUTOMATED COUNT: 46.5 FL (ref 35.9–50)
GFR SERPL CREATININE-BSD FRML MDRD: >60 ML/MIN/1.73 M 2
GLUCOSE SERPL-MCNC: 137 MG/DL (ref 65–99)
HCT VFR BLD AUTO: 36.6 % (ref 42–52)
HGB BLD-MCNC: 12.4 G/DL (ref 14–18)
MCH RBC QN AUTO: 32.1 PG (ref 27–33)
MCHC RBC AUTO-ENTMCNC: 33.9 G/DL (ref 33.7–35.3)
MCV RBC AUTO: 94.8 FL (ref 81.4–97.8)
PLATELET # BLD AUTO: 284 K/UL (ref 164–446)
PMV BLD AUTO: 9.7 FL (ref 9–12.9)
POTASSIUM SERPL-SCNC: 3.8 MMOL/L (ref 3.6–5.5)
RBC # BLD AUTO: 3.86 M/UL (ref 4.7–6.1)
SODIUM SERPL-SCNC: 132 MMOL/L (ref 135–145)
WBC # BLD AUTO: 8.5 K/UL (ref 4.8–10.8)

## 2017-08-21 PROCEDURE — 85027 COMPLETE CBC AUTOMATED: CPT

## 2017-08-21 PROCEDURE — 36415 COLL VENOUS BLD VENIPUNCTURE: CPT

## 2017-08-21 PROCEDURE — 80048 BASIC METABOLIC PNL TOTAL CA: CPT

## 2017-08-21 RX ORDER — DIGOXIN 125 MCG
125 TABLET ORAL DAILY
Status: ON HOLD | COMMUNITY
End: 2017-08-22

## 2017-08-22 ENCOUNTER — HOSPITAL ENCOUNTER (OUTPATIENT)
Facility: MEDICAL CENTER | Age: 77
End: 2017-08-22
Attending: SURGERY | Admitting: SURGERY
Payer: MEDICARE

## 2017-08-22 VITALS
HEIGHT: 70 IN | BODY MASS INDEX: 26.92 KG/M2 | HEART RATE: 84 BPM | WEIGHT: 188.05 LBS | TEMPERATURE: 96.8 F | OXYGEN SATURATION: 99 % | RESPIRATION RATE: 16 BRPM

## 2017-08-22 PROBLEM — K80.20 CHOLECYSTOLITHIASIS: Status: ACTIVE | Noted: 2017-08-22

## 2017-08-22 PROCEDURE — 160036 HCHG PACU - EA ADDL 30 MINS PHASE I: Performed by: SURGERY

## 2017-08-22 PROCEDURE — 160002 HCHG RECOVERY MINUTES (STAT): Performed by: SURGERY

## 2017-08-22 PROCEDURE — 160039 HCHG SURGERY MINUTES - EA ADDL 1 MIN LEVEL 3: Performed by: SURGERY

## 2017-08-22 PROCEDURE — 501582 HCHG TROCAR, THRD BLADED: Performed by: SURGERY

## 2017-08-22 PROCEDURE — 700111 HCHG RX REV CODE 636 W/ 250 OVERRIDE (IP)

## 2017-08-22 PROCEDURE — 88304 TISSUE EXAM BY PATHOLOGIST: CPT

## 2017-08-22 PROCEDURE — 503052 HCHG HEMOSTAT POWDER-5GRAM: Performed by: SURGERY

## 2017-08-22 PROCEDURE — 500514 HCHG ENDOCLIP: Performed by: SURGERY

## 2017-08-22 PROCEDURE — A6402 STERILE GAUZE <= 16 SQ IN: HCPCS | Performed by: SURGERY

## 2017-08-22 PROCEDURE — 501838 HCHG SUTURE GENERAL: Performed by: SURGERY

## 2017-08-22 PROCEDURE — 700101 HCHG RX REV CODE 250

## 2017-08-22 PROCEDURE — 502571 HCHG PACK, LAP CHOLE: Performed by: SURGERY

## 2017-08-22 PROCEDURE — 160035 HCHG PACU - 1ST 60 MINS PHASE I: Performed by: SURGERY

## 2017-08-22 PROCEDURE — 160025 RECOVERY II MINUTES (STATS): Performed by: SURGERY

## 2017-08-22 PROCEDURE — 160028 HCHG SURGERY MINUTES - 1ST 30 MINS LEVEL 3: Performed by: SURGERY

## 2017-08-22 PROCEDURE — 160048 HCHG OR STATISTICAL LEVEL 1-5: Performed by: SURGERY

## 2017-08-22 PROCEDURE — 501586 HCHG TROCAR, THRD SPIKE 5X55: Performed by: SURGERY

## 2017-08-22 PROCEDURE — 160046 HCHG PACU - 1ST 60 MINS PHASE II: Performed by: SURGERY

## 2017-08-22 PROCEDURE — 500868 HCHG NEEDLE, SURGI(VARES): Performed by: SURGERY

## 2017-08-22 PROCEDURE — 700102 HCHG RX REV CODE 250 W/ 637 OVERRIDE(OP)

## 2017-08-22 PROCEDURE — 160047 HCHG PACU  - EA ADDL 30 MINS PHASE II: Performed by: SURGERY

## 2017-08-22 PROCEDURE — 501574 HCHG TROCAR, SMTH CAN&SEAL 5: Performed by: SURGERY

## 2017-08-22 PROCEDURE — 160009 HCHG ANES TIME/MIN: Performed by: SURGERY

## 2017-08-22 PROCEDURE — 501399 HCHG SPECIMAN BAG, ENDO CATC: Performed by: SURGERY

## 2017-08-22 PROCEDURE — A9270 NON-COVERED ITEM OR SERVICE: HCPCS

## 2017-08-22 RX ORDER — LIDOCAINE HYDROCHLORIDE 10 MG/ML
0.5 INJECTION, SOLUTION INFILTRATION; PERINEURAL
Status: COMPLETED | OUTPATIENT
Start: 2017-08-22 | End: 2017-08-22

## 2017-08-22 RX ORDER — SODIUM CHLORIDE AND POTASSIUM CHLORIDE 150; 900 MG/100ML; MG/100ML
INJECTION, SOLUTION INTRAVENOUS CONTINUOUS
Status: DISCONTINUED | OUTPATIENT
Start: 2017-08-22 | End: 2017-08-22 | Stop reason: HOSPADM

## 2017-08-22 RX ORDER — OXYCODONE HYDROCHLORIDE 10 MG/1
5 TABLET ORAL EVERY 4 HOURS PRN
Status: DISCONTINUED | OUTPATIENT
Start: 2017-08-22 | End: 2017-08-22 | Stop reason: HOSPADM

## 2017-08-22 RX ORDER — LIDOCAINE HYDROCHLORIDE 10 MG/ML
INJECTION, SOLUTION INFILTRATION; PERINEURAL
Status: COMPLETED
Start: 2017-08-22 | End: 2017-08-22

## 2017-08-22 RX ORDER — LIDOCAINE AND PRILOCAINE 25; 25 MG/G; MG/G
1 CREAM TOPICAL
Status: COMPLETED | OUTPATIENT
Start: 2017-08-22 | End: 2017-08-22

## 2017-08-22 RX ORDER — SODIUM CHLORIDE, SODIUM LACTATE, POTASSIUM CHLORIDE, CALCIUM CHLORIDE 600; 310; 30; 20 MG/100ML; MG/100ML; MG/100ML; MG/100ML
INJECTION, SOLUTION INTRAVENOUS CONTINUOUS
Status: DISCONTINUED | OUTPATIENT
Start: 2017-08-22 | End: 2017-08-22 | Stop reason: HOSPADM

## 2017-08-22 RX ORDER — OXYCODONE HCL 5 MG/5 ML
SOLUTION, ORAL ORAL
Status: COMPLETED
Start: 2017-08-22 | End: 2017-08-22

## 2017-08-22 RX ADMIN — LIDOCAINE HYDROCHLORIDE 0.5 ML: 10 INJECTION, SOLUTION INFILTRATION; PERINEURAL at 10:55

## 2017-08-22 RX ADMIN — OXYCODONE HYDROCHLORIDE 5 MG: 5 SOLUTION ORAL at 13:20

## 2017-08-22 RX ADMIN — SODIUM CHLORIDE, SODIUM LACTATE, POTASSIUM CHLORIDE, CALCIUM CHLORIDE: 600; 310; 30; 20 INJECTION, SOLUTION INTRAVENOUS at 11:10

## 2017-08-22 ASSESSMENT — PAIN SCALES - GENERAL
PAINLEVEL_OUTOF10: 0

## 2017-08-22 NOTE — IP AVS SNAPSHOT
8/22/2017    Damian Larsen  [address] [place] [address]    Dear Damian:    [facility] wants to ensure your discharge home is safe and you or your loved ones have had all of your questions answered regarding your care after you leave the hospital.    Below is a list of resources and contact information should you have any questions regarding your hospital stay, follow-up instructions, or active medical symptoms.    Questions or Concerns Regarding… Contact   Medical Questions Related to Your Discharge  (7 days a week, 8am-5pm) Contact a Nurse Care Coordinator   [phone]   Medical Questions Not Related to Your Discharge  (24 hours a day / 7 days a week)  Contact the Nurse Health Line   [phone]    Medications or Discharge Instructions Refer to your discharge packet   or contact your [facility] Primary Care Provider   [phone]   Follow-up Appointment(s) Schedule your appointment via [employer]   or contact Scheduling [phone]   Billing Review your statement via [employer]  or contact Billing [phone]   Medical Records Review your records via [employer]   or contact Medical Records [phone]     You may receive a telephone call within two days of discharge. This call is to make certain you understand your discharge instructions and have the opportunity to have any questions answered. You can also easily access your medical information, test results and upcoming appointments via the [employer] free online health management tool. You can learn more and sign up at [email or web address]. For assistance setting up your [employer] account, please call [phone].    Once again, we want to ensure your discharge home is safe and that you have a clear understanding of any next steps in your care. If you have any questions or concerns, please do not hesitate to contact us, we are here for you. Thank you for choosing [facility] for your healthcare needs.    Sincerely,    Your [facility] Healthcare Team

## 2017-08-22 NOTE — OP REPORT
DATE OF SERVICE:  08/22/2017    PREOPERATIVE DIAGNOSIS:  History of emphysematous cholecystitis and sepsis.    POSTOPERATIVE DIAGNOSIS:  History of emphysematous cholecystitis and sepsis.    PROCEDURE:  Laparoscopic cholecystectomy.    SURGEON:  Kae Last MD    ASSISTANT:  Sara Brito PA-C    ANESTHESIA:  General endotracheal.    ANESTHESIOLOGIST:  Osvaldo Cordova MD    INDICATIONS:  The patient is a 77-year-old gentleman who presented to the   [facility] in June with sepsis and acute MI as well as   emphysematous cholecystitis.  At that time, a cholecystostomy tube was placed   and surgery was avoided at that time.  He subsequently recovered from his   sepsis as well as his MI.  He is now being brought to the operating room at   this time for laparoscopic cholecystectomy.    FINDINGS:  Evidence of necrotic cholecystitis still on the back wall.  There   was some scarring around the gallbladder with single duct, single artery   identified.    DESCRIPTION OF PROCEDURE:  After the patient was identified and consented, he   was brought to the operating room and placed in supine position.  Patient   underwent general endotracheal anesthetic.  Patient's abdomen was prepped and   draped in sterile fashion.  Periumbilical area was anesthetized with 0.25%   Marcaine and a 1-cm incision was made.  The abdominal wall was lifted up and   Veress needle was inserted into the abdominal cavity.  After positive drop   test, pneumoperitoneum was obtained.  The Veress needle was removed.  A 5-mm   trocar was placed.  Under laparoscopic guidance, a 10-mm trocar was placed in   epigastric position and two 5-mm trocars were placed in right subcostal   position.  The gallbladder was lifted up, adhesions were taken down, the duct   and surrounding tissues doubly clipped and transected.  The gallbladder was   excised from the liver bed using electrocautery.  In the process, it was the   posterior wall that was  necrotic and the gallbladder was entered because of   this.  Once the gallbladder was fully removed from the liver bed, it was   placed in an EndoCatch, brought through the epigastric port.  Liver bed was   irrigated and hemostasis secured.  Port sites were removed and   pneumoperitoneum was released.  All port sites were closed with 4-0 Vicryls.    Op-Site dressings were placed on the wounds.  Patient was extubated and taken   to recovery in stable condition.  All sponge and needle counts were correct.       ____________________________________     DWAINE LUONG MD    [facility] / [facility]    DD:  08/22/2017 12:49:12  DT:  08/22/2017 15:41:01    D#:  [id number]  Job#:  [id number]    cc: GUEVARA DECKER MD, JENN GORDON MD

## 2017-08-22 NOTE — IP AVS SNAPSHOT
" Home Care Instructions                                                                                                                Name:Damian Larsen  Medical Record Number:[id number]  CSN: [id number]    YOB: 1940   Age: 77 y.o.  Sex: male  HT:1.778 m (5' 10\") WT: 85.3 kg (188 lb 0.8 oz)          Admit Date: 8/22/2017     Discharge Date:   Today's Date: 8/22/2017  Attending Doctor:  Kae Last M.D.                  Allergies:  Review of patient's allergies indicates no known allergies.                Discharge Instructions         ACTIVITY: Rest and take it easy for the first 24 hours.  A responsible adult is recommended to remain with you during that time.  It is normal to feel sleepy.  We encourage you to not do anything that requires balance, judgment or coordination.    MILD FLU-LIKE SYMPTOMS ARE NORMAL. YOU MAY EXPERIENCE GENERALIZED MUSCLE ACHES, THROAT IRRITATION, HEADACHE AND/OR SOME NAUSEA.    FOR 24 HOURS DO NOT:  Drive, operate machinery or run household appliances.  Drink beer or alcoholic beverages.   Make important decisions or sign legal documents.    SPECIAL INSTRUCTIONS: **Comments: Laparoscopic Cholecystectomy D/C instructions:     DIET: Upon discharge from the hospital you may resume your normal preoperative diet. Depending on how you are feeling and whether you have nausea or not, you may wish to stay with a bland diet for the first few days. However, you can advance this as quickly as you feel ready.     ACTIVITIES: After discharge from the hospital, you may resume full routine activities. However, there should be no heavy lifting (greater than 15 pounds) and no strenuous activities until after your follow-up visit. Otherwise, routine activities of daily living are acceptable.     DRIVING: You may drive whenever you are off pain medications and are able to perform the activities needed to drive, i.e. turning, bending, twisting, etc.     BATHING: You may get the wound wet at " any time after leaving the hospital. You may shower, but do not submerge in a bath for at least a week. Dressings may come off after 48 hours.     BOWEL FUNCTION: A few patients, after this operation, will develop either frequent or loose stools after meals. This usually corrects itself after a few days, to a few weeks. If this occurs, do not worry; it is not unusual and will resolve. Much more common than loose stools, is constipation. The combination of pain medication and decreased activity level can cause constipation in otherwise normal patients. If you feel this is occurring, take a laxative (Milk of Magnesia, Ex-Lax, Senokot, etc.) until the problem has resolved.     PAIN MEDICATION: You will be given a prescription for pain medication at discharge. Please take these as directed. It is important to remember not to take medications on an empty stomach as this may cause nausea.     CALL IF YOU HAVE: (1) Fevers to more than 1010 F, (2) Unusual chest or leg pain, (3) Drainage or fluid from incision that may be foul smelling, increased tenderness or soreness at the wound or the wound edges are no longer together, redness or swelling at the incision site. Please do not hesitate to call with any other questions.     APPOINTMENT: Contact our office at [phone] for a follow-up appointment in 1 to 2 weeks following your procedure.     If you have any additional questions, please do not hesitate to call the office.     Office address:   [address] [place] [address], [address] [place] [address]  *    DIET: To avoid nausea, slowly advance diet as tolerated, avoiding spicy or greasy foods for the first day.  Add more substantial food to your diet according to your physician's instructions.  Babies can be fed formula or breast milk as soon as they are hungry.  INCREASE FLUIDS AND FIBER TO AVOID CONSTIPATION.      You should CALL YOUR PHYSICIAN if you develop:  Fever greater than 101 degrees F.  Pain not relieved by medication, or  persistent nausea or vomiting.  Excessive bleeding (blood soaking through dressing) or unexpected drainage from the wound.  Extreme redness or swelling around the incision site, drainage of pus or foul smelling drainage.  Inability to urinate or empty your bladder within 8 hours.  Problems with breathing or chest pain.    You should call 911 if you develop problems with breathing or chest pain.  If you are unable to contact your doctor or surgical center, you should go to the nearest emergency room or urgent care center.  Physician's telephone #: ***    If any questions arise, call your doctor.  If your doctor is not available, please feel free to call the Surgical Center at {Surgical Dept Numbers:[id number]}.  The Center is open Monday through Friday from 7AM to 7PM.  You can also call the [employer] HOTLINE open 24 hours/day, 7 days/week and speak to a nurse at [phone], or toll free at [phone].    A registered nurse may call you a few days after your surgery to see how you are doing after your procedure.    MEDICATIONS: Resume taking daily medication.  Take prescribed pain medication with food.  If no medication is prescribed, you may take non-aspirin pain medication if needed.  PAIN MEDICATION CAN BE VERY CONSTIPATING.  Take a stool softener or laxative such as senokot, pericolace, or milk of magnesia if needed.    Prescription given for *Oxycodone**.  Last pain medication given at *1:20 p.m.**.    If your physician has prescribed pain medication that includes Acetaminophen (Tylenol), do not take additional Acetaminophen (Tylenol) while taking the prescribed medication.    Depression / Suicide Risk    As you are discharged from this [facility] facility, it is important to learn how to keep safe from harming yourself.    Recognize the warning signs:  · Abrupt changes in personality, positive or negative- including increase in energy   · Giving away possessions  · Change in eating patterns- significant  weight changes-  positive or negative  · Change in sleeping patterns- unable to sleep or sleeping all the time   · Unwillingness or inability to communicate  · Depression  · Unusual sadness, discouragement and loneliness  · Talk of wanting to die  · Neglect of personal appearance   · Rebelliousness- reckless behavior  · Withdrawal from people/activities they love  · Confusion- inability to concentrate     If you or a loved one observes any of these behaviors or has concerns about self-harm, here's what you can do:  · Talk about it- your feelings and reasons for harming yourself  · Remove any means that you might use to hurt yourself (examples: pills, rope, extension cords, firearm)  · Get professional help from the community (Mental Health, Substance Abuse, psychological counseling)  · Do not be alone:Call your Safe Contact- someone whom you trust who will be there for you.  · Call your local CRISIS HOTLINE 917-2262 or 390-638-1482  · Call your local Children's Mobile Crisis Response Team [place] (009) 332-4825 or www.[employer]  · Call the toll free National Suicide Prevention Hotlines   · National Suicide Prevention Lifeline 786-774-DZKB (3186)  · National Hope Line Network 800-SUICIDE (024-7770)       Medication List      ASK your doctor about these medications        Instructions    Morning Afternoon Evening Bedtime    amlodipine 10 MG Tabs   Commonly known as:  NORVASC        Take 1 Tab by mouth every day.   Dose:  10 mg                        ascorbic acid 500 MG Tabs   Commonly known as:  ascorbic acid        Take 500 mg by mouth every day.   Dose:  500 mg                        aspirin 81 MG Chew chewable tablet   Commonly known as:  ASA        Take 81 mg by mouth every day.   Dose:  81 mg                        cyanocobalamin 100 MCG Tabs   Commonly known as:  VITAMIN B-12        Take 100 mcg by mouth every day.   Dose:  100 mcg                        fish oil 1000 MG Caps capsule        Take  1,000 mg by mouth every day.   Dose:  1000 mg                        hydrALAZINE 50 MG Tabs   Commonly known as:  APRESOLINE        Take 1 Tab by mouth every 8 hours.   Dose:  50 mg                        hydrochlorothiazide 25 MG Tabs   Commonly known as:  HYDRODIURIL        Take 1 Tab by mouth every day.   Dose:  25 mg                        latanoprost 0.005 % Soln   Commonly known as:  XALATAN        Place 1 Drop in both eyes every evening.   Dose:  1 Drop                        lisinopril 40 MG tablet   Commonly known as:  PRINIVIL, ZESTRIL        Take 1 Tab by mouth every day.   Dose:  40 mg                        loratadine 10 MG Tabs   Commonly known as:  CLARITIN        Take 10 mg by mouth every day.   Dose:  10 mg                        omeprazole 20 MG delayed-release capsule   Commonly known as:  PRILOSEC        Take 20 mg by mouth every day.   Dose:  20 mg                        therapeutic multivitamin-minerals Tabs        Take 1 Tab by mouth every day.   Dose:  1 Tab                        vitamin D 1000 UNIT Tabs   Commonly known as:  cholecalciferol        Take 1,000 Units by mouth every day.   Dose:  1000 Units                        vitamin e 400 UNIT Caps   Commonly known as:  VITAMIN E        Take 400 Units by mouth every day.   Dose:  400 Units                                Medication Information     Above and/or attached are the medications your physician expects you to take upon discharge. Review all of your home medications and newly ordered medications with your doctor and/or pharmacist. Follow medication instructions as directed by your doctor and/or pharmacist. Please keep your medication list with you and share with your physician. Update the information when medications are discontinued, doses are changed, or new medications (including over-the-counter products) are added; and carry medication information at all times in the event of emergency situations.        Resources     Quit  Smoking / Tobacco Use:    I understand the use of any tobacco products increases my chance of suffering from future heart disease or stroke and could cause other illnesses which may shorten my life. Quitting the use of tobacco products is the single most important thing I can do to improve my health. For further information on smoking / tobacco cessation call a Toll Free Quit Line at 1-110.801.1053 (*[employer] [place]) or 1-260.456.2362 (American Lung Association) or you can access the web based program at www.lungusa.org.    [place] Tobacco Users Help Line:  (100) 813-3543       Toll Free: 1-768.861.9625  Quit Tobacco Program [facility] (006)536-3592    Crisis Hotline:    [place] Crisis Hotline:  7-536-PPEGNRF or 1-956.561.4246    [place] Crisis Hotline:    1-461.353.7898 or 560-755-0699    Discharge Survey:   Thank you for choosing [facility]. We hope we did everything we could to make your hospital stay a pleasant one. You may be receiving a survey and we would appreciate your time and participation in answering the questions. Your input is very valuable to us in our efforts to improve our service to our patients and their families.            Signatures     My signature on this form indicates that:    1. I acknowledge receipt and understanding of these Home Care Instruction.  2. My questions regarding this information have been answered to my satisfaction.  3. I have formulated a plan with my discharge nurse to obtain my prescribed medications for home.    __________________________________      __________________________________                   Patient Signature                                 Guardian/Responsible Adult Signature      __________________________________                 __________       ________                       Nurse Signature                                               Date                 Time

## 2017-08-22 NOTE — DISCHARGE INSTRUCTIONS
ACTIVITY: Rest and take it easy for the first 24 hours.  A responsible adult is recommended to remain with you during that time.  It is normal to feel sleepy.  We encourage you to not do anything that requires balance, judgment or coordination.    MILD FLU-LIKE SYMPTOMS ARE NORMAL. YOU MAY EXPERIENCE GENERALIZED MUSCLE ACHES, THROAT IRRITATION, HEADACHE AND/OR SOME NAUSEA.    FOR 24 HOURS DO NOT:  Drive, operate machinery or run household appliances.  Drink beer or alcoholic beverages.   Make important decisions or sign legal documents.    SPECIAL INSTRUCTIONS: **Comments: Laparoscopic Cholecystectomy D/C instructions:     DIET: Upon discharge from the hospital you may resume your normal preoperative diet. Depending on how you are feeling and whether you have nausea or not, you may wish to stay with a bland diet for the first few days. However, you can advance this as quickly as you feel ready.     ACTIVITIES: After discharge from the hospital, you may resume full routine activities. However, there should be no heavy lifting (greater than 15 pounds) and no strenuous activities until after your follow-up visit. Otherwise, routine activities of daily living are acceptable.     DRIVING: You may drive whenever you are off pain medications and are able to perform the activities needed to drive, i.e. turning, bending, twisting, etc.     BATHING: You may get the wound wet at any time after leaving the hospital. You may shower, but do not submerge in a bath for at least a week. Dressings may come off after 48 hours.     BOWEL FUNCTION: A few patients, after this operation, will develop either frequent or loose stools after meals. This usually corrects itself after a few days, to a few weeks. If this occurs, do not worry; it is not unusual and will resolve. Much more common than loose stools, is constipation. The combination of pain medication and decreased activity level can cause constipation in otherwise normal  patients. If you feel this is occurring, take a laxative (Milk of Magnesia, Ex-Lax, Senokot, etc.) until the problem has resolved.     PAIN MEDICATION: You will be given a prescription for pain medication at discharge. Please take these as directed. It is important to remember not to take medications on an empty stomach as this may cause nausea.     CALL IF YOU HAVE: (1) Fevers to more than 1010 F, (2) Unusual chest or leg pain, (3) Drainage or fluid from incision that may be foul smelling, increased tenderness or soreness at the wound or the wound edges are no longer together, redness or swelling at the incision site. Please do not hesitate to call with any other questions.     APPOINTMENT: Contact our office at [phone] for a follow-up appointment in 1 to 2 weeks following your procedure.     If you have any additional questions, please do not hesitate to call the office.     Office address:   [address] [place] [address], [address] [place] [address]  *    DIET: To avoid nausea, slowly advance diet as tolerated, avoiding spicy or greasy foods for the first day.  Add more substantial food to your diet according to your physician's instructions.  Babies can be fed formula or breast milk as soon as they are hungry.  INCREASE FLUIDS AND FIBER TO AVOID CONSTIPATION.      You should CALL YOUR PHYSICIAN if you develop:  Fever greater than 101 degrees F.  Pain not relieved by medication, or persistent nausea or vomiting.  Excessive bleeding (blood soaking through dressing) or unexpected drainage from the wound.  Extreme redness or swelling around the incision site, drainage of pus or foul smelling drainage.  Inability to urinate or empty your bladder within 8 hours.  Problems with breathing or chest pain.    You should call 911 if you develop problems with breathing or chest pain.  If you are unable to contact your doctor or surgical center, you should go to the nearest emergency room or urgent care center.  Physician's telephone #:  ***    If any questions arise, call your doctor.  If your doctor is not available, please feel free to call the Surgical Center at {Surgical Dept Numbers:[id number]}.  The Center is open Monday through Friday from 7AM to 7PM.  You can also call the HEALTH HOTLINE open 24 hours/day, 7 days/week and speak to a nurse at [phone], or toll free at [phone].    A registered nurse may call you a few days after your surgery to see how you are doing after your procedure.    MEDICATIONS: Resume taking daily medication.  Take prescribed pain medication with food.  If no medication is prescribed, you may take non-aspirin pain medication if needed.  PAIN MEDICATION CAN BE VERY CONSTIPATING.  Take a stool softener or laxative such as senokot, pericolace, or milk of magnesia if needed.    Prescription given for *Oxycodone**.  Last pain medication given at *1:20 p.m.**.    If your physician has prescribed pain medication that includes Acetaminophen (Tylenol), do not take additional Acetaminophen (Tylenol) while taking the prescribed medication.    Depression / Suicide Risk    As you are discharged from this [facility] facility, it is important to learn how to keep safe from harming yourself.    Recognize the warning signs:  · Abrupt changes in personality, positive or negative- including increase in energy   · Giving away possessions  · Change in eating patterns- significant weight changes-  positive or negative  · Change in sleeping patterns- unable to sleep or sleeping all the time   · Unwillingness or inability to communicate  · Depression  · Unusual sadness, discouragement and loneliness  · Talk of wanting to die  · Neglect of personal appearance   · Rebelliousness- reckless behavior  · Withdrawal from people/activities they love  · Confusion- inability to concentrate     If you or a loved one observes any of these behaviors or has concerns about self-harm, here's what you can do:  · Talk about it- your feelings and  reasons for harming yourself  · Remove any means that you might use to hurt yourself (examples: pills, rope, extension cords, firearm)  · Get professional help from the community (Mental Health, Substance Abuse, psychological counseling)  · Do not be alone:Call your Safe Contact- someone whom you trust who will be there for you.  · Call your local CRISIS HOTLINE 719-4246 or 354-311-2636  · Call your local Children's Mobile Crisis Response Team [place] (187) 825-9608 or www.[employer]  · Call the toll free National Suicide Prevention Hotlines   · National Suicide Prevention Lifeline 756-510-QIJN (6744)  · National Hope Line Network 800-SUICIDE (618-8844)

## 2017-12-06 DIAGNOSIS — I10 ESSENTIAL HYPERTENSION: ICD-10-CM

## 2017-12-07 RX ORDER — HYDROCHLOROTHIAZIDE 25 MG/1
8 TABLET ORAL DAILY
Qty: 30 TAB | Refills: 8 | Status: SHIPPED | OUTPATIENT
Start: 2017-12-07 | End: 2018-09-05 | Stop reason: SDUPTHER

## 2017-12-07 RX ORDER — AMLODIPINE BESYLATE 10 MG/1
10 TABLET ORAL DAILY
Qty: 30 TAB | Refills: 8 | Status: SHIPPED | OUTPATIENT
Start: 2017-12-07 | End: 2018-09-05 | Stop reason: SDUPTHER

## 2017-12-07 RX ORDER — LISINOPRIL 40 MG/1
40 TABLET ORAL DAILY
Qty: 30 TAB | Refills: 8 | Status: SHIPPED | OUTPATIENT
Start: 2017-12-07 | End: 2018-09-05 | Stop reason: SDUPTHER

## 2018-03-03 DIAGNOSIS — I10 ESSENTIAL HYPERTENSION: ICD-10-CM

## 2018-03-05 RX ORDER — HYDRALAZINE HYDROCHLORIDE 50 MG/1
TABLET, FILM COATED ORAL
Qty: 270 TAB | Refills: 1 | Status: SHIPPED | OUTPATIENT
Start: 2018-03-05 | End: 2018-08-11 | Stop reason: SDUPTHER

## 2018-08-11 DIAGNOSIS — I10 ESSENTIAL HYPERTENSION: ICD-10-CM

## 2018-08-13 RX ORDER — HYDRALAZINE HYDROCHLORIDE 50 MG/1
TABLET, FILM COATED ORAL
Qty: 270 TAB | Refills: 2 | Status: ON HOLD | OUTPATIENT
Start: 2018-08-13 | End: 2019-09-15 | Stop reason: SDUPTHER

## 2018-09-05 DIAGNOSIS — I10 ESSENTIAL HYPERTENSION: ICD-10-CM

## 2018-09-06 RX ORDER — LISINOPRIL 40 MG/1
40 TABLET ORAL DAILY
Qty: 90 TAB | Refills: 0 | Status: SHIPPED | OUTPATIENT
Start: 2018-09-06 | End: 2018-12-04 | Stop reason: SDUPTHER

## 2018-09-06 RX ORDER — HYDROCHLOROTHIAZIDE 25 MG/1
25 TABLET ORAL DAILY
Qty: 90 TAB | Refills: 0 | Status: SHIPPED | OUTPATIENT
Start: 2018-09-06 | End: 2018-12-05 | Stop reason: SDUPTHER

## 2018-09-06 RX ORDER — AMLODIPINE BESYLATE 10 MG/1
10 TABLET ORAL DAILY
Qty: 90 TAB | Refills: 0 | Status: SHIPPED | OUTPATIENT
Start: 2018-09-06 | End: 2018-12-04 | Stop reason: SDUPTHER

## 2018-10-29 ENCOUNTER — HOSPITAL ENCOUNTER (OUTPATIENT)
Dept: LAB | Facility: MEDICAL CENTER | Age: 78
End: 2018-10-29
Attending: NURSE PRACTITIONER
Payer: MEDICARE

## 2018-10-29 LAB
ALBUMIN SERPL BCP-MCNC: 4.3 G/DL (ref 3.2–4.9)
ALBUMIN/GLOB SERPL: 1.3 G/DL
ALP SERPL-CCNC: 57 U/L (ref 30–99)
ALT SERPL-CCNC: 22 U/L (ref 2–50)
ANION GAP SERPL CALC-SCNC: 9 MMOL/L (ref 0–11.9)
APPEARANCE UR: CLEAR
AST SERPL-CCNC: 18 U/L (ref 12–45)
BACTERIA #/AREA URNS HPF: NEGATIVE /HPF
BASOPHILS # BLD AUTO: 0.4 % (ref 0–1.8)
BASOPHILS # BLD: 0.03 K/UL (ref 0–0.12)
BILIRUB SERPL-MCNC: 0.7 MG/DL (ref 0.1–1.5)
BILIRUB UR QL STRIP.AUTO: ABNORMAL
BUN SERPL-MCNC: 18 MG/DL (ref 8–22)
CALCIUM SERPL-MCNC: 9.7 MG/DL (ref 8.5–10.5)
CHLORIDE SERPL-SCNC: 102 MMOL/L (ref 96–112)
CHOLEST SERPL-MCNC: 148 MG/DL (ref 100–199)
CO2 SERPL-SCNC: 23 MMOL/L (ref 20–33)
COLOR UR: ABNORMAL
CREAT SERPL-MCNC: 0.89 MG/DL (ref 0.5–1.4)
EOSINOPHIL # BLD AUTO: 0.01 K/UL (ref 0–0.51)
EOSINOPHIL NFR BLD: 0.1 % (ref 0–6.9)
EPI CELLS #/AREA URNS HPF: NEGATIVE /HPF
ERYTHROCYTE [DISTWIDTH] IN BLOOD BY AUTOMATED COUNT: 45.9 FL (ref 35.9–50)
FASTING STATUS PATIENT QL REPORTED: NORMAL
GLOBULIN SER CALC-MCNC: 3.3 G/DL (ref 1.9–3.5)
GLUCOSE SERPL-MCNC: 127 MG/DL (ref 65–99)
GLUCOSE UR STRIP.AUTO-MCNC: NEGATIVE MG/DL
HCT VFR BLD AUTO: 40.5 % (ref 42–52)
HDLC SERPL-MCNC: 33 MG/DL
HGB BLD-MCNC: 14 G/DL (ref 14–18)
HYALINE CASTS #/AREA URNS LPF: ABNORMAL /LPF
IMM GRANULOCYTES # BLD AUTO: 0.02 K/UL (ref 0–0.11)
IMM GRANULOCYTES NFR BLD AUTO: 0.3 % (ref 0–0.9)
KETONES UR STRIP.AUTO-MCNC: ABNORMAL MG/DL
LDLC SERPL CALC-MCNC: 92 MG/DL
LEUKOCYTE ESTERASE UR QL STRIP.AUTO: ABNORMAL
LYMPHOCYTES # BLD AUTO: 1.4 K/UL (ref 1–4.8)
LYMPHOCYTES NFR BLD: 18.6 % (ref 22–41)
MCH RBC QN AUTO: 32.7 PG (ref 27–33)
MCHC RBC AUTO-ENTMCNC: 34.6 G/DL (ref 33.7–35.3)
MCV RBC AUTO: 94.6 FL (ref 81.4–97.8)
MICRO URNS: ABNORMAL
MONOCYTES # BLD AUTO: 0.89 K/UL (ref 0–0.85)
MONOCYTES NFR BLD AUTO: 11.8 % (ref 0–13.4)
NEUTROPHILS # BLD AUTO: 5.17 K/UL (ref 1.82–7.42)
NEUTROPHILS NFR BLD: 68.8 % (ref 44–72)
NITRITE UR QL STRIP.AUTO: NEGATIVE
NRBC # BLD AUTO: 0 K/UL
NRBC BLD-RTO: 0 /100 WBC
PH UR STRIP.AUTO: 6 [PH]
PLATELET # BLD AUTO: 287 K/UL (ref 164–446)
PMV BLD AUTO: 9.7 FL (ref 9–12.9)
POTASSIUM SERPL-SCNC: 3.8 MMOL/L (ref 3.6–5.5)
PROT SERPL-MCNC: 7.6 G/DL (ref 6–8.2)
PROT UR QL STRIP: NEGATIVE MG/DL
PSA SERPL-MCNC: 2.56 NG/ML (ref 0–4)
RBC # BLD AUTO: 4.28 M/UL (ref 4.7–6.1)
RBC # URNS HPF: ABNORMAL /HPF
RBC UR QL AUTO: NEGATIVE
SODIUM SERPL-SCNC: 134 MMOL/L (ref 135–145)
SP GR UR STRIP.AUTO: 1.03
TRIGL SERPL-MCNC: 116 MG/DL (ref 0–149)
TSH SERPL DL<=0.005 MIU/L-ACNC: 1.51 UIU/ML (ref 0.38–5.33)
UROBILINOGEN UR STRIP.AUTO-MCNC: 1 MG/DL
WBC # BLD AUTO: 7.5 K/UL (ref 4.8–10.8)
WBC #/AREA URNS HPF: ABNORMAL /HPF

## 2018-10-29 PROCEDURE — 84443 ASSAY THYROID STIM HORMONE: CPT

## 2018-10-29 PROCEDURE — 80053 COMPREHEN METABOLIC PANEL: CPT

## 2018-10-29 PROCEDURE — 80061 LIPID PANEL: CPT

## 2018-10-29 PROCEDURE — 36415 COLL VENOUS BLD VENIPUNCTURE: CPT

## 2018-10-29 PROCEDURE — 81001 URINALYSIS AUTO W/SCOPE: CPT

## 2018-10-29 PROCEDURE — 84153 ASSAY OF PSA TOTAL: CPT

## 2018-10-29 PROCEDURE — 85025 COMPLETE CBC W/AUTO DIFF WBC: CPT

## 2018-12-04 DIAGNOSIS — I10 ESSENTIAL HYPERTENSION: ICD-10-CM

## 2018-12-04 RX ORDER — LISINOPRIL 40 MG/1
40 TABLET ORAL DAILY
Qty: 90 TAB | Refills: 0 | Status: ON HOLD | OUTPATIENT
Start: 2018-12-04 | End: 2019-09-15

## 2018-12-04 RX ORDER — AMLODIPINE BESYLATE 10 MG/1
10 TABLET ORAL DAILY
Qty: 90 TAB | Refills: 0 | Status: ON HOLD | OUTPATIENT
Start: 2018-12-04 | End: 2019-09-15

## 2018-12-05 DIAGNOSIS — I10 ESSENTIAL HYPERTENSION: ICD-10-CM

## 2018-12-05 RX ORDER — HYDROCHLOROTHIAZIDE 25 MG/1
25 TABLET ORAL DAILY
Qty: 30 TAB | Refills: 0 | Status: ON HOLD | OUTPATIENT
Start: 2018-12-05 | End: 2019-09-15

## 2018-12-09 NOTE — THERAPY
"Occupational Therapy Evaluation completed.   Functional Status:  Pt up in chair, agreeable to therapy after 2nd attempt.  Initial attempt pt was starting lunch and wanted to wait.  CGA and verbal cues for sit to stand, CG/Bhargavi with FWW to walk to sink with FWW, stand at sink to groom for 5 min with verbal and tactile cues.  Pt reported he needed to toilet, headed into bathroom.  Bhargavi to sit onto commode over toilet.  Due to design flaw with commode raised over toilet, pt accidentally urinated on floor instead of in toilet.  MaxA for sponge bath seated on toilet, Bhargavi to change gown and maxA to change socks.  MaxA porter hygiene after BM.  Bhargavi to walk back to chair.  Wife very concerned about pt's transfer and no one knowing how to care for all of his lines.  Assured her that staff at SNF is well versed in different lines.  OT did as much as she could to ease anxiety spouse has about pt;s care.  She also needs a lot of encouragement.  Pt making great gains today, tolerating more activity.    Plan of Care: Will benefit from Occupational Therapy 3 times per week  Discharge Recommendations:  Equipment: Will Continue to Assess for Equipment Needs. Post-acute therapy Discharge to a transitional care facility for continued skilled therapy services.    See \"Rehab Therapy-Acute\" Patient Summary Report for complete documentation.    " Telephone Encounter by Darius Gibson RMA at 03/31/17 11:13 AM     Author:  Darius Gibson RMA Service:  (none) Author Type:  Certified Medical Assistant     Filed:  03/31/17 11:14 AM Encounter Date:  3/30/2017 Status:  Signed     :  Darius Gibson RMA (Certified Medical Assistant)            Patient notified.[AK1.1T] Med ordered[AK1.1M]  Electronically Signed by:    CHRISTINA Grullon , 3/31/2017[AK1.2T]        Revision History        User Key Date/Time User Provider Type Action    > AK1.2 03/31/17 11:14 AM Darius Gibson RMA Certified Medical Assistant Sign     AK1.1 03/31/17 11:13 AM Darius Gibson RMA Certified Medical Assistant     M - Manual, T - Template

## 2018-12-17 ENCOUNTER — HOSPITAL ENCOUNTER (OUTPATIENT)
Dept: LAB | Facility: MEDICAL CENTER | Age: 78
End: 2018-12-17
Attending: NURSE PRACTITIONER
Payer: MEDICARE

## 2018-12-17 LAB
CREAT UR-MCNC: 164.4 MG/DL
MICROALBUMIN UR-MCNC: 0.8 MG/DL
MICROALBUMIN/CREAT UR: 5 MG/G (ref 0–30)

## 2018-12-17 PROCEDURE — 82043 UR ALBUMIN QUANTITATIVE: CPT

## 2018-12-17 PROCEDURE — 36415 COLL VENOUS BLD VENIPUNCTURE: CPT

## 2018-12-17 PROCEDURE — 83036 HEMOGLOBIN GLYCOSYLATED A1C: CPT

## 2018-12-17 PROCEDURE — 82570 ASSAY OF URINE CREATININE: CPT

## 2018-12-18 LAB
EST. AVERAGE GLUCOSE BLD GHB EST-MCNC: 148 MG/DL
HBA1C MFR BLD: 6.8 % (ref 0–5.6)

## 2019-05-17 DIAGNOSIS — Z01.812 PRE-OPERATIVE LABORATORY EXAMINATION: ICD-10-CM

## 2019-05-17 DIAGNOSIS — Z01.810 PRE-OPERATIVE CARDIOVASCULAR EXAMINATION: ICD-10-CM

## 2019-05-17 LAB
ANION GAP SERPL CALC-SCNC: 7 MMOL/L (ref 0–11.9)
BUN SERPL-MCNC: 14 MG/DL (ref 8–22)
CALCIUM SERPL-MCNC: 9.6 MG/DL (ref 8.5–10.5)
CHLORIDE SERPL-SCNC: 104 MMOL/L (ref 96–112)
CO2 SERPL-SCNC: 27 MMOL/L (ref 20–33)
CREAT SERPL-MCNC: 0.85 MG/DL (ref 0.5–1.4)
EKG IMPRESSION: NORMAL
GLUCOSE SERPL-MCNC: 108 MG/DL (ref 65–99)
POTASSIUM SERPL-SCNC: 4.2 MMOL/L (ref 3.6–5.5)
SODIUM SERPL-SCNC: 138 MMOL/L (ref 135–145)

## 2019-05-17 PROCEDURE — 80048 BASIC METABOLIC PNL TOTAL CA: CPT

## 2019-05-17 PROCEDURE — 93010 ELECTROCARDIOGRAM REPORT: CPT | Performed by: INTERNAL MEDICINE

## 2019-05-17 PROCEDURE — 93005 ELECTROCARDIOGRAM TRACING: CPT

## 2019-05-17 RX ORDER — CHLORPHENIRAMINE MALEATE 4 MG
4 TABLET ORAL DAILY
COMMUNITY
End: 2021-01-01

## 2019-05-17 NOTE — OR NURSING
"Pre-admit appointment completed. \"Preparing for your procedure\" sheet given to pt along with verbal and written instructions. Pt instructed to continue regularly prescribed medications through the day before surgery. Pt instructed to take the following medications the day of surgery with a sip of water, per anesthesia protocol; norvasc, apresoline, prilosec.  "

## 2019-05-30 ENCOUNTER — HOSPITAL ENCOUNTER (OUTPATIENT)
Facility: MEDICAL CENTER | Age: 79
End: 2019-05-30
Attending: INTERNAL MEDICINE | Admitting: INTERNAL MEDICINE
Payer: MEDICARE

## 2019-05-30 ENCOUNTER — ANESTHESIA (OUTPATIENT)
Dept: SURGERY | Facility: MEDICAL CENTER | Age: 79
End: 2019-05-30
Payer: MEDICARE

## 2019-05-30 ENCOUNTER — ANESTHESIA EVENT (OUTPATIENT)
Dept: SURGERY | Facility: MEDICAL CENTER | Age: 79
End: 2019-05-30
Payer: MEDICARE

## 2019-05-30 VITALS
BODY MASS INDEX: 27.21 KG/M2 | WEIGHT: 190.04 LBS | RESPIRATION RATE: 16 BRPM | SYSTOLIC BLOOD PRESSURE: 125 MMHG | TEMPERATURE: 97.2 F | DIASTOLIC BLOOD PRESSURE: 75 MMHG | HEART RATE: 75 BPM | HEIGHT: 70 IN | OXYGEN SATURATION: 96 %

## 2019-05-30 LAB — PATHOLOGY CONSULT NOTE: NORMAL

## 2019-05-30 PROCEDURE — 160046 HCHG PACU - 1ST 60 MINS PHASE II: Performed by: INTERNAL MEDICINE

## 2019-05-30 PROCEDURE — 160002 HCHG RECOVERY MINUTES (STAT): Performed by: INTERNAL MEDICINE

## 2019-05-30 PROCEDURE — 160048 HCHG OR STATISTICAL LEVEL 1-5: Performed by: INTERNAL MEDICINE

## 2019-05-30 PROCEDURE — 160025 RECOVERY II MINUTES (STATS): Performed by: INTERNAL MEDICINE

## 2019-05-30 PROCEDURE — 700105 HCHG RX REV CODE 258: Performed by: INTERNAL MEDICINE

## 2019-05-30 PROCEDURE — 160009 HCHG ANES TIME/MIN: Performed by: INTERNAL MEDICINE

## 2019-05-30 PROCEDURE — 160204 HCHG ENDO MINUTES - 1ST 30 MINS LEVEL 5: Performed by: INTERNAL MEDICINE

## 2019-05-30 PROCEDURE — 700101 HCHG RX REV CODE 250: Performed by: ANESTHESIOLOGY

## 2019-05-30 PROCEDURE — 502240 HCHG MISC OR SUPPLY RC 0272: Performed by: INTERNAL MEDICINE

## 2019-05-30 PROCEDURE — 700111 HCHG RX REV CODE 636 W/ 250 OVERRIDE (IP): Performed by: ANESTHESIOLOGY

## 2019-05-30 PROCEDURE — 160035 HCHG PACU - 1ST 60 MINS PHASE I: Performed by: INTERNAL MEDICINE

## 2019-05-30 PROCEDURE — 501629 HCHG TUBE, LUKI TRAP STERILE DISP: Performed by: INTERNAL MEDICINE

## 2019-05-30 PROCEDURE — 88305 TISSUE EXAM BY PATHOLOGIST: CPT

## 2019-05-30 PROCEDURE — 160209 HCHG ENDO MINUTES - EA ADDL 1 MIN LEVEL 5: Performed by: INTERNAL MEDICINE

## 2019-05-30 RX ORDER — KETOROLAC TROMETHAMINE 30 MG/ML
30 INJECTION, SOLUTION INTRAMUSCULAR; INTRAVENOUS EVERY 6 HOURS PRN
Status: DISCONTINUED | OUTPATIENT
Start: 2019-05-30 | End: 2019-05-30 | Stop reason: HOSPADM

## 2019-05-30 RX ORDER — MORPHINE SULFATE 4 MG/ML
2 INJECTION, SOLUTION INTRAMUSCULAR; INTRAVENOUS
Status: DISCONTINUED | OUTPATIENT
Start: 2019-05-30 | End: 2019-05-30 | Stop reason: HOSPADM

## 2019-05-30 RX ORDER — OXYCODONE HCL 5 MG/5 ML
10 SOLUTION, ORAL ORAL
Status: DISCONTINUED | OUTPATIENT
Start: 2019-05-30 | End: 2019-05-30 | Stop reason: HOSPADM

## 2019-05-30 RX ORDER — METOPROLOL TARTRATE 1 MG/ML
1 INJECTION, SOLUTION INTRAVENOUS
Status: DISCONTINUED | OUTPATIENT
Start: 2019-05-30 | End: 2019-05-30 | Stop reason: HOSPADM

## 2019-05-30 RX ORDER — DIPHENHYDRAMINE HYDROCHLORIDE 50 MG/ML
12.5 INJECTION INTRAMUSCULAR; INTRAVENOUS
Status: DISCONTINUED | OUTPATIENT
Start: 2019-05-30 | End: 2019-05-30 | Stop reason: HOSPADM

## 2019-05-30 RX ORDER — MIDAZOLAM HYDROCHLORIDE 1 MG/ML
1 INJECTION INTRAMUSCULAR; INTRAVENOUS
Status: DISCONTINUED | OUTPATIENT
Start: 2019-05-30 | End: 2019-05-30 | Stop reason: HOSPADM

## 2019-05-30 RX ORDER — HYDRALAZINE HYDROCHLORIDE 20 MG/ML
10 INJECTION INTRAMUSCULAR; INTRAVENOUS
Status: DISCONTINUED | OUTPATIENT
Start: 2019-05-30 | End: 2019-05-30 | Stop reason: HOSPADM

## 2019-05-30 RX ORDER — MORPHINE SULFATE 10 MG/ML
5 INJECTION, SOLUTION INTRAMUSCULAR; INTRAVENOUS
Status: DISCONTINUED | OUTPATIENT
Start: 2019-05-30 | End: 2019-05-30 | Stop reason: HOSPADM

## 2019-05-30 RX ORDER — HALOPERIDOL 5 MG/ML
1 INJECTION INTRAMUSCULAR
Status: DISCONTINUED | OUTPATIENT
Start: 2019-05-30 | End: 2019-05-30 | Stop reason: HOSPADM

## 2019-05-30 RX ORDER — MEPERIDINE HYDROCHLORIDE 25 MG/ML
12.5 INJECTION INTRAMUSCULAR; INTRAVENOUS; SUBCUTANEOUS
Status: DISCONTINUED | OUTPATIENT
Start: 2019-05-30 | End: 2019-05-30 | Stop reason: HOSPADM

## 2019-05-30 RX ORDER — SODIUM CHLORIDE, SODIUM LACTATE, POTASSIUM CHLORIDE, CALCIUM CHLORIDE 600; 310; 30; 20 MG/100ML; MG/100ML; MG/100ML; MG/100ML
INJECTION, SOLUTION INTRAVENOUS CONTINUOUS
Status: DISCONTINUED | OUTPATIENT
Start: 2019-05-30 | End: 2019-05-30 | Stop reason: HOSPADM

## 2019-05-30 RX ORDER — SODIUM CHLORIDE, SODIUM LACTATE, POTASSIUM CHLORIDE, CALCIUM CHLORIDE 600; 310; 30; 20 MG/100ML; MG/100ML; MG/100ML; MG/100ML
1000 INJECTION, SOLUTION INTRAVENOUS CONTINUOUS
Status: DISCONTINUED | OUTPATIENT
Start: 2019-05-30 | End: 2019-05-30 | Stop reason: HOSPADM

## 2019-05-30 RX ADMIN — PROPOFOL 20 MG: 10 INJECTION, EMULSION INTRAVENOUS at 08:09

## 2019-05-30 RX ADMIN — SODIUM CHLORIDE, POTASSIUM CHLORIDE, SODIUM LACTATE AND CALCIUM CHLORIDE: 600; 310; 30; 20 INJECTION, SOLUTION INTRAVENOUS at 07:48

## 2019-05-30 RX ADMIN — PROPOFOL 40 MG: 10 INJECTION, EMULSION INTRAVENOUS at 07:52

## 2019-05-30 RX ADMIN — PROPOFOL 20 MG: 10 INJECTION, EMULSION INTRAVENOUS at 07:58

## 2019-05-30 RX ADMIN — MIDAZOLAM HYDROCHLORIDE 2 MG: 1 INJECTION, SOLUTION INTRAMUSCULAR; INTRAVENOUS at 07:14

## 2019-05-30 RX ADMIN — PROPOFOL 20 MG: 10 INJECTION, EMULSION INTRAVENOUS at 08:00

## 2019-05-30 RX ADMIN — LIDOCAINE HYDROCHLORIDE 100 MG: 20 INJECTION, SOLUTION INFILTRATION; PERINEURAL at 07:49

## 2019-05-30 RX ADMIN — PROPOFOL 40 MG: 10 INJECTION, EMULSION INTRAVENOUS at 07:49

## 2019-05-30 RX ADMIN — SODIUM CHLORIDE, POTASSIUM CHLORIDE, SODIUM LACTATE AND CALCIUM CHLORIDE 1000 ML: 600; 310; 30; 20 INJECTION, SOLUTION INTRAVENOUS at 06:40

## 2019-05-30 RX ADMIN — FENTANYL CITRATE 100 MCG: 50 INJECTION, SOLUTION INTRAMUSCULAR; INTRAVENOUS at 07:48

## 2019-05-30 RX ADMIN — PROPOFOL 20 MG: 10 INJECTION, EMULSION INTRAVENOUS at 07:55

## 2019-05-30 RX ADMIN — PROPOFOL 20 MG: 10 INJECTION, EMULSION INTRAVENOUS at 08:07

## 2019-05-30 RX ADMIN — PROPOFOL 20 MG: 10 INJECTION, EMULSION INTRAVENOUS at 07:57

## 2019-05-30 RX ADMIN — PROPOFOL 20 MG: 10 INJECTION, EMULSION INTRAVENOUS at 08:04

## 2019-05-30 ASSESSMENT — PAIN SCALES - GENERAL: PAIN_LEVEL: 0

## 2019-05-30 NOTE — ANESTHESIA QCDR
2019 [facility] Clinical Data Registry (for Quality Improvement)     Postoperative nausea/vomiting risk protocol (Adult = 18 yrs and Pediatric 3-17 yrs)- (430 and 463)  General inhalation anesthetic (NOT TIVA) with PONV risk factors: Yes  Provision of anti-emetic therapy with at least 2 different classes of agents: Yes   Patient DID NOT receive anti-emetic therapy and reason is documented in Medical Record:  N/A    Multimodal Pain Management- (AQI59)  Patient undergoing Elective Surgery (i.e. Outpatient, or ASC, or Prescheduled Surgery prior to Hospital Admission): Yes  Use of Multimodal Pain Management, two or more drugs and/or interventions, NOT including systemic opioids: Yes   Exception: Documented allergy to multiple classes of analgesics:  N/A    PACU assessment of acute postoperative pain prior to Anesthesia Care End- Applies to Patients Age = 18- (ABG7)  Initial PACU pain score is which of the following: Pain not assessed  Patient unable to report pain score: Yes (Patient Unable to Report)    Post-anesthetic transfer of care checklist/protocol to PACU/ICU- (426 and 427)  Upon conclusion of case, patient transferred to which of the following locations: PACU/Non-ICU  Use of transfer checklist/protocol: Yes  Exclusion: Service Performed in Patient Hospital Room (and thus did not require transfer): N/A    PACU Reintubation- (AQI31)  General anesthesia requiring endotracheal intubation (ETT) along with subsequent extubation in OR or PACU: No  Required reintubation in the PACU: N/A  Extubation was a planned trial documented in the medical record prior to removal of the original airway device: N/A    Unplanned admission to ICU related to anesthesia service up through end of PACU care- (MD51)  Unplanned admission to ICU (not initially anticipated at anesthesia start time): No

## 2019-05-30 NOTE — ANESTHESIA PREPROCEDURE EVALUATION
Relevant Problems   (+) Acute kidney injury (nontraumatic) ([facility])   (+) Acute respiratory failure with hypoxia (HCC)   (+) Atrial fibrillation with RVR ([facility])   (+) Hypertension   (+) Hypokalemia due to inadequate potassium intake   (+) Hypotension (arterial)   (+) Metabolic acidosis   (+) Non-STEMI (non-ST elevated myocardial infarction) ([facility])   (+) Sepsis(995.91)   (+) Thrombocytopenia ([facility])       Physical Exam    Airway   Mallampati: II  TM distance: >3 FB  Neck ROM: full       Cardiovascular - normal exam  Rhythm: regular  Rate: normal  (-) murmur     Dental - normal exam         Pulmonary - normal exam  Breath sounds clear to auscultation     Abdominal    Neurological - normal exam                 Anesthesia Plan    ASA 3   ASA physical status 3 criteria: MI or angina - history (> 3 months)    Plan - MAC             Induction: intravenous    Postoperative Plan: Postoperative administration of opioids is intended.    Pertinent diagnostic labs and testing reviewed    Informed Consent:    Anesthetic plan and risks discussed with patient.    Use of blood products discussed with: patient whom consented to blood products.

## 2019-05-30 NOTE — OR NURSING
0837: To PACU post colonoscopy. Pt is extubated. Breathing is spontaneous and unlabored.  0845: Pt more awake. Denies pain or nausea.  0900: Remains pain/nausea free.  0910: Meets criteria for stage ll.

## 2019-05-30 NOTE — DISCHARGE INSTRUCTIONS
ENDOSCOPY HOME CARE INSTRUCTIONS      COLONOSCOPY OR FLEXIBLE SIGMOIDOSCOPY  1. If you received a barium enema, take a mild laxative such as dulcolax, Yani's M.O., or Milk of Magnesia to clean out the barium.  2. Drink plenty of fluids. Eat a diet high in fiber (whole-grain breads, fresh fruit and vegetables).  3. You may notice a few drops of blood with your first bowel movement. If you develop any large amount of bleeding, black stools, a fever, or abdominal pain, call your doctor right away.  4. Call your doctor for test results in two weeks if you don't receive them from him.   5. Don't drive or drink alcohol for 24 hours. The medication you received will make you too drowsy.  6. No NSAIDS for TWO WEEKS.**  8. See Dr. Alvarenga in one year.     Dr. Alvarenga [phone]    You should call 911 if you develop problems with breathing or chest pain.  If any questions arise, call your doctor. If your doctor is not available, please feel free to call [phone]. You can also call the HEALTH HOTLINE open 24 hours/day, 7 days/week and speak to a nurse at [phone], or toll free [phone].    Depression / Suicide Risk    As you are discharged from this [facility] facility, it is important to learn how to keep safe from harming yourself.    Recognize the warning signs:  · Abrupt changes in personality, positive or negative- including increase in energy   · Giving away possessions  · Change in eating patterns- significant weight changes-  positive or negative  · Change in sleeping patterns- unable to sleep or sleeping all the time   · Unwillingness or inability to communicate  · Depression  · Unusual sadness, discouragement and loneliness  · Talk of wanting to die  · Neglect of personal appearance   · Rebelliousness- reckless behavior  · Withdrawal from people/activities they love  · Confusion- inability to concentrate     If you or a loved one observes any of these behaviors or has concerns about  self-harm, here's what you can do:  · Talk about it- your feelings and reasons for harming yourself  · Remove any means that you might use to hurt yourself (examples: pills, rope, extension cords, firearm)  · Get professional help from the community (Mental Health, Substance Abuse, psychological counseling)  · Do not be alone:Call your Safe Contact- someone whom you trust who will be there for you.  · Call your local CRISIS HOTLINE 182-4755 or 393-103-3724  · Call your local Children's Mobile Crisis Response Team [place] (161) 711-2099 or www.[employer]  · Call the toll free National Suicide Prevention Hotlines   · National Suicide Prevention Lifeline 954-651-YYXY (2434)  · National Hope Line Network 800-SUICIDE (213-1072)    I acknowledge receipt and understanding of these Home Care Instructions.

## 2019-05-30 NOTE — PROCEDURES
DATE OF SERVICE:  05/30/2019    PROCEDURE PERFORMED:  Colonoscopy with endoscopic mucosa resection of 40 mm   polyp in the ascending colon utilizing injection of Orise and utilization of   20 mm and 10 mm hot snare polypectomy technique and cold biopsy forceps   removal with 100% removal of polyp and utilization of 2 clips for complete   closure of resected area.    PHYSICIAN:  Will Alvarenga MD    MEDICATIONS:  1.  Deep sedation.  2.  A 12 mL of Orise subepithelial injection solution.    CONSENT:  Procedure, risks, and benefits reviewed through the patient, risks   including but not limited to bleeding, perforation, side effects of medication   were informed.  Patient voiced understanding and agreed to proceed.    DESCRIPTION OF PROCEDURE:  Patient was placed in left lateral decubitus   position.  After sedation was achieved, rectal examination revealed no   palpable abnormalities.  Then, a colonoscope was inserted in the rectum and   advanced to the cecum in a well prepped colon.  The appendiceal orifice and   ileocecal valve were well visualized and imaged.  Upon careful retraction of   the proximal ascending colon, there was an irregular shaped, completely flat   carpet-like polyp measuring approximately 30-40 mm.  A 12 mL of Orise was   utilized to lift the entire lesion in piecemeal resection utilizing a 20 mm,   followed by a 10 mm.  Hot snare polypectomy was utilized to remove 99% of the   entire polyp.  A small area that could not be removed the snare was then   subsequently removed, avulsed with cold biopsy forceps.  A 100% removal of   polyp was appreciated.  The resected area was brought together utilizing 2   clips with complete closure.  All pieces removed were collected.  Upon careful   retraction, no other polyps, masses, or lesions were appreciated.    Retroflexed view of the rectum was otherwise normal.  Rectum was desufflated.    Scope was removed.    COMPLICATIONS:  None.    BLOOD LOSS:  1  Gricel.    SPECIMENS:  Obtained.    RECOMMENDATIONS:  Review results of histopathology.  Repeat colonoscopy in 1   year time at [facility].  The above was reviewed thoroughly with   the patient's wife.  All questions were answered to their satisfaction.    Patient was advised to avoid any aspirin or NSAIDs for 2 weeks.  Patient was   advised to avoid any alcohol or tobacco if he consumes these.    Patient was advised to have a very healthy diet, fruits and vegetables, lean   proteins and avoid fast foods, processed foods for the next 2-4 weeks to   enhance healing.  The above was reviewed thoroughly with the patient and the   patient's wife.  All questions were answered to their satisfaction.       ____________________________________     Will MD DESTINEY Grider / GREGG    DD:  05/30/2019 08:40:20  DT:  05/30/2019 09:01:32    D#:  [id number]  Job#:  [id number]

## 2019-05-30 NOTE — ANESTHESIA TIME REPORT
Anesthesia Start and Stop Event Times     Date Time Event    5/30/2019 0748 Anesthesia Start     0839 Anesthesia Stop        Responsible Staff  05/30/19    Name Role Begin End    Hossein Abel M.D. Anesth 0748 0839        Preop Diagnosis (Free Text):  Pre-op Diagnosis     COLON POLYP        Preop Diagnosis (Codes):  Diagnosis Information     Diagnosis Code(s):         Post op Diagnosis  Colon polyp      Premium Reason  Non-Premium    Comments:

## 2019-09-14 ENCOUNTER — HOSPITAL ENCOUNTER (INPATIENT)
Facility: MEDICAL CENTER | Age: 79
LOS: 1 days | DRG: 309 | End: 2019-09-15
Attending: EMERGENCY MEDICINE | Admitting: HOSPITALIST
Payer: MEDICARE

## 2019-09-14 ENCOUNTER — TELEPHONE (OUTPATIENT)
Dept: CARDIOLOGY | Facility: MEDICAL CENTER | Age: 79
End: 2019-09-14

## 2019-09-14 DIAGNOSIS — Z01.89 ENCOUNTER FOR CARDIOVERSION PROCEDURE: ICD-10-CM

## 2019-09-14 DIAGNOSIS — I48.91 ATRIAL FIBRILLATION, UNSPECIFIED TYPE (HCC): ICD-10-CM

## 2019-09-14 DIAGNOSIS — I10 ESSENTIAL HYPERTENSION: ICD-10-CM

## 2019-09-14 PROBLEM — N17.9 AKI (ACUTE KIDNEY INJURY) (HCC): Status: ACTIVE | Noted: 2019-09-14

## 2019-09-14 PROBLEM — E87.1 HYPONATREMIA: Status: ACTIVE | Noted: 2019-09-14

## 2019-09-14 LAB
ALBUMIN SERPL BCP-MCNC: 4.2 G/DL (ref 3.2–4.9)
ALBUMIN/GLOB SERPL: 1.5 G/DL
ALP SERPL-CCNC: 67 U/L (ref 30–99)
ALT SERPL-CCNC: 20 U/L (ref 2–50)
ANION GAP SERPL CALC-SCNC: 17 MMOL/L (ref 0–11.9)
APTT PPP: 30.5 SEC (ref 24.7–36)
AST SERPL-CCNC: 21 U/L (ref 12–45)
BASOPHILS # BLD AUTO: 0.3 % (ref 0–1.8)
BASOPHILS # BLD: 0.04 K/UL (ref 0–0.12)
BILIRUB SERPL-MCNC: 0.3 MG/DL (ref 0.1–1.5)
BUN SERPL-MCNC: 18 MG/DL (ref 8–22)
CALCIUM SERPL-MCNC: 8.8 MG/DL (ref 8.4–10.2)
CHLORIDE SERPL-SCNC: 92 MMOL/L (ref 96–112)
CO2 SERPL-SCNC: 22 MMOL/L (ref 20–33)
CREAT SERPL-MCNC: 1.13 MG/DL (ref 0.5–1.4)
EKG IMPRESSION: NORMAL
EOSINOPHIL # BLD AUTO: 0.01 K/UL (ref 0–0.51)
EOSINOPHIL NFR BLD: 0.1 % (ref 0–6.9)
ERYTHROCYTE [DISTWIDTH] IN BLOOD BY AUTOMATED COUNT: 45.4 FL (ref 35.9–50)
GLOBULIN SER CALC-MCNC: 2.8 G/DL (ref 1.9–3.5)
GLUCOSE SERPL-MCNC: 177 MG/DL (ref 65–99)
HCT VFR BLD AUTO: 36.5 % (ref 42–52)
HGB BLD-MCNC: 12.5 G/DL (ref 14–18)
IMM GRANULOCYTES # BLD AUTO: 0.08 K/UL (ref 0–0.11)
IMM GRANULOCYTES NFR BLD AUTO: 0.6 % (ref 0–0.9)
INR PPP: 0.98 (ref 0.87–1.13)
LYMPHOCYTES # BLD AUTO: 2.47 K/UL (ref 1–4.8)
LYMPHOCYTES NFR BLD: 18 % (ref 22–41)
MAGNESIUM SERPL-MCNC: 1.7 MG/DL (ref 1.5–2.5)
MCH RBC QN AUTO: 32.5 PG (ref 27–33)
MCHC RBC AUTO-ENTMCNC: 34.2 G/DL (ref 33.7–35.3)
MCV RBC AUTO: 94.8 FL (ref 81.4–97.8)
MONOCYTES # BLD AUTO: 0.94 K/UL (ref 0–0.85)
MONOCYTES NFR BLD AUTO: 6.8 % (ref 0–13.4)
NEUTROPHILS # BLD AUTO: 10.2 K/UL (ref 1.82–7.42)
NEUTROPHILS NFR BLD: 74.2 % (ref 44–72)
NRBC # BLD AUTO: 0 K/UL
NRBC BLD-RTO: 0 /100 WBC
NT-PROBNP SERPL IA-MCNC: 58 PG/ML (ref 0–125)
PLATELET # BLD AUTO: 262 K/UL (ref 164–446)
PMV BLD AUTO: 9.4 FL (ref 9–12.9)
POTASSIUM SERPL-SCNC: 3.9 MMOL/L (ref 3.6–5.5)
PROT SERPL-MCNC: 7 G/DL (ref 6–8.2)
PROTHROMBIN TIME: 13.1 SEC (ref 12–14.6)
RBC # BLD AUTO: 3.85 M/UL (ref 4.7–6.1)
SODIUM SERPL-SCNC: 131 MMOL/L (ref 135–145)
T4 FREE SERPL-MCNC: 1.64 NG/DL (ref 0.58–1.64)
T4 FREE SERPL-MCNC: 1.67 NG/DL (ref 0.58–1.64)
TROPONIN T SERPL-MCNC: 18 NG/L (ref 6–19)
TSH SERPL DL<=0.005 MIU/L-ACNC: 1.96 UIU/ML (ref 0.38–5.33)
WBC # BLD AUTO: 13.7 K/UL (ref 4.8–10.8)

## 2019-09-14 PROCEDURE — A9270 NON-COVERED ITEM OR SERVICE: HCPCS | Performed by: EMERGENCY MEDICINE

## 2019-09-14 PROCEDURE — 84443 ASSAY THYROID STIM HORMONE: CPT

## 2019-09-14 PROCEDURE — 96374 THER/PROPH/DIAG INJ IV PUSH: CPT

## 2019-09-14 PROCEDURE — 700111 HCHG RX REV CODE 636 W/ 250 OVERRIDE (IP)

## 2019-09-14 PROCEDURE — 99152 MOD SED SAME PHYS/QHP 5/>YRS: CPT

## 2019-09-14 PROCEDURE — 85610 PROTHROMBIN TIME: CPT

## 2019-09-14 PROCEDURE — 700111 HCHG RX REV CODE 636 W/ 250 OVERRIDE (IP): Performed by: EMERGENCY MEDICINE

## 2019-09-14 PROCEDURE — 83880 ASSAY OF NATRIURETIC PEPTIDE: CPT

## 2019-09-14 PROCEDURE — 84439 ASSAY OF FREE THYROXINE: CPT

## 2019-09-14 PROCEDURE — 80053 COMPREHEN METABOLIC PANEL: CPT

## 2019-09-14 PROCEDURE — 83735 ASSAY OF MAGNESIUM: CPT

## 2019-09-14 PROCEDURE — 700102 HCHG RX REV CODE 250 W/ 637 OVERRIDE(OP): Performed by: EMERGENCY MEDICINE

## 2019-09-14 PROCEDURE — 770020 HCHG ROOM/CARE - TELE (206)

## 2019-09-14 PROCEDURE — 93005 ELECTROCARDIOGRAM TRACING: CPT

## 2019-09-14 PROCEDURE — 85025 COMPLETE CBC W/AUTO DIFF WBC: CPT

## 2019-09-14 PROCEDURE — 99223 1ST HOSP IP/OBS HIGH 75: CPT | Mod: AI | Performed by: HOSPITALIST

## 2019-09-14 PROCEDURE — 99285 EMERGENCY DEPT VISIT HI MDM: CPT

## 2019-09-14 PROCEDURE — 85730 THROMBOPLASTIN TIME PARTIAL: CPT

## 2019-09-14 PROCEDURE — 84484 ASSAY OF TROPONIN QUANT: CPT

## 2019-09-14 PROCEDURE — 92960 CARDIOVERSION ELECTRIC EXT: CPT

## 2019-09-14 PROCEDURE — 5A2204Z RESTORATION OF CARDIAC RHYTHM, SINGLE: ICD-10-PCS | Performed by: EMERGENCY MEDICINE

## 2019-09-14 RX ORDER — LATANOPROST 50 UG/ML
1 SOLUTION/ DROPS OPHTHALMIC NIGHTLY
Status: DISCONTINUED | OUTPATIENT
Start: 2019-09-14 | End: 2019-09-15 | Stop reason: HOSPADM

## 2019-09-14 RX ORDER — ASPIRIN 325 MG
325 TABLET ORAL ONCE
Status: COMPLETED | OUTPATIENT
Start: 2019-09-14 | End: 2019-09-14

## 2019-09-14 RX ORDER — OMEPRAZOLE 20 MG/1
20 CAPSULE, DELAYED RELEASE ORAL DAILY
Status: DISCONTINUED | OUTPATIENT
Start: 2019-09-15 | End: 2019-09-15 | Stop reason: HOSPADM

## 2019-09-14 RX ORDER — BISACODYL 10 MG
10 SUPPOSITORY, RECTAL RECTAL
Status: DISCONTINUED | OUTPATIENT
Start: 2019-09-14 | End: 2019-09-15 | Stop reason: HOSPADM

## 2019-09-14 RX ORDER — ONDANSETRON 4 MG/1
4 TABLET, ORALLY DISINTEGRATING ORAL EVERY 4 HOURS PRN
Status: DISCONTINUED | OUTPATIENT
Start: 2019-09-14 | End: 2019-09-15 | Stop reason: HOSPADM

## 2019-09-14 RX ORDER — ASPIRIN 81 MG/1
81 TABLET, CHEWABLE ORAL DAILY
Status: DISCONTINUED | OUTPATIENT
Start: 2019-09-15 | End: 2019-09-15

## 2019-09-14 RX ORDER — DILTIAZEM HYDROCHLORIDE 5 MG/ML
10 INJECTION INTRAVENOUS ONCE
Status: COMPLETED | OUTPATIENT
Start: 2019-09-14 | End: 2019-09-14

## 2019-09-14 RX ORDER — ONDANSETRON 2 MG/ML
4 INJECTION INTRAMUSCULAR; INTRAVENOUS EVERY 4 HOURS PRN
Status: DISCONTINUED | OUTPATIENT
Start: 2019-09-14 | End: 2019-09-15 | Stop reason: HOSPADM

## 2019-09-14 RX ORDER — ACETAMINOPHEN 325 MG/1
650 TABLET ORAL EVERY 6 HOURS PRN
Status: DISCONTINUED | OUTPATIENT
Start: 2019-09-14 | End: 2019-09-15 | Stop reason: HOSPADM

## 2019-09-14 RX ORDER — POLYETHYLENE GLYCOL 3350 17 G/17G
1 POWDER, FOR SOLUTION ORAL
Status: DISCONTINUED | OUTPATIENT
Start: 2019-09-14 | End: 2019-09-15 | Stop reason: HOSPADM

## 2019-09-14 RX ORDER — AMOXICILLIN 250 MG
2 CAPSULE ORAL 2 TIMES DAILY
Status: DISCONTINUED | OUTPATIENT
Start: 2019-09-14 | End: 2019-09-15 | Stop reason: HOSPADM

## 2019-09-14 RX ORDER — LISINOPRIL 20 MG/1
40 TABLET ORAL DAILY
Status: DISCONTINUED | OUTPATIENT
Start: 2019-09-15 | End: 2019-09-15

## 2019-09-14 RX ORDER — HYDRALAZINE HYDROCHLORIDE 25 MG/1
50 TABLET, FILM COATED ORAL EVERY 8 HOURS
Status: DISCONTINUED | OUTPATIENT
Start: 2019-09-14 | End: 2019-09-15

## 2019-09-14 RX ORDER — METOPROLOL TARTRATE 50 MG/1
50 TABLET, FILM COATED ORAL ONCE
Status: DISCONTINUED | OUTPATIENT
Start: 2019-09-14 | End: 2019-09-15

## 2019-09-14 RX ORDER — METOPROLOL TARTRATE 1 MG/ML
5 INJECTION, SOLUTION INTRAVENOUS
Status: DISCONTINUED | OUTPATIENT
Start: 2019-09-14 | End: 2019-09-15 | Stop reason: HOSPADM

## 2019-09-14 RX ADMIN — DILTIAZEM HYDROCHLORIDE 10 MG: 5 INJECTION INTRAVENOUS at 17:25

## 2019-09-14 RX ADMIN — RIVAROXABAN 20 MG: 20 TABLET, FILM COATED ORAL at 23:14

## 2019-09-14 RX ADMIN — PROPOFOL 44 MG: 10 INJECTION, EMULSION INTRAVENOUS at 18:20

## 2019-09-14 RX ADMIN — DILTIAZEM HYDROCHLORIDE 10 MG: 5 INJECTION INTRAVENOUS at 16:55

## 2019-09-14 RX ADMIN — ASPIRIN 325 MG ORAL TABLET 325 MG: 325 PILL ORAL at 16:55

## 2019-09-14 RX ADMIN — PROPOFOL 88 MG: 10 INJECTION, EMULSION INTRAVENOUS at 20:11

## 2019-09-14 ASSESSMENT — ENCOUNTER SYMPTOMS
BACK PAIN: 0
BLURRED VISION: 0
PALPITATIONS: 1
MYALGIAS: 0
NERVOUS/ANXIOUS: 0
WEAKNESS: 0
SENSORY CHANGE: 0
CONSTIPATION: 0
PND: 0
EYE PAIN: 0
FEVER: 0
CLAUDICATION: 0
HEADACHES: 0
NECK PAIN: 0
COUGH: 0
HEMOPTYSIS: 0
ORTHOPNEA: 0
MEMORY LOSS: 0
VOMITING: 0
SORE THROAT: 0
SHORTNESS OF BREATH: 0
TREMORS: 0
TINGLING: 0
NAUSEA: 0
BLOOD IN STOOL: 0
DEPRESSION: 0
STRIDOR: 0
CHILLS: 0
DIZZINESS: 0
SPEECH CHANGE: 0
SPUTUM PRODUCTION: 0
PHOTOPHOBIA: 0
HEARTBURN: 0
DOUBLE VISION: 0

## 2019-09-14 ASSESSMENT — LIFESTYLE VARIABLES
EVER FELT BAD OR GUILTY ABOUT YOUR DRINKING: NO
TOTAL SCORE: 0
HAVE PEOPLE ANNOYED YOU BY CRITICIZING YOUR DRINKING: NO
HAVE YOU EVER FELT YOU SHOULD CUT DOWN ON YOUR DRINKING: NO
TOTAL SCORE: 0
EVER HAD A DRINK FIRST THING IN THE MORNING TO STEADY YOUR NERVES TO GET RID OF A HANGOVER: NO
EVER_SMOKED: YES
ALCOHOL_USE: NO
ON A TYPICAL DAY WHEN YOU DRINK ALCOHOL HOW MANY DRINKS DO YOU HAVE: 0
CONSUMPTION TOTAL: NEGATIVE
HOW MANY TIMES IN THE PAST YEAR HAVE YOU HAD 5 OR MORE DRINKS IN A DAY: 0
AVERAGE NUMBER OF DAYS PER WEEK YOU HAVE A DRINK CONTAINING ALCOHOL: 0
TOTAL SCORE: 0

## 2019-09-14 ASSESSMENT — COGNITIVE AND FUNCTIONAL STATUS - GENERAL
MOBILITY SCORE: 24
DAILY ACTIVITIY SCORE: 24
SUGGESTED CMS G CODE MODIFIER DAILY ACTIVITY: CH
SUGGESTED CMS G CODE MODIFIER MOBILITY: CH

## 2019-09-14 ASSESSMENT — PATIENT HEALTH QUESTIONNAIRE - PHQ9
2. FEELING DOWN, DEPRESSED, IRRITABLE, OR HOPELESS: NOT AT ALL
SUM OF ALL RESPONSES TO PHQ9 QUESTIONS 1 AND 2: 0
1. LITTLE INTEREST OR PLEASURE IN DOING THINGS: NOT AT ALL

## 2019-09-14 NOTE — ED TRIAGE NOTES
"Presents complaining of hypertension and tachycardia escalating since earlier this afternoon.  He was at the air races, when he felt his heart rate increasing without any apparent precipitators.  He denies chest pain or any associated dyspnea.  Chief Complaint   Patient presents with   • Hypertension   • Tachycardia     /68   Pulse 75   Temp 36.7 °C (98.1 °F) (Temporal)   Resp 17   Ht 1.778 m (5' 10\")   Wt 88 kg (194 lb 0.1 oz)   SpO2 96%   BMI 27.84 kg/m²     "

## 2019-09-14 NOTE — ED PROVIDER NOTES
ED Provider Note    CHIEF COMPLAINT  Chief Complaint   Patient presents with   • Hypertension   • Tachycardia       HPI  Damian Larsen is a 79 y.o. male who presents with irregular heartbeat.  He was at the air races today and felt dizzy sat down he went to the EMS tent in the noted he had a low blood pressure of 90/54 and improved.  His heart rate was normal at the time.  His wife took him home and recorded blood pressures which did normalize but he did have a progressively increasing heart rate with palpitation chest pressure and came in for evaluation.  He has no radiation of pressure pain in the arm neck or back no abdominal pain no nausea or vomiting no shortness of breath no diaphoresis he felt hot out there but it is a hot day.  He did have a non-STEMI MI in 2 years ago from sepsis.  Has a history of hypertension.  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   • Cholecystitis June 2017   • Cholecystitis with cholelithiasis 2017   • Glaucoma     bilateral   • HTN (hypertension)    • Non-STEMI (non-ST elevated myocardial infarction) (HCC) 06/13/2017    Secondary to sepsis   • Sepsis(995.91) June 2017   • Skin cancer 1987   • Smoker      Previous smoke       FAMILY HISTORY  History reviewed. No pertinent family history.    SOCIAL HISTORY  Social History     Socioeconomic History   • Marital status:      Spouse name: Not on file   • Number of children: Not on file   • Years of education: Not on file   • Highest education level: Not on file   Occupational History   • Not on file   Social Needs   • Financial resource strain: Not on file   • Food insecurity:     Worry: Not on file     Inability: Not on file   • Transportation needs:     Medical: Not on file     Non-medical: Not on file   Tobacco Use   • Smoking status: Former Smoker     Packs/day: 0.50  "    Years: 20.00     Pack years: 10.00     Types: Cigarettes, Pipe     Last attempt to quit: 1987     Years since quittin.2   • Smokeless tobacco: Never Used   Substance and Sexual Activity   • Alcohol use: No   • Drug use: No   • Sexual activity: Not on file     Comment:    Lifestyle   • Physical activity:     Days per week: Not on file     Minutes per session: Not on file   • Stress: Not on file   Relationships   • Social connections:     Talks on phone: Not on file     Gets together: Not on file     Attends Bahai service: Not on file     Active member of club or organization: Not on file     Attends meetings of clubs or organizations: Not on file     Relationship status: Not on file   • Intimate partner violence:     Fear of current or ex partner: Not on file     Emotionally abused: Not on file     Physically abused: Not on file     Forced sexual activity: Not on file   Other Topics Concern   • Not on file   Social History Narrative   • Not on file       SURGICAL HISTORY  Past Surgical History:   Procedure Laterality Date   • COLONOSCOPY  2019    Procedure: COLONOSCOPY - W/POSS BX, DILATION, POLYPECTOMY, CONTROL OF HEMORRHAGE, W/ENDOSCOPIC MUCOSAL RESECTION;  Surgeon: Will Alvarenga M.D.;  Location: SURGERY [facility];  Service: EUS   • ENDOSCOPY-ESOPH/STOMACH  2019    with colonoscopy   • REJI BY LAPAROSCOPY N/A 2017    Procedure: REJI BY LAPAROSCOPY;  Surgeon: Kae Last M.D.;  Location: SURGERY [facility];  Service:        CURRENT MEDICATIONS  Home Medications    **Home medications have not yet been reviewed for this encounter**         ALLERGIES  No Known Allergies    PHYSICAL EXAM  VITAL SIGNS: /68   Pulse (!) 130   Temp 36.7 °C (98.1 °F) (Temporal)   Resp 17   Ht 1.778 m (5' 10\")   Wt 88 kg (194 lb 0.1 oz)   SpO2 96%   BMI 27.84 kg/m²     Constitutional: Patient is alert and oriented x3 in no distress   HENT: Moist mucous membranes  Eyes:  "  No conjunctivitis  Neck: Trachea is midline  Lymphatic: Negative anterior cervical lymphadenopathy  Cardiovascular: Tachycardic irregularly irregular no murmur  Thorax & Lungs: Clear to auscultation  Back: No CVA tenderness  Abdomen: Soft nontender  Neurologic: Normal motor sensation  Extremities: No pretibial edema  Psychiatric: Affect normal, Judgment normal, Mood normal.   Skin: Diaphoretic mildly  Results for orders placed or performed during the hospital encounter of 19   CBC w/ Differential   Result Value Ref Range    WBC 13.7 (H) 4.8 - 10.8 K/uL    RBC 3.85 (L) 4.70 - 6.10 M/uL    Hemoglobin 12.5 (L) 14.0 - 18.0 g/dL    Hematocrit 36.5 (L) 42.0 - 52.0 %    MCV 94.8 81.4 - 97.8 fL    MCH 32.5 27.0 - 33.0 pg    MCHC 34.2 33.7 - 35.3 g/dL    RDW 45.4 35.9 - 50.0 fL    Platelet Count 262 164 - 446 K/uL    MPV 9.4 9.0 - 12.9 fL    Neutrophils-Polys 74.20 (H) 44.00 - 72.00 %    Lymphocytes 18.00 (L) 22.00 - 41.00 %    Monocytes 6.80 0.00 - 13.40 %    Eosinophils 0.10 0.00 - 6.90 %    Basophils 0.30 0.00 - 1.80 %    Immature Granulocytes 0.60 0.00 - 0.90 %    Nucleated RBC 0.00 /100 WBC    Neutrophils (Absolute) 10.20 (H) 1.82 - 7.42 K/uL    Lymphs (Absolute) 2.47 1.00 - 4.80 K/uL    Monos (Absolute) 0.94 (H) 0.00 - 0.85 K/uL    Eos (Absolute) 0.01 0.00 - 0.51 K/uL    Baso (Absolute) 0.04 0.00 - 0.12 K/uL    Immature Granulocytes (abs) 0.08 0.00 - 0.11 K/uL    NRBC (Absolute) 0.00 K/uL   EKG   Result Value Ref Range    Report       [facility] Emergency Dept.    Test Date:  2019  Pt Name:    ANITHA RICHMOND                 Department: [facility]  MRN:        [id number]                      Room:       -ROOM 8  Gender:     Male                         Technician: ELYSIA PONCEB:        1940                   Requested By:ER TRIAGE PROTOCOL  Order #:    [id number]                    Reading MD: KHANH BRADEN MD    Measurements  Intervals                                Axis  Rate:       140                           P:  LA:                                      QRS:        56  QRSD:       80                           T:          -7  QT:         324  QTc:        495    Interpretive Statements  Atrial fibrillation rates 140 with a normal corrected QT interval QRS and  axis.  ST depressions noted anteriorly V2 through V6 new atrial fibrillation  compared to  May of this year    Electronically Signed On 9- 16:53:10 PDT by KHANH BRADEN MD          EKG after first cardioversion.  Atrial fibrillation 112 with normal corrected QT interval QRS and axis normal ST segments.      Procedure: #1    The patient was consented for conscious sedation and for cardioversion.  Conscious sedation note the patient's airway was assessed his pre-sedation physical was normal he is a low risk for airway difficulties.  He was then given 50 mg of propofol and subsequently given an 50 a second time and then once again the third time for a total of 150 mg of propofol.  There was no complication a cardioversion attempt was made which was unsuccessful.  Patient's respirations remain normal.  He remained in atrial fib.  I was at his bedside for 16 minutes.  He is alert and oriented without complaint or recall of the procedure    Cardioversion note.  After proper conscious sedation patient had a defibrillation attempt at 200 J this was unsuccessful.    Procedure #2:    The patient was consented for conscious sedation and for cardioversion.  Conscious sedation note the patient's airway was assessed his pre-sedation physical was normal he is a low risk for airway difficulties.  He was then given 75 mg of propofol.  There was no complication a cardioversion attempt was made which was unsuccessful.  Patient's respirations remain normal.  He remained in atrial fib.  I was at his bedside for 16 minutes.  He is alert and oriented without complaint or recall of the procedure    Cardioversion note.  After proper conscious sedation  patient had a defibrillation attempt at 200 J this was initially successful for cardioversion to a sinus mechanism for about 10 beats and then reverting back to atrial fibrillation    COURSE & MEDICAL DECISION MAKING  Pertinent Labs & Imaging studies reviewed. (See chart for details)  There is a good timing for the symptoms being less than 48 hours in fact it is closer to 4 hours.  I discussed the case with Dr. Lozada of cardiology has been elected to cardiovert the patient.  After consultation and conscious sedation that was done and was not successful.  Patient's rate control is adequate.  I did recontact Dr. Lozada he is requesting a repeat cardioversion if the patient is up for.  The patient would like to have that done.  Repeat cardioversion was only temporarily successful.  I did once again talked to Dr. Lozada.  There was some consideration with sending the patient home on metoprolol he did feel well however he is running a low pressure for a man his age with hypertension is can be placed on metoprolol do not feel like he is safe for outpatient treatment at this time.  Of contact the hospitalist for admission      FINAL IMPRESSION  1.   1. Atrial fibrillation, unspecified type (HCC)     2. Encounter for cardioversion procedure     Proceduralist sedation    2.   3.         Electronically signed by: John Cano, 9/14/2019 4:52 PM

## 2019-09-15 ENCOUNTER — APPOINTMENT (OUTPATIENT)
Dept: CARDIOLOGY | Facility: MEDICAL CENTER | Age: 79
DRG: 309 | End: 2019-09-15
Attending: HOSPITALIST
Payer: MEDICARE

## 2019-09-15 VITALS
TEMPERATURE: 98.8 F | RESPIRATION RATE: 20 BRPM | OXYGEN SATURATION: 97 % | SYSTOLIC BLOOD PRESSURE: 103 MMHG | WEIGHT: 196.65 LBS | HEART RATE: 73 BPM | DIASTOLIC BLOOD PRESSURE: 57 MMHG | HEIGHT: 70 IN | BODY MASS INDEX: 28.15 KG/M2

## 2019-09-15 LAB
ALBUMIN SERPL BCP-MCNC: 3.6 G/DL (ref 3.2–4.9)
ALBUMIN/GLOB SERPL: 1.6 G/DL
ALP SERPL-CCNC: 54 U/L (ref 30–99)
ALT SERPL-CCNC: 15 U/L (ref 2–50)
ANION GAP SERPL CALC-SCNC: 10 MMOL/L (ref 0–11.9)
AST SERPL-CCNC: 17 U/L (ref 12–45)
BASOPHILS # BLD AUTO: 0.4 % (ref 0–1.8)
BASOPHILS # BLD: 0.03 K/UL (ref 0–0.12)
BILIRUB SERPL-MCNC: 0.4 MG/DL (ref 0.1–1.5)
BUN SERPL-MCNC: 15 MG/DL (ref 8–22)
CALCIUM SERPL-MCNC: 8.5 MG/DL (ref 8.4–10.2)
CHLORIDE SERPL-SCNC: 101 MMOL/L (ref 96–112)
CO2 SERPL-SCNC: 25 MMOL/L (ref 20–33)
CREAT SERPL-MCNC: 0.88 MG/DL (ref 0.5–1.4)
EKG IMPRESSION: NORMAL
EOSINOPHIL # BLD AUTO: 0.01 K/UL (ref 0–0.51)
EOSINOPHIL NFR BLD: 0.1 % (ref 0–6.9)
ERYTHROCYTE [DISTWIDTH] IN BLOOD BY AUTOMATED COUNT: 44.6 FL (ref 35.9–50)
GLOBULIN SER CALC-MCNC: 2.3 G/DL (ref 1.9–3.5)
GLUCOSE SERPL-MCNC: 114 MG/DL (ref 65–99)
HCT VFR BLD AUTO: 33.1 % (ref 42–52)
HGB BLD-MCNC: 11.3 G/DL (ref 14–18)
IMM GRANULOCYTES # BLD AUTO: 0.04 K/UL (ref 0–0.11)
IMM GRANULOCYTES NFR BLD AUTO: 0.5 % (ref 0–0.9)
LV EJECT FRACT  99904: 70
LV EJECT FRACT MOD 2C 99903: 77.93
LV EJECT FRACT MOD 4C 99902: 63.23
LV EJECT FRACT MOD BP 99901: 71.87
LYMPHOCYTES # BLD AUTO: 1.96 K/UL (ref 1–4.8)
LYMPHOCYTES NFR BLD: 23.6 % (ref 22–41)
MCH RBC QN AUTO: 32.4 PG (ref 27–33)
MCHC RBC AUTO-ENTMCNC: 34.1 G/DL (ref 33.7–35.3)
MCV RBC AUTO: 94.8 FL (ref 81.4–97.8)
MONOCYTES # BLD AUTO: 1 K/UL (ref 0–0.85)
MONOCYTES NFR BLD AUTO: 12 % (ref 0–13.4)
NEUTROPHILS # BLD AUTO: 5.27 K/UL (ref 1.82–7.42)
NEUTROPHILS NFR BLD: 63.4 % (ref 44–72)
NRBC # BLD AUTO: 0 K/UL
NRBC BLD-RTO: 0 /100 WBC
PLATELET # BLD AUTO: 245 K/UL (ref 164–446)
PMV BLD AUTO: 9.7 FL (ref 9–12.9)
POTASSIUM SERPL-SCNC: 3.6 MMOL/L (ref 3.6–5.5)
PROT SERPL-MCNC: 5.9 G/DL (ref 6–8.2)
RBC # BLD AUTO: 3.49 M/UL (ref 4.7–6.1)
SODIUM SERPL-SCNC: 136 MMOL/L (ref 135–145)
WBC # BLD AUTO: 8.3 K/UL (ref 4.8–10.8)

## 2019-09-15 PROCEDURE — 93010 ELECTROCARDIOGRAM REPORT: CPT | Mod: 59 | Performed by: INTERNAL MEDICINE

## 2019-09-15 PROCEDURE — 93005 ELECTROCARDIOGRAM TRACING: CPT | Performed by: INTERNAL MEDICINE

## 2019-09-15 PROCEDURE — 93306 TTE W/DOPPLER COMPLETE: CPT | Mod: 26 | Performed by: INTERNAL MEDICINE

## 2019-09-15 PROCEDURE — 93306 TTE W/DOPPLER COMPLETE: CPT

## 2019-09-15 PROCEDURE — 99239 HOSP IP/OBS DSCHRG MGMT >30: CPT | Performed by: INTERNAL MEDICINE

## 2019-09-15 PROCEDURE — 80053 COMPREHEN METABOLIC PANEL: CPT

## 2019-09-15 PROCEDURE — 85025 COMPLETE CBC W/AUTO DIFF WBC: CPT

## 2019-09-15 PROCEDURE — 700102 HCHG RX REV CODE 250 W/ 637 OVERRIDE(OP): Performed by: HOSPITALIST

## 2019-09-15 PROCEDURE — 99222 1ST HOSP IP/OBS MODERATE 55: CPT | Performed by: INTERNAL MEDICINE

## 2019-09-15 PROCEDURE — A9270 NON-COVERED ITEM OR SERVICE: HCPCS | Performed by: HOSPITALIST

## 2019-09-15 PROCEDURE — 93005 ELECTROCARDIOGRAM TRACING: CPT

## 2019-09-15 RX ORDER — HYDROCHLOROTHIAZIDE 25 MG/1
25 TABLET ORAL DAILY
Status: ON HOLD | COMMUNITY
End: 2019-09-15

## 2019-09-15 RX ORDER — LISINOPRIL 40 MG/1
40 TABLET ORAL DAILY
Status: ON HOLD | COMMUNITY
End: 2019-09-15

## 2019-09-15 RX ORDER — HYDRALAZINE HYDROCHLORIDE 10 MG/1
10 TABLET, FILM COATED ORAL EVERY 8 HOURS
Status: DISCONTINUED | OUTPATIENT
Start: 2019-09-15 | End: 2019-09-15 | Stop reason: HOSPADM

## 2019-09-15 RX ORDER — HYDRALAZINE HYDROCHLORIDE 50 MG/1
50 TABLET, FILM COATED ORAL 3 TIMES DAILY
Status: ON HOLD | COMMUNITY
End: 2019-09-15

## 2019-09-15 RX ORDER — LISINOPRIL 20 MG/1
20 TABLET ORAL DAILY
Qty: 30 TAB | Refills: 1 | Status: SHIPPED | OUTPATIENT
Start: 2019-09-16 | End: 2020-01-01

## 2019-09-15 RX ORDER — LISINOPRIL 20 MG/1
20 TABLET ORAL DAILY
Status: DISCONTINUED | OUTPATIENT
Start: 2019-09-16 | End: 2019-09-15 | Stop reason: HOSPADM

## 2019-09-15 RX ORDER — AMLODIPINE BESYLATE 10 MG/1
10 TABLET ORAL DAILY
Status: ON HOLD | COMMUNITY
End: 2019-09-15

## 2019-09-15 RX ORDER — HYDRALAZINE HYDROCHLORIDE 10 MG/1
10 TABLET, FILM COATED ORAL 3 TIMES DAILY
Qty: 90 TAB | Refills: 1 | Status: SHIPPED | OUTPATIENT
Start: 2019-09-15 | End: 2020-01-01

## 2019-09-15 RX ADMIN — HYDRALAZINE HYDROCHLORIDE 50 MG: 25 TABLET ORAL at 06:13

## 2019-09-15 RX ADMIN — LISINOPRIL 40 MG: 20 TABLET ORAL at 06:14

## 2019-09-15 RX ADMIN — METOPROLOL TARTRATE 25 MG: 25 TABLET ORAL at 06:14

## 2019-09-15 RX ADMIN — OMEPRAZOLE 20 MG: 20 CAPSULE, DELAYED RELEASE ORAL at 06:13

## 2019-09-15 ASSESSMENT — ENCOUNTER SYMPTOMS
PALPITATIONS: 1
VOMITING: 0
COUGH: 0
FEVER: 0
WHEEZING: 0
BRUISES/BLEEDS EASILY: 0
NAUSEA: 0
HEMOPTYSIS: 0
CHILLS: 0
LOSS OF CONSCIOUSNESS: 0
EYE PAIN: 0
BLURRED VISION: 0
ABDOMINAL PAIN: 0
MYALGIAS: 0
SPEECH CHANGE: 0
EYE DISCHARGE: 0
NERVOUS/ANXIOUS: 0
DEPRESSION: 0

## 2019-09-15 ASSESSMENT — CHA2DS2 SCORE
CHF OR LEFT VENTRICULAR DYSFUNCTION: NO
HYPERTENSION: YES
SEX: MALE
AGE 75 OR GREATER: YES
AGE 65 TO 74: NO
CHA2DS2 VASC SCORE: 3
DIABETES: NO
PRIOR STROKE OR TIA OR THROMBOEMBOLISM: NO
VASCULAR DISEASE: NO

## 2019-09-15 NOTE — PROGRESS NOTES
Telemetry Shift Summary    Rhythm SR  HR Range 60s-70s  Ectopy none  Measurements 0.14/0.10/0.42        Normal Values  Rhythm SR  HR Range    Measurements 0.12-0.20 / 0.06-0.10  / 0.30-0.52

## 2019-09-15 NOTE — DISCHARGE PLANNING
Called [facility] pharmacy to verify insurance coverage for xarelto. Per pharmacy, rx went through insurance for $47.

## 2019-09-15 NOTE — PROGRESS NOTES
Med rec updated and complete  Allergies reviewed  Interviewed pt with wife at bedside with permission from pt.  Pts wife had a list of medications, went over list of medications and returned list of medications back to pts wife.  Pt reports no antibiotics in the last 2 weeks

## 2019-09-15 NOTE — CARE PLAN
Problem: Safety  Goal: Will remain free from injury  Outcome: PROGRESSING AS EXPECTED  Note:   Bed in low position, break on, treaded socks on, call light in reach  Goal: Will remain free from falls  Outcome: PROGRESSING AS EXPECTED     Problem: Knowledge Deficit  Goal: Knowledge of disease process/condition, treatment plan, diagnostic tests, and medications will improve  Outcome: PROGRESSING AS EXPECTED  Note:   Education provided on afib disease process, medications, labs and management     Problem: Cardiac:  Goal: Ability to maintain an adequate cardiac output will improve  Outcome: PROGRESSING AS EXPECTED  Goal: Complications related to the disease process, condition or treatment will be avoided or minimized  Outcome: PROGRESSING AS EXPECTED     Problem: Mobility:  Goal: Ability to tolerate increased activity will improve  Outcome: PROGRESSING AS EXPECTED  Goal: Ability to participate in self-care as condition permits will improve  Outcome: PROGRESSING AS EXPECTED     Problem: Knowledge Deficit:  Goal: Knowledge of disease process/condition, treatment plan, diagnostic tests, and medications will improve  Outcome: PROGRESSING AS EXPECTED  Note:   Education provided on afib disease process, medications, labs and management     Problem: Cardiac:  Goal: No new evidence of cardiac arrhythmia  Outcome: MET  Note:   Converted back to SR

## 2019-09-15 NOTE — H&P
Hospital Medicine History & Physical Note    Date of Service  9/14/2019    Primary Care Physician  Grover Morillo M.D.    Consultants   by phone    Code Status  Full Code    Chief Complaint  Chief Complaint   Patient presents with   • Hypertension   • Tachycardia       History of Presenting Illness  Chava henry a very pleasant 79 y.o. male with a past medical history of prior NSTEMI, HTN, hx of glaucoma,  presented to the emergency room on 9/14/2019 for evaluation of feeling of dizziness as well as irregular heartbeat.  Patient was at the air races today and during this event patient felt very dizzy and had to sit down.  Luckily EMS was called and upon taking his blood pressure was noted to be in a 90 systolic although his heart rate was regular at the time.  Never was patient was taken to the emergency room for further evaluation.  Upon my evaluation patient was noted to be in atrial fibrillation with rapid ventricular response. Two cardioversion's attempts in the the ER were unsuccessful.  At this point ERP discussed case with cardiology recommended initiation of AV agus blocking medications and anticoagulation, with outpatient follow-up.  However after initiating metoprolol 50 mg patient did have mild hypotension with systolic blood pressure in the mid 90s.  As result patient will be admitted for further heart rate control and titration of medications.  Patient otherwise denies any chest pain shortness of breath or nausea vomiting.        Review of Systems  Review of Systems   Constitutional: Negative for chills, fever and malaise/fatigue.   HENT: Negative for congestion, hearing loss, sore throat and tinnitus.    Eyes: Negative for blurred vision, double vision, photophobia and pain.   Respiratory: Negative for cough, hemoptysis, sputum production, shortness of breath and stridor.    Cardiovascular: Positive for palpitations. Negative for chest pain, orthopnea, claudication and PND.   Gastrointestinal:  Negative for blood in stool, constipation, heartburn, melena, nausea and vomiting.   Genitourinary: Negative for dysuria, frequency and urgency.   Musculoskeletal: Negative for back pain, myalgias and neck pain.   Neurological: Negative for dizziness, tingling, tremors, sensory change, speech change, weakness and headaches.   Psychiatric/Behavioral: Negative for depression, memory loss and suicidal ideas. The patient is not nervous/anxious.    All other systems reviewed and are negative.      Past Medical History  Past Medical History:   Diagnosis Date   • Non-STEMI (non-ST elevated myocardial infarction) (HCC) 06/13/2017    Secondary to sepsis   • Sepsis(995.91) June 2017   • Cholecystitis June 2017   • Cholecystitis with cholelithiasis 2017   • Skin cancer 1987   • Cancer (HCC) 1987    basal skin removed at office.   • Glaucoma     bilateral   • HTN (hypertension)    • Smoker      Previous smoke       Surgical History   has a past surgical history that includes brook by laparoscopy (N/A, 8/22/2017); endoscopy-esoph/stomach (04/01/2019); and colonoscopy (5/30/2019).    Family History  Denies family history of heart diease      Social History   reports that he quit smoking about 32 years ago. His smoking use included cigarettes and pipe. He has a 10.00 pack-year smoking history. He has never used smokeless tobacco. He reports that he does not drink alcohol or use drugs.    Allergies  No Known Allergies    Medications  Prior to Admission medications    Medication Sig Start Date End Date Taking? Authorizing Provider   chlorpheniramine (CHLOR-TRIMETON) 4 MG Tab Take 4 mg by mouth every 6 hours as needed for Allergies.    Physician Outpatient   hydroCHLOROthiazide (HYDRODIURIL) 25 MG Tab Take 1 Tab by mouth every day. Contact PCP for further refills. Thank you 12/5/18   Giselle Briones, A.P.N.   amLODIPine (NORVASC) 10 MG Tab Take 1 Tab by mouth every day. Contact PCP for further refills. Thank you 12/4/18   Giselle Briones A.P.N.    lisinopril (PRINIVIL, ZESTRIL) 40 MG tablet Take 1 Tab by mouth every day. Contact PCP for further refills. Thank you 12/4/18   LONNIE MendosaPJOSE MARTIN.   hydrALAZINE (APRESOLINE) 50 MG Tab TAKE 1 TABLET BY MOUTH EVERY 8 HOURS 8/13/18   DOROTHY Mendosa.   latanoprost (XALATAN) 0.005 % Solution Place 1 Drop in both eyes every evening.    Physician Outpatient   omeprazole (PRILOSEC) 20 MG delayed-release capsule Take 20 mg by mouth every day.    Nn Emergency Md Per Protocol, MKENJI   ascorbic acid (ASCORBIC ACID) 500 MG Tab Take 500 mg by mouth every day.    Physician Outpatient   vitamin D (CHOLECALCIFEROL) 1000 UNIT Tab Take 1,000 Units by mouth every day.    Physician Outpatient   cyanocobalamin (VITAMIN B-12) 100 MCG Tab Take 100 mcg by mouth every day.    Physician Outpatient   vitamin e (VITAMIN E) 400 UNIT Cap Take 400 Units by mouth every day.    Physician Outpatient   therapeutic multivitamin-minerals (THERAGRAN-M) Tab Take 1 Tab by mouth every day.    Physician Outpatient   Omega-3 Fatty Acids (FISH OIL) 1000 MG Cap capsule Take 1,000 mg by mouth every day.    Physician Outpatient   aspirin (ASA) 81 MG Chew Tab chewable tablet Take 81 mg by mouth every day.    Physician Outpatient   loratadine (CLARITIN) 10 MG Tab Take 10 mg by mouth every day.    Physician Outpatient       Physical Exam  Temp:  [36.7 °C (98.1 °F)] 36.7 °C (98.1 °F)  Pulse:  [] 88  Resp:  [11-38] 19  BP: ()/(49-76) 90/57  SpO2:  [94 %-99 %] 96 %  Physical Exam   Constitutional: He is oriented to person, place, and time. He appears well-developed and well-nourished. No distress.   HENT:   Head: Normocephalic and atraumatic.   Mouth/Throat: No oropharyngeal exudate.   Eyes: Pupils are equal, round, and reactive to light. Conjunctivae are normal. Right eye exhibits no discharge. No scleral icterus.   Neck: Neck supple. No JVD present. No thyromegaly present.   Cardiovascular: Normal rate and intact distal pulses.   No murmur  heard.  Pulses:       Dorsalis pedis pulses are 2+ on the right side, and 2+ on the left side.   Cap refill < 3 s  HR goes from regular to Irregularly irregular.     Pulmonary/Chest: Effort normal and breath sounds normal. No stridor. No respiratory distress. He has no wheezes. He has no rales.   Abdominal: Soft. Bowel sounds are normal. He exhibits no distension. There is no tenderness. There is no rebound.   Musculoskeletal: Normal range of motion. He exhibits no edema.   Neurological: He is alert and oriented to person, place, and time. No cranial nerve deficit.   Skin: Skin is warm and dry. He is not diaphoretic. No erythema.   Psychiatric: He has a normal mood and affect. His behavior is normal. Thought content normal.   Nursing note and vitals reviewed.      Laboratory:  Recent Labs     09/14/19  1620   WBC 13.7*   RBC 3.85*   HEMOGLOBIN 12.5*   HEMATOCRIT 36.5*   MCV 94.8   MCH 32.5   MCHC 34.2   RDW 45.4   PLATELETCT 262   MPV 9.4     Recent Labs     09/14/19  1620   SODIUM 131*   POTASSIUM 3.9   CHLORIDE 92*   CO2 22   GLUCOSE 177*   BUN 18   CREATININE 1.13   CALCIUM 8.8     Recent Labs     09/14/19  1620   ALTSGPT 20   ASTSGOT 21   ALKPHOSPHAT 67   TBILIRUBIN 0.3   GLUCOSE 177*     Recent Labs     09/14/19  1745   APTT 30.5   INR 0.98           Urinalysis:          Imaging:  EC-ECHOCARDIOGRAM COMPLETE W/O CONT    (Results Pending)       Assessment/Plan:  I anticipate this patient will require at least two midnights for appropriate medical management, necessitating inpatient admission.    * Atrial fibrillation with RVR (HCC)- (present on admission)  Assessment & Plan  Has had Afib on last admission due to sepsis, but was not treated given that it resolved  Cardioversion attempt x2 failed in the ER>  ERP discussed case with  who recommended rate control and anticougulation.   Metoprolol 25mg BID started  IV lopressor PRN if HR sustained >130s  Check TSH  Xarelto started   Check echocardiogram      VIJAY (acute kidney injury) (HCC)  Assessment & Plan  Creatinine mildly elevated from baseline.  IV fluids started  Recheck bmp in the am    Hyponatremia  Assessment & Plan  Serum sodium is 131  IV fluids started  BMP in the am    Hypertension- (present on admission)  Assessment & Plan  We will hold his HCTZ  Resume lisinopril and norvasc, hydralazine, may need to adjust doses to accommodate for metoprolol if patient remains hypotensive   remains hypotensive      VTE prophylaxis: Prophylaxis: rivaroxaban

## 2019-09-15 NOTE — CARE PLAN
Problem: Communication  Goal: The ability to communicate needs accurately and effectively will improve  Outcome: PROGRESSING AS EXPECTED  Intervention: [place] patient and significant other/support system to call light to alert staff of needs  Note:   Patient oriented to call light system and all relevant hospital policies. Call light within reach and used appropriately when in need of assistance.       Problem: Infection  Goal: Will remain free from infection  Outcome: PROGRESSING AS EXPECTED  Intervention: Implement standard precautions and perform hand washing before and after patient contact  Note:   Standard precautions and hand hygiene practices implemented before and after patient room entry. Gloves worn during times of patient contact.

## 2019-09-15 NOTE — ASSESSMENT & PLAN NOTE
Has had Afib on last admission due to sepsis, but was not treated given that it resolved  Cardioversion attempt x2 failed in the ER>  ERP discussed case with  who recommended rate control and anticougulation.   Metoprolol 25mg BID started  IV lopressor PRN if HR sustained >130s  Check TSH  Xarelto started   Check echocardiogram

## 2019-09-15 NOTE — PROGRESS NOTES
Pt discharged to home. Discharge instructions provided to pt. Pt verbalizes understanding. Pt states all questions have been answered. Signed copy in chart. Prescriptions sent to [facility]. Pt states that all personal belongings are in possession. Pt off unit via self, escorted by wife.

## 2019-09-15 NOTE — PROGRESS NOTES
2 RN skin check completed with Beau DILLARD. Pt independent/ambulatory and has no wounds/skin breakdown noted.

## 2019-09-15 NOTE — ASSESSMENT & PLAN NOTE
We will hold his HCTZ  Resume lisinopril and norvasc, hydralazine, may need to adjust doses to accommodate for metoprolol if patient remains hypotensive

## 2019-09-15 NOTE — RESPIRATORY CARE
Conscious Sedation Respiratory       02 to 4L for procedure, Sp02 in the high 90s - ETC02 in the low 20s t/o. Pt tolerated well.

## 2019-09-15 NOTE — DISCHARGE INSTRUCTIONS
Discharge Instructions    Discharged to home by car with relative. Discharged via wheelchair, hospital escort: Yes.  Special equipment needed: Not Applicable    Be sure to schedule a follow-up appointment with your primary care doctor or any specialists as instructed.     Discharge Plan:   Diet Plan: Discussed  Activity Level: Discussed  Confirmed Follow up Appointment: Patient to Call and Schedule Appointment  Confirmed Symptoms Management: Discussed  Medication Reconciliation Updated: Yes  Influenza Vaccine Indication: Patient Refuses    I understand that a diet low in cholesterol, fat, and sodium is recommended for good health. Unless I have been given specific instructions below for another diet, I accept this instruction as my diet prescription.   Other diet: Regular    Special Instructions: None    · Is patient discharged on Warfarin / Coumadin?   No     Atrial Fibrillation  Atrial fibrillation is a type of irregular or rapid heartbeat (arrhythmia). In atrial fibrillation, the heart quivers continuously in a chaotic pattern. This occurs when parts of the heart receive disorganized signals that make the heart unable to pump blood normally. This can increase the risk for stroke, heart failure, and other heart-related conditions. There are different types of atrial fibrillation, including:  · Paroxysmal atrial fibrillation. This type starts suddenly, and it usually stops on its own shortly after it starts.  · Persistent atrial fibrillation. This type often lasts longer than a week. It may stop on its own or with treatment.  · Long-lasting persistent atrial fibrillation. This type lasts longer than 12 months.  · Permanent atrial fibrillation. This type does not go away.  Talk with your health care provider to learn about the type of atrial fibrillation that you have.  What are the causes?  This condition is caused by some heart-related conditions or procedures, including:  · A heart attack.  · Coronary artery  disease.  · Heart failure.  · Heart valve conditions.  · High blood pressure.  · Inflammation of the sac that surrounds the heart (pericarditis).  · Heart surgery.  · Certain heart rhythm disorders, such as Howell-Parkinson-White syndrome.  Other causes include:  · Pneumonia.  · Obstructive sleep apnea.  · Blockage of an artery in the lungs (pulmonary embolism, or PE).  · Lung cancer.  · Chronic lung disease.  · Thyroid problems, especially if the thyroid is overactive (hyperthyroidism).  · Caffeine.  · Excessive alcohol use or illegal drug use.  · Use of some medicines, including certain decongestants and diet pills.  Sometimes, the cause cannot be found.  What increases the risk?  This condition is more likely to develop in:  · People who are older in age.  · People who smoke.  · People who have diabetes mellitus.  · People who are overweight (obese).  · Athletes who exercise vigorously.  What are the signs or symptoms?  Symptoms of this condition include:  · A feeling that your heart is beating rapidly or irregularly.  · A feeling of discomfort or pain in your chest.  · Shortness of breath.  · Sudden light-headedness or weakness.  · Getting tired easily during exercise.  In some cases, there are no symptoms.  How is this diagnosed?  Your health care provider may be able to detect atrial fibrillation when taking your pulse. If detected, this condition may be diagnosed with:  · An electrocardiogram (ECG).  · A Holter monitor test that records your heartbeat patterns over a 24-hour period.  · Transthoracic echocardiogram (TTE) to evaluate how blood flows through your heart.  · Transesophageal echocardiogram (GISELA) to view more detailed images of your heart.  · A stress test.  · Imaging tests, such as a CT scan or chest X-ray.  · Blood tests.  How is this treated?  The main goals of treatment are to prevent blood clots from forming and to keep your heart beating at a normal rate and rhythm. The type of treatment that  you receive depends on many factors, such as your underlying medical conditions and how you feel when you are experiencing atrial fibrillation.  This condition may be treated with:  · Medicine to slow down the heart rate, bring the heart’s rhythm back to normal, or prevent clots from forming.  · Electrical cardioversion. This is a procedure that resets your heart’s rhythm by delivering a controlled, low-energy shock to the heart through your skin.  · Different types of ablation, such as catheter ablation, catheter ablation with pacemaker, or surgical ablation. These procedures destroy the heart tissues that send abnormal signals. When the pacemaker is used, it is placed under your skin to help your heart beat in a regular rhythm.  Follow these instructions at home:  · Take over-the counter and prescription medicines only as told by your health care provider.  · If your health care provider prescribed a blood-thinning medicine (anticoagulant), take it exactly as told. Taking too much blood-thinning medicine can cause bleeding. If you do not take enough blood-thinning medicine, you will not have the protection that you need against stroke and other problems.  · Do not use tobacco products, including cigarettes, chewing tobacco, and e-cigarettes. If you need help quitting, ask your health care provider.  · If you have obstructive sleep apnea, manage your condition as told by your health care provider.  · Do not drink alcohol.  · Do not drink beverages that contain caffeine, such as coffee, soda, and tea.  · Maintain a healthy weight. Do not use diet pills unless your health care provider approves. Diet pills may make heart problems worse.  · Follow diet instructions as told by your health care provider.  · Exercise regularly as told by your health care provider.  · Keep all follow-up visits as told by your health care provider. This is important.  How is this prevented?  · Avoid drinking beverages that contain caffeine  or alcohol.  · Avoid certain medicines, especially medicines that are used for breathing problems.  · Avoid certain herbs and herbal medicines, such as those that contain ephedra or ginseng.  · Do not use illegal drugs, such as cocaine and amphetamines.  · Do not smoke.  · Manage your high blood pressure.  Contact a health care provider if:  · You notice a change in the rate, rhythm, or strength of your heartbeat.  · You are taking an anticoagulant and you notice increased bruising.  · You tire more easily when you exercise or exert yourself.  Get help right away if:  · You have chest pain, abdominal pain, sweating, or weakness.  · You feel nauseous.  · You notice blood in your vomit, bowel movement, or urine.  · You have shortness of breath.  · You suddenly have swollen feet and ankles.  · You feel dizzy.  · You have sudden weakness or numbness of the face, arm, or leg, especially on one side of the body.  · You have trouble speaking, trouble understanding, or both (aphasia).  · Your face or your eyelid droops on one side.  These symptoms may represent a serious problem that is an emergency. Do not wait to see if the symptoms will go away. Get medical help right away. Call your local emergency services (911 in the [place] Do not drive yourself to the hospital.   This information is not intended to replace advice given to you by your health care provider. Make sure you discuss any questions you have with your health care provider.  Document Released: 12/18/2006 Document Revised: 04/26/2017 Document Reviewed: 04/13/2016  [employer] Interactive Patient Education © 2017 [employer]    Echocardiogram  An echocardiogram, or echocardiography, uses sound waves (ultrasound) to produce an image of your heart. The echocardiogram is simple, painless, obtained within a short period of time, and offers valuable information to your health care provider. The images from an echocardiogram can provide information such as:  · Evidence  of coronary artery disease (CAD).  · Heart size.  · Heart muscle function.  · Heart valve function.  · Aneurysm detection.  · Evidence of a past heart attack.  · Fluid buildup around the heart.  · Heart muscle thickening.  · Assess heart valve function.  Tell a health care provider about:  · Any allergies you have.  · All medicines you are taking, including vitamins, herbs, eye drops, creams, and over-the-counter medicines.  · Any problems you or family members have had with anesthetic medicines.  · Any blood disorders you have.  · Any surgeries you have had.  · Any medical conditions you have.  · Whether you are pregnant or may be pregnant.  What happens before the procedure?  No special preparation is needed. Eat and drink normally.  What happens during the procedure?  · In order to produce an image of your heart, gel will be applied to your chest and a wand-like tool (transducer) will be moved over your chest. The gel will help transmit the sound waves from the transducer. The sound waves will harmlessly bounce off your heart to allow the heart images to be captured in real-time motion. These images will then be recorded.  · You may need an IV to receive a medicine that improves the quality of the pictures.  What happens after the procedure?  You may return to your normal schedule including diet, activities, and medicines, unless your health care provider tells you otherwise.  This information is not intended to replace advice given to you by your health care provider. Make sure you discuss any questions you have with your health care provider.  Document Released: 12/15/2001 Document Revised: 08/05/2017 Document Reviewed: 08/25/2014  [employer] Interactive Patient Education © 2017 [employer]        Depression / Suicide Risk    As you are discharged from this [facility] facility, it is important to learn how to keep safe from harming yourself.    Recognize the warning signs:  · Abrupt changes in personality,  positive or negative- including increase in energy   · Giving away possessions  · Change in eating patterns- significant weight changes-  positive or negative  · Change in sleeping patterns- unable to sleep or sleeping all the time   · Unwillingness or inability to communicate  · Depression  · Unusual sadness, discouragement and loneliness  · Talk of wanting to die  · Neglect of personal appearance   · Rebelliousness- reckless behavior  · Withdrawal from people/activities they love  · Confusion- inability to concentrate     If you or a loved one observes any of these behaviors or has concerns about self-harm, here's what you can do:  · Talk about it- your feelings and reasons for harming yourself  · Remove any means that you might use to hurt yourself (examples: pills, rope, extension cords, firearm)  · Get professional help from the community (Mental Health, Substance Abuse, psychological counseling)  · Do not be alone:Call your Safe Contact- someone whom you trust who will be there for you.  · Call your local CRISIS HOTLINE 239-6267 or 713-036-2011  · Call your local Children's Mobile Crisis Response Team [place] (262) 917-3486 or www.[employer]  · Call the toll free National Suicide Prevention Hotlines   · National Suicide Prevention Lifeline 481-214-JLJD (4089)  · National Hope Line Network 800-SUICIDE (148-1995)

## 2019-09-15 NOTE — PROGRESS NOTES
Pt arrived to floor in stable condition. Upon application of tele monitor, patient found to have converted into SR 80s on transport to floor. Denies any symptoms at this time. Will notify attending.

## 2019-09-15 NOTE — RESPIRATORY CARE
Conscious Sedation Respiratory Update       O2 (LPM): 3 (09/14/19 2009)   Pt tolerated procedure well - Sp02 in high 90s on 3L nc. ETC02 in the low 20s.

## 2019-09-15 NOTE — ED NOTES
Pt consented for cardioversion.  Pt has not questions at this time. NAD noted.  See vs flowsheet.

## 2019-09-15 NOTE — DISCHARGE SUMMARY
Discharge Summary    CHIEF COMPLAINT ON ADMISSION  Chief Complaint   Patient presents with   • Hypertension   • Tachycardia       Reason for Admission  high bloodpresure      Admission Date  9/14/2019    CODE STATUS  Full Code    HPI & HOSPITAL COURSE  This is a 79 y.o. male with a history of hypertension here with palpitations and elevated heart rate found to have atrial fibrillation with RVR.  He was started on Xarelto and cardiology was consulted.  He was started on oral metoprolol therapy and converted to normal sinus rhythm overnight.  His troponin was normal as was his BNP.  He underwent an echocardiogram that was unchanged from his previous showing a normal ejection fraction and an elevated RVSP.  His blood pressure was running in the low end of normal once metoprolol was added therefore his home hydralazine and lisinopril are both decreased and his home HCTZ was discontinued.  He will leave the hospital with metoprolol as well as Xarelto.  He will follow-up with Dr. Lozada of cardiology.       Therefore, he is discharged in good and stable condition to home with close outpatient follow-up.    The patient recovered much more quickly than anticipated on admission.    Discharge Date  9/15/2019    FOLLOW UP ITEMS POST DISCHARGE  Follow-up with Dr. Lozada of cardiology    DISCHARGE DIAGNOSES  Principal Problem:    Atrial fibrillation with RVR (HCC) POA: Yes  Active Problems:    Hypertension POA: Yes    Hyponatremia POA: Unknown    VIJAY (acute kidney injury) (HCC) POA: Unknown  Resolved Problems:    * No resolved hospital problems. *      FOLLOW UP  No future appointments.  Grover Morillo M.D.  [address] [facility] [facility]  [address]  [place] [id number] [phone]    Schedule an appointment as soon as possible for a visit in 1 week  1 week    Damian Lozada M.D.  [address]  [place] [id number] [phone]    Schedule an appointment as soon as possible for a visit in 1 week  1 week      MEDICATIONS ON DISCHARGE      Medication List      START taking these medications      Instructions   metoprolol 25 MG Tabs  Commonly known as:  LOPRESSOR   Take 1 Tab by mouth 2 Times a Day.  Dose:  25 mg     rivaroxaban 20 MG Tabs tablet  Commonly known as:  XARELTO   Take 1 Tab by mouth with dinner.  Dose:  20 mg        CHANGE how you take these medications      Instructions   hydrALAZINE 10 MG Tabs  What changed:    · medication strength  · how much to take  · when to take this  · Another medication with the same name was removed. Continue taking this medication, and follow the directions you see here.  Commonly known as:  APRESOLINE   Take 1 Tab by mouth 3 times a day.  Dose:  10 mg     lisinopril 20 MG Tabs  Start taking on:  9/16/2019  What changed:    · medication strength  · how much to take  · additional instructions  · Another medication with the same name was removed. Continue taking this medication, and follow the directions you see here.  Commonly known as:  PRINIVIL   Take 1 Tab by mouth every day.  Dose:  20 mg        CONTINUE taking these medications      Instructions   ascorbic acid 500 MG Tabs  Commonly known as:  ascorbic acid   Take 500 mg by mouth every day.  Dose:  500 mg     CHLOR-TRIMETON 4 MG Tabs  Generic drug:  chlorpheniramine   Take 4 mg by mouth every day.  Dose:  4 mg     cyanocobalamin 100 MCG Tabs  Commonly known as:  VITAMIN B-12   Take 100 mcg by mouth every day.  Dose:  100 mcg     fish oil 1000 MG Caps capsule   Take 1,000 mg by mouth every day.  Dose:  1,000 mg     latanoprost 0.005 % Soln  Commonly known as:  XALATAN   Place 1 Drop in both eyes every evening.  Dose:  1 Drop     loratadine 10 MG Tabs  Commonly known as:  CLARITIN   Take 10 mg by mouth every day.  Dose:  10 mg     omeprazole 20 MG delayed-release capsule  Commonly known as:  PRILOSEC   Take 20 mg by mouth every day.  Dose:  20 mg     therapeutic multivitamin-minerals Tabs   Take 1 Tab by mouth every day.  Dose:  1 Tab     vitamin D 1000  UNIT Tabs  Commonly known as:  cholecalciferol   Take 1,000 Units by mouth every day.  Dose:  1,000 Units     vitamin e 400 UNIT Caps  Commonly known as:  VITAMIN E   Take 400 Units by mouth every day.  Dose:  400 Units        STOP taking these medications    amLODIPine 10 MG Tabs  Commonly known as:  NORVASC     aspirin 81 MG Chew chewable tablet  Commonly known as:  ASA     aspirin EC 81 MG Tbec  Commonly known as:  ECOTRIN     B-12 PO     hydroCHLOROthiazide 25 MG Tabs  Commonly known as:  HYDRODIURIL            Allergies  No Known Allergies    DIET  Orders Placed This Encounter   Procedures   • Diet Order Cardiac     Standing Status:   Standing     Number of Occurrences:   1     Order Specific Question:   Diet:     Answer:   Cardiac [6]       ACTIVITY  As tolerated.  Weight bearing as tolerated    CONSULTATIONS  Dr. Lozada- Cardiology    PROCEDURES  None    LABORATORY  Lab Results   Component Value Date    SODIUM 136 09/15/2019    POTASSIUM 3.6 09/15/2019    CHLORIDE 101 09/15/2019    CO2 25 09/15/2019    GLUCOSE 114 (H) 09/15/2019    BUN 15 09/15/2019    CREATININE 0.88 09/15/2019        Lab Results   Component Value Date    WBC 8.3 09/15/2019    HEMOGLOBIN 11.3 (L) 09/15/2019    HEMATOCRIT 33.1 (L) 09/15/2019    PLATELETCT 245 09/15/2019        Total time of the discharge process exceeds 39 minutes.

## 2019-09-15 NOTE — ED NOTES
Pt is talking. Awake answers questions appropriately.  Cardioversion unsuccessful.  Continue to monitor closely.

## 2019-09-15 NOTE — CONSULTS
Cardiology consult    Requested by Dr. Treadwell      REASON FOR CONSULT: Atrial fibrillation with RVR    HPI: 71-year-old white male with history of previous sepsis and atrial fibrillation with a slight troponin rise negative thallium.  Came in after being dehydrated at diuresis and was in atrial for ablation with RVR was dizziness.  No chest pain.  History of hypertension denies diabetes no history of stroke or congestive heart failure.  Patient's chads vasc score is 3.  Placed on beta-blockers and has gone back into sinus rhythm.  Denies smoking or alcohol use.  Pain-free without shortness of breath.  On antihypertensive medications.  No family history of heart disease.  Thallium from 2017 unremarkable        MEDICATIONS:      Current Facility-Administered Medications:   •  hydrALAZINE (APRESOLINE) tablet 10 mg, 10 mg, Oral, Q8HRS, Kimberlee Givens D.O.  •  [START ON 9/16/2019] lisinopril (PRINIVIL) tablet 20 mg, 20 mg, Oral, DAILY, Kimberlee Givens D.O.  •  latanoprost (XALATAN) 0.005 % ophthalmic solution 1 Drop, 1 Drop, Both Eyes, Nightly, Martha Treadwell M.D.  •  omeprazole (PRILOSEC) capsule 20 mg, 20 mg, Oral, DAILY, Martha Treadwell M.D., 20 mg at 09/15/19 0613  •  senna-docusate (PERICOLACE or SENOKOT S) 8.6-50 MG per tablet 2 Tab, 2 Tab, Oral, BID **AND** polyethylene glycol/lytes (MIRALAX) PACKET 1 Packet, 1 Packet, Oral, QDAY PRN **AND** magnesium hydroxide (MILK OF MAGNESIA) suspension 30 mL, 30 mL, Oral, QDAY PRN **AND** bisacodyl (DULCOLAX) suppository 10 mg, 10 mg, Rectal, QDAY PRN, Martha Treadwell M.D.  •  acetaminophen (TYLENOL) tablet 650 mg, 650 mg, Oral, Q6HRS PRN, Martha Treadwell M.D.  •  ondansetron (ZOFRAN) syringe/vial injection 4 mg, 4 mg, Intravenous, Q4HRS PRN, Martha Treadwell M.D.  •  ondansetron (ZOFRAN ODT) dispertab 4 mg, 4 mg, Oral, Q4HRS PRN, Martha Treadwell M.D.  •  Metoprolol Tartrate (LOPRESSOR) injection 5 mg, 5 mg, Intravenous, Q5 MIN PRN, Martha  MAMADOU Treadwell  •  metoprolol (LOPRESSOR) tablet 25 mg, 25 mg, Oral, TWICE DAILY, Martha Treadwell M.D., 25 mg at 09/15/19 0614  •  rivaroxaban (XARELTO) tablet 20 mg, 20 mg, Oral, PM MEAL, John Cano M.D., 20 mg at 09/14/19 6823    ALLERGIES:   Mr. Larsen  has No Known Allergies.     SURGERIES:  Mr. Larsen   has a past surgical history that includes brook by laparoscopy (N/A, 8/22/2017); endoscopy-esoph/stomach (04/01/2019); and colonoscopy (5/30/2019).     MEDICAL ILLNESSES:  Mr. Larsen   has a past medical history of Cancer (HCC) (1987), Cholecystitis (June 2017), Cholecystitis with cholelithiasis (2017), Glaucoma, HTN (hypertension), Non-STEMI (non-ST elevated myocardial infarction) ([facility]) (06/13/2017), Sepsis(995.91) (June 2017), Skin cancer (1987), and Smoker ( ).     SOCIAL HISTORY:  Mr. Larsen   reports that he quit smoking about 32 years ago. His smoking use included cigarettes and pipe. He has a 10.00 pack-year smoking history. He has never used smokeless tobacco. He reports that he does not drink alcohol or use drugs.     FAMILY HISTORY:  Mr.'s Larsen  family history is not on file.      ROS:  Review of Systems   Constitutional: Negative for chills and fever.   HENT: Negative for congestion.    Eyes: Negative for blurred vision, pain and discharge.   Respiratory: Negative for cough, hemoptysis and wheezing.    Cardiovascular: Positive for palpitations. Negative for chest pain.   Gastrointestinal: Negative for abdominal pain, nausea and vomiting.   Musculoskeletal: Negative for joint pain and myalgias.   Skin: Negative for itching and rash.   Neurological: Negative for speech change and loss of consciousness.   Endo/Heme/Allergies: Does not bruise/bleed easily.   Psychiatric/Behavioral: Negative for depression. The patient is not nervous/anxious.    All other systems reviewed and are negative.    In addition to the above, a complete review of system was performed and all other organs systems were  "normal    PHYSICAL EXAM:  Physical Exam   Constitutional: He is oriented to person, place, and time and well-developed, well-nourished, and in no distress.   HENT:   Head: Normocephalic and atraumatic.   Mouth/Throat: Oropharynx is clear and moist.   Eyes: Pupils are equal, round, and reactive to light. EOM are normal.   Neck: Normal range of motion. Neck supple. No thyromegaly present.   Cardiovascular: Normal rate, regular rhythm, normal heart sounds and intact distal pulses. Exam reveals no gallop and no friction rub.   No murmur heard.  Pulmonary/Chest: Effort normal and breath sounds normal.   Abdominal: Soft. Bowel sounds are normal.   Musculoskeletal: Normal range of motion. He exhibits no edema or tenderness.   Neurological: He is alert and oriented to person, place, and time.   Skin: Skin is warm and dry. No rash noted.   Psychiatric: Mood, memory, affect and judgment normal.       /50   Pulse 68   Temp 36.8 °C (98.3 °F) (Oral)   Resp 20   Ht 1.778 m (5' 10\")   Wt 89.2 kg (196 lb 10.4 oz)   SpO2 96%   BMI 28.22 kg/m²      Lab Results   Component Value Date/Time    CHOLSTRLTOT 148 10/29/2018 08:40 AM    LDL 92 10/29/2018 08:40 AM    HDL 33 (A) 10/29/2018 08:40 AM    TRIGLYCERIDE 116 10/29/2018 08:40 AM       Lab Results   Component Value Date/Time    SODIUM 136 09/15/2019 03:24 AM    POTASSIUM 3.6 09/15/2019 03:24 AM    CHLORIDE 101 09/15/2019 03:24 AM    CO2 25 09/15/2019 03:24 AM    GLUCOSE 114 (H) 09/15/2019 03:24 AM    BUN 15 09/15/2019 03:24 AM    CREATININE 0.88 09/15/2019 03:24 AM     Lab Results   Component Value Date/Time    ALKPHOSPHAT 54 09/15/2019 03:24 AM    ASTSGOT 17 09/15/2019 03:24 AM    ALTSGPT 15 09/15/2019 03:24 AM    TBILIRUBIN 0.4 09/15/2019 03:24 AM      No results found for: BNPBTYPENAT      Recent Labs     09/14/19  1620 09/15/19  0324   WBC 13.7* 8.3   RBC 3.85* 3.49*   HEMOGLOBIN 12.5* 11.3*   HEMATOCRIT 36.5* 33.1*   MCV 94.8 94.8   MCH 32.5 32.4   MCHC 34.2 34.1 "   RDW 45.4 44.6   PLATELETCT 262 245   MPV 9.4 9.7   Echocardiogram from 6/15/2017  CONCLUSIONS  No prior study is available for comparison.   Left ventricular ejection fraction is visually estimated to be 60%.  Grade I diastolic dysfunction.  No valvular heart disease.  Estimated right ventricular systolic pressure  is 35 mmHg.    EKG: Atrial fibrillation with RVR, incomplete right bundle branch block  Results for orders placed or performed during the hospital encounter of 19   EKG   Result Value Ref Range    Report       [facility]    Test Date:  2019  Pt Name:    ANITHA RICHMOND                 Department: EDS  MRN:        [id number]                      Room:       [facility] 8  Gender:     Male                         Technician: ELYSIA  :        1940                   Requested By:ER TRIAGE PROTOCOL  Order #:    [id number]                    Reading MD: KHANH BRADEN MD    Measurements  Intervals                                Axis  Rate:       140                          P:  DE:                                      QRS:        56  QRSD:       80                           T:          -7  QT:         324  QTc:        495    Interpretive Statements  Atrial fibrillation rates 140 with a normal corrected QT interval QRS and  axis.  ST depressions noted anteriorly V2 through V6 new atrial fibrillation  compared to  May of this year    Electronically Signed On 2019 16:53:10 PDT by KHANH BRADEN MD     EKG   Result Value Ref Range    Report       [facility] Emergency Dept.    Test Date:  2019  Pt Name:    ANITHA RICHMOND                 Department: EDS  MRN:        [id number]                      Room:       [facility] 8  Gender:     Male                         Technician: ELYSIA  :        1940                   Requested By:ER TRIAGE PROTOCOL  Order #:    [id number]                    Reading MD:    Measurements  Intervals                                 Axis  Rate:       112                          P:  NC:                                      QRS:        44  QRSD:       95                           T:          11  QT:         358  QTc:        489    Interpretive Statements  Atrial fibrillation  RSR' in V1 or V2, right VCD or RVH  Borderline prolonged QT interval  Compared to ECG 2019 16:33:38  Right ventricular hypertrophy now present  RSR' in V1 or V2 now present  ST (T wave) deviation no longer present     EKG   Result Value Ref Range    Report       [facility] Emergency Dept.    Test Date:  2019  Pt Name:    ANITHA RICHMOND                 Department: MED  MRN:        [id number]                      Room:       3302  Gender:     Male                         Technician: ELYSIA  :        1940                   Requested By:ER TRIAGE PROTOCOL  Order #:    [id number]                    Reading MD:    Measurements  Intervals                                Axis  Rate:       133                          P:  NC:                                      QRS:        64  QRSD:       93                           T:          -4  QT:         331  QTc:        493    Interpretive Statements  Atrial fibrillation  Ventricular premature complex  RSR' in V1 or V2, right VCD or RVH  Borderline T abnormalities, inferior leads  Borderline prolonged QT interval  Compared to ECG 2019 16:33:38  Ventricular premature complex(es) now present  Right ventricular hypertrophy now present  RSR' in V1 or V2 now present  T-wave abnorm ality now present  ST (T wave) deviation no longer present         IMAGING:   EC-ECHOCARDIOGRAM COMPLETE W/O CONT    (Results Pending)         Patient Active Problem List    Diagnosis Date Noted   • Atrial fibrillation with RVR (HCC) 2017     Priority: High   • Hyponatremia 2019   • VIJAY (acute kidney injury) ([facility]) 2019   • Cholecystolithiasis 2017   • Hypertension 2017   •  Acute respiratory failure with hypoxia ([facility]) 06/15/2017   • Metabolic acidosis 06/15/2017   • Acute kidney injury (nontraumatic) ([facility]) 06/15/2017   • Bacteremia 06/15/2017   • Acute emphysematous cholecystitis 06/15/2017   • Hypotension (arterial) 06/15/2017   • Thrombocytopenia ([facility]) 06/15/2017   • Hypokalemia due to inadequate potassium intake 06/15/2017   • Sepsis(995.91) 06/14/2017   • Non-STEMI (non-ST elevated myocardial infarction) ([facility]) 06/14/2017         ASSESSMENT AND PLAN:   1.  Paroxysmal atrial fibrillation in the setting of dehydration.  Has history of hypertension and age over 76.  Recommendation to continue on NOAC.  Consider sleep study.  Beta-blockers.  2.  Follow-up in general cardiology.  3.  Hypertension as above.

## 2019-09-16 ENCOUNTER — PATIENT OUTREACH (OUTPATIENT)
Dept: HEALTH INFORMATION MANAGEMENT | Facility: OTHER | Age: 79
End: 2019-09-16

## 2019-09-20 ENCOUNTER — OFFICE VISIT (OUTPATIENT)
Dept: CARDIOLOGY | Facility: MEDICAL CENTER | Age: 79
End: 2019-09-20
Payer: MEDICARE

## 2019-09-20 VITALS
WEIGHT: 190.2 LBS | HEART RATE: 76 BPM | OXYGEN SATURATION: 96 % | BODY MASS INDEX: 27.23 KG/M2 | SYSTOLIC BLOOD PRESSURE: 136 MMHG | HEIGHT: 70 IN | DIASTOLIC BLOOD PRESSURE: 66 MMHG

## 2019-09-20 DIAGNOSIS — I10 ESSENTIAL HYPERTENSION: ICD-10-CM

## 2019-09-20 DIAGNOSIS — I48.91 ATRIAL FIBRILLATION WITH RVR (HCC): ICD-10-CM

## 2019-09-20 LAB — EKG IMPRESSION: NORMAL

## 2019-09-20 PROCEDURE — 93000 ELECTROCARDIOGRAM COMPLETE: CPT | Performed by: INTERNAL MEDICINE

## 2019-09-20 PROCEDURE — 99214 OFFICE O/P EST MOD 30 MIN: CPT | Performed by: NURSE PRACTITIONER

## 2019-09-20 RX ORDER — SODIUM, POTASSIUM,MAG SULFATES 17.5-3.13G
SOLUTION, RECONSTITUTED, ORAL ORAL
COMMUNITY
Start: 2019-09-18 | End: 2019-11-18

## 2019-09-20 RX ORDER — TRIAMCINOLONE ACETONIDE 1 MG/G
1 CREAM TOPICAL PRN
Refills: 0 | COMMUNITY
Start: 2019-07-19 | End: 2021-01-01

## 2019-09-20 ASSESSMENT — ENCOUNTER SYMPTOMS
CLAUDICATION: 0
ORTHOPNEA: 0
FEVER: 0
PND: 0
DIZZINESS: 0
CHILLS: 0
HEADACHES: 0
HEMOPTYSIS: 0
SHORTNESS OF BREATH: 0
PALPITATIONS: 0
WHEEZING: 0
VOMITING: 0
COUGH: 0
NAUSEA: 0
SPUTUM PRODUCTION: 0

## 2019-09-20 NOTE — PROGRESS NOTES
Chief Complaint   Patient presents with   • Hospital Follow-up       Subjective:   Geo Larsen is a 79 y.o. male who presents today after ani hospitalized 9/14-15/2019 for recently hospitalized zero 9/14-15/2019 for hypertension and tachycardia.  Patient felt he had an irregular heartbeat, felt dizzy. Was last seen 8/15/2017 by Giselle WAY.    He was at the air [place] and went to the EMS tent and found to have a blood pressure of 90/54 and his heart rate was normal at the time.  He had a progressively increasing heart rate and was evaluated in the emergency department and was found to be in A. fib with RVR.  2 cardioversions were attempted in the emergency department but were unsuccessful.  Patient was placed on metoprolol 25 mg twice daily and rivaroxaban 20 mg every evening for [id number] score of 3(age, HTN)    Patient has a history of HTN, NSTEMI, and glaucoma.    INR   Date Value Ref Range Status   09/14/2019 0.98 0.87 - 1.13 Final     Comment:     Please note new reference range as of 10/30/2014 10:00 AM  INR - Non-therapeutic Reference Range: 0.87-1.13  INR - Therapeutic Reference Range: 2.0-4.0       No results found for: POCINR     Past Medical History:   Diagnosis Date   • Cancer (HCC) 1987    basal skin removed at office.   • Cholecystitis June 2017   • Cholecystitis with cholelithiasis 2017   • Glaucoma     bilateral   • HTN (hypertension)    • Non-STEMI (non-ST elevated myocardial infarction) (HCC) 06/13/2017    Secondary to sepsis   • Sepsis(995.91) June 2017   • Skin cancer 1987   • Smoker      Previous smoke     Past Surgical History:   Procedure Laterality Date   • COLONOSCOPY  5/30/2019    Procedure: COLONOSCOPY - W/POSS BX, DILATION, POLYPECTOMY, CONTROL OF HEMORRHAGE, W/ENDOSCOPIC MUCOSAL RESECTION;  Surgeon: Will Alvarenga M.D.;  Location: SURGERY [facility];  Service: EUS   • ENDOSCOPY-ESOPH/STOMACH  04/01/2019    with colonoscopy   • REJI BY LAPAROSCOPY N/A 8/22/2017     Procedure: REJI BY LAPAROSCOPY;  Surgeon: Kae Last M.D.;  Location: SURGERY [facility];  Service:      History reviewed. No pertinent family history.  Social History     Socioeconomic History   • Marital status:      Spouse name: Not on file   • Number of children: Not on file   • Years of education: Not on file   • Highest education level: Not on file   Occupational History   • Not on file   Social Needs   • Financial resource strain: Not on file   • Food insecurity:     Worry: Not on file     Inability: Not on file   • Transportation needs:     Medical: Not on file     Non-medical: Not on file   Tobacco Use   • Smoking status: Former Smoker     Packs/day: 0.50     Years: 20.00     Pack years: 10.00     Types: Cigarettes, Pipe     Last attempt to quit: 1987     Years since quittin.2   • Smokeless tobacco: Never Used   Substance and Sexual Activity   • Alcohol use: No   • Drug use: No   • Sexual activity: Not on file     Comment:    Lifestyle   • Physical activity:     Days per week: Not on file     Minutes per session: Not on file   • Stress: Not on file   Relationships   • Social connections:     Talks on phone: Not on file     Gets together: Not on file     Attends Hinduism service: Not on file     Active member of club or organization: Not on file     Attends meetings of clubs or organizations: Not on file     Relationship status: Not on file   • Intimate partner violence:     Fear of current or ex partner: Not on file     Emotionally abused: Not on file     Physically abused: Not on file     Forced sexual activity: Not on file   Other Topics Concern   • Not on file   Social History Narrative   • Not on file     No Known Allergies  Outpatient Encounter Medications as of 2019   Medication Sig Dispense Refill   • SUPREP BOWEL PREP KIT 17.5-3.13-1.6 GM/177ML Solution      • triamcinolone acetonide (KENALOG) 0.1 % Cream Apply 1 Application to affected area(s) as needed.  "Apply as directed  0   • hydrALAZINE (APRESOLINE) 10 MG Tab Take 1 Tab by mouth 3 times a day. 90 Tab 1   • lisinopril (PRINIVIL) 20 MG Tab Take 1 Tab by mouth every day. 30 Tab 1   • metoprolol (LOPRESSOR) 25 MG Tab Take 1 Tab by mouth 2 Times a Day. 60 Tab 2   • rivaroxaban (XARELTO) 20 MG Tab tablet Take 1 Tab by mouth with dinner. 30 Tab 1   • latanoprost (XALATAN) 0.005 % Solution Place 1 Drop in both eyes every evening.     • omeprazole (PRILOSEC) 20 MG delayed-release capsule Take 20 mg by mouth every day.     • ascorbic acid (ASCORBIC ACID) 500 MG Tab Take 500 mg by mouth every day.     • vitamin D (CHOLECALCIFEROL) 1000 UNIT Tab Take 1,000 Units by mouth every day.     • cyanocobalamin (VITAMIN B-12) 100 MCG Tab Take 100 mcg by mouth every day.     • vitamin e (VITAMIN E) 400 UNIT Cap Take 400 Units by mouth every day.     • therapeutic multivitamin-minerals (THERAGRAN-M) Tab Take 1 Tab by mouth every day.     • Omega-3 Fatty Acids (FISH OIL) 1000 MG Cap capsule Take 1,000 mg by mouth every day.     • loratadine (CLARITIN) 10 MG Tab Take 10 mg by mouth every day.     • chlorpheniramine (CHLOR-TRIMETON) 4 MG Tab Take 4 mg by mouth every day.       No facility-administered encounter medications on file as of 9/20/2019.      Review of Systems   Constitutional: Negative for chills and fever.   Respiratory: Negative for cough, hemoptysis, sputum production, shortness of breath and wheezing.    Cardiovascular: Negative for chest pain, palpitations, orthopnea, claudication, leg swelling and PND.   Gastrointestinal: Negative for nausea and vomiting.   Neurological: Negative for dizziness and headaches.   All other systems reviewed and are negative.       Objective:   /66 (BP Location: Left arm, Patient Position: Sitting, BP Cuff Size: Adult)   Pulse 76   Ht 1.778 m (5' 10\")   Wt 86.3 kg (190 lb 3.2 oz)   SpO2 96%   BMI 27.29 kg/m²     Physical Exam   Constitutional: He appears well-developed and " well-nourished.   Eyes: EOM are normal.   Neck: Neck supple. No JVD present.   Cardiovascular: Normal rate, regular rhythm and normal heart sounds.   No murmur heard.  Pulmonary/Chest: Effort normal and breath sounds normal.   Abdominal: Soft.   Neurological:   CN II-XII grossly intact   Skin: Skin is warm and dry.   Psychiatric: He has a normal mood and affect. His behavior is normal. Judgment and thought content normal.   Nursing note and vitals reviewed.    Echocardiogram 9/14/2019  CONCLUSIONS  Normal left ventricular systolic function.  Left ventricular ejection fraction is visually estimated to be 70%.  Normal diastolic function.  The right ventricle was normal in size and function.  Mild tricuspid regurgitation.  Estimated right ventricular systolic pressure  is 45 mmHg.  Compared to the images of the prior study done 6/15/17  -  there has   been no significant change.     Cardiac stress test 8/9/2017   Myocardial Perfusion   Report   NUCLEAR IMAGING INTERPRETATION   Normal myocardial perfusion with no ischemia.   Normal left ventricular wall motion.  LV ejection fraction = 74%.   ECG INTERPRETATION   Negative stress ECG for ischemia.    Assessment:     1. Atrial fibrillation with RVR ([facility])  EKG   2. Essential hypertension         Medical Decision Making:  Today's Assessment / Status / Plan:   1.  Atrial fibrillation with RVR  -Continue metoprolol 25 mg twice daily and rivaroxaban 20 mg every evening  - Echo indicates EF 70%, and atria normal size    2.  HTN  -Well-controlled on hydralazine 10 mg 3 times daily, lisinopril 20 mg daily, metoprolol 25 mg twice daily      Collaborating MD is Dr. Blank.  Follow-up visit in 2-3 months with MD to establish care.  Patient knows to contact our office should he feel any palpitations or fluttering sensation in his chest.    Please note that this dictation was created using voice recognition software.  I have made every reasonable attempt to correct obvious errors, but  it is possible there are errors of grammar or possibly content that I did not discover before finalizing the note.

## 2019-09-25 ENCOUNTER — TELEPHONE (OUTPATIENT)
Dept: VASCULAR LAB | Facility: MEDICAL CENTER | Age: 79
End: 2019-09-25

## 2019-09-25 NOTE — TELEPHONE ENCOUNTER
Called patient establish care regarding anticoagulation referral for Xarelto due to Afib from Dr. Balderrama on 9/20/19    Insurance: SCP  PCP: non-[facility] - Grover Morillo M.D.  Locations to be seen: [facility] at [phone], fax [phone]    Erum Jackson, YomiD

## 2019-09-27 ENCOUNTER — PATIENT OUTREACH (OUTPATIENT)
Dept: HEALTH INFORMATION MANAGEMENT | Facility: OTHER | Age: 79
End: 2019-09-27

## 2019-09-30 ENCOUNTER — ANTICOAGULATION VISIT (OUTPATIENT)
Dept: VASCULAR LAB | Facility: MEDICAL CENTER | Age: 79
End: 2019-09-30
Attending: INTERNAL MEDICINE
Payer: MEDICARE

## 2019-09-30 ENCOUNTER — TELEPHONE (OUTPATIENT)
Dept: VASCULAR LAB | Facility: MEDICAL CENTER | Age: 79
End: 2019-09-30

## 2019-09-30 VITALS — DIASTOLIC BLOOD PRESSURE: 71 MMHG | SYSTOLIC BLOOD PRESSURE: 120 MMHG

## 2019-09-30 DIAGNOSIS — I48.91 ATRIAL FIBRILLATION WITH RVR (HCC): ICD-10-CM

## 2019-09-30 PROCEDURE — 99212 OFFICE O/P EST SF 10 MIN: CPT

## 2019-09-30 NOTE — TELEPHONE ENCOUNTER
Initial anticoag note and most recent cards note reviewed.  Patient with afib and chads vasc = at least 3    Pending further recommendations, we will continue with indefinite anticoagulation with xarelto as directed by cards    Will defer all management of rhythm, rate, and other cv issues, aside from anticoagulation, to cards Michael Bloch, MD  Anticoagulation Clinic    Cc: CHARLY Morillo

## 2019-09-30 NOTE — PROGRESS NOTES
Target end date:Indefinite  Indication: Afib  Drug: Xarelto  CHADsVASC = 3 (HTN, Age)    Health Status Since Last Assessment  Pt is a new start on Xarelto in the last 2 weeks for atrial fibrillation. He was recently hospitalized for dehydration but denies any complications since then.   Patient denies any new relevant medical problems, ED visits or hospitalizations  Patient denies any embolic events (stroke/tia/systemic embolism)    Adherence with DOAC Therapy   Pt has not missed any doses in the average week    Bleeding Risk Assessment     Denies Epistaxis   Pt denies any excessive or unusual bleeding/hematomas.  Pt denies any GI bleeds or hematemesis.  Pt denies any concerning daily headache or sub dural hematoma symptoms.   Pt denies any hematuria.   ETOH overuse none.     Ref. Range 9/15/2019 03:24   WBC Latest Ref Range: 4.8 - 10.8 K/uL 8.3   RBC Latest Ref Range: 4.70 - 6.10 M/uL 3.49 (L)   Hemoglobin Latest Ref Range: 14.0 - 18.0 g/dL 11.3 (L)   Hematocrit Latest Ref Range: 42.0 - 52.0 % 33.1 (L)   MCV Latest Ref Range: 81.4 - 97.8 fL 94.8   MCH Latest Ref Range: 27.0 - 33.0 pg 32.4   MCHC Latest Ref Range: 33.7 - 35.3 g/dL 34.1   RDW Latest Ref Range: 35.9 - 50.0 fL 44.6   Platelet Count Latest Ref Range: 164 - 446 K/uL 245   MPV Latest Ref Range: 9.0 - 12.9 fL 9.7        Creatinine Clearance/Renal Function     Latest ClCr >60 mL/min     Ref. Range 9/15/2019 03:24   Creatinine Latest Ref Range: 0.50 - 1.40 mg/dL 0.88   GFR If  Latest Ref Range: >60 mL/min/1.73 m 2 >60   GFR If Non  Latest Ref Range: >60 mL/min/1.73 m 2 >60       Drug Interactions     ASA/other antiplatelets None   NSAID None   Other drug interactions none   Verified no anticonvulsant or azole therapy, education provided for future use.     Examination   Blood Pressure 120/71 mmHg   Symptomatic hypotension none   Significant gait impairment/imbalance/high risk for falls? none    Final Assessment and  Recommendations:   Patient appears stable from the anticoagulation staindpoint.     Benefits of continued DOAC therapy outweigh risks for this patient   Recommend pt continue with current DOAC therapy of 20 mg daily    Educated the patient on bruising and bleeding that may occur while on Xarelto and to call the clinic with any questions or concerns. The patient's wife was previously on this medication, so the patient is familiar with the drug.      Other Actions: cmp/ cbc hemogram ordered prior to next visit    Follow up:  Follow up with patient in 6 weeks.   Pt to contact clinic for any s/s of unusual bleeding, bruising, clotting, or any changes to diet or medication.     Marjorie Flores, PharmD

## 2019-10-28 NOTE — PROGRESS NOTES
A 79-year-old male was an emergent admission to [facility] from 9/14/2019 to 9/15/2019 to treat unspecified atrial fibrillation. IHD did not visit the patient bedside. The patient was discharged home. No additional medical conditions were listed. The patient was not under clinical case management.     The patient was ordered to start/continue to take the following medications: Lisinopril (Prinivil), Hydralazine (Apresoline), Rivaroxaban (Xarelto), and Metoprolol Tartrate (Lopressor). The patient successfully filled all medications.     The patient was ordered to follow-up with PCP and Specialist. The patient had the following appointments:     1) 9/15/2019 @ 9:00 Grover Morillo, general/family practice - NO APPOINTMENT PER MD OFFICE     2) 9/20/2019 @ 4:00 Carly Balderrama, pulmonology - CONFIRMED AS KEPT     3) 9/30/2019 @ 8:00 Anticoagulation Services, laboratory - CONFIRMED AS KEPT  The patient has no future appointments scheduled.    [facility] followed the patient for a total of 16 days and IHD was unable to reach patient despite multiple outreaches with no return call. Due to no patient contact, PPS Screening was not conducted.

## 2019-11-18 ENCOUNTER — ANTICOAGULATION VISIT (OUTPATIENT)
Dept: VASCULAR LAB | Facility: MEDICAL CENTER | Age: 79
End: 2019-11-18
Attending: INTERNAL MEDICINE
Payer: MEDICARE

## 2019-11-18 VITALS — SYSTOLIC BLOOD PRESSURE: 142 MMHG | DIASTOLIC BLOOD PRESSURE: 82 MMHG | HEART RATE: 62 BPM

## 2019-11-18 DIAGNOSIS — I48.91 ATRIAL FIBRILLATION WITH RVR (HCC): ICD-10-CM

## 2019-11-18 PROCEDURE — 99212 OFFICE O/P EST SF 10 MIN: CPT

## 2019-11-18 NOTE — PROGRESS NOTES
Target end date:Indefinite  Indication: Atrial Fibrillation  Drug: Xarelto  CHADsVASC = 3    Health Status Since Last Assessment   Patient denies any new relevant medical problems, ED visits or hospitalizations   Patient denies any embolic events (stroke/tia/systemic embolism)  Patient has been experiencing occasional feelings of irregular heart beat. Recommended visiting his primary physician if he continues to feel irregular heart beat.     Adherence with DOAC Therapy   Pt has 0 missed any doses in the average week    Bleeding Risk Assessment     Denies Epistaxis   Pt denies any excessive or unusual bleeding/hematomas.  Pt denies any GI bleeds or hematemesis.  Pt denies any concerning daily headache or sub dural hematoma symptoms.     Pt denies any hematuria.   ETOH overuse none    No newer labs to review.   Ref. Range 9/15/2019 03:24   WBC Latest Ref Range: 4.8 - 10.8 K/uL 8.3   RBC Latest Ref Range: 4.70 - 6.10 M/uL 3.49 (L)   Hemoglobin Latest Ref Range: 14.0 - 18.0 g/dL 11.3 (L)   Hematocrit Latest Ref Range: 42.0 - 52.0 % 33.1 (L)   MCV Latest Ref Range: 81.4 - 97.8 fL 94.8   MCH Latest Ref Range: 27.0 - 33.0 pg 32.4   MCHC Latest Ref Range: 33.7 - 35.3 g/dL 34.1   RDW Latest Ref Range: 35.9 - 50.0 fL 44.6   Platelet Count Latest Ref Range: 164 - 446 K/uL 245        Creatinine Clearance/Renal Function     Latest ClCr >60 mL/min    No newer labs to review.    Ref. Range 9/15/2019 03:24   Creatinine Latest Ref Range: 0.50 - 1.40 mg/dL 0.88   GFR If  Latest Ref Range: >60 mL/min/1.73 m 2 >60   GFR If Non  Latest Ref Range: >60 mL/min/1.73 m 2 >60       Drug Interactions      ASA/other antiplatelets none   NSAID none   Other drug interactions none   Verified no anticonvulsant or azole therapy, education provided for future use.     Examination   Blood Pressure 142/82 mmHg   Symptomatic hypotension none   Significant gait impairment/imbalance/high risk for falls? none    Final  Assessment and Recommendations:   Patient appears stable from the anticoagulation staindpoint.     Benefits of continued DOAC therapy outweigh risks for this patient   Recommend pt continue with current DOAC therapy 20 mg daily      Other Actions: cmp/ cbc hemogram ordered prior to next visit    Follow up:   Will follow up with patient 6 months.        Marjorie Flores, PharmD

## 2019-11-19 ENCOUNTER — TELEPHONE (OUTPATIENT)
Dept: VASCULAR LAB | Facility: MEDICAL CENTER | Age: 79
End: 2019-11-19

## 2019-12-17 ENCOUNTER — HOSPITAL ENCOUNTER (EMERGENCY)
Facility: MEDICAL CENTER | Age: 79
End: 2019-12-18
Attending: EMERGENCY MEDICINE
Payer: MEDICARE

## 2019-12-17 ENCOUNTER — APPOINTMENT (OUTPATIENT)
Dept: RADIOLOGY | Facility: MEDICAL CENTER | Age: 79
End: 2019-12-17
Attending: EMERGENCY MEDICINE
Payer: MEDICARE

## 2019-12-17 VITALS
RESPIRATION RATE: 18 BRPM | TEMPERATURE: 97.3 F | OXYGEN SATURATION: 94 % | SYSTOLIC BLOOD PRESSURE: 111 MMHG | HEART RATE: 90 BPM | HEIGHT: 70 IN | BODY MASS INDEX: 29.1 KG/M2 | WEIGHT: 203.26 LBS | DIASTOLIC BLOOD PRESSURE: 64 MMHG

## 2019-12-17 DIAGNOSIS — I48.0 PAROXYSMAL ATRIAL FIBRILLATION (HCC): ICD-10-CM

## 2019-12-17 LAB
ALBUMIN SERPL BCP-MCNC: 4.3 G/DL (ref 3.2–4.9)
ALBUMIN/GLOB SERPL: 1.3 G/DL
ALP SERPL-CCNC: 77 U/L (ref 30–99)
ALT SERPL-CCNC: 23 U/L (ref 2–50)
ANION GAP SERPL CALC-SCNC: 16 MMOL/L (ref 0–11.9)
AST SERPL-CCNC: 21 U/L (ref 12–45)
BASOPHILS # BLD AUTO: 0.3 % (ref 0–1.8)
BASOPHILS # BLD: 0.03 K/UL (ref 0–0.12)
BILIRUB SERPL-MCNC: <0.2 MG/DL (ref 0.1–1.5)
BUN SERPL-MCNC: 15 MG/DL (ref 8–22)
CALCIUM SERPL-MCNC: 8.7 MG/DL (ref 8.4–10.2)
CHLORIDE SERPL-SCNC: 105 MMOL/L (ref 96–112)
CO2 SERPL-SCNC: 22 MMOL/L (ref 20–33)
CREAT SERPL-MCNC: 0.83 MG/DL (ref 0.5–1.4)
EKG IMPRESSION: NORMAL
EOSINOPHIL # BLD AUTO: 0 K/UL (ref 0–0.51)
EOSINOPHIL NFR BLD: 0 % (ref 0–6.9)
ERYTHROCYTE [DISTWIDTH] IN BLOOD BY AUTOMATED COUNT: 48.2 FL (ref 35.9–50)
GLOBULIN SER CALC-MCNC: 3.4 G/DL (ref 1.9–3.5)
GLUCOSE SERPL-MCNC: 180 MG/DL (ref 65–99)
HCT VFR BLD AUTO: 44.2 % (ref 42–52)
HGB BLD-MCNC: 14.4 G/DL (ref 14–18)
IMM GRANULOCYTES # BLD AUTO: 0.04 K/UL (ref 0–0.11)
IMM GRANULOCYTES NFR BLD AUTO: 0.5 % (ref 0–0.9)
LYMPHOCYTES # BLD AUTO: 2.39 K/UL (ref 1–4.8)
LYMPHOCYTES NFR BLD: 27.1 % (ref 22–41)
MCH RBC QN AUTO: 29.5 PG (ref 27–33)
MCHC RBC AUTO-ENTMCNC: 32.6 G/DL (ref 33.7–35.3)
MCV RBC AUTO: 90.6 FL (ref 81.4–97.8)
MONOCYTES # BLD AUTO: 0.84 K/UL (ref 0–0.85)
MONOCYTES NFR BLD AUTO: 9.5 % (ref 0–13.4)
NEUTROPHILS # BLD AUTO: 5.53 K/UL (ref 1.82–7.42)
NEUTROPHILS NFR BLD: 62.6 % (ref 44–72)
NRBC # BLD AUTO: 0 K/UL
NRBC BLD-RTO: 0 /100 WBC
PLATELET # BLD AUTO: 271 K/UL (ref 164–446)
PMV BLD AUTO: 9.7 FL (ref 9–12.9)
POTASSIUM SERPL-SCNC: 4.1 MMOL/L (ref 3.6–5.5)
PROT SERPL-MCNC: 7.7 G/DL (ref 6–8.2)
RBC # BLD AUTO: 4.88 M/UL (ref 4.7–6.1)
SODIUM SERPL-SCNC: 143 MMOL/L (ref 135–145)
TROPONIN T SERPL-MCNC: 17 NG/L (ref 6–19)
WBC # BLD AUTO: 8.8 K/UL (ref 4.8–10.8)

## 2019-12-17 PROCEDURE — 93005 ELECTROCARDIOGRAM TRACING: CPT | Performed by: EMERGENCY MEDICINE

## 2019-12-17 PROCEDURE — 96375 TX/PRO/DX INJ NEW DRUG ADDON: CPT | Mod: XU

## 2019-12-17 PROCEDURE — 96374 THER/PROPH/DIAG INJ IV PUSH: CPT | Mod: XU

## 2019-12-17 PROCEDURE — 85025 COMPLETE CBC W/AUTO DIFF WBC: CPT

## 2019-12-17 PROCEDURE — 84484 ASSAY OF TROPONIN QUANT: CPT

## 2019-12-17 PROCEDURE — 93005 ELECTROCARDIOGRAM TRACING: CPT

## 2019-12-17 PROCEDURE — 700101 HCHG RX REV CODE 250

## 2019-12-17 PROCEDURE — 700111 HCHG RX REV CODE 636 W/ 250 OVERRIDE (IP): Performed by: EMERGENCY MEDICINE

## 2019-12-17 PROCEDURE — 92960 CARDIOVERSION ELECTRIC EXT: CPT

## 2019-12-17 PROCEDURE — 99285 EMERGENCY DEPT VISIT HI MDM: CPT

## 2019-12-17 PROCEDURE — 71045 X-RAY EXAM CHEST 1 VIEW: CPT

## 2019-12-17 PROCEDURE — 700105 HCHG RX REV CODE 258: Performed by: EMERGENCY MEDICINE

## 2019-12-17 PROCEDURE — 36415 COLL VENOUS BLD VENIPUNCTURE: CPT

## 2019-12-17 PROCEDURE — 80053 COMPREHEN METABOLIC PANEL: CPT

## 2019-12-17 RX ORDER — ETOMIDATE 2 MG/ML
8 INJECTION INTRAVENOUS ONCE
Status: COMPLETED | OUTPATIENT
Start: 2019-12-17 | End: 2019-12-17

## 2019-12-17 RX ORDER — SODIUM CHLORIDE 9 MG/ML
1000 INJECTION, SOLUTION INTRAVENOUS ONCE
Status: COMPLETED | OUTPATIENT
Start: 2019-12-17 | End: 2019-12-17

## 2019-12-17 RX ORDER — METOPROLOL TARTRATE 1 MG/ML
INJECTION, SOLUTION INTRAVENOUS
Status: COMPLETED
Start: 2019-12-17 | End: 2019-12-17

## 2019-12-17 RX ORDER — METOPROLOL TARTRATE 1 MG/ML
5 INJECTION, SOLUTION INTRAVENOUS ONCE
Status: COMPLETED | OUTPATIENT
Start: 2019-12-17 | End: 2019-12-17

## 2019-12-17 RX ADMIN — METOPROLOL TARTRATE 5 MG: 1 INJECTION, SOLUTION INTRAVENOUS at 21:32

## 2019-12-17 RX ADMIN — SODIUM CHLORIDE 1000 ML: 9 INJECTION, SOLUTION INTRAVENOUS at 21:26

## 2019-12-17 RX ADMIN — ETOMIDATE 8 MG: 2 INJECTION, SOLUTION INTRAVENOUS at 22:49

## 2019-12-18 LAB — EKG IMPRESSION: NORMAL

## 2019-12-18 ASSESSMENT — ENCOUNTER SYMPTOMS
EYE REDNESS: 0
HEADACHES: 0
FEVER: 0
SHORTNESS OF BREATH: 0
SEIZURES: 0
BLURRED VISION: 0
VOMITING: 0
ABDOMINAL PAIN: 0
FOCAL WEAKNESS: 0
PALPITATIONS: 1
SORE THROAT: 0
COUGH: 0
CHILLS: 0
NECK PAIN: 0
BACK PAIN: 0

## 2019-12-18 NOTE — ED NOTES
EDP and RN at bedside. IV attempt #1, unsuccessful. Labs were collected and sent. XR now present at bedside, will reattempt PIV access after CXR.

## 2019-12-18 NOTE — DISCHARGE INSTRUCTIONS
You were seen in the Emergency Department for atrial fibrillation.    Labs, chest xray were completed without significant acute abnormalities.    Please use 1,000mg of tylenol or 600mg of ibuprofen every 6 hours as needed for pain.  Continue medications as directed.    Please follow up with your cardiologist as soon as possible.    Return to the Emergency Department with chest pain, trouble breathing, further palpitations, or other concerns.

## 2019-12-18 NOTE — ED NOTES
HR improved. Patient reports he has no symptoms now. Denies needs. EDP has reevaluated patient, consulting cardiology. Patient aware of POC.

## 2019-12-18 NOTE — ED PROVIDER NOTES
ED Provider Note    CHIEF COMPLAINT  Chief Complaint   Patient presents with   • Chest Pain     started around 6:30   feeling heart irregular beating   • Hypertension       HPI  Damian Larsen is a 79 y.o. male with past medical history of paroxysmal atrial fibrillation, hypertension, prior and STEMI in the setting of sepsis who presents with chest pain and palpitations.  The patient states that after eating a large dinner at around his evening he felt acute onset of palpitations and his heart racing.  He states that this feels similar to this episodes of atrial fibrillation that he has had in the past.  He endorses associated very dull chest pain which he states is currently essentially resolved.  No associated shortness of breath, nausea or vomiting.  He has not had any recent fevers, cough, or infectious symptoms.  No lower extremity swelling.  The patient has been taking his metoprolol and Xarelto as directed in addition to his antihypertensives.    REVIEW OF SYSTEMS  See HPI for further details.   Review of Systems   Constitutional: Negative for chills and fever.   HENT: Negative for sore throat.    Eyes: Negative for blurred vision and redness.   Respiratory: Negative for cough and shortness of breath.    Cardiovascular: Positive for chest pain and palpitations. Negative for leg swelling.   Gastrointestinal: Negative for abdominal pain and vomiting.   Genitourinary: Negative for dysuria and urgency.   Musculoskeletal: Negative for back pain and neck pain.   Skin: Negative for rash.   Neurological: Negative for focal weakness, seizures and headaches.   Psychiatric/Behavioral: Negative for suicidal ideas.         PAST MEDICAL HISTORY   has a past medical history of Cancer (HCC) (1987), Cholecystitis (June 2017), Cholecystitis with cholelithiasis (2017), Glaucoma, HTN (hypertension), Non-STEMI (non-ST elevated myocardial infarction) ([facility]) (06/13/2017), Sepsis(995.91) (June 2017), Skin cancer (1987), and Smoker  "( ).    SOCIAL HISTORY  Social History     Tobacco Use   • Smoking status: Former Smoker     Packs/day: 0.50     Years: 20.00     Pack years: 10.00     Types: Cigarettes, Pipe     Last attempt to quit: 1987     Years since quittin.5   • Smokeless tobacco: Never Used   Substance and Sexual Activity   • Alcohol use: No   • Drug use: No   • Sexual activity: Not on file     Comment:        SURGICAL HISTORY   has a past surgical history that includes brook by laparoscopy (N/A, 2017); endoscopy-esoph/stomach (2019); and colonoscopy (2019).    CURRENT MEDICATIONS  Home Medications     Reviewed by Alanis Dodge R.N. (Registered Nurse) on 19 at 2014  Med List Status: Partial   Medication Last Dose Status   ascorbic acid (ASCORBIC ACID) 500 MG Tab 2019 Active   chlorpheniramine (CHLOR-TRIMETON) 4 MG Tab 2019 Active   hydrALAZINE (APRESOLINE) 10 MG Tab 2019 Active   latanoprost (XALATAN) 0.005 % Solution  Active   lisinopril (PRINIVIL) 20 MG Tab 2019 Active   loratadine (CLARITIN) 10 MG Tab 2019 Active   metoprolol (LOPRESSOR) 25 MG Tab 2019 Active   Omega-3 Fatty Acids (FISH OIL) 1000 MG Cap capsule 2019 Active   omeprazole (PRILOSEC) 20 MG delayed-release capsule 2019 Active   rivaroxaban (XARELTO) 20 MG Tab tablet 2019 Active   therapeutic multivitamin-minerals (THERAGRAN-M) Tab 2019 Active   triamcinolone acetonide (KENALOG) 0.1 % Cream prn Active   vitamin e (VITAMIN E) 400 UNIT Cap 2019 Active                ALLERGIES  No Known Allergies    PHYSICAL EXAM   VITAL SIGNS: /64   Pulse 90   Temp 36.3 °C (97.3 °F) (Temporal)   Resp 18   Ht 1.778 m (5' 10\")   Wt 92.2 kg (203 lb 4.2 oz)   SpO2 94%   BMI 29.17 kg/m²      Physical Exam   Constitutional: He is oriented to person, place, and time and well-developed, well-nourished, and in no distress. No distress.   Nontoxic-appearing elderly male   HENT: "   Head: Normocephalic and atraumatic.   Eyes: Pupils are equal, round, and reactive to light. Conjunctivae are normal.   Neck: Normal range of motion. Neck supple.   Cardiovascular: Normal heart sounds.   Irregularly irregular rhythm   Pulmonary/Chest: Effort normal and breath sounds normal. No respiratory distress.   Abdominal: Soft. He exhibits no distension. There is no tenderness.   Musculoskeletal: Normal range of motion.         General: No tenderness or edema.   Neurological: He is alert and oriented to person, place, and time.   Moving all extremities spontaneously   Skin: Skin is warm and dry.   Psychiatric: Affect normal.         DIAGNOSTIC STUDIES    EKG  Results for orders placed or performed during the hospital encounter of 19   EKG (Now)   Result Value Ref Range    Report       [facility] Emergency Dept.    Test Date:  2019  Pt Name:    ANITHA RICHMOND                 Department: EDSM  MRN:        [id number]                      [facility]  Gender:     Male                         Technician:   :        1940                   Requested By:FLORENCE POLLOCK  Order #:    [id number]                    Reading MD: Florence Pollock MD    Measurements  Intervals                                Axis  Rate:       144                          P:  AK:                                      QRS:        80  QRSD:       88                           T:          6  QT:         306  QTc:        474    Interpretive Statements  Atrial fibrillation with rapid V-rate  ST depression, probably rate related  Compared to ECG 2019 16:27:06  ST (T wave) deviation now present  Sinus rhythm no longer present  Electronically Signed On 2019 23:39:03 PST by Florence Pollock MD     EKG   Result Value Ref Range    Report       [facility] Emergency Dept.    Test Date:  2019  Pt Name:    ANITHA RICHMOND                 Department: EDSM  MRN:         [id number]                      [facility]  Gender:     Male                         Technician: DOLORESL  :        1940                   Requested By:ER TRIAGE PROTOCOL  Order #:    [id number]                    Reading MD: Florence Shelby MD    Measurements  Intervals                                Axis  Rate:       91                           P:          43  DE:         168                          QRS:        15  QRSD:       88                           T:          13  QT:         360  QTc:        443    Interpretive Statements  SINUS RHYTHM  PROBABLE LEFT ATRIAL ABNORMALITY  EARLY PRECORDIAL R/S TRANSITION  Compared to ECG 2019 20:11:18  Atrial fibrillation no longer present  ST (T wave) deviation no longer present  Electronically Signed On 2019 1:55:42 PST by Florence Shelby MD             LABS  Personally reviewed by me  Labs Reviewed   CBC WITH DIFFERENTIAL - Abnormal; Notable for the following components:       Result Value    MCHC 32.6 (*)     All other components within normal limits   COMP METABOLIC PANEL - Abnormal; Notable for the following components:    Anion Gap 16.0 (*)     Glucose 180 (*)     All other components within normal limits   TROPONIN   ESTIMATED GFR           RADIOLOGY  Personally reviewed by me  DX-CHEST-PORTABLE (1 VIEW)   Final Result         1.  Bilateral basilar atelectasis, no focal infiltrate              PROCEDURES  Cardioversion Procedure Note    Indication: atrial fibrillation    Consent: The patient was counseled regarding the procedure, its indications, risks, potential complications and alternatives, and any questions were answered. Consent was obtained to proceed.    Pre-Medication: etomidate 8mg intravenously    Procedure: The patient was placed in the supine position and the chest area was exposed. The cardioversion pads were applied in the standard manner and configuration.    Attempt #1: The defibrillator was set on the synchronous mode and  charged to 200 joules.  A charge was then delivered which resulted in conversion to normal sinus rhythm.    Attempt #2: Not necessary    Attempt #3: Not necessary    The patient tolerated the procedure well.    Complications: None      ED COURSE  Vitals:    12/17/19 2317 12/17/19 2322 12/17/19 2327 12/17/19 2352   BP: 114/72 107/65 114/65 111/64   Pulse: 92 92 91 90   Resp: 14 18 18 18   Temp:       TempSrc:       SpO2: 93% 91% 93% 94%   Weight:       Height:             Medications administered:  Medications   NS infusion 1,000 mL (0 mL Intravenous Stopped 12/17/19 2220)   Metoprolol Tartrate (LOPRESSOR) injection 5 mg (5 mg Intravenous Given 12/17/19 2132)   etomidate (AMIDATE) injection 8 mg (8 mg Intravenous Given 12/17/19 2249)       Patient was given IV fluids for tachycardia and possible dehydration.  IV hydration was used because oral hydration was not adequate alone.  Following fluid administration patient's hydration status and vital signs were improved.      Old records personally reviewed:  Patient was seen here in September 2019 for similar occurrence.  Cardioversion was attempted in the emergency department x2 however unsuccessful.  He ended up being admitted and placed on metoprolol and converted spontaneously in the morning.        MEDICAL DECISION MAKING  Patient with history of paroxysmal atrial fibrillation on Xarelto who presents with acute onset of palpitations this evening.  He is tachycardic on arrival with otherwise reassuring vitals.  EKG shows atrial fibrillation with RVR.  Labs are reassuring without signs of dehydration or electrolyte abnormality.  Troponin is negative.  History does not seem consistent with ACS or pulmonary embolism.  Chest XR without e/o pneumonia, edema.    I discussed the case with Dr. Atwood, cardiologist on-call.  He continues to recommend an attempted cardioversion even though it was unsuccessful last time and if not admission with rate control.      Patient was  consented for procedure as above and successfully converted to sinus rhythm.  Repeat EKG without concerning signs for ischemia.  Patient was monitored in the department for sometime with resolution of symptoms.  He was discharged home with normal vital signs and instructions to continue home medications and call cardiology for immediate follow up.  Patient understands plan of care and strict return precautions for changing or worsening symptoms.      IMPRESSION  (I48.0) Paroxysmal atrial fibrillation (HCC)    Disposition: Discharge home, stable condition  Results, diagnoses, and treatment options were discussed with the patient and/or family. Patient verbalized understanding of plan of care.    Patient referred to primary care provider for monitoring and treatment of blood pressure.      Discharge Medication List as of 12/18/2019 12:12 AM              Electronically signed by: Florence Shelby, 12/17/2019 11:38 PM

## 2019-12-18 NOTE — ED NOTES
Vitals remaining stable. Patient A/Ox4 at baseline. Tolerating PO fluids. Ambulating with steady gait.  Written and verbal discharge instructions given to patient. Patient acknowledges and reports understanding of instructions.  Patient is agreeable to discharge at this time.  D/C to home with spouse.

## 2019-12-18 NOTE — ED NOTES
Patient now A/Ox4 at baseline mentation. RN remaining at bedside to continue monitoring pending repeat blood pressure. Vitals otherwise stable.

## 2019-12-18 NOTE — ED TRIAGE NOTES
Pt comes in c/o irregular HR that started around 6:30 tonight  Having chest pain  3/10 as well  Has Hx of a-fib  Just finished dinner when this started

## 2019-12-19 ENCOUNTER — OFFICE VISIT (OUTPATIENT)
Dept: CARDIOLOGY | Facility: MEDICAL CENTER | Age: 79
End: 2019-12-19
Payer: MEDICARE

## 2019-12-19 VITALS
HEIGHT: 70 IN | BODY MASS INDEX: 29.43 KG/M2 | DIASTOLIC BLOOD PRESSURE: 88 MMHG | OXYGEN SATURATION: 94 % | HEART RATE: 77 BPM | SYSTOLIC BLOOD PRESSURE: 152 MMHG | WEIGHT: 205.58 LBS

## 2019-12-19 DIAGNOSIS — I48.91 ATRIAL FIBRILLATION WITH RVR (HCC): ICD-10-CM

## 2019-12-19 LAB — EKG IMPRESSION: NORMAL

## 2019-12-19 PROCEDURE — 93000 ELECTROCARDIOGRAM COMPLETE: CPT | Performed by: INTERNAL MEDICINE

## 2019-12-19 PROCEDURE — 99214 OFFICE O/P EST MOD 30 MIN: CPT | Performed by: INTERNAL MEDICINE

## 2019-12-19 RX ORDER — FLECAINIDE ACETATE 50 MG/1
50 TABLET ORAL 2 TIMES DAILY
Qty: 60 TAB | Refills: 11 | Status: SHIPPED | OUTPATIENT
Start: 2019-12-19

## 2019-12-19 RX ORDER — INFLUENZA A VIRUS A/MICHIGAN/45/2015 X-275 (H1N1) ANTIGEN (FORMALDEHYDE INACTIVATED), INFLUENZA A VIRUS A/SINGAPORE/INFIMH-16-0019/2016 IVR-186 (H3N2) ANTIGEN (FORMALDEHYDE INACTIVATED), AND INFLUENZA B VIRUS B/MARYLAND/15/2016 BX-69A (A B/COLORADO/6/2017-LIKE VIRUS) ANTIGEN (FORMALDEHYDE INACTIVATED) 60; 60; 60 UG/.5ML; UG/.5ML; UG/.5ML
INJECTION, SUSPENSION INTRAMUSCULAR
COMMUNITY
Start: 2019-10-05 | End: 2020-01-01

## 2019-12-19 RX ORDER — METOPROLOL TARTRATE 50 MG/1
50 TABLET, FILM COATED ORAL 2 TIMES DAILY
Qty: 60 TAB | Refills: 11 | Status: SHIPPED | OUTPATIENT
Start: 2019-12-19 | End: 2021-01-01 | Stop reason: SDUPTHER

## 2019-12-19 RX ORDER — LISINOPRIL 40 MG/1
TABLET ORAL
COMMUNITY
Start: 2019-11-12 | End: 2020-01-01

## 2019-12-19 NOTE — PROGRESS NOTES
Cardiology Follow-up Consultation Note    Date of note:    12/19/2019    Primary Care Provider: Grover Morillo M.D.    Name:             Damian Larsen   YOB: 1940  MRN:               [id number]    CC: Follow-up atrial fibrillation, hypertension    Patient ID/HPI:   79-year-old male patient with history of type II non-STEMI in 2017 due to sepsis, normal stress test, hypertension, paroxysmal atrial fibrillation with prior cardioversions here for follow-up.  He was recently in the emergency room 2 days ago with an episode of atrial fibrillation, he felt his heart was racing associated with dull chest pain.  His heart rate was in 130s.  He went to emergency room, had cardioversion.  Feels well today.  Denies chest pain, shortness of breath.  Compliant with medications.      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   • Cholecystitis June 2017   • Cholecystitis with cholelithiasis 2017   • Glaucoma     bilateral   • HTN (hypertension)    • Non-STEMI (non-ST elevated myocardial infarction) (HCC) 06/13/2017    Secondary to sepsis   • Sepsis(995.91) June 2017   • Skin cancer 1987   • Smoker      Previous smoke         Past Surgical History:   Procedure Laterality Date   • COLONOSCOPY  5/30/2019    Procedure: COLONOSCOPY - W/POSS BX, DILATION, POLYPECTOMY, CONTROL OF HEMORRHAGE, W/ENDOSCOPIC MUCOSAL RESECTION;  Surgeon: Will Alvarenga M.D.;  Location: SURGERY [facility];  Service: EUS   • ENDOSCOPY-ESOPH/STOMACH  04/01/2019    with colonoscopy   • REJI BY LAPAROSCOPY N/A 8/22/2017    Procedure: REJI BY LAPAROSCOPY;  Surgeon: Kae Last M.D.;  [facility];  Service:          Current Outpatient Medications   Medication Sig Dispense Refill   • metoprolol (LOPRESSOR) 50 MG Tab Take 1 Tab by mouth 2 Times a Day. 60 Tab 11   • flecainide (TAMBOCOR) 50 MG tablet Take 1  Tab by mouth 2 times a day. 60 Tab 11   • triamcinolone acetonide (KENALOG) 0.1 % Cream Apply 1 Application to affected area(s) as needed. Apply as directed  0   • rivaroxaban (XARELTO) 20 MG Tab tablet Take 1 Tab by mouth with dinner. 90 Tab 3   • hydrALAZINE (APRESOLINE) 10 MG Tab Take 1 Tab by mouth 3 times a day. 90 Tab 1   • lisinopril (PRINIVIL) 20 MG Tab Take 1 Tab by mouth every day. 30 Tab 1   • chlorpheniramine (CHLOR-TRIMETON) 4 MG Tab Take 4 mg by mouth every day.     • latanoprost (XALATAN) 0.005 % Solution Place 1 Drop in both eyes every evening.     • omeprazole (PRILOSEC) 20 MG delayed-release capsule Take 20 mg by mouth every day.     • ascorbic acid (ASCORBIC ACID) 500 MG Tab Take 500 mg by mouth every day.     • vitamin e (VITAMIN E) 400 UNIT Cap Take 400 Units by mouth every day.     • therapeutic multivitamin-minerals (THERAGRAN-M) Tab Take 1 Tab by mouth every day.     • Omega-3 Fatty Acids (FISH OIL) 1000 MG Cap capsule Take 1,000 mg by mouth every day.     • loratadine (CLARITIN) 10 MG Tab Take 10 mg by mouth every day.     • lisinopril (PRINIVIL) 40 MG tablet      • FLUZONE HIGH-DOSE 0.5 ML Suspension Prefilled Syringe injection        No current facility-administered medications for this visit.          No Known Allergies    No family history of coronary artery disease    Social History     Socioeconomic History   • Marital status:      Spouse name: Not on file   • Number of children: Not on file   • Years of education: Not on file   • Highest education level: Not on file   Occupational History   • Not on file   Social Needs   • Financial resource strain: Not on file   • Food insecurity:     Worry: Not on file     Inability: Not on file   • Transportation needs:     Medical: Not on file     Non-medical: Not on file   Tobacco Use   • Smoking status: Former Smoker     Packs/day: 0.50     Years: 20.00     Pack years: 10.00     Types: Cigarettes, Pipe     Last attempt to quit: 6/14/1987   "    Years since quittin.5   • Smokeless tobacco: Never Used   Substance and Sexual Activity   • Alcohol use: No   • Drug use: No   • Sexual activity: Not on file     Comment:    Lifestyle   • Physical activity:     Days per week: Not on file     Minutes per session: Not on file   • Stress: Not on file   Relationships   • Social connections:     Talks on phone: Not on file     Gets together: Not on file     Attends Confucianism service: Not on file     Active member of club or organization: Not on file     Attends meetings of clubs or organizations: Not on file     Relationship status: Not on file   • Intimate partner violence:     Fear of current or ex partner: Not on file     Emotionally abused: Not on file     Physically abused: Not on file     Forced sexual activity: Not on file   Other Topics Concern   • Not on file   Social History Narrative   • Not on file         Physical Exam:  Ambulatory Vitals  /88 (BP Location: Left arm, Patient Position: Sitting, BP Cuff Size: Adult)   Pulse 77   Ht 1.778 m (5' 10\")   Wt 93.2 kg (205 lb 9.3 oz)   SpO2 94%    Oxygen Therapy:  Pulse Oximetry: 94 %  BP Readings from Last 4 Encounters:   19 152/88   19 111/64   19 142/82   19 120/71       Weight/BMI: Body mass index is 29.5 kg/m².  Wt Readings from Last 4 Encounters:   19 93.2 kg (205 lb 9.3 oz)   19 92.2 kg (203 lb 4.2 oz)   19 86.3 kg (190 lb 3.2 oz)   19 89.2 kg (196 lb 10.4 oz)       General: Well appearing and in no apparent distress  Head: atrumatic  Eyes: No conjunctival pallor   ENT: normal external appearance of nose and ears  Neck: JVD absent, carotid bruits absent  Lungs: respiratory sounds  normal, additional breath sounds absent  Heart: Regular rhythm,   No palpable thrills on palpation, murmurs absent, no rubs,   Lower extremity edema absent.   Pedal pulses normal  Abdomen: soft, non tender, non distended.  Extremities/MSK: no clubbing, no " cyanosis  Neurological: normal orientation, Gait normal   Psychiatric: Appropriate affect, intact judgement and insight  Skin: Warm extremities        Lab Data Review:  Lab Results   Component Value Date/Time    CHOLSTRLTOT 148 10/29/2018 08:40 AM    LDL 92 10/29/2018 08:40 AM    HDL 33 (A) 10/29/2018 08:40 AM    TRIGLYCERIDE 116 10/29/2018 08:40 AM       Lab Results   Component Value Date/Time    SODIUM 143 12/17/2019 09:00 PM    POTASSIUM 4.1 12/17/2019 09:00 PM    CHLORIDE 105 12/17/2019 09:00 PM    CO2 22 12/17/2019 09:00 PM    GLUCOSE 180 (H) 12/17/2019 09:00 PM    BUN 15 12/17/2019 09:00 PM    CREATININE 0.83 12/17/2019 09:00 PM     Lab Results   Component Value Date/Time    ALKPHOSPHAT 77 12/17/2019 09:00 PM    ASTSGOT 21 12/17/2019 09:00 PM    ALTSGPT 23 12/17/2019 09:00 PM    TBILIRUBIN <0.2 12/17/2019 09:00 PM      Lab Results   Component Value Date/Time    WBC 8.8 12/17/2019 09:00 PM     NUCLEAR IMAGING INTERPRETATION OF 8/9/2017 - REVIEWED WITH PATIENT:   Normal myocardial perfusion with no ischemia.   Normal left ventricular wall motion.  LV ejection fraction = 74%.   ECG INTERPRETATION   Negative stress ECG for ischemia.    Echo 9/15/2019  CONCLUSIONS  Normal left ventricular systolic function.  Left ventricular ejection fraction is visually estimated to be 70%.  Normal diastolic function.  The right ventricle was normal in size and function.  Mild tricuspid regurgitation.  Estimated right ventricular systolic pressure  is 45 mmHg.  Compared to the images of the prior study done 6/15/17  -  there has   been no significant change.    EKG today shows sinus rhythm, early R wave progression    Impression and Plan:  79-year-old male patient with  1.  Paroxysmal atrial fibrillation  2.  Hypertension  3.  Type II non-STEMI with minimal troponin elevation, negative stress test    -Patient has recurrent atrial fibrillation.  Had cardioversion 2 days ago in the emergency room.  EKG today shows sinus rhythm.  -I  will start him on flecainide 50 mg twice daily to help stay in sinus rhythm.  2-week EKG only follow-up  -Increase metoprolol to 50 twice daily to help with hypertension, as well as tachycardia if he converts to A. Fib.  -Continue Xarelto for anticoagulation.  -If he has recurrence of A. fib, increase flecainide to 100 mg twice daily.    Return in about 6 months (around 6/19/2020).      Adrian RASMUSSEN  Interventional cardiologist  [facility]  [address] [address]  Phone: [phone]  Fax: [phone]

## 2020-01-01 ENCOUNTER — PATIENT MESSAGE (OUTPATIENT)
Dept: CARDIOLOGY | Facility: MEDICAL CENTER | Age: 80
End: 2020-01-01

## 2020-01-01 ENCOUNTER — HOSPITAL ENCOUNTER (OUTPATIENT)
Dept: RADIOLOGY | Facility: MEDICAL CENTER | Age: 80
End: 2020-10-29
Attending: OTOLARYNGOLOGY
Payer: MEDICARE

## 2020-01-01 ENCOUNTER — HOSPITAL ENCOUNTER (OUTPATIENT)
Dept: LAB | Facility: MEDICAL CENTER | Age: 80
End: 2020-01-01
Attending: OTOLARYNGOLOGY
Payer: MEDICARE

## 2020-01-01 ENCOUNTER — HOSPITAL ENCOUNTER (OUTPATIENT)
Dept: RADIATION ONCOLOGY | Facility: MEDICAL CENTER | Age: 80
End: 2020-12-27
Payer: MEDICARE

## 2020-01-01 ENCOUNTER — HOSPITAL ENCOUNTER (OUTPATIENT)
Dept: RADIATION ONCOLOGY | Facility: MEDICAL CENTER | Age: 80
End: 2020-12-21
Payer: MEDICARE

## 2020-01-01 ENCOUNTER — TELEPHONE (OUTPATIENT)
Dept: CARDIOLOGY | Facility: MEDICAL CENTER | Age: 80
End: 2020-01-01

## 2020-01-01 ENCOUNTER — SPEECH THERAPY (OUTPATIENT)
Dept: SPEECH THERAPY | Facility: REHABILITATION | Age: 80
End: 2020-01-01
Attending: RADIOLOGY
Payer: MEDICARE

## 2020-01-01 ENCOUNTER — HOSPITAL ENCOUNTER (OUTPATIENT)
Dept: RADIATION ONCOLOGY | Facility: MEDICAL CENTER | Age: 80
End: 2020-12-31
Payer: MEDICARE

## 2020-01-01 ENCOUNTER — HOSPITAL ENCOUNTER (OUTPATIENT)
Dept: RADIATION ONCOLOGY | Facility: MEDICAL CENTER | Age: 80
End: 2020-12-29

## 2020-01-01 ENCOUNTER — DOCUMENTATION (OUTPATIENT)
Dept: NUTRITION | Facility: MEDICAL CENTER | Age: 80
End: 2020-01-01

## 2020-01-01 ENCOUNTER — PRE-ADMISSION TESTING (OUTPATIENT)
Dept: ADMISSIONS | Facility: MEDICAL CENTER | Age: 80
DRG: 517 | End: 2020-01-01
Attending: OTOLARYNGOLOGY
Payer: MEDICARE

## 2020-01-01 ENCOUNTER — ANESTHESIA (OUTPATIENT)
Dept: SURGERY | Facility: MEDICAL CENTER | Age: 80
DRG: 517 | End: 2020-01-01
Payer: MEDICARE

## 2020-01-01 ENCOUNTER — HOSPITAL ENCOUNTER (OUTPATIENT)
Dept: RADIATION ONCOLOGY | Facility: MEDICAL CENTER | Age: 80
End: 2020-12-24
Payer: MEDICARE

## 2020-01-01 ENCOUNTER — PATIENT OUTREACH (OUTPATIENT)
Dept: OTHER | Facility: MEDICAL CENTER | Age: 80
End: 2020-01-01

## 2020-01-01 ENCOUNTER — HOSPITAL ENCOUNTER (OUTPATIENT)
Dept: RADIATION ONCOLOGY | Facility: MEDICAL CENTER | Age: 80
End: 2020-12-10

## 2020-01-01 ENCOUNTER — HOSPITAL ENCOUNTER (OUTPATIENT)
Dept: RADIOLOGY | Facility: MEDICAL CENTER | Age: 80
End: 2020-01-01
Attending: OTOLARYNGOLOGY
Payer: MEDICARE

## 2020-01-01 ENCOUNTER — HOSPITAL ENCOUNTER (OUTPATIENT)
Dept: RADIATION ONCOLOGY | Facility: MEDICAL CENTER | Age: 80
End: 2020-12-29
Payer: MEDICARE

## 2020-01-01 ENCOUNTER — HOSPITAL ENCOUNTER (OUTPATIENT)
Dept: RADIATION ONCOLOGY | Facility: MEDICAL CENTER | Age: 80
End: 2020-12-22
Payer: MEDICARE

## 2020-01-01 ENCOUNTER — HOSPITAL ENCOUNTER (OUTPATIENT)
Dept: RADIATION ONCOLOGY | Facility: MEDICAL CENTER | Age: 80
End: 2020-12-20
Payer: MEDICARE

## 2020-01-01 ENCOUNTER — TELEMEDICINE (OUTPATIENT)
Dept: CARDIOLOGY | Facility: MEDICAL CENTER | Age: 80
End: 2020-01-01
Payer: MEDICARE

## 2020-01-01 ENCOUNTER — HOSPITAL ENCOUNTER (OUTPATIENT)
Dept: RADIATION ONCOLOGY | Facility: MEDICAL CENTER | Age: 80
End: 2020-12-10
Payer: MEDICARE

## 2020-01-01 ENCOUNTER — HOSPITAL ENCOUNTER (OUTPATIENT)
Dept: RADIATION ONCOLOGY | Facility: MEDICAL CENTER | Age: 80
End: 2020-12-23
Payer: MEDICARE

## 2020-01-01 ENCOUNTER — HOSPITAL ENCOUNTER (OUTPATIENT)
Dept: RADIATION ONCOLOGY | Facility: MEDICAL CENTER | Age: 80
End: 2020-12-31
Attending: RADIOLOGY
Payer: MEDICARE

## 2020-01-01 ENCOUNTER — ANESTHESIA EVENT (OUTPATIENT)
Dept: SURGERY | Facility: MEDICAL CENTER | Age: 80
DRG: 517 | End: 2020-01-01
Payer: MEDICARE

## 2020-01-01 ENCOUNTER — HOSPITAL ENCOUNTER (OUTPATIENT)
Dept: RADIOLOGY | Facility: MEDICAL CENTER | Age: 80
End: 2020-12-24
Attending: RADIOLOGY
Payer: MEDICARE

## 2020-01-01 ENCOUNTER — HOSPITAL ENCOUNTER (OUTPATIENT)
Dept: RADIATION ONCOLOGY | Facility: MEDICAL CENTER | Age: 80
End: 2020-12-30
Payer: MEDICARE

## 2020-01-01 ENCOUNTER — PATIENT MESSAGE (OUTPATIENT)
Dept: HEALTH INFORMATION MANAGEMENT | Facility: OTHER | Age: 80
End: 2020-01-01

## 2020-01-01 ENCOUNTER — HOSPITAL ENCOUNTER (OUTPATIENT)
Dept: RADIOLOGY | Facility: MEDICAL CENTER | Age: 80
End: 2020-10-23
Attending: OTOLARYNGOLOGY
Payer: MEDICARE

## 2020-01-01 ENCOUNTER — APPOINTMENT (OUTPATIENT)
Dept: RADIOLOGY | Facility: MEDICAL CENTER | Age: 80
End: 2020-01-01
Attending: RADIOLOGY
Payer: MEDICARE

## 2020-01-01 ENCOUNTER — HOSPITAL ENCOUNTER (OUTPATIENT)
Dept: RADIOLOGY | Facility: MEDICAL CENTER | Age: 80
End: 2020-08-26
Attending: OTOLARYNGOLOGY
Payer: MEDICARE

## 2020-01-01 ENCOUNTER — HOSPITAL ENCOUNTER (INPATIENT)
Facility: MEDICAL CENTER | Age: 80
LOS: 3 days | DRG: 517 | End: 2020-09-17
Attending: OTOLARYNGOLOGY | Admitting: OTOLARYNGOLOGY
Payer: MEDICARE

## 2020-01-01 ENCOUNTER — HOSPITAL ENCOUNTER (OUTPATIENT)
Dept: RADIATION ONCOLOGY | Facility: MEDICAL CENTER | Age: 80
End: 2020-12-28
Payer: MEDICARE

## 2020-01-01 ENCOUNTER — APPOINTMENT (OUTPATIENT)
Dept: RADIATION ONCOLOGY | Facility: MEDICAL CENTER | Age: 80
End: 2020-01-01
Attending: RADIOLOGY
Payer: MEDICARE

## 2020-01-01 ENCOUNTER — HOSPITAL ENCOUNTER (OUTPATIENT)
Dept: RADIOLOGY | Facility: MEDICAL CENTER | Age: 80
End: 2020-08-03
Attending: OTOLARYNGOLOGY
Payer: MEDICARE

## 2020-01-01 VITALS
TEMPERATURE: 96.8 F | WEIGHT: 166.67 LBS | OXYGEN SATURATION: 97 % | DIASTOLIC BLOOD PRESSURE: 62 MMHG | BODY MASS INDEX: 23.86 KG/M2 | HEIGHT: 70 IN | SYSTOLIC BLOOD PRESSURE: 115 MMHG | HEART RATE: 65 BPM

## 2020-01-01 VITALS
HEART RATE: 73 BPM | DIASTOLIC BLOOD PRESSURE: 72 MMHG | HEIGHT: 70 IN | WEIGHT: 186 LBS | SYSTOLIC BLOOD PRESSURE: 118 MMHG | BODY MASS INDEX: 26.63 KG/M2

## 2020-01-01 VITALS
SYSTOLIC BLOOD PRESSURE: 132 MMHG | HEART RATE: 64 BPM | OXYGEN SATURATION: 96 % | BODY MASS INDEX: 26.29 KG/M2 | RESPIRATION RATE: 17 BRPM | WEIGHT: 183.64 LBS | HEIGHT: 70 IN | DIASTOLIC BLOOD PRESSURE: 72 MMHG | TEMPERATURE: 97.4 F

## 2020-01-01 VITALS
TEMPERATURE: 97.9 F | SYSTOLIC BLOOD PRESSURE: 96 MMHG | DIASTOLIC BLOOD PRESSURE: 60 MMHG | OXYGEN SATURATION: 96 % | BODY MASS INDEX: 23.6 KG/M2 | WEIGHT: 164.5 LBS | HEART RATE: 76 BPM

## 2020-01-01 VITALS
SYSTOLIC BLOOD PRESSURE: 96 MMHG | OXYGEN SATURATION: 98 % | DIASTOLIC BLOOD PRESSURE: 53 MMHG | TEMPERATURE: 96.7 F | HEART RATE: 60 BPM | RESPIRATION RATE: 16 BRPM

## 2020-01-01 DIAGNOSIS — I48.91 ATRIAL FIBRILLATION WITH RVR (HCC): ICD-10-CM

## 2020-01-01 DIAGNOSIS — C06.9 CARCINOMA OF ORAL CAVITY (HCC): ICD-10-CM

## 2020-01-01 DIAGNOSIS — C03.9 PRIMARY CANCER OF ALVEOLAR RIDGE MUCOSA (HCC): ICD-10-CM

## 2020-01-01 DIAGNOSIS — Z01.812 PRE-PROCEDURAL LABORATORY EXAMINATION: ICD-10-CM

## 2020-01-01 DIAGNOSIS — R22.1 NECK MASS: ICD-10-CM

## 2020-01-01 DIAGNOSIS — I21.4 NON-STEMI (NON-ST ELEVATED MYOCARDIAL INFARCTION) (HCC): Primary | ICD-10-CM

## 2020-01-01 DIAGNOSIS — I25.10 3-VESSEL CORONARY ARTERY DISEASE: ICD-10-CM

## 2020-01-01 DIAGNOSIS — Z01.810 PRE-OPERATIVE CARDIOVASCULAR EXAMINATION: ICD-10-CM

## 2020-01-01 DIAGNOSIS — R13.12 OROPHARYNGEAL DYSPHAGIA: ICD-10-CM

## 2020-01-01 DIAGNOSIS — C06.9 SQUAMOUS CELL CARCINOMA OF ORAL CAVITY (HCC): ICD-10-CM

## 2020-01-01 DIAGNOSIS — C06.1: ICD-10-CM

## 2020-01-01 DIAGNOSIS — L24.A9 WOUND DRAINAGE: ICD-10-CM

## 2020-01-01 DIAGNOSIS — R13.19 ESOPHAGEAL DYSPHAGIA: ICD-10-CM

## 2020-01-01 DIAGNOSIS — G89.18 POST-OP PAIN: ICD-10-CM

## 2020-01-01 DIAGNOSIS — Z79.01 CHRONIC ANTICOAGULATION: ICD-10-CM

## 2020-01-01 LAB
ANION GAP SERPL CALC-SCNC: 14 MMOL/L (ref 7–16)
ANION GAP SERPL CALC-SCNC: 15 MMOL/L (ref 7–16)
BUN SERPL-MCNC: 14 MG/DL (ref 8–22)
BUN SERPL-MCNC: 24 MG/DL (ref 8–22)
CALCIUM SERPL-MCNC: 9 MG/DL (ref 8.5–10.5)
CALCIUM SERPL-MCNC: 9.8 MG/DL (ref 8.5–10.5)
CHEMOTHERAPY INFUSION START DATE: NORMAL
CHEMOTHERAPY RECORDS: 2
CHEMOTHERAPY RECORDS: 6600
CHEMOTHERAPY RECORDS: NORMAL
CHEMOTHERAPY RX CANCER: NORMAL
CHLORIDE SERPL-SCNC: 100 MMOL/L (ref 96–112)
CHLORIDE SERPL-SCNC: 99 MMOL/L (ref 96–112)
CO2 SERPL-SCNC: 23 MMOL/L (ref 20–33)
CO2 SERPL-SCNC: 24 MMOL/L (ref 20–33)
COVID ORDER STATUS COVID19: NORMAL
CREAT SERPL-MCNC: 0.78 MG/DL (ref 0.5–1.4)
CREAT SERPL-MCNC: 0.78 MG/DL (ref 0.5–1.4)
DATE 1ST CHEMO CANCER: NORMAL
EKG IMPRESSION: NORMAL
ERYTHROCYTE [DISTWIDTH] IN BLOOD BY AUTOMATED COUNT: 46.6 FL (ref 35.9–50)
ERYTHROCYTE [DISTWIDTH] IN BLOOD BY AUTOMATED COUNT: 48.3 FL (ref 35.9–50)
GLUCOSE SERPL-MCNC: 109 MG/DL (ref 65–99)
GLUCOSE SERPL-MCNC: 156 MG/DL (ref 65–99)
HCT VFR BLD AUTO: 38.2 % (ref 42–52)
HCT VFR BLD AUTO: 40.3 % (ref 42–52)
HGB BLD-MCNC: 12.7 G/DL (ref 14–18)
HGB BLD-MCNC: 13.5 G/DL (ref 14–18)
MCH RBC QN AUTO: 32.8 PG (ref 27–33)
MCH RBC QN AUTO: 32.9 PG (ref 27–33)
MCHC RBC AUTO-ENTMCNC: 33.2 G/DL (ref 33.7–35.3)
MCHC RBC AUTO-ENTMCNC: 33.5 G/DL (ref 33.7–35.3)
MCV RBC AUTO: 98.3 FL (ref 81.4–97.8)
MCV RBC AUTO: 98.7 FL (ref 81.4–97.8)
PATHOLOGY CONSULT NOTE: NORMAL
PLATELET # BLD AUTO: 269 K/UL (ref 164–446)
PLATELET # BLD AUTO: 275 K/UL (ref 164–446)
PMV BLD AUTO: 9.3 FL (ref 9–12.9)
PMV BLD AUTO: 9.4 FL (ref 9–12.9)
POTASSIUM SERPL-SCNC: 4.4 MMOL/L (ref 3.6–5.5)
POTASSIUM SERPL-SCNC: 4.5 MMOL/L (ref 3.6–5.5)
RAD ONC ARIA COURSE LAST TREATMENT DATE: NORMAL
RAD ONC ARIA COURSE TREATMENT ELAPSED DAYS: NORMAL
RAD ONC ARIA REFERENCE POINT DOSAGE GIVEN TO DATE: 10
RAD ONC ARIA REFERENCE POINT DOSAGE GIVEN TO DATE: 10.14
RAD ONC ARIA REFERENCE POINT DOSAGE GIVEN TO DATE: 12
RAD ONC ARIA REFERENCE POINT DOSAGE GIVEN TO DATE: 12.16
RAD ONC ARIA REFERENCE POINT DOSAGE GIVEN TO DATE: 14
RAD ONC ARIA REFERENCE POINT DOSAGE GIVEN TO DATE: 14.19
RAD ONC ARIA REFERENCE POINT DOSAGE GIVEN TO DATE: 16
RAD ONC ARIA REFERENCE POINT DOSAGE GIVEN TO DATE: 16.22
RAD ONC ARIA REFERENCE POINT DOSAGE GIVEN TO DATE: 18
RAD ONC ARIA REFERENCE POINT DOSAGE GIVEN TO DATE: 18.24
RAD ONC ARIA REFERENCE POINT DOSAGE GIVEN TO DATE: 2
RAD ONC ARIA REFERENCE POINT DOSAGE GIVEN TO DATE: 2.03
RAD ONC ARIA REFERENCE POINT DOSAGE GIVEN TO DATE: 4
RAD ONC ARIA REFERENCE POINT DOSAGE GIVEN TO DATE: 4.05
RAD ONC ARIA REFERENCE POINT DOSAGE GIVEN TO DATE: 6
RAD ONC ARIA REFERENCE POINT DOSAGE GIVEN TO DATE: 6.08
RAD ONC ARIA REFERENCE POINT DOSAGE GIVEN TO DATE: 8
RAD ONC ARIA REFERENCE POINT DOSAGE GIVEN TO DATE: 8.11
RAD ONC ARIA REFERENCE POINT ID: NORMAL
RAD ONC ARIA REFERENCE POINT SESSION DOSAGE GIVEN: 2
RAD ONC ARIA REFERENCE POINT SESSION DOSAGE GIVEN: 2.03
RBC # BLD AUTO: 3.87 M/UL (ref 4.7–6.1)
RBC # BLD AUTO: 4.1 M/UL (ref 4.7–6.1)
SARS-COV-2 RNA RESP QL NAA+PROBE: NOTDETECTED
SODIUM SERPL-SCNC: 137 MMOL/L (ref 135–145)
SODIUM SERPL-SCNC: 138 MMOL/L (ref 135–145)
SPECIMEN SOURCE: NORMAL
WBC # BLD AUTO: 11.8 K/UL (ref 4.8–10.8)
WBC # BLD AUTO: 12.3 K/UL (ref 4.8–10.8)

## 2020-01-01 PROCEDURE — 77290 THER RAD SIMULAJ FIELD CPLX: CPT | Performed by: RADIOLOGY

## 2020-01-01 PROCEDURE — 700102 HCHG RX REV CODE 250 W/ 637 OVERRIDE(OP): Performed by: ANESTHESIOLOGY

## 2020-01-01 PROCEDURE — 160009 HCHG ANES TIME/MIN: Performed by: OTOLARYNGOLOGY

## 2020-01-01 PROCEDURE — 77386 HCHG IMRT DELIVERY COMPLEX: CPT | Performed by: RADIOLOGY

## 2020-01-01 PROCEDURE — A9270 NON-COVERED ITEM OR SERVICE: HCPCS | Performed by: OTOLARYNGOLOGY

## 2020-01-01 PROCEDURE — 99214 OFFICE O/P EST MOD 30 MIN: CPT | Performed by: RADIOLOGY

## 2020-01-01 PROCEDURE — 77334 RADIATION TREATMENT AID(S): CPT | Performed by: RADIOLOGY

## 2020-01-01 PROCEDURE — 700101 HCHG RX REV CODE 250: Performed by: ANESTHESIOLOGY

## 2020-01-01 PROCEDURE — 77338 DESIGN MLC DEVICE FOR IMRT: CPT | Performed by: RADIOLOGY

## 2020-01-01 PROCEDURE — 88307 TISSUE EXAM BY PATHOLOGIST: CPT | Mod: 59

## 2020-01-01 PROCEDURE — 88309 TISSUE EXAM BY PATHOLOGIST: CPT

## 2020-01-01 PROCEDURE — 502573 HCHG PACK, ENT: Performed by: OTOLARYNGOLOGY

## 2020-01-01 PROCEDURE — 77470 SPECIAL RADIATION TREATMENT: CPT | Performed by: RADIOLOGY

## 2020-01-01 PROCEDURE — 80048 BASIC METABOLIC PNL TOTAL CA: CPT

## 2020-01-01 PROCEDURE — 0NBT0ZZ EXCISION OF RIGHT MANDIBLE, OPEN APPROACH: ICD-10-PCS | Performed by: OTOLARYNGOLOGY

## 2020-01-01 PROCEDURE — 160048 HCHG OR STATISTICAL LEVEL 1-5: Performed by: OTOLARYNGOLOGY

## 2020-01-01 PROCEDURE — 700101 HCHG RX REV CODE 250: Performed by: OTOLARYNGOLOGY

## 2020-01-01 PROCEDURE — 160036 HCHG PACU - EA ADDL 30 MINS PHASE I: Performed by: OTOLARYNGOLOGY

## 2020-01-01 PROCEDURE — U0003 INFECTIOUS AGENT DETECTION BY NUCLEIC ACID (DNA OR RNA); SEVERE ACUTE RESPIRATORY SYNDROME CORONAVIRUS 2 (SARS-COV-2) (CORONAVIRUS DISEASE [COVID-19]), AMPLIFIED PROBE TECHNIQUE, MAKING USE OF HIGH THROUGHPUT TECHNOLOGIES AS DESCRIBED BY CMS-2020-01-R: HCPCS

## 2020-01-01 PROCEDURE — 77300 RADIATION THERAPY DOSE PLAN: CPT | Performed by: RADIOLOGY

## 2020-01-01 PROCEDURE — 92611 MOTION FLUOROSCOPY/SWALLOW: CPT

## 2020-01-01 PROCEDURE — 700102 HCHG RX REV CODE 250 W/ 637 OVERRIDE(OP): Performed by: OTOLARYNGOLOGY

## 2020-01-01 PROCEDURE — 700111 HCHG RX REV CODE 636 W/ 250 OVERRIDE (IP): Performed by: ANESTHESIOLOGY

## 2020-01-01 PROCEDURE — 88305 TISSUE EXAM BY PATHOLOGIST: CPT | Mod: 59

## 2020-01-01 PROCEDURE — 500112 HCHG BONE WAX: Performed by: OTOLARYNGOLOGY

## 2020-01-01 PROCEDURE — 77300 RADIATION THERAPY DOSE PLAN: CPT | Mod: 26 | Performed by: RADIOLOGY

## 2020-01-01 PROCEDURE — 77301 RADIOTHERAPY DOSE PLAN IMRT: CPT | Mod: 26 | Performed by: RADIOLOGY

## 2020-01-01 PROCEDURE — 93005 ELECTROCARDIOGRAM TRACING: CPT

## 2020-01-01 PROCEDURE — 92610 EVALUATE SWALLOWING FUNCTION: CPT

## 2020-01-01 PROCEDURE — 500389 HCHG DRAIN, RESERVOIR SUCT JP 100CC: Performed by: OTOLARYNGOLOGY

## 2020-01-01 PROCEDURE — 85027 COMPLETE CBC AUTOMATED: CPT

## 2020-01-01 PROCEDURE — 77014 PR CT GUIDANCE PLACEMENT RAD THERAPY FIELDS: CPT | Mod: 26 | Performed by: RADIOLOGY

## 2020-01-01 PROCEDURE — 88311 DECALCIFY TISSUE: CPT | Mod: 91

## 2020-01-01 PROCEDURE — 700117 HCHG RX CONTRAST REV CODE 255

## 2020-01-01 PROCEDURE — 74230 X-RAY XM SWLNG FUNCJ C+: CPT

## 2020-01-01 PROCEDURE — 36415 COLL VENOUS BLD VENIPUNCTURE: CPT

## 2020-01-01 PROCEDURE — 160041 HCHG SURGERY MINUTES - EA ADDL 1 MIN LEVEL 4: Performed by: OTOLARYNGOLOGY

## 2020-01-01 PROCEDURE — 93922 UPR/L XTREMITY ART 2 LEVELS: CPT

## 2020-01-01 PROCEDURE — 07T10ZZ RESECTION OF RIGHT NECK LYMPHATIC, OPEN APPROACH: ICD-10-PCS | Performed by: OTOLARYNGOLOGY

## 2020-01-01 PROCEDURE — A9552 F18 FDG: HCPCS

## 2020-01-01 PROCEDURE — 500363 HCHG DISSECT TOOL, ANSPACH S: Performed by: OTOLARYNGOLOGY

## 2020-01-01 PROCEDURE — 70491 CT SOFT TISSUE NECK W/DYE: CPT

## 2020-01-01 PROCEDURE — A9270 NON-COVERED ITEM OR SERVICE: HCPCS | Performed by: ANESTHESIOLOGY

## 2020-01-01 PROCEDURE — 77336 RADIATION PHYSICS CONSULT: CPT | Performed by: RADIOLOGY

## 2020-01-01 PROCEDURE — 770006 HCHG ROOM/CARE - MED/SURG/GYN SEMI*

## 2020-01-01 PROCEDURE — 93010 ELECTROCARDIOGRAM REPORT: CPT | Performed by: INTERNAL MEDICINE

## 2020-01-01 PROCEDURE — 700117 HCHG RX CONTRAST REV CODE 255: Performed by: OBSTETRICS & GYNECOLOGY

## 2020-01-01 PROCEDURE — 99205 OFFICE O/P NEW HI 60 MIN: CPT | Performed by: RADIOLOGY

## 2020-01-01 PROCEDURE — 700105 HCHG RX REV CODE 258: Performed by: ANESTHESIOLOGY

## 2020-01-01 PROCEDURE — 160029 HCHG SURGERY MINUTES - 1ST 30 MINS LEVEL 4: Performed by: OTOLARYNGOLOGY

## 2020-01-01 PROCEDURE — 77280 THER RAD SIMULAJ FIELD SMPL: CPT | Performed by: RADIOLOGY

## 2020-01-01 PROCEDURE — 88331 PATH CONSLTJ SURG 1 BLK 1SPC: CPT

## 2020-01-01 PROCEDURE — 160035 HCHG PACU - 1ST 60 MINS PHASE I: Performed by: OTOLARYNGOLOGY

## 2020-01-01 PROCEDURE — 99214 OFFICE O/P EST MOD 30 MIN: CPT | Mod: 95,CR | Performed by: INTERNAL MEDICINE

## 2020-01-01 PROCEDURE — 77338 DESIGN MLC DEVICE FOR IMRT: CPT | Mod: 26 | Performed by: RADIOLOGY

## 2020-01-01 PROCEDURE — 77334 RADIATION TREATMENT AID(S): CPT | Mod: 26 | Performed by: RADIOLOGY

## 2020-01-01 PROCEDURE — 77263 THER RADIOLOGY TX PLNG CPLX: CPT | Performed by: RADIOLOGY

## 2020-01-01 PROCEDURE — 77427 RADIATION TX MANAGEMENT X5: CPT | Performed by: RADIOLOGY

## 2020-01-01 PROCEDURE — 160002 HCHG RECOVERY MINUTES (STAT): Performed by: OTOLARYNGOLOGY

## 2020-01-01 PROCEDURE — 77301 RADIOTHERAPY DOSE PLAN IMRT: CPT | Performed by: RADIOLOGY

## 2020-01-01 PROCEDURE — 51798 US URINE CAPACITY MEASURE: CPT

## 2020-01-01 PROCEDURE — 700111 HCHG RX REV CODE 636 W/ 250 OVERRIDE (IP): Mod: JW | Performed by: ANESTHESIOLOGY

## 2020-01-01 PROCEDURE — 501838 HCHG SUTURE GENERAL: Performed by: OTOLARYNGOLOGY

## 2020-01-01 RX ORDER — ONDANSETRON 2 MG/ML
4 INJECTION INTRAMUSCULAR; INTRAVENOUS
Status: DISCONTINUED | OUTPATIENT
Start: 2020-01-01 | End: 2020-01-01 | Stop reason: HOSPADM

## 2020-01-01 RX ORDER — SODIUM CHLORIDE 9 MG/ML
500 INJECTION, SOLUTION INTRAVENOUS ONCE
Status: CANCELLED | OUTPATIENT
Start: 2020-01-01 | End: 2020-01-01

## 2020-01-01 RX ORDER — OMEPRAZOLE 20 MG/1
20 CAPSULE, DELAYED RELEASE ORAL DAILY
Status: DISCONTINUED | OUTPATIENT
Start: 2020-01-01 | End: 2020-01-01 | Stop reason: HOSPADM

## 2020-01-01 RX ORDER — LIDOCAINE HYDROCHLORIDE AND EPINEPHRINE 10; 10 MG/ML; UG/ML
INJECTION, SOLUTION INFILTRATION; PERINEURAL
Status: DISCONTINUED | OUTPATIENT
Start: 2020-01-01 | End: 2020-01-01 | Stop reason: HOSPADM

## 2020-01-01 RX ORDER — ACETAMINOPHEN 500 MG
2000 TABLET ORAL 2 TIMES DAILY PRN
COMMUNITY

## 2020-01-01 RX ORDER — AMOXICILLIN AND CLAVULANATE POTASSIUM 875; 125 MG/1; MG/1
1 TABLET, FILM COATED ORAL 2 TIMES DAILY
Qty: 14 TAB | Refills: 0 | Status: SHIPPED | OUTPATIENT
Start: 2020-01-01 | End: 2021-01-01 | Stop reason: SDUPTHER

## 2020-01-01 RX ORDER — LISINOPRIL 40 MG/1
40 TABLET ORAL DAILY
Qty: 30 TAB | Refills: 11 | Status: SHIPPED | OUTPATIENT
Start: 2020-01-01 | End: 2020-01-01 | Stop reason: SDUPTHER

## 2020-01-01 RX ORDER — FLECAINIDE ACETATE 50 MG/1
50 TABLET ORAL 2 TIMES DAILY
Status: DISCONTINUED | OUTPATIENT
Start: 2020-01-01 | End: 2020-01-01 | Stop reason: HOSPADM

## 2020-01-01 RX ORDER — PHENYLEPHRINE HYDROCHLORIDE 10 MG/ML
INJECTION, SOLUTION INTRAMUSCULAR; INTRAVENOUS; SUBCUTANEOUS PRN
Status: DISCONTINUED | OUTPATIENT
Start: 2020-01-01 | End: 2020-01-01 | Stop reason: SURG

## 2020-01-01 RX ORDER — BACITRACIN ZINC 500 [USP'U]/G
OINTMENT TOPICAL
Status: DISCONTINUED
Start: 2020-01-01 | End: 2020-01-01 | Stop reason: ALTCHOICE

## 2020-01-01 RX ORDER — AMIODARONE HYDROCHLORIDE 200 MG/1
200 TABLET ORAL EVERY MORNING
COMMUNITY
Start: 2020-01-01

## 2020-01-01 RX ORDER — LISINOPRIL 20 MG/1
40 TABLET ORAL DAILY
Status: DISCONTINUED | OUTPATIENT
Start: 2020-01-01 | End: 2020-01-01 | Stop reason: HOSPADM

## 2020-01-01 RX ORDER — OXYCODONE HCL 5 MG/5 ML
5 SOLUTION, ORAL ORAL
Status: COMPLETED | OUTPATIENT
Start: 2020-01-01 | End: 2020-01-01

## 2020-01-01 RX ORDER — OXYCODONE HCL 5 MG/5 ML
10 SOLUTION, ORAL ORAL
Status: COMPLETED | OUTPATIENT
Start: 2020-01-01 | End: 2020-01-01

## 2020-01-01 RX ORDER — LISINOPRIL 40 MG/1
40 TABLET ORAL DAILY
Qty: 100 TAB | Refills: 3 | Status: SHIPPED | OUTPATIENT
Start: 2020-01-01

## 2020-01-01 RX ORDER — HALOPERIDOL 5 MG/ML
1 INJECTION INTRAMUSCULAR
Status: DISCONTINUED | OUTPATIENT
Start: 2020-01-01 | End: 2020-01-01 | Stop reason: HOSPADM

## 2020-01-01 RX ORDER — 0.9 % SODIUM CHLORIDE 0.9 %
10 VIAL (ML) INJECTION PRN
Status: CANCELLED | OUTPATIENT
Start: 2020-01-01

## 2020-01-01 RX ORDER — MORPHINE SULFATE 4 MG/ML
1-5 INJECTION, SOLUTION INTRAMUSCULAR; INTRAVENOUS
Status: DISCONTINUED | OUTPATIENT
Start: 2020-01-01 | End: 2020-01-01 | Stop reason: HOSPADM

## 2020-01-01 RX ORDER — HYDROMORPHONE HYDROCHLORIDE 1 MG/ML
0.2 INJECTION, SOLUTION INTRAMUSCULAR; INTRAVENOUS; SUBCUTANEOUS
Status: DISCONTINUED | OUTPATIENT
Start: 2020-01-01 | End: 2020-01-01 | Stop reason: HOSPADM

## 2020-01-01 RX ORDER — SODIUM CHLORIDE 9 MG/ML
500 INJECTION, SOLUTION INTRAVENOUS ONCE
Status: COMPLETED | OUTPATIENT
Start: 2020-01-01 | End: 2020-01-01

## 2020-01-01 RX ORDER — HYDROCODONE BITARTRATE AND ACETAMINOPHEN 7.5; 325 MG/1; MG/1
1-2 TABLET ORAL EVERY 6 HOURS PRN
Qty: 45 TAB | Refills: 0 | Status: SHIPPED | OUTPATIENT
Start: 2020-01-01 | End: 2020-01-01

## 2020-01-01 RX ORDER — HYDROMORPHONE HYDROCHLORIDE 1 MG/ML
0.1 INJECTION, SOLUTION INTRAMUSCULAR; INTRAVENOUS; SUBCUTANEOUS
Status: DISCONTINUED | OUTPATIENT
Start: 2020-01-01 | End: 2020-01-01 | Stop reason: HOSPADM

## 2020-01-01 RX ORDER — BACITRACIN ZINC 500 [USP'U]/G
OINTMENT TOPICAL
Status: DISCONTINUED | OUTPATIENT
Start: 2020-01-01 | End: 2020-01-01 | Stop reason: HOSPADM

## 2020-01-01 RX ORDER — AMLODIPINE BESYLATE 10 MG/1
10 TABLET ORAL DAILY
Status: DISCONTINUED | OUTPATIENT
Start: 2020-01-01 | End: 2020-01-01 | Stop reason: HOSPADM

## 2020-01-01 RX ORDER — SODIUM CHLORIDE, SODIUM LACTATE, POTASSIUM CHLORIDE, CALCIUM CHLORIDE 600; 310; 30; 20 MG/100ML; MG/100ML; MG/100ML; MG/100ML
INJECTION, SOLUTION INTRAVENOUS
Status: DISCONTINUED | OUTPATIENT
Start: 2020-01-01 | End: 2020-01-01 | Stop reason: SURG

## 2020-01-01 RX ORDER — 0.9 % SODIUM CHLORIDE 0.9 %
VIAL (ML) INJECTION PRN
Status: CANCELLED | OUTPATIENT
Start: 2020-01-01

## 2020-01-01 RX ORDER — HEPARIN SODIUM (PORCINE) LOCK FLUSH IV SOLN 100 UNIT/ML 100 UNIT/ML
500 SOLUTION INTRAVENOUS PRN
Status: CANCELLED | OUTPATIENT
Start: 2020-01-01

## 2020-01-01 RX ORDER — DEXAMETHASONE SODIUM PHOSPHATE 4 MG/ML
INJECTION, SOLUTION INTRA-ARTICULAR; INTRALESIONAL; INTRAMUSCULAR; INTRAVENOUS; SOFT TISSUE PRN
Status: DISCONTINUED | OUTPATIENT
Start: 2020-01-01 | End: 2020-01-01 | Stop reason: SURG

## 2020-01-01 RX ORDER — CEFAZOLIN SODIUM 1 G/3ML
INJECTION, POWDER, FOR SOLUTION INTRAMUSCULAR; INTRAVENOUS PRN
Status: DISCONTINUED | OUTPATIENT
Start: 2020-01-01 | End: 2020-01-01 | Stop reason: SURG

## 2020-01-01 RX ORDER — MAGNESIUM SULFATE HEPTAHYDRATE 40 MG/ML
INJECTION, SOLUTION INTRAVENOUS PRN
Status: DISCONTINUED | OUTPATIENT
Start: 2020-01-01 | End: 2020-01-01 | Stop reason: SURG

## 2020-01-01 RX ORDER — 0.9 % SODIUM CHLORIDE 0.9 %
3 VIAL (ML) INJECTION PRN
Status: CANCELLED | OUTPATIENT
Start: 2020-01-01

## 2020-01-01 RX ORDER — ATORVASTATIN CALCIUM 80 MG/1
80 TABLET, FILM COATED ORAL
COMMUNITY
Start: 2020-01-01

## 2020-01-01 RX ORDER — EPINEPHRINE 1 MG/ML(1)
AMPUL (ML) INJECTION PRN
Status: DISCONTINUED | OUTPATIENT
Start: 2020-01-01 | End: 2020-01-01 | Stop reason: SURG

## 2020-01-01 RX ORDER — ONDANSETRON 2 MG/ML
INJECTION INTRAMUSCULAR; INTRAVENOUS PRN
Status: DISCONTINUED | OUTPATIENT
Start: 2020-01-01 | End: 2020-01-01 | Stop reason: SURG

## 2020-01-01 RX ORDER — METOPROLOL TARTRATE 50 MG/1
50 TABLET, FILM COATED ORAL 2 TIMES DAILY
Status: DISCONTINUED | OUTPATIENT
Start: 2020-01-01 | End: 2020-01-01 | Stop reason: HOSPADM

## 2020-01-01 RX ORDER — MEPERIDINE HYDROCHLORIDE 25 MG/ML
12.5 INJECTION INTRAMUSCULAR; INTRAVENOUS; SUBCUTANEOUS
Status: DISCONTINUED | OUTPATIENT
Start: 2020-01-01 | End: 2020-01-01 | Stop reason: HOSPADM

## 2020-01-01 RX ORDER — DIPHENHYDRAMINE HYDROCHLORIDE 50 MG/ML
12.5 INJECTION INTRAMUSCULAR; INTRAVENOUS
Status: DISCONTINUED | OUTPATIENT
Start: 2020-01-01 | End: 2020-01-01 | Stop reason: HOSPADM

## 2020-01-01 RX ORDER — ASPIRIN 81 MG/1
81 TABLET, CHEWABLE ORAL
COMMUNITY
Start: 2020-01-01 | End: 2021-01-01

## 2020-01-01 RX ORDER — SODIUM CHLORIDE, SODIUM LACTATE, POTASSIUM CHLORIDE, CALCIUM CHLORIDE 600; 310; 30; 20 MG/100ML; MG/100ML; MG/100ML; MG/100ML
INJECTION, SOLUTION INTRAVENOUS CONTINUOUS
Status: DISCONTINUED | OUTPATIENT
Start: 2020-01-01 | End: 2020-01-01 | Stop reason: HOSPADM

## 2020-01-01 RX ORDER — METOPROLOL TARTRATE 1 MG/ML
1 INJECTION, SOLUTION INTRAVENOUS
Status: DISCONTINUED | OUTPATIENT
Start: 2020-01-01 | End: 2020-01-01 | Stop reason: HOSPADM

## 2020-01-01 RX ORDER — HYDROMORPHONE HYDROCHLORIDE 1 MG/ML
0.4 INJECTION, SOLUTION INTRAMUSCULAR; INTRAVENOUS; SUBCUTANEOUS
Status: DISCONTINUED | OUTPATIENT
Start: 2020-01-01 | End: 2020-01-01 | Stop reason: HOSPADM

## 2020-01-01 RX ORDER — HYDROCODONE BITARTRATE AND ACETAMINOPHEN 5; 325 MG/1; MG/1
1.5 TABLET ORAL EVERY 4 HOURS PRN
Status: DISCONTINUED | OUTPATIENT
Start: 2020-01-01 | End: 2020-01-01 | Stop reason: HOSPADM

## 2020-01-01 RX ORDER — AMLODIPINE BESYLATE 10 MG/1
10 TABLET ORAL DAILY
Qty: 30 TAB | Refills: 3 | Status: SHIPPED | OUTPATIENT
Start: 2020-01-01 | End: 2021-01-01 | Stop reason: SDUPTHER

## 2020-01-01 RX ORDER — MIDAZOLAM HYDROCHLORIDE 1 MG/ML
1 INJECTION INTRAMUSCULAR; INTRAVENOUS
Status: DISCONTINUED | OUTPATIENT
Start: 2020-01-01 | End: 2020-01-01 | Stop reason: HOSPADM

## 2020-01-01 RX ADMIN — FENTANYL CITRATE 50 MCG: 50 INJECTION INTRAMUSCULAR; INTRAVENOUS at 12:31

## 2020-01-01 RX ADMIN — PHENYLEPHRINE HYDROCHLORIDE 100 MCG: 10 INJECTION INTRAVENOUS at 12:59

## 2020-01-01 RX ADMIN — HYDROCODONE BITARTRATE AND ACETAMINOPHEN 15 ML: 7.5; 325 SOLUTION ORAL at 03:38

## 2020-01-01 RX ADMIN — OMEPRAZOLE 20 MG: 20 CAPSULE, DELAYED RELEASE ORAL at 05:06

## 2020-01-01 RX ADMIN — FLECAINIDE ACETATE 50 MG: 50 TABLET ORAL at 22:12

## 2020-01-01 RX ADMIN — HYDROCODONE BITARTRATE AND ACETAMINOPHEN 15 ML: 7.5; 325 SOLUTION ORAL at 08:25

## 2020-01-01 RX ADMIN — PHENYLEPHRINE HYDROCHLORIDE 100 MCG: 10 INJECTION INTRAVENOUS at 13:12

## 2020-01-01 RX ADMIN — EPINEPHRINE 10 MCG: 1 INJECTION, SOLUTION, CONCENTRATE INTRAVENOUS at 12:36

## 2020-01-01 RX ADMIN — EPINEPHRINE 10 MCG: 1 INJECTION, SOLUTION, CONCENTRATE INTRAVENOUS at 12:59

## 2020-01-01 RX ADMIN — HYDROCODONE BITARTRATE AND ACETAMINOPHEN 15 ML: 7.5; 325 SOLUTION ORAL at 18:07

## 2020-01-01 RX ADMIN — CEFAZOLIN 2 G: 330 INJECTION, POWDER, FOR SOLUTION INTRAMUSCULAR; INTRAVENOUS at 16:25

## 2020-01-01 RX ADMIN — METOPROLOL TARTRATE 50 MG: 50 TABLET, FILM COATED ORAL at 17:57

## 2020-01-01 RX ADMIN — OXYCODONE HYDROCHLORIDE 10 MG: 5 SOLUTION ORAL at 19:23

## 2020-01-01 RX ADMIN — MAGNESIUM SULFATE IN WATER 1 G: 40 INJECTION, SOLUTION INTRAVENOUS at 12:31

## 2020-01-01 RX ADMIN — FLECAINIDE ACETATE 50 MG: 50 TABLET ORAL at 04:12

## 2020-01-01 RX ADMIN — AMLODIPINE BESYLATE 10 MG: 10 TABLET ORAL at 06:12

## 2020-01-01 RX ADMIN — FENTANYL CITRATE 50 MCG: 50 INJECTION INTRAMUSCULAR; INTRAVENOUS at 13:53

## 2020-01-01 RX ADMIN — METOPROLOL TARTRATE 50 MG: 50 TABLET, FILM COATED ORAL at 04:08

## 2020-01-01 RX ADMIN — PHENYLEPHRINE HYDROCHLORIDE 100 MCG: 10 INJECTION INTRAVENOUS at 12:36

## 2020-01-01 RX ADMIN — LISINOPRIL 40 MG: 20 TABLET ORAL at 05:06

## 2020-01-01 RX ADMIN — FLECAINIDE ACETATE 50 MG: 50 TABLET ORAL at 05:06

## 2020-01-01 RX ADMIN — HYDROCODONE BITARTRATE AND ACETAMINOPHEN 15 ML: 7.5; 325 SOLUTION ORAL at 22:12

## 2020-01-01 RX ADMIN — EPINEPHRINE 10 MCG: 1 INJECTION, SOLUTION, CONCENTRATE INTRAVENOUS at 12:44

## 2020-01-01 RX ADMIN — HYDROCODONE BITARTRATE AND ACETAMINOPHEN 15 ML: 7.5; 325 SOLUTION ORAL at 04:06

## 2020-01-01 RX ADMIN — FENTANYL CITRATE 50 MCG: 50 INJECTION INTRAMUSCULAR; INTRAVENOUS at 19:11

## 2020-01-01 RX ADMIN — SODIUM CHLORIDE, POTASSIUM CHLORIDE, SODIUM LACTATE AND CALCIUM CHLORIDE: 600; 310; 30; 20 INJECTION, SOLUTION INTRAVENOUS at 12:25

## 2020-01-01 RX ADMIN — OMEPRAZOLE 20 MG: 20 CAPSULE, DELAYED RELEASE ORAL at 06:12

## 2020-01-01 RX ADMIN — LISINOPRIL 40 MG: 20 TABLET ORAL at 06:12

## 2020-01-01 RX ADMIN — DEXAMETHASONE SODIUM PHOSPHATE 4 MG: 4 INJECTION, SOLUTION INTRA-ARTICULAR; INTRALESIONAL; INTRAMUSCULAR; INTRAVENOUS; SOFT TISSUE at 12:31

## 2020-01-01 RX ADMIN — LISINOPRIL 40 MG: 20 TABLET ORAL at 04:08

## 2020-01-01 RX ADMIN — ROCURONIUM BROMIDE 50 MG: 10 INJECTION, SOLUTION INTRAVENOUS at 12:31

## 2020-01-01 RX ADMIN — PROPOFOL 150 MG: 10 INJECTION, EMULSION INTRAVENOUS at 12:31

## 2020-01-01 RX ADMIN — AMLODIPINE BESYLATE 10 MG: 10 TABLET ORAL at 05:06

## 2020-01-01 RX ADMIN — ONDANSETRON 4 MG: 2 INJECTION INTRAMUSCULAR; INTRAVENOUS at 12:31

## 2020-01-01 RX ADMIN — PROPOFOL 200 MCG/KG/MIN: 10 INJECTION, EMULSION INTRAVENOUS at 12:31

## 2020-01-01 RX ADMIN — METOPROLOL TARTRATE 50 MG: 50 TABLET, FILM COATED ORAL at 06:12

## 2020-01-01 RX ADMIN — FLECAINIDE ACETATE 50 MG: 50 TABLET ORAL at 06:12

## 2020-01-01 RX ADMIN — METOPROLOL TARTRATE 50 MG: 50 TABLET, FILM COATED ORAL at 17:19

## 2020-01-01 RX ADMIN — METOPROLOL TARTRATE 50 MG: 50 TABLET, FILM COATED ORAL at 05:06

## 2020-01-01 RX ADMIN — IOHEXOL 100 ML: 350 INJECTION, SOLUTION INTRAVENOUS at 10:29

## 2020-01-01 RX ADMIN — HYDROCODONE BITARTRATE AND ACETAMINOPHEN 15 ML: 7.5; 325 SOLUTION ORAL at 09:47

## 2020-01-01 RX ADMIN — FLECAINIDE ACETATE 50 MG: 50 TABLET ORAL at 17:19

## 2020-01-01 RX ADMIN — IOHEXOL 80 ML: 350 INJECTION, SOLUTION INTRAVENOUS at 15:45

## 2020-01-01 RX ADMIN — CEFAZOLIN 2 G: 330 INJECTION, POWDER, FOR SOLUTION INTRAMUSCULAR; INTRAVENOUS at 12:25

## 2020-01-01 RX ADMIN — FLECAINIDE ACETATE 50 MG: 50 TABLET ORAL at 17:57

## 2020-01-01 RX ADMIN — HYDROCODONE BITARTRATE AND ACETAMINOPHEN 15 ML: 7.5; 325 SOLUTION ORAL at 02:03

## 2020-01-01 RX ADMIN — HYDROCODONE BITARTRATE AND ACETAMINOPHEN 15 ML: 7.5; 325 SOLUTION ORAL at 06:12

## 2020-01-01 RX ADMIN — EPINEPHRINE 10 MCG: 1 INJECTION, SOLUTION, CONCENTRATE INTRAVENOUS at 13:25

## 2020-01-01 RX ADMIN — HYDROCODONE BITARTRATE AND ACETAMINOPHEN 15 ML: 7.5; 325 SOLUTION ORAL at 12:42

## 2020-01-01 RX ADMIN — PHENYLEPHRINE HYDROCHLORIDE 100 MCG: 10 INJECTION INTRAVENOUS at 12:44

## 2020-01-01 RX ADMIN — DEXAMETHASONE SODIUM PHOSPHATE 6 MG: 4 INJECTION, SOLUTION INTRA-ARTICULAR; INTRALESIONAL; INTRAMUSCULAR; INTRAVENOUS; SOFT TISSUE at 17:03

## 2020-01-01 RX ADMIN — EPINEPHRINE 10 MCG: 1 INJECTION, SOLUTION, CONCENTRATE INTRAVENOUS at 13:12

## 2020-01-01 RX ADMIN — MIDAZOLAM 1 MG: 1 INJECTION INTRAMUSCULAR; INTRAVENOUS at 12:31

## 2020-01-01 RX ADMIN — SODIUM CHLORIDE 500 ML: 9 INJECTION, SOLUTION INTRAVENOUS at 13:54

## 2020-01-01 RX ADMIN — HYDROCODONE BITARTRATE AND ACETAMINOPHEN 15 ML: 7.5; 325 SOLUTION ORAL at 17:19

## 2020-01-01 RX ADMIN — AMLODIPINE BESYLATE 10 MG: 10 TABLET ORAL at 04:08

## 2020-01-01 RX ADMIN — OMEPRAZOLE 20 MG: 20 CAPSULE, DELAYED RELEASE ORAL at 04:08

## 2020-01-01 RX ADMIN — FENTANYL CITRATE 50 MCG: 50 INJECTION INTRAMUSCULAR; INTRAVENOUS at 17:57

## 2020-01-01 RX ADMIN — PHENYLEPHRINE HYDROCHLORIDE 100 MCG: 10 INJECTION INTRAVENOUS at 13:25

## 2020-01-01 RX ADMIN — MIDAZOLAM 1 MG: 1 INJECTION INTRAMUSCULAR; INTRAVENOUS at 13:53

## 2020-01-01 RX ADMIN — METOPROLOL TARTRATE 50 MG: 50 TABLET, FILM COATED ORAL at 22:12

## 2020-01-01 ASSESSMENT — PAIN DESCRIPTION - PAIN TYPE
TYPE: ACUTE PAIN
TYPE: ACUTE PAIN;SURGICAL PAIN
TYPE: ACUTE PAIN
TYPE: ACUTE PAIN
TYPE: ACUTE PAIN;SURGICAL PAIN
TYPE: ACUTE PAIN
TYPE: ACUTE PAIN;SURGICAL PAIN
TYPE: SURGICAL PAIN
TYPE: ACUTE PAIN
TYPE: ACUTE PAIN;SURGICAL PAIN
TYPE: ACUTE PAIN
TYPE: ACUTE PAIN
TYPE: ACUTE PAIN;SURGICAL PAIN
TYPE: ACUTE PAIN
TYPE: ACUTE PAIN;SURGICAL PAIN
TYPE: ACUTE PAIN

## 2020-01-01 ASSESSMENT — COGNITIVE AND FUNCTIONAL STATUS - GENERAL
DAILY ACTIVITIY SCORE: 22
CLIMB 3 TO 5 STEPS WITH RAILING: A LITTLE
DRESSING REGULAR LOWER BODY CLOTHING: A LITTLE
STANDING UP FROM CHAIR USING ARMS: A LITTLE
MOBILITY SCORE: 21
SUGGESTED CMS G CODE MODIFIER MOBILITY: CJ
HELP NEEDED FOR BATHING: A LITTLE
WALKING IN HOSPITAL ROOM: A LITTLE
SUGGESTED CMS G CODE MODIFIER DAILY ACTIVITY: CJ

## 2020-01-01 ASSESSMENT — LIFESTYLE VARIABLES
TOTAL SCORE: 0
ON A TYPICAL DAY WHEN YOU DRINK ALCOHOL HOW MANY DRINKS DO YOU HAVE: 0
HOW MANY TIMES IN THE PAST YEAR HAVE YOU HAD 5 OR MORE DRINKS IN A DAY: 0
DOES PATIENT WANT TO STOP DRINKING: NO
EVER HAD A DRINK FIRST THING IN THE MORNING TO STEADY YOUR NERVES TO GET RID OF A HANGOVER: NO
HAVE YOU EVER FELT YOU SHOULD CUT DOWN ON YOUR DRINKING: NO
TOTAL SCORE: 0
AVERAGE NUMBER OF DAYS PER WEEK YOU HAVE A DRINK CONTAINING ALCOHOL: 0
TOTAL SCORE: 0
HAVE PEOPLE ANNOYED YOU BY CRITICIZING YOUR DRINKING: NO
EVER FELT BAD OR GUILTY ABOUT YOUR DRINKING: NO
CONSUMPTION TOTAL: NEGATIVE
ALCOHOL_USE: NO

## 2020-01-01 ASSESSMENT — PAIN SCALES - GENERAL
PAIN_LEVEL: 2
PAINLEVEL: NO PAIN

## 2020-01-01 ASSESSMENT — FIBROSIS 4 INDEX
FIB4 SCORE: 1.28
FIB4 SCORE: 1.17
FIB4 SCORE: 1.15
FIB4 SCORE: 1.263157894736842105
FIB4 SCORE: 1.17

## 2020-01-01 ASSESSMENT — ENCOUNTER SYMPTOMS: NO PATIENT REPORTED PAIN: 1

## 2020-01-03 ENCOUNTER — NON-PROVIDER VISIT (OUTPATIENT)
Dept: CARDIOLOGY | Facility: MEDICAL CENTER | Age: 80
End: 2020-01-03
Payer: MEDICARE

## 2020-01-03 DIAGNOSIS — I48.0 PAROXYSMAL ATRIAL FIBRILLATION (HCC): Primary | ICD-10-CM

## 2020-01-03 PROCEDURE — 93000 ELECTROCARDIOGRAM COMPLETE: CPT | Performed by: INTERNAL MEDICINE

## 2020-01-03 NOTE — NON-PROVIDER
Brought pt to exam room for EKG. Performed EKG. Noticed abnormal readings, took EKG immediately Uriel RN. Let pt know that RN will be in room to speak with pt as soon as RN finished with previous pt.

## 2020-01-03 NOTE — NON-PROVIDER
Pt states he feels better since starting flecainide and had no concerns. He denies chest pain, jaw or arm pain, dizziness, shortness of breath or fatigue. Reviewed medication list and confirmed he is taking as prescribed. Reviewed with Dr. Blank in office and received an okay for pt to go home. Dr. Valdez returned call shortly after and is happy with EKG and would like pt to continue current med regimen.     Contact pt and left message with Dr. Valdez's impression and recommendations. Advised to call back with any concerns.

## 2020-01-04 LAB — EKG IMPRESSION: NORMAL

## 2020-04-09 ENCOUNTER — TELEPHONE (OUTPATIENT)
Dept: CARDIOLOGY | Facility: MEDICAL CENTER | Age: 80
End: 2020-04-09

## 2020-04-09 NOTE — TELEPHONE ENCOUNTER
Received anticoagulation clearance request from from [facility] for pt to hold Xarelto for 2 days prior to EGD colonoscopy.     Hx: PAF on flecainide and metoprolol, chronic oac, CAD, no hx of stroke, DVT, PE noted in chart    To Dr. Valdez

## 2020-04-10 NOTE — TELEPHONE ENCOUNTER
Message   Received: Today   Message Contents   MAMADOU Murphy R.N.   Caller: Unspecified (Yesterday,  4:00 PM)             Ok to hold.      -------------------------------------------------------------------------------    Form prepped and awaiting Dr Valdez's signature.

## 2020-04-30 DIAGNOSIS — Z79.01 CHRONIC ANTICOAGULATION: ICD-10-CM

## 2020-05-18 ENCOUNTER — ANTICOAGULATION VISIT (OUTPATIENT)
Dept: VASCULAR LAB | Facility: MEDICAL CENTER | Age: 80
End: 2020-05-18
Attending: INTERNAL MEDICINE
Payer: MEDICARE

## 2020-05-18 DIAGNOSIS — I48.91 ATRIAL FIBRILLATION WITH RVR (HCC): ICD-10-CM

## 2020-05-18 PROCEDURE — 99212 OFFICE O/P EST SF 10 MIN: CPT

## 2020-05-18 NOTE — OR SURGEON
Immediate Post- Operative Note        PostOp Diagnosis: EMPHYSEMATOUS CHOLECYSTITIS    Procedure(s): ULTRASOUND GUIDED PERCUTANEOUS CHOLECYSTOTOMY    Estimated Blood Loss: Less than 5 ml        Complications: None            6/15/2017     2:06 PM     Niko Wright       Post-Anesthesia Evaluation and Assessment    Patient: Yuliya Feliciano MRN: [id number]  SSN: [id number]    YOB: 1940  Age: 67 y.o. Sex: male      I have evaluated the patient and they are stable and ready for discharge from the PACU. Cardiovascular Function/Vital Signs  Visit Vitals  /70   Pulse 64   Temp 36.8 °C (98.2 °F)   Resp 14   Wt 95.3 kg (210 lb)   SpO2 100%   BMI 28.48 kg/m²       Patient is status post MAC anesthesia for Procedure(s):  COLONOSCOPY  COLON BIOPSY. Nausea/Vomiting: None    Postoperative hydration reviewed and adequate. Pain:  Pain Scale 1: Numeric (0 - 10) (05/15/20 1022)  Pain Intensity 1: 0 (05/15/20 1022)   Managed    Neurological Status: At baseline    Mental Status, Level of Consciousness: Alert and  oriented to person, place, and time    Pulmonary Status:   O2 Device: Room air (05/15/20 1022)   Adequate oxygenation and airway patent    Complications related to anesthesia: None    Post-anesthesia assessment completed. No concerns    Signed By: Carlos Irby MD     May 18, 2020              Procedure(s):  COLONOSCOPY  COLON BIOPSY.     MAC    <BSHSIANPOST>    Vitals Value Taken Time   /70 5/15/2020 10:22 AM   Temp 36.8 °C (98.2 °F) 5/15/2020  9:54 AM   Pulse 64 5/15/2020 10:22 AM   Resp 14 5/15/2020 10:22 AM   SpO2 100 % 5/15/2020 10:22 AM

## 2020-06-11 ENCOUNTER — HOSPITAL ENCOUNTER (OUTPATIENT)
Dept: LAB | Facility: MEDICAL CENTER | Age: 80
End: 2020-06-11
Attending: INTERNAL MEDICINE
Payer: MEDICARE

## 2020-06-11 DIAGNOSIS — I48.91 ATRIAL FIBRILLATION WITH RVR (HCC): ICD-10-CM

## 2020-06-11 LAB
ALBUMIN SERPL BCP-MCNC: 3.8 G/DL (ref 3.2–4.9)
ALBUMIN/GLOB SERPL: 1.2 G/DL
ALP SERPL-CCNC: 75 U/L (ref 30–99)
ALT SERPL-CCNC: 25 U/L (ref 2–50)
ANION GAP SERPL CALC-SCNC: 12 MMOL/L (ref 7–16)
AST SERPL-CCNC: 20 U/L (ref 12–45)
BASOPHILS # BLD AUTO: 0.5 % (ref 0–1.8)
BASOPHILS # BLD: 0.05 K/UL (ref 0–0.12)
BILIRUB SERPL-MCNC: 0.2 MG/DL (ref 0.1–1.5)
BUN SERPL-MCNC: 11 MG/DL (ref 8–22)
CALCIUM SERPL-MCNC: 8.9 MG/DL (ref 8.5–10.5)
CHLORIDE SERPL-SCNC: 102 MMOL/L (ref 96–112)
CO2 SERPL-SCNC: 24 MMOL/L (ref 20–33)
CREAT SERPL-MCNC: 0.7 MG/DL (ref 0.5–1.4)
EOSINOPHIL # BLD AUTO: 0.2 K/UL (ref 0–0.51)
EOSINOPHIL NFR BLD: 2.2 % (ref 0–6.9)
ERYTHROCYTE [DISTWIDTH] IN BLOOD BY AUTOMATED COUNT: 49.8 FL (ref 35.9–50)
GLOBULIN SER CALC-MCNC: 3.1 G/DL (ref 1.9–3.5)
GLUCOSE SERPL-MCNC: 122 MG/DL (ref 65–99)
HCT VFR BLD AUTO: 42 % (ref 42–52)
HGB BLD-MCNC: 14 G/DL (ref 14–18)
IMM GRANULOCYTES # BLD AUTO: 0.05 K/UL (ref 0–0.11)
IMM GRANULOCYTES NFR BLD AUTO: 0.5 % (ref 0–0.9)
LYMPHOCYTES # BLD AUTO: 2.38 K/UL (ref 1–4.8)
LYMPHOCYTES NFR BLD: 25.7 % (ref 22–41)
MCH RBC QN AUTO: 32.8 PG (ref 27–33)
MCHC RBC AUTO-ENTMCNC: 33.3 G/DL (ref 33.7–35.3)
MCV RBC AUTO: 98.4 FL (ref 81.4–97.8)
MONOCYTES # BLD AUTO: 0.92 K/UL (ref 0–0.85)
MONOCYTES NFR BLD AUTO: 9.9 % (ref 0–13.4)
NEUTROPHILS # BLD AUTO: 5.65 K/UL (ref 1.82–7.42)
NEUTROPHILS NFR BLD: 61.2 % (ref 44–72)
NRBC # BLD AUTO: 0 K/UL
NRBC BLD-RTO: 0 /100 WBC
PLATELET # BLD AUTO: 247 K/UL (ref 164–446)
PMV BLD AUTO: 10.9 FL (ref 9–12.9)
POTASSIUM SERPL-SCNC: 3.9 MMOL/L (ref 3.6–5.5)
PROT SERPL-MCNC: 6.9 G/DL (ref 6–8.2)
RBC # BLD AUTO: 4.27 M/UL (ref 4.7–6.1)
SODIUM SERPL-SCNC: 138 MMOL/L (ref 135–145)
WBC # BLD AUTO: 9.3 K/UL (ref 4.8–10.8)

## 2020-06-11 PROCEDURE — 36415 COLL VENOUS BLD VENIPUNCTURE: CPT

## 2020-06-11 PROCEDURE — 85025 COMPLETE CBC W/AUTO DIFF WBC: CPT

## 2020-06-11 PROCEDURE — 80053 COMPREHEN METABOLIC PANEL: CPT

## 2020-06-24 NOTE — PROGRESS NOTES
Telemedicine established patient    Date of note:    6/24/20   Primary Care Provider: Grover Morillo M.D.    Name:             Damian Larsen   YOB: 1940  MRN:               [id number]    Video was done through zoom jeannine.  Verbal consent was obtained, patient identity is verified.    CC: Follow-up atrial fibrillation, hypertension    Patient ID/HPI:   79-year-old male patient with history of type II non-STEMI in 2017 due to sepsis, normal stress test, hypertension, paroxysmal atrial fibrillation with prior cardioversions here for follow-up.  Since last evaluated by me in January he has not had any significant recurrence of atrial fibrillation.  Tolerating flecainide well.  Blood pressure ranging from 110s to 140s.  No chest pain, shortness of breath.  No particular complaints today.    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   • Cholecystitis June 2017   • Cholecystitis with cholelithiasis 2017   • Glaucoma     bilateral   • HTN (hypertension)    • Non-STEMI (non-ST elevated myocardial infarction) (HCC) 06/13/2017    Secondary to sepsis   • Sepsis, unspecified (HCC) June 2017   • Skin cancer 1987   • Smoker      Previous smoke         Past Surgical History:   Procedure Laterality Date   • COLONOSCOPY  5/30/2019    Procedure: COLONOSCOPY - W/POSS BX, DILATION, POLYPECTOMY, CONTROL OF HEMORRHAGE, W/ENDOSCOPIC MUCOSAL RESECTION;  Surgeon: Will Alvarenga M.D.;  Location: SURGERY [facility];  Service: EUS   • ENDOSCOPY-ESOPH/STOMACH  04/01/2019    with colonoscopy   • REJI BY LAPAROSCOPY N/A 8/22/2017    Procedure: REJI BY LAPAROSCOPY;  Surgeon: Kae Last M.D.;  Location: SURGERY [facility];  Service:          Current Outpatient Medications   Medication Sig Dispense Refill   • lisinopril (PRINIVIL) 40 MG tablet Take 1 Tab by mouth every day. 30 Tab 11   • rivaroxaban (XARELTO)  20 MG Tab tablet Take 1 Tab by mouth with dinner. 90 Tab 1   • metoprolol (LOPRESSOR) 50 MG Tab Take 1 Tab by mouth 2 Times a Day. 60 Tab 11   • flecainide (TAMBOCOR) 50 MG tablet Take 1 Tab by mouth 2 times a day. 60 Tab 11   • triamcinolone acetonide (KENALOG) 0.1 % Cream Apply 1 Application to affected area(s) as needed. Apply as directed  0   • hydrALAZINE (APRESOLINE) 10 MG Tab Take 1 Tab by mouth 3 times a day. 90 Tab 1   • chlorpheniramine (CHLOR-TRIMETON) 4 MG Tab Take 4 mg by mouth every day.     • latanoprost (XALATAN) 0.005 % Solution Place 1 Drop in both eyes every evening.     • omeprazole (PRILOSEC) 20 MG delayed-release capsule Take 20 mg by mouth every day.     • ascorbic acid (ASCORBIC ACID) 500 MG Tab Take 500 mg by mouth every day.     • vitamin e (VITAMIN E) 400 UNIT Cap Take 400 Units by mouth every day.     • therapeutic multivitamin-minerals (THERAGRAN-M) Tab Take 1 Tab by mouth every day.     • Omega-3 Fatty Acids (FISH OIL) 1000 MG Cap capsule Take 1,000 mg by mouth every day.     • loratadine (CLARITIN) 10 MG Tab Take 10 mg by mouth every day.       No current facility-administered medications for this visit.          No Known Allergies    No family history of coronary artery disease    Social History     Socioeconomic History   • Marital status:      Spouse name: Not on file   • Number of children: Not on file   • Years of education: Not on file   • Highest education level: Not on file   Occupational History   • Not on file   Social Needs   • Financial resource strain: Not on file   • Food insecurity     Worry: Not on file     Inability: Not on file   • Transportation needs     Medical: Not on file     Non-medical: Not on file   Tobacco Use   • Smoking status: Former Smoker     Packs/day: 0.50     Years: 20.00     Pack years: 10.00     Types: Cigarettes, Pipe     Last attempt to quit: 1987     Years since quittin.0   • Smokeless tobacco: Never Used   Substance and Sexual  "Activity   • Alcohol use: No   • Drug use: No   • Sexual activity: Not on file     Comment:    Lifestyle   • Physical activity     Days per week: Not on file     Minutes per session: Not on file   • Stress: Not on file   Relationships   • Social connections     Talks on phone: Not on file     Gets together: Not on file     Attends Taoist service: Not on file     Active member of club or organization: Not on file     Attends meetings of clubs or organizations: Not on file     Relationship status: Not on file   • Intimate partner violence     Fear of current or ex partner: Not on file     Emotionally abused: Not on file     Physically abused: Not on file     Forced sexual activity: Not on file   Other Topics Concern   • Not on file   Social History Narrative   • Not on file         Physical Exam:  Ambulatory Vitals  /72 (BP Location: Left arm)   Pulse 73   Ht 1.778 m (5' 10\")   Wt 84.4 kg (186 lb)    Oxygen Therapy:     BP Readings from Last 4 Encounters:   06/24/20 118/72   12/19/19 152/88   12/17/19 111/64   11/18/19 142/82       Weight/BMI: Body mass index is 26.69 kg/m².  Wt Readings from Last 4 Encounters:   06/24/20 84.4 kg (186 lb)   12/19/19 93.2 kg (205 lb 9.3 oz)   12/17/19 92.2 kg (203 lb 4.2 oz)   09/20/19 86.3 kg (190 lb 3.2 oz)       Visit was done through his video conference.  Patient alert awake oriented.  Head atraumatic normocephalic.  Respirations are nonlabored.        Lab Data Review:  Lab Results   Component Value Date/Time    CHOLSTRLTOT 148 10/29/2018 08:40 AM    LDL 92 10/29/2018 08:40 AM    HDL 33 (A) 10/29/2018 08:40 AM    TRIGLYCERIDE 116 10/29/2018 08:40 AM       Lab Results   Component Value Date/Time    SODIUM 138 06/11/2020 01:43 PM    POTASSIUM 3.9 06/11/2020 01:43 PM    CHLORIDE 102 06/11/2020 01:43 PM    CO2 24 06/11/2020 01:43 PM    GLUCOSE 122 (H) 06/11/2020 01:43 PM    BUN 11 06/11/2020 01:43 PM    CREATININE 0.70 06/11/2020 01:43 PM     Lab Results   Component " Value Date/Time    ALKPHOSPHAT 75 06/11/2020 01:43 PM    ASTSGOT 20 06/11/2020 01:43 PM    ALTSGPT 25 06/11/2020 01:43 PM    TBILIRUBIN 0.2 06/11/2020 01:43 PM      Lab Results   Component Value Date/Time    WBC 9.3 06/11/2020 01:43 PM     NUCLEAR IMAGING INTERPRETATION OF 8/9/2017 - REVIEWED WITH PATIENT:   Normal myocardial perfusion with no ischemia.   Normal left ventricular wall motion.  LV ejection fraction = 74%.   ECG INTERPRETATION   Negative stress ECG for ischemia.    Echo 9/15/2019  CONCLUSIONS  Normal left ventricular systolic function.  Left ventricular ejection fraction is visually estimated to be 70%.  Normal diastolic function.  The right ventricle was normal in size and function.  Mild tricuspid regurgitation.  Estimated right ventricular systolic pressure  is 45 mmHg.  Compared to the images of the prior study done 6/15/17  -  there has   been no significant change.    EKG today shows sinus rhythm, early R wave progression    Impression and Plan:  79-year-old male patient with  1.  Paroxysmal atrial fibrillation  2.  Hypertension  3.  Type II non-STEMI with minimal troponin elevation, negative stress test    -He is doing well with flecainide with no recurrences.  Continue metoprolol, flecainide, Xarelto.  -His blood pressure is slightly high, increase lisinopril to 40 mg daily, stop hydralazine, advised to report blood pressures in about 2 weeks to adjust the medications further.    Return in about 1 year (around 6/24/2021).      Adrian RASMUSSEN  Interventional cardiologist  [facility], [address]  [address] [address]  [place] [address]  Phone: [phone]  Fax: [phone]

## 2020-07-17 NOTE — TELEPHONE ENCOUNTER
SURAJ Hernandez @ Dr. Kemp's office requesting clearance for an oral biopsy for a cavity on Monday, 7/20. Pt is on xarelto.     Ph# [phone] ext. [phone]  Fax# [phone]

## 2020-07-17 NOTE — LETTER
PROCEDURE/SURGERY CLEARANCE FORM      Encounter Date: 7/17/2020    Patient: Damian Larsen  YOB: 1940    CARDIOLOGIST: Dr. Adrian Valdez    REFERRING DOCTOR: Dr. Kemp        The above patient may hold Xarelto for 2 days prior to oral surgery and restart per Dr. Kemp when bleeding is stable.                                                    Dr. Adrian Valdez  Hand signed by Uriel Ruiz RN due to time constraints

## 2020-07-17 NOTE — TELEPHONE ENCOUNTER
Dr. Valdez recently cleared pt to hold Xarelto 4/10/2020 for 2 days prior to GI procedure.     Contacted pt and he confirmed that he had no problems holding Xarelto previously. He denies any new cardiac symptoms, new diagnoses or health concerns. Pt recently seen by Dr. Valdez 6/24/2020 and doing well. Pt states that his blood pressure is doing well, reports 's-130's with an occasional 140's. Pt advised to restart Xarelto per Dr. Kemp's instructions once bleeding is stable.     Letter written and faxed back to [facility] at [phone]    FYI to Dr. Valdez

## 2020-08-18 NOTE — TELEPHONE ENCOUNTER
Left detailed message for pt. That he may hold Xarelto.    Message  Received: Today  Message Contents   MAMADOU Murphy L.P.N.   Caller: Unspecified (Today,  8:13 AM)             That should be fine

## 2020-08-27 NOTE — PATIENT COMMUNICATION
FW: Test Result Question  Received: Yesterday  Message Contents   Adrian Valdez M.D.  Argentina Ruiz R.N.             Lets have him start on amlodipine 10 mg daily      ----------------------------------------------------------------------------------------    Contacted pt and unable to reach him. Message left that a [email or web address] message would be sent with Dr. Valdez's recommendations.    See [email or web address] message.

## 2020-08-27 NOTE — PATIENT COMMUNICATION
"Pts BP is trending up significantly in the past week per attached BP log.   Contacted pt to get more information.     Pt confirms he has not started any new medication or made any significant changes. Taking meds as prescribed.  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 He reports the pain as \"medium\".     He is scheduled for surgery 9/14.     He denies headache, chest pain, vision changes or any other associated symptoms. HR low 70's. If BP remains significantly elevated, pt advised he could take extra dose of metoprolol tonight until hearing from Dr. Valdez tomorrow.     To AK      "

## 2020-09-12 NOTE — OR NURSING
COVID-19 Pre-surgery screenin. Do you have an undiagnosed respiratory illness or symptoms such as coughing or sneezing? NO (Yes/No)  a. Onset of Sx NO  b. Acute vs. chronic respiratory illness NO    2. Do you have an unexplained fever greater than 100.4 degrees Fahrenheit or 38 degrees Celsius?    NO (Yes/No)    3. Have you had direct exposure to a patient who tested positive for Covid-19?   NO (Yes/No)    4. Have you had any loss of your sense of taste or smell? Have you had N/V or sore throat? NO    Patient has been informed of visitor policy and asked to wear a mask upon entering the hospital  YES (Yes/No)

## 2020-09-14 NOTE — ANESTHESIA PREPROCEDURE EVALUATION
Relevant Problems   CARDIAC   (+) Atrial fibrillation with RVR ([facility])   (+) Hypertension   (+) Non-STEMI (non-ST elevated myocardial infarction) ([facility])         (+) VIJAY (acute kidney injury) ([facility])   (+) Acute kidney injury (nontraumatic) ([facility])       Physical Exam    Airway   Mallampati: II  TM distance: >3 FB  Neck ROM: full       Cardiovascular - normal exam  Rhythm: regular  Rate: normal  (-) murmur     Dental - normal exam             Pulmonary - normal exam  Breath sounds clear to auscultation     Abdominal    Neurological - normal exam                 Anesthesia Plan    ASA 3   ASA physical status 3 criteria: MI or angina - history (> 3 months)    Plan - general       Airway plan will be ETT        Induction: intravenous    Postoperative Plan: Postoperative administration of opioids is intended.    Pertinent diagnostic labs and testing reviewed    Informed Consent:    Anesthetic plan and risks discussed with patient.    Use of blood products discussed with: patient whom consented to blood products.

## 2020-09-14 NOTE — ANESTHESIA PROCEDURE NOTES
Airway    Date/Time: 9/14/2020 12:33 PM  Performed by: Graham Barrios M.D.  Authorized by: Graham Barrios M.D.     Location:  OR  Urgency:  Elective  Indications for Airway Management:  Anesthesia      Spontaneous Ventilation: absent    Sedation Level:  Deep  Preoxygenated: Yes    Patient Position:  Sniffing  Final Airway Type:  Endotracheal airway  Final Endotracheal Airway:  ETT and JOE tube  Cuffed: Yes    Technique Used for Successful ETT Placement:  Direct laryngoscopy  Devices/Methods Used in Placement:  Magill forceps    Insertion Site:  Right naris  Blade Type:  Solitario  Laryngoscope Blade/Videolaryngoscope Blade Size:  2  ETT Size (mm):  6.5  Measured from:  Nares  Placement Verified by: auscultation and capnometry    Cormack-Lehane Classification:  Grade IIa - partial view of glottis  Number of Attempts at Approach:  1

## 2020-09-15 NOTE — CARE PLAN
Problem: Safety  Goal: Will remain free from falls  Outcome: PROGRESSING AS EXPECTED  Note: Bed locked and in lowest position, side rails up x2, call light within reach, patient educated to call for assistance prior to exiting bed      Problem: Pain Management  Goal: Pain level will decrease to patient's comfort goal  Outcome: PROGRESSING AS EXPECTED  Note: Assess pain Q2-4H, administer pain medication as indicated, encourage patient to voice pain to staff

## 2020-09-15 NOTE — CARE PLAN
Problem: Communication  Goal: The ability to communicate needs accurately and effectively will improve  Outcome: PROGRESSING SLOWER THAN EXPECTED  Intervention: [place] patient and significant other/support system to call light to alert staff of needs  Note: Pt was educated on call light use and the need to alert staff to needs and prior to mobilization. Pt demonstrates understanding.  Wife initially told pt to yell out into [address] if he had any needs. This RN corrected this education and provided additional education on call light use.      Problem: Knowledge Deficit  Goal: Knowledge of disease process/condition, treatment plan, diagnostic tests, and medications will improve  Outcome: PROGRESSING SLOWER THAN EXPECTED  Goal: Knowledge of the prescribed therapeutic regimen will improve  Outcome: PROGRESSING SLOWER THAN EXPECTED  Intervention: Discuss information regarding therpeutic regimen and document in education  Note: Pt was educated on POC, MAR, shift routine and nursing education R/T Dx. Wife is very anxious about pt's mobility and refused to allow him to mobilize to restroom despite education. Questions were encouraged and answered. Pt verbalized understanding of all teaching and is very compliant with education now that wife has left for the night.

## 2020-09-15 NOTE — OR NURSING
1741 to pacu from or remains asleep with oral airway in place o2 via mask at 6 liters sats 100%  inc to r jaw line with sutures taiwo michael drain also to site draining small amount bloody drainage. Report recvd from Dr Barrios and Or rn        1759 medicated iv fent cold pack to surg inc site per Dr gordillo verbal  orders updated pt spouse in lobby   1845 updated pt spouse in lobby   1928 medicated po pain meds awaiting bed assignment updated pt spouse again   1947 spouse to bedside to visit with pt awaiting bed assignment     2030 awake alert oriented pain controlled without nausea ice back to surg site for increased comfort   2100 report called to mary rodriguez   2122 transported pt to room accomp with spouse and 2 [place]n [place]

## 2020-09-15 NOTE — ANESTHESIA POSTPROCEDURE EVALUATION
Patient: Damian Larsen    Procedure Summary     Date: 09/14/20 Room / Location: [facility] ROOM 28 / SURGERY SAME DAY [facility]    Anesthesia Start: 1225 Anesthesia Stop: 1744    Procedures:       ORIF, FRACTURE, MANDIBLE- FOR MARGINAL MANDIBULECTOMY. (Right Mouth)      DISSECTION, NECK, RADICAL- MODIFIED (Right Neck) Diagnosis: (MANDIBULAR SQUAMAS CELL CARCINOMA)    Surgeon: Shayne Vieira M.D. Responsible Provider: Graham Barrios M.D.    Anesthesia Type: general ASA Status: 3          Final Anesthesia Type: general  Last vitals  BP   Blood Pressure : 141/77    Temp   36.5 °C (97.7 °F)    Pulse   Pulse: 85   Resp   14    SpO2   100 %      Anesthesia Post Evaluation    Patient location during evaluation: PACU  Patient participation: complete - patient participated  Level of consciousness: awake and alert  Pain score: 2    Airway patency: patent  Anesthetic complications: no  Cardiovascular status: hemodynamically stable  Respiratory status: acceptable  Hydration status: euvolemic    PONV: none           Nurse Pain Score: 2 (NPRS)

## 2020-09-15 NOTE — ANESTHESIA TIME REPORT
Anesthesia Start and Stop Event Times     Date Time Event    9/14/2020 1224 Ready for Procedure     1225 Anesthesia Start     1744 Anesthesia Stop        Responsible Staff  09/14/20    Name Role Begin End    Graham Barrios M.D. 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 3PM - 7AM    Comments:

## 2020-09-15 NOTE — PROGRESS NOTES
Pt up from PACU with PACU RN and SO.   Pt was transferred into T427-2.    2 RN skin check complete.   Devices in place pulse ox, PIV, ice pack to rt neck, Domingo to rt neck, clothing.  Skin assessed under devices and all bony prominences.  No pressure ulcers found.  Only significant finding was expected surgical incision to rt face and neck. Site is LIO and approximated.  DOMINGO is compressed to self-suction with + bloody output noted.   The following interventions in place: pt is mobile and completes self-turns.

## 2020-09-15 NOTE — OP REPORT
DATE OF SERVICE:  09/14/2020    PREOPERATIVE DIAGNOSIS:  Right mandibular squamous cell carcinoma.      POSTOPERATIVE DIAGNOSIS:  Right mandibular squamous cell carcinoma.      PROCEDURES PERFORMED:  1.  Right marginal mandibulectomy.  2.  Right modified radical neck dissection.    OPERATING SURGEONS:  1.  Shayne Vieira MD  2.  Marily Ramirez MD    SPECIMENS:  1.  Right neck contents.  2.  Right mandibular mass.  3.  Margin frozen sections.    PROCEDURE:  Patient was intubated by anesthesia transnasally.  Once adequate   levels of anesthesia were achieved, patient was draped and prepared in the   normal surgical fashion for this procedure.  He was injected with 8 mL of   lidocaine with epinephrine along a proposed incision line 2 fingerbreadths   below the lower border of the mandible.  Incision was made through the skin   with a 15 blade, down through the platysma where superior and inferior   subplatysmal flaps were elevated.  The inferior border of the submandibular   gland was developed and retracted superiorly.  This was brought anteriorly to   the mylohyoid and further to the anterior belly of the digastric.  Dissection   was taken in the level I area down to in between the anterior digastrics   bilaterally and brought over the anterior larynx.  This was reflected to the   level of the anterior border of the submandibular gland.  At this point, the   mylohyoid was retracted anteriorly and the sublingual nerve was visualized.    It was tied off and ligated at its connection to the submandibular gland which   was then further reflected posteriorly down to the anterior border of the   jugular vein.  Inferiorly, the dissection was carried under the omohyoid to   the anterior border of the jugular vein which was then further followed   superiorly to the level of the posterior digastric.  The anterior border of   the SCM was developed down to the floor of the neck starting inferiorly.  The   neck contents  were carefully elevated superiorly up to the accessory nerve   until they were delivered for specimen.  The wound was rinsed with copious   amounts of sterile saline irrigation.  A 7 fluted drain was placed through the   inferior skin flap and then secured in place with a 3-0 nylon.  The wound was   then closed using interrupted Vicryl for the platysma and then a running   Prolene for the skin with overlying bacitracin ointment.  At this point,   attention was then turned to the oral cavity where he was injected with an   additional 8 mL of lidocaine with epinephrine.  An incision was then marked   out around the mucosal portion of the lesion and made with a 15 blade using a   Bovie electrocautery.  This was then carried to the lateral border of the   mandible which was then freed up using a freer.  The mass was found to the   level of the submental nerve, which was sacrificed.  Anterior, 2 additional   teeth were removed to facilitate improved margins.  Incision was then made   with an oscillating saw through the lateral cortex.  Then, using a 2 ge   drill, the medial cortex was then passed through.  The mass was then elevated   starting anteriorly until all soft tissue was transected using bipolar   electrocautery.  The specimen was then marked and sent for permanent.    Circumferential frozen sections were then taken and sent for specimen and then   anterior and posterior deep bone marrow margins were sent for permanent.    Once the frozen sections came back negative, the wound was then closed using   interrupted Vicryl over the mandible.  Oral cavity was suctioned of any bloody   contents.  At this point, my portion of the procedure was terminated and   patient was turned back over to anesthesia for emergence.    COMPLICATIONS:  None.    SPONGE COUNT:  Correct.    IV FLUIDS GIVEN:  1800 mL Lactated Ringer's.    ESTIMATED BLOOD LOSS:  380 mL.    ANESTHESIA TYPE:  General.        ____________________________________     MD LISSETT Wilburn / GREGG    DD:  09/14/2020 17:54:26  DT:  09/14/2020 19:54:54    D#:  [id number]  Job#:  [id number]

## 2020-09-15 NOTE — RESPIRATORY CARE
COPD EDUCATION by COPD CLINICAL EDUCATOR  9/15/2020 at 4:40 PM by Huong Baldwin RRT     Patient reviewed by COPD education team. Patient does not have a history or diagnosis of COPD and is a non-smoker, therefore does not qualify for the COPD program.

## 2020-09-16 NOTE — PROGRESS NOTES
AA&Ox4.    SpO2 93% on RA. Denies SOB.    Patient declines pain.     SKIN Right neck incision approximated with sutures; LIO. Swelling to right side of face and neck noted.     Drains R incision site  DOMINGO drain compressed to self suction. Draining SS output.     Tolerating Regular soft diet. Denies N/V.    + void. Via urinal. Bladder scan as needed to monitor possible retention.     LBM Prior to arrival per patient.    Pt ambulates/up with stand by assist.    All needs met at this time. Call light within reach. Pt calls appropriately.

## 2020-09-16 NOTE — CARE PLAN
Problem: Safety  Goal: Will remain free from falls  Outcome: PROGRESSING AS EXPECTED   Pt demonstrates appropriate use of call light to alert staff to needs.      Problem: Knowledge Deficit  Goal: Knowledge of disease process/condition, treatment plan, diagnostic tests, and medications will improve  Outcome: PROGRESSING AS EXPECTED   Pt was educated on POC, MAR, shift routine and nursing education R/T Dx. Questions were encouraged and answered. Pt verbalized understanding of all teaching.

## 2020-09-16 NOTE — PROGRESS NOTES
"S: Damian Larsen is a 80 y.o. male here for right mm mrnd pod 2 doing well. Taking po. Ambulating. Pain controled with po meds. Voiding.    O:  /68   Pulse 64   Temp 37.6 °C (99.7 °F) (Temporal)   Resp 18   Ht 1.778 m (5' 10\")   Wt 83.3 kg (183 lb 10.3 oz)   SpO2 92%         Recent Labs     09/15/20  0631   SODIUM 137   POTASSIUM 4.5   CHLORIDE 100   CO2 23   GLUCOSE 156*   BUN 14   CREATININE 0.78   CALCIUM 9.0         Neck:flaps well seated  DOMINGO:serosang  Face:minimal swelling along jawling, all branches functioning  Oc/op:incision intact, mandible well covered, tongue working well, no swelling  Airway:patent, nl quiet resp  Abd;s/nd/nd  Ext;maew  Lymph;no pedal edeam    A:s/p r mm mrnd pod 2 - doing well    P:  -diet consult  -likely d/c in am  "

## 2020-09-16 NOTE — PROGRESS NOTES
Bedside report received.  Assessment complete.  A&O x 4. Patient calls appropriately.  Patient ambulates with no assist.    Patient has 5/10 pain. Pain managed with prescribed medications.  Denies N&V. Tolerating regular/soft diet. Patient reports difficulty chewing current diet. Diet downgraded to minced and moist diet for greater ease with chewing.   Incision to right jaw, approximated with sutures, LIO, CDI. Right neck DOMINGO drain, LIO.   + void, - flatus, - BM.  Patient denies SOB.  SCD's off.    Review plan with of care with patient. Call light and personal belongings with in reach. Hourly rounding in place. All needs met at this time.

## 2020-09-17 NOTE — PROGRESS NOTES
Discharge orders acknowledged, Pt aware and compliant with new orders, D/C teaching completed, Pt able to verbalize needs and complaint with discharge orders. Medication script given to pt with explanation. IV removed.  DOMINGO education completed.  Pt and family aware of up coming appointment about removal of DOMINGO at Dr Vieira clinic tomorrow.

## 2020-09-17 NOTE — PROGRESS NOTES
AA&Ox4.     SpO2 96% on RA. Denies SOB.     Patient declines pain.      SKIN Right neck incision approximated with sutures; LIO. Swelling to right side of face and neck noted.      Drains R incision site  DOMINGO drain compressed to self suction. Draining SS output.      Tolerating Minced and moist diet. Denies N/V.     + void. Via urinal.      LBM Prior to arrival per patient.     Pt ambulates/up with stand by assist.     All needs met at this time. Call light within reach. Pt calls appropriately

## 2020-09-17 NOTE — CARE PLAN
Problem: Communication  Goal: The ability to communicate needs accurately and effectively will improve  Outcome: PROGRESSING AS EXPECTED  Note: Pt able to make needs known, Uses call light appropriately.       Problem: Safety  Goal: Will remain free from injury  Outcome: PROGRESSING AS EXPECTED  Note: Non skid socks, fall band on, hourly rounding in place, call light within reach, path free of clutter. Pt calls appropriately. Education provided on need to call staff for assistance with mobility and pt states understanding.      Problem: Pain Management  Goal: Pain level will decrease to patient's comfort goal  Outcome: PROGRESSING AS EXPECTED  Flowsheets  Taken 9/17/2020 0856  Non Verbal Scale:   Calm   Unlabored Breathing  Taken 9/17/2020 0800  Pain Rating Scale (NPRS): 3  Note: Pain controled with PRN and Scheduled pain medication.

## 2020-09-17 NOTE — DISCHARGE INSTRUCTIONS
Surgical Drain Record  Empty your surgical drain as told by your health care provider. Use this form to write down the amount of fluid that has collected in the drainage container. Bring this form with you to your follow-up visits.  Surgical drain #1 location: ___________________    Date __________ Time __________ Amount __________  Date __________ Time __________ Amount __________  Date __________ Time __________ Amount __________  Date __________ Time __________ Amount __________  Date __________ Time __________ Amount __________  Date __________ Time __________ Amount __________  Date __________ Time __________ Amount __________  Date __________ Time __________ Amount __________  Date __________ Time __________ Amount __________  Date __________ Time __________ Amount __________  Date __________ Time __________ Amount __________  Date __________ Time __________ Amount __________  Date __________ Time __________ Amount __________  Date __________ Time __________ Amount __________  Date __________ Time __________ Amount __________  Date __________ Time __________ Amount __________  Date __________ Time __________ Amount __________  Date __________ Time __________ Amount __________  Date __________ Time __________ Amount __________  Date __________ Time __________ Amount __________  Date __________ Time __________ Amount __________  Surgical drain #2 location: ___________________  Date __________ Time __________ Amount __________  Date __________ Time __________ Amount __________  Date __________ Time __________ Amount __________  Date __________ Time __________ Amount __________  Date __________ Time __________ Amount __________  Date __________ Time __________ Amount __________  Date __________ Time __________ Amount __________  Date __________ Time __________ Amount __________  Date __________ Time __________ Amount __________  Date __________ Time __________ Amount __________  Date __________ Time __________  Amount __________  Date __________ Time __________ Amount __________  Date __________ Time __________ Amount __________  Date __________ Time __________ Amount __________  Date __________ Time __________ Amount __________  Date __________ Time __________ Amount __________  Date __________ Time __________ Amount __________  Date __________ Time __________ Amount __________  Date __________ Time __________ Amount __________  Date __________ Time __________ Amount __________  Date __________ Time __________ Amount __________  This information is not intended to replace advice given to you by your health care provider. Make sure you discuss any questions you have with your health care provider.      Discharge Instructions    Discharged to home by car with relative. Discharged via wheelchair, hospital escort: Yes.  Special equipment needed: Not Applicable    Be sure to schedule a follow-up appointment with your primary care doctor or any specialists as instructed.     Discharge Plan:   Diet Plan: Discussed  Activity Level: Discussed  Confirmed Follow up Appointment: Patient to Call and Schedule Appointment  Confirmed Symptoms Management: Discussed  Medication Reconciliation Updated: Yes    I understand that a diet low in cholesterol, fat, and sodium is recommended for good health. Unless I have been given specific instructions below for another diet, I accept this instruction as my diet prescription.   Other diet: minced and Moist regular diet    Special Instructions: None    · Is patient discharged on Warfarin / Coumadin?   No     Depression / Suicide Risk    As you are discharged from this [facility] facility, it is important to learn how to keep safe from harming yourself.    Recognize the warning signs:  · Abrupt changes in personality, positive or negative- including increase in energy   · Giving away possessions  · Change in eating patterns- significant weight changes-  positive or negative  · Change in sleeping  patterns- unable to sleep or sleeping all the time   · Unwillingness or inability to communicate  · Depression  · Unusual sadness, discouragement and loneliness  · Talk of wanting to die  · Neglect of personal appearance   · Rebelliousness- reckless behavior  · Withdrawal from people/activities they love  · Confusion- inability to concentrate     If you or a loved one observes any of these behaviors or has concerns about self-harm, here's what you can do:  · Talk about it- your feelings and reasons for harming yourself  · Remove any means that you might use to hurt yourself (examples: pills, rope, extension cords, firearm)  · Get professional help from the community (Mental Health, Substance Abuse, psychological counseling)  · Do not be alone:Call your Safe Contact- someone whom you trust who will be there for you.  · Call your local CRISIS HOTLINE 636-2473 or 342-274-5943  · Call your local Children's Mobile Crisis Response Team [place] (476) 635-4661 or www.StartSampling  · Call the toll free National Suicide Prevention Hotlines   · National Suicide Prevention Lifeline 842-255-IDQZ (2603)  · National Hope Line Network 800-SUICIDE (995-5602)

## 2020-10-27 NOTE — DISCHARGE SUMMARY
DATE OF ADMISSION:  09/14/2020    DATE OF DISCHARGE:  09/17/2020    ADMISSION DIAGNOSIS:  Right mandibular squamous cell carcinoma.    DISCHARGE DIAGNOSIS:  Right mandibular squamous cell carcinoma.    PROCEDURE PERFORMED WHILE IN HOUSE:  1.  Right modified radical neck dissection.  2.  Right marginal mandibulectomy.    HOSPITAL COURSE:  The patient was admitted after surgery for wound healing   evaluation and pain control.  The next morning, he was found to be ambulating   and voiding well.  Pain was controlled with pain medication.  Drain was still   putting out a considerable amount of fluid.  He was kept overnight and the   next morning was doing quite well, ambulating up and down the vázquez.  Again,   pain was well controlled.  He had seen dietitian who had given him   recommendations for an oral diet.  The next morning, he was doing quite well   and desired to be discharged home.  This was accommodated to him.    DISCHARGE MEDICATION:  Norco.    DISCHARGE DIET:  Per dietitian.    FOLLOW UP:  Follow up in 7-10 days for suture removal and wound check with Dr. Vieira    CONSULTS WHILE IN-HOUSE:  Diet consult.       ____________________________________     MD LISSETT Wilburn / GREGG    DD:  10/26/2020 13:00:25  DT:  10/26/2020 21:14:58    D#:  [id number]  Job#:  [id number]

## 2020-11-19 NOTE — TELEPHONE ENCOUNTER
AK    TO: /Ofc  NM: Dr Dotson   HOSP: [facility]   PH: [phone]   PT NM: Damian Larsen   : 42   REG DR: Dr Valdez   RE: Wanted to give updates to Dr Valdez.  Not urgent can be  followed up tomorrow .    DISP HIST: 2020 04:42P

## 2020-11-20 NOTE — TELEPHONE ENCOUNTER
S/w Dr. Valdez over phone. Verbal orders received to refer pt to CT surgery. Referral placed. Will follow up next week.

## 2020-11-20 NOTE — TELEPHONE ENCOUNTER
SURAJ cline called in regards to pt post-op procedure. Dr cline wanted to let Dr RUELAS know what was going on with pt. I was going to call over to nurse and  hung up. Called dr back and BAHMAN that I would send over a message to the nurse.     Please call back at  [phone]

## 2020-11-20 NOTE — TELEPHONE ENCOUNTER
Spoke with Dr. Dotson from [facility]. Pt underwent mandibular resection and developed klebsiella pneumonia and a-fib with RVR so he was transitioned from flecainide to amiodarone. 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 Pt will stay on amio and appropriate CAD med management including metoprolol. Pt is still admitted, should be going back to [place] soon. Will continue radiation with onc here in [place]. Pt will go home with records and discs of cath.    Dr. Dotson is recommending referral to CT surgery and to coordinate this with the oncology team once discharged.     To Dr. Valdez

## 2020-12-08 PROBLEM — C03.9: Status: ACTIVE | Noted: 2020-01-01

## 2020-12-08 NOTE — CONSULTS
RADIATION ONCOLOGY CONSULT    DATE OF SERVICE:   12/8/2020    IDENTIFICATION:   A 80 y.o. male with  Visit Diagnoses     ICD-10-CM   1. 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 on 12/8/2020  Histopathologic type: Squamous cell carcinoma, NOS  Stage prefix: Post-therapy  Histologic grade (G): G1  Histologic grading system: 3 grade system  Residual tumor (R): R0 - None  Laterality: Right  Lymph-vascular invasion (LVI): LVI not present (absent)/not identified  Perineural invasion (PNI): Present  ECOG performance status: Grade 0  Karnofsky performance status: Score 90      He is being seen at the kind request of Dr. Flores for radiotherapy evaluation.    HISTORY OF PRESENT ILLNESS:   10-year-old male who presented in June 2020 with right jaw pain and loose teeth.  Presented to his dentist to noted an abnormality suspicious for malignancy.  Referred to oral surgery where biopsy was performed demonstrating squamous cell carcinoma.  He underwent PET/CT demonstrating an FDG avid lesion involving the right mandible with possible small level 1 lymph node.    Seen by Dr. Vieira and ENT consultation and underwent on 9/14/2020 right marginal mandibulectomy with right neck dissection.  0/42 nodes positive, primary measured 4.2 cm with a depth of invasion of 1.6 cm.  Multiple margins were positive.    Patient referred to [facility] was evaluated by Dr. Flores.  11/4/2020 underwent composite resection with segmental mandibulectomy, bilateral neck exploration, and left osteocutaneous composite fibula free flap reconstruction.  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  Depth of invasion at least 38 mm.  Medial margin 1 mm.  Perineural invasion was noted.  1/26 right level  "1 lymph node involved.    He is now 5 weeks postop.  Overall fairly active.  Does report some mild swallowing difficulty with thick liquids and solids.  Mainly drinking shakes for nutrition.  He denies pain.  Reports some numbness involving the surgical site of the neck bilaterally.    PAST MEDICAL HISTORY:   Past Medical History:   Diagnosis Date   • Arrhythmia     \"A fib\"   • Cancer (HCC) 1987    basal skin removed at office.   • Cholecystitis June 2017   • Cholecystitis with cholelithiasis 2017   • Dental disorder     5 teeth exracted on 9/8/2020 bottom right side.   • Glaucoma     bilateral   • Hemorrhagic disorder (HCC)     Related to taking Xarelto.   • HTN (hypertension)    • Hyperlipidemia    • Hypertension    • Non-STEMI (non-ST elevated myocardial infarction) (HCC) 06/13/2017    Secondary to sepsis   • Sepsis, unspecified June 2017   • Skin cancer 1987   • Smoker      Previous smoke       PAST SURGICAL HISTORY:  Past Surgical History:   Procedure Laterality Date   • NECK DISSECTION  11/04/2020    Right marginal mandibulectomy and right modified radical neck dissection   • NECK DISSECTION RADICAL Right 9/14/2020    Procedure: DISSECTION, NECK, RADICAL- MODIFIED;  Surgeon: Shayne Vieira M.D.;  Location: SURGERY SAME DAY [facility];  Service: Ent   • MANDIBLE FRACTURE ORIF Right 9/14/2020    Procedure: ORIF, FRACTURE, MANDIBLE- FOR MARGINAL MANDIBULECTOMY.;  Surgeon: Shayne Vieira M.D.;  Location: SURGERY SAME DAY [facility];  Service: Ent   • COLONOSCOPY  5/30/2019    Procedure: COLONOSCOPY - W/POSS BX, DILATION, POLYPECTOMY, CONTROL OF HEMORRHAGE, W/ENDOSCOPIC MUCOSAL RESECTION;  Surgeon: Will Alvarenga M.D.;  Location: SURGERY [facility];  Service: EUS   • ENDOSCOPY-ESOPH/STOMACH  04/01/2019    with colonoscopy   • REJI BY LAPAROSCOPY N/A 8/22/2017    Procedure: REJI BY LAPAROSCOPY;  Surgeon: Kae Last M.D.;  Location: SURGERY [facility];  Service:        CURRENT MEDICATIONS:  Current " Outpatient Medications   Medication Sig Dispense Refill   • amiodarone (CORDARONE) 200 MG Tab Take 200 mg by mouth.     • atorvastatin (LIPITOR) 80 MG tablet Take 80 mg by mouth.     • aspirin (ASA) 81 MG Chew Tab chewable tablet 81 mg by Nasogastric route.     • acetaminophen (TYLENOL) 500 MG Tab Take 500-1,000 mg by mouth every 6 hours as needed.     • amLODIPine (NORVASC) 10 MG Tab Take 1 Tab by mouth every day. 30 Tab 3   • lisinopril (PRINIVIL) 40 MG tablet Take 1 Tab by mouth every day. 100 Tab 3   • rivaroxaban (XARELTO) 20 MG Tab tablet Take 1 Tab by mouth with dinner. 90 Tab 1   • metoprolol (LOPRESSOR) 50 MG Tab Take 1 Tab by mouth 2 Times a Day. 60 Tab 11   • chlorpheniramine (CHLOR-TRIMETON) 4 MG Tab Take 4 mg by mouth every day.     • latanoprost (XALATAN) 0.005 % Solution Place 1 Drop in both eyes every evening.     • omeprazole (PRILOSEC) 20 MG delayed-release capsule Take 20 mg by mouth every day.     • loratadine (CLARITIN) 10 MG Tab Take 10 mg by mouth every day.     • flecainide (TAMBOCOR) 50 MG tablet Take 1 Tab by mouth 2 times a day. (Patient not taking: Reported on 12/8/2020) 60 Tab 11   • triamcinolone acetonide (KENALOG) 0.1 % Cream Apply 1 Application to affected area(s) as needed. Apply as directed  0   • ascorbic acid (ASCORBIC ACID) 500 MG Tab Take 500 mg by mouth every day.     • vitamin e (VITAMIN E) 400 UNIT Cap Take 400 Units by mouth every day.     • therapeutic multivitamin-minerals (THERAGRAN-M) Tab Take 1 Tab by mouth every day.     • Omega-3 Fatty Acids (FISH OIL) 1000 MG Cap capsule Take 1,000 mg by mouth every day.       No current facility-administered medications for this encounter.        ALLERGIES:  Patient has no known allergies.    FAMILY HISTORY:    family history includes Cancer in his father, mother, sister, and another family member.    SOCIAL HISTORY:     reports that he quit smoking about 33 years ago. His smoking use included cigarettes and pipe. He has a 10.00  "pack-year smoking history. He has never used smokeless tobacco. He reports that he does not drink alcohol or use drugs.    REVIEW OF SYSTEMS:    A review of systems for today's date of service was reviewed and uploaded into the electronic medical record.     PHYSICAL EXAM:   PERFORMANCE STATUS:  No flowsheet data found.  Karnofsky Score 12/8/2020   Karnofsky Score 80   Some recent data might be hidden     /62   Pulse 65   Temp 36 °C (96.8 °F)   Ht 1.778 m (5' 10\")   Wt 75.6 kg (166 lb 10.7 oz)   SpO2 97%   BMI 23.91 kg/m²   Physical Exam  Vitals signs and nursing note reviewed.   Constitutional:       General: He is not in acute distress.     Appearance: He is well-developed. He is not diaphoretic.   HENT:      Head: Normocephalic.      Mouth/Throat:      Mouth: Mucous membranes are dry.      Pharynx: No oropharyngeal exudate or posterior oropharyngeal erythema.      Comments: Reconstructed right mandible with free flap noted.  Well-healed.  No evidence of devascularization.  Neck:      Musculoskeletal: Normal range of motion and neck supple.      Comments: Lymphedema involving the upper neck bilaterally.  Cardiovascular:      Rate and Rhythm: Normal rate and regular rhythm.      Heart sounds: Normal heart sounds.   Pulmonary:      Effort: Pulmonary effort is normal.      Breath sounds: Normal breath sounds.   Lymphadenopathy:      Cervical: No cervical adenopathy.   Skin:     General: Skin is warm and dry.      Findings: No erythema.   Neurological:      Mental Status: He is alert and oriented to person, place, and time.      Cranial Nerves: No cranial nerve deficit.      Coordination: Coordination normal.   Psychiatric:         Behavior: Behavior normal.         Thought Content: Thought content normal.         Judgment: Judgment normal.         LABORATORY DATA:   Lab Results   Component Value Date/Time    WBC 11.8 (H) 09/17/2020 06:06 AM    RBC 3.87 (L) 09/17/2020 06:06 AM    HEMOGLOBIN 12.7 (L) " 09/17/2020 06:06 AM    HEMATOCRIT 38.2 (L) 09/17/2020 06:06 AM    MCV 98.7 (H) 09/17/2020 06:06 AM    MCH 32.8 09/17/2020 06:06 AM    MCHC 33.2 (L) 09/17/2020 06:06 AM    RDW 46.6 09/17/2020 06:06 AM    PLATELETCT 269 09/17/2020 06:06 AM    MPV 9.3 09/17/2020 06:06 AM    NEUTSPOLYS 61.20 06/11/2020 01:43 PM    LYMPHOCYTES 25.70 06/11/2020 01:43 PM    MONOCYTES 9.90 06/11/2020 01:43 PM    EOSINOPHILS 2.20 06/11/2020 01:43 PM    BASOPHILS 0.50 06/11/2020 01:43 PM      Lab Results   Component Value Date/Time    SODIUM 137 09/15/2020 06:31 AM    POTASSIUM 4.5 09/15/2020 06:31 AM    CHLORIDE 100 09/15/2020 06:31 AM    CO2 23 09/15/2020 06:31 AM    GLUCOSE 156 (H) 09/15/2020 06:31 AM    BUN 14 09/15/2020 06:31 AM    CREATININE 0.78 09/15/2020 06:31 AM           RADIOLOGY DATA:  Ct-soft Tissue Neck With    Result Date: 10/23/2020  Enlarging mass destroying the right body and symphysis of the mandible. It measures up to 5.4 cm as compared to 2.3 cm previously. There is invasion of the right anterolateral sublingual soft tissues. The right submandibular gland is heterogeneously hypodense when compared to previous. Posttreatment change versus tumor infiltration. Possible adjacent tiny enhancing metastatic lymph node. Enhancing right parotid and right submandibular lymph nodes. A metastatic. Bilateral small cervical lymph nodes are identified.       IMPRESSION:    A 80 y.o. with  Visit Diagnoses     ICD-10-CM   1. 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 on 12/8/2020  Histopathologic type: Squamous cell carcinoma, NOS  Stage prefix: Post-therapy  Histologic grade (G): G1  Histologic grading system: 3 grade system  Residual tumor (R): R0 - None  Laterality: Right  Lymph-vascular invasion (LVI): LVI not present (absent)/not  identified  Perineural invasion (PNI): Present  ECOG performance status: Grade 0  Karnofsky performance status: Score 90        RECOMMENDATIONS:   Reviewed with Mr. Mrs. Spears pathology findings and indications for postoperative radiotherapy.  This includes deeply invasive very large squamous cell carcinoma of the right alveolar ridge.  The presence of perineural invasion and close margin are also worrisome.    We discussed IMRT based adjuvant radiotherapy. This will involve delivering 6600 cGy in 33 treatments delivered over 6.5 weeks. Treatments would be daily Monday through Friday.    We discussed the acute radiation effects associated with therapy including but not limited to: Xerostomia, dysgeusia, radiation pharyngitis, radiation mucositis, rare risk of osteoradionecrosis, dysphagia, and odynophagia. We discussed the importance of nutrition and maintaining fluid balance during therapy. We also discussed the importance of not having any treatment breaks during therapy.     Measures to ameliorate radiation effects include analgesics, placement of a feeding tube either prophylactic or  on an as needed basis to help with nutrition and fluid during therapy. Weekly nutritionist visits. Speech therapy referral for swallow evaluation and ongoing swallow exercises.     We reviewed the importance of dental care prior to start of radiotherapy, during and after completion of radiotherapy. I did recommend a dental visit as well as dental  trays for fluoride therapy.     After a lengthy one hour face-to-face discussion patient is willing to proceed.  We will refer to speech and do a video swallow evaluation considering his dysphagia complaints.  He and his wife are not interested in a prophylactic G-tube will monitor him closely.  He will return for simulation on December 10 with treatment expected to start on Sunday, December 20.    Thank you for the opportunity to participate in his care.  If any questions or comments,  please do not hesitate in calling.    Orders Placed This Encounter   • DX-ESOPHAGUS - [id number]   • REFERRAL TO SPEECH THERAPY   • REFERRAL TO ONCOLOGY NUTRITION SERVICES   • REFERRAL TO ONCOLOGY NURSE NAVIGATOR   • amiodarone (CORDARONE) 200 MG Tab   • atorvastatin (LIPITOR) 80 MG tablet   • aspirin (ASA) 81 MG Chew Tab chewable tablet

## 2020-12-09 NOTE — CT SIMULATION
PATIENT NAME Damian Larsen   PRIMARY PHYSICIAN Grover Morillo P [id number]   REFERRING PHYSICIAN No ref. provider found 1940     Primary cancer of alveolar ridge mucosa (HCC)  Staging form: Oral Cavity, AJCC 8th Edition  - Pathologic stage from 12/8/2020: Stage THOMAS (ypT4a, pN1, cM0) - Signed by Daiana HARVEY M.D. on 12/8/2020  Histopathologic type: Squamous cell carcinoma, NOS  Stage prefix: Post-therapy  Histologic grade (G): G1  Histologic grading system: 3 grade system  Residual tumor (R): R0 - None  Laterality: Right  Lymph-vascular invasion (LVI): LVI not present (absent)/not identified  Perineural invasion (PNI): Present  ECOG performance status: Grade 0  Karnofsky performance status: Score 90         Treatment Planning CT Simulation      Order Questions     Question Answer Comment    Is this for a new course of treatment? Yes     Is this an Addendum? No     Implanted Device/Pacemaker No     Simulation Status Initial     Planned Start Date 12/20/2020     Treatment Site Bone - Mandible     Laterality Right     Treatment Technique IMRT     Treatment Pattern/Frequency Daily     Concurrent Chemotherapy No     CT Technique 3D     Slice Thickness 2mm     Scan Extent H&N     Contrast IV     IV Contrast Volume Other     Volume (cc) 100 @2.5    Treatment Device(s) S-Frame Mask      Shoulder Pulls      Tongue Blade      Bolus 0.5 cm     Treatment Marker Scar     Patient Attire Gown     Patient Position Supine     Patient Orientation Head First     Arm Position Down     Dentures Out     Chin Position Neutral     Treatment Image Guidance CBCT     Frequency (CBCT) Daily     Image Guidance Match Bone     Treatment Planning Image Fusion CT/PET     Other Orders Special Tx Procedure      Weekly Physics Check             Comments     Push to [facility]. Inform physician after pushed. Will review in [facility]

## 2020-12-09 NOTE — PROGRESS NOTES
Nutrition Services: [place] for Cancer Referral  80 year old male with diagnosis of primary cancer of alveolar ridge mucosa (stage THOMAS: ypT4a, pN1, cM0).  Referral received for nutrition services. RD will attempt to see at next oncology appointment or will contact per policy for nutrition assessment.    Please contact as needed.  [phone]

## 2020-12-11 NOTE — OP THERAPY EVALUATION
Outpatient Speech Therapy  INITIAL EVALUATION    [facility] Speech Therapy [facility]  [address]  [address]  [place] [address]  Phone:  [phone]  Fax:  [phone]    Date of Evaluation: 12/11/2020    Patient: Damian Larsen  YOB: 1940  MRN: [id number]     Referring Provider: Daiana HARVEY M.D.  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 Primary cancer of alveolar ridge mucosa (HCC)  C03.9   2. Oropharyngeal dysphagia  R13.12     Subjective:   Reason for Therapy:     Reason For Evaluation:  Dysphagia    Onset Date:  7/17/2020    Onset Description:  Current medical course as noted from referring physician:  80-year-old male who presented in June 2020 with right jaw pain and loose teeth.  Presented to his dentist who noted an abnormality suspicious for malignancy.  Referred to oral surgery where biopsy was performed demonstrating squamous cell carcinoma.  He underwent PET/CT demonstrating an FDG avid lesion involving the right mandible with possible small level 1 lymph node.    Primary cancer of alveolar ridge mucosa (HCC)  Staging form: Oral Cavity, AJCC 8th Edition  - Pathologic stage from 12/8/2020: Stage THOMAS (ypT4a, pN1, cM0) - Signed by Daiana HARVEY M.D. on 12/8/2020  Histopathologic type: Squamous cell carcinoma, NOS  Stage prefix: Post-therapy  Histologic grade (G): G1  Histologic grading system: 3 grade system  Residual tumor (R): R0 - None  Laterality: Right  Lymph-vascular invasion (LVI): LVI not present (absent)/not identified  Perineural invasion (PNI): Present  ECOG performance status: Grade 0  Karnofsky performance  "status: Score 90       Surgery Date:  9/14/2020    Surgery Description:  Seen by Dr. Vieira and ENT consultation and underwent on 9/14/2020 right marginal mandibulectomy with right neck dissection.  0/42 nodes positive, primary measured 4.2 cm with a depth of invasion of 1.6 cm.  Multiple margins were positive.     Patient referred to [facility] was evaluated by Dr. Flores.  11/4/2020 underwent composite resection with segmental mandibulectomy, bilateral neck exploration, and left osteocutaneous composite fibula free flap reconstruction.  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  Depth of invasion at least 38 mm.  Medial margin 1 mm.  Perineural invasion was noted.  1/26 right level 1 lymph node involved.  Social Support:     Accompanied By:  Spouse (wife, Vicky \"Pat\", present and supportive)    Patient Mental Status:  Responsive (Oriented x 4)  Pain:     no pain reported    Therapy History:     ST Subjective  Current Diet: IDDSI minced and moist textures; thin liquids.    Nutritional Concerns Restrictions and Allergies:  Patient endorses intake of \"mashed up foods\" providing examples of macaroni and cheese, fish, soups.     Hearing:  Hard of hearing and Hearing Aids (Bilateral)    Vision:  Eye Glasses    Dentition:  Missing Dentition (Patient endorses that lack of teeth is impacting mastication; dentition limited to upper teeth)  Additional Subjective Comments:      Patient endorses \"I talk very well\" endorsing he has experienced changes to speech since surgery completed at [facility]. Patient denies presence of cough/ throat clearing with intake at this time.     Past Medical History:   Diagnosis Date   • Arrhythmia     \"A fib\"   • Cancer (HCC) 1987    basal skin removed at office.   • Cholecystitis June 2017   • Cholecystitis with cholelithiasis 2017   • Dental disorder     5 teeth exracted on 9/8/2020 bottom right side.   • Glaucoma     bilateral   • " Hemorrhagic disorder (HCC)     Related to taking Xarelto.   • HTN (hypertension)    • Hyperlipidemia    • Hypertension    • Non-STEMI (non-ST elevated myocardial infarction) (HCC) 06/13/2017    Secondary to sepsis   • Sepsis, unspecified June 2017   • Skin cancer 1987   • Smoker      Previous smoke     Past Surgical History:   Procedure Laterality Date   • NECK DISSECTION  11/04/2020    Right marginal mandibulectomy and right modified radical neck dissection   • NECK DISSECTION RADICAL Right 9/14/2020    Procedure: DISSECTION, NECK, RADICAL- MODIFIED;  Surgeon: Shayne Vieira M.D.;  Location: SURGERY SAME DAY [facility];  Service: Ent   • MANDIBLE FRACTURE ORIF Right 9/14/2020    Procedure: ORIF, FRACTURE, MANDIBLE- FOR MARGINAL MANDIBULECTOMY.;  Surgeon: Shayne Vieira M.D.;  Location: SURGERY SAME DAY [facility];  Service: Ent   • COLONOSCOPY  5/30/2019    Procedure: COLONOSCOPY - W/POSS BX, DILATION, POLYPECTOMY, CONTROL OF HEMORRHAGE, W/ENDOSCOPIC MUCOSAL RESECTION;  Surgeon: Will Alvarenga M.D.;  Location: SURGERY [facility];  Service: EUS   • ENDOSCOPY-ESOPH/STOMACH  04/01/2019    with colonoscopy   • REJI BY LAPAROSCOPY N/A 8/22/2017    Procedure: REJI BY LAPAROSCOPY;  Surgeon: Kae Last M.D.;  Location: SURGERY [facility];  Service:      Objective:   Treatments/Interventions Performed:  Patient/Caregiver education, Dysphagia treatment, Home program and Compensatory strategy training  Treatment Intervention tool(s) used:  EAT 10 (EAT-10) is a patient administered, self-assessment of possible difficulties that may be encountered as a result of difficulty in swallow.  Scores greater than 3 can be indicative of changes in swallow consistent with dysphagia.   Objective Details:  EAT 10 score: 16/40. Patient with largest complaint regarding (3) moderate swallowing solids and pills takes extra effort. Patient notes that taking time is most helpful to swallow at this time.     Speech Therapy  Assessment:     Speech Mechanism Assessment:     Patient voice description: Clear    Oral Motor Status:     Labial strength and control for patient: Poor (asymmetry demonstrated at rest, during movement with continuous open mouth posture demonstrated)    Lingual strength and control for patient: Poor (Tongue protrusion limited to level of lower gum line; increased movement of tongue on left side/ tongue tip deviation to right side with protrusion/ elevation)    Patient saliva management: Fair (drooling due to anterior loss of saliva with open mouth posture at rest; some degree of control demonstrated)Patient oral sensation and awareness: Fair (increased sensation demonstrated to right side of oral cavity)    Patient awareness of swallow problem: Fair (patient/ wife deny any difficulty in swallow; however, patient reports of swallowing difficulty suggest need for diet texture modification and compensatory swallow strategies to promote safety in swallow)    Oral motor status comments: Cranial nerve involvement in patient oral motor/ oral phase of swallow suggested as evidenced by lack of sensation to mandible and lower lip on the right side, changes to tongue movement in the oral cavity and changes to jaw posture and oral opening. Oral opening was limited to 2 cm as measured by lateral incisor to lateral incisor. Patient noted to demonstrate open mouth posture at rest, with increased opening demonstrated to the right versus left side.     Reduced sensation to the lower lip and jaw likely contributing factor to reduced articulatory precision resulting in speech perceived as mumbled. Patient wife endorsed that speech production is improved; however, wife frequently repeated patient utterances during evaluation.     Oral Phase Assessment:     Types of food within functional limits: Puree and Thin Liquid (TN0 via straw presentation; puree via teaspoon presentation; both items encouraged to side of increased sensation left  "side)    Oral phase comments: Patient endorses need to mash items with fork even when presented as minced and moist textures. Given patient complaints regarding food loss in oral cavity due to reduced sensation, patient was provided with education in straw/ food placement to left side to increase oral sensation and promote improved oral awareness of bolus to address complaints regarding loss of liquids, solids in the oral cavity prior to/ following swallow. Given small teaspoon bites of puree, patient noted to attempt swallow with open mouth posture. Patient was trialed in food placement to left side, complete lip closure with swallow with good results.     Pharyngeal Phase Assessment:     Pharyngeal phase comments: While patient did not demonstrate any overt signs or symptoms of aspiration with oral intake of pureed and thin liquid textures, some concern was demonstrated regarding coordination of oral, pharyngeal phases of swallow as evidenced by inconsistent audible swallows during intake. Given patient noted changes to sensation and musculature related to swallow, participation in a formal evaluation of swallow recommended through MBS to rule out aspiration and quantify pharyngeal phase of swallow.     Speech Therapy Plan :   Prognosis & Recommendations  Impression Summary:  Mr. Damian Larsen, a 80 year old male, participated in an outpatient speech therapy evaluation of swallow at the kind request of Radiation/ Oncology, Dr. Stewart, in the setting of squamous cell carcinoma of the oral cavity.    Patient arrived to speech therapy evaluation endorsing intake of \"softer foods\" with types of foods consumed most consistent with minced and moist solids, thin liquid textures at this time. Patient and wife largely stated that patient is not experiencing any difficulty in swallow; but that soft foods are a result of loss in dentition and changes to tongue. Presence of cough or throat clear during intake was denied. " Patient with no noted weight loss, or recent history of pneumonia.      Patient bedside swallow assessment revealed patient demonstrated improvement in swallow with initiation of compensatory swallow strategies to address changes to oral sensation, oral motor musculature and movement, specific to food placement to left side, small, single bites/ sips and use of effortful swallow. It is important to note that presence of silent aspiration can not be ruled out based on results of a bedside swallow evaluation. Given patient suggested cranial nerve involvement in changes to sensation and motor function of oral motor musculature related to swallow, formal evaluation of swallow through MBS is strongly recommended. It was noted that referring physician placed order prior to today's evaluation 12/8/2020.     Education provided on importance of aspiration and swallow precautions, strict oral hygiene. Discussion completed regarding purpose/ rationale for alternative means of nutrition/ hydration with patient wife adamantly declining any talk of this during today's evaluation. While they were open to education provided, they stated they are motivated to complete treatment through PO intake at this time.    Patient was provided with education in home program addressing swallow with the following exercises educated/ trained: yawn, gargle, effortful swallow, Shaker (to continuous only, lift/ lower in repetitions of 5, 3 sets), Mendelsohn maneuver, jaw stretch, rotary jaw chew. A hand out was provided to increase recall and accuracy in completion of exercise.     Patient and wife were encouraged to return for continued participation in direct, outpatient speech therapy services to assist in patient swallowing and speech production. Patient wife declined to schedule additional follow up visits with outpatient speech therapy at this time, stating patient would need time in schedule to return to [facility] as recommended.   Prognosis:   Good  Prognosis Details:  Patient would likely benefit from participation in direct, outpatient speech therapy to address changes to swallow present during today's evaluation and address potential changes that may occur as a result of radiation. Direct speech therapy is recommended to provide further education/ training and support to maintain swallow integrity and promote return to PO/ swallow function during and following completion of radiation treatment.   Compensatory swallow strategies:  Upright position 90 degrees during meal and 45 minutes after meal, Reduce bolus size, Alternate liquids/solids, Multiple swallows, Cough/clear wet vocal quality after swallow and No talking while eating (Food present/ placement to left side of oral cavity)  Diet Recommendation:  Pureed Foods (PU4, MM5)  Liquid Recommendation:  Thin (TN0)  Goals  Short Term Goals:  1.) Patient will improve oral phase of swallow through improved lip closure during swallow, completing structured exercise addressing lip coordination/ strength for swallow, demonstrating complete lip closure 8/10 trials of various liquids, solids minimal verbal cues-independent.  2.) Patient will improve tongue base retraction during swallow through completion of swallow retraining exercise including: effortful swallow, mendelsohn maneuver as evidenced by complete bolus formation with liquids, solids prior to initiation of pharyngeal swallow as evidenced by formal assessment of swallow through MBS.   3.) Patient will improve airway protection through improved laryngeal elevation/ excursion during the swallow as evidenced by aspiration/ penetration scale of 1 based on formal assessment of swallow through MBS.   4.) Patient will demonstrate independence in completion of swallow exercises designed to maintain function of oral, pharyngeal phases of swallow completing as part of home program 7/7 days throughout radiation treatment, independent.   Long Term Goals:  1.)  Patient will state understanding to changes in swallow as a result of radiation and implement compensatory swallow strategies as necessary to reduce risk of aspiration/ penetration/ choking maintaining PO intake for primary nutrition needs.  2.) Patient will improve swallow a evidenced by an EAT 10 score of 3 or less.   Patient Stated Goal:  Maintain nutrition orally through radiation  Therapy Recommendations  Recommendation: Individual Speech Therapy,  Planned Therapy Interventions:  Home Program, Patient/Caregiver Education, Compensatory Strategy Training and Dysphagia treatment,   Plan Details:  4 units (17286)  Frequency:  2x month  Duration (in visits):  4  Duration (in weeks):  8      Functional Assessment Used  EAT 10     Referring provider co-signature:  I have reviewed this plan of care and my co-signature certifies the need for services.    Certification Period: 12/11/2020 to  02/19/21    Physician Signature: ________________________________ Date: ______________

## 2020-12-11 NOTE — RADIATION PLANNING NOTES
DATE OF SERVICE: 12/10/2020    DIAGNOSIS:  Primary cancer of alveolar ridge mucosa (HCC)  Staging form: Oral Cavity, AJCC 8th Edition  - Pathologic stage from 12/8/2020: Stage THOMAS (ypT4a, pN1, cM0) - Signed by Daiana HARVEY M.D. on 12/8/2020  Histopathologic type: Squamous cell carcinoma, NOS  Stage prefix: Post-therapy  Histologic grade (G): G1  Histologic grading system: 3 grade system  Residual tumor (R): R0 - None  Laterality: Right  Lymph-vascular invasion (LVI): LVI not present (absent)/not identified  Perineural invasion (PNI): Present  ECOG performance status: Grade 0  Karnofsky performance status: Score 90       DATE OF SERVICE: 12/10/2020    TYPE OF SIMULATION: Head & Neck    GOAL OF TREATMENT:   [x] Curative  [] Palliative  [] Oligometastatic    CONTRAST:    [x] IV Contrast*  [] Oral Contrast               POSITION:    [x]  Supine  [] Prone     COMPLEX:  [] Complex Blocking   [x]Arcs  [] Custom Blocks  [] >3 Sites    PROCEDURE: Patient place in supine position on CT table with head in neutral position. Dentures removed. Shoulder pulls used to stabilize shoulders. Patient head and shoulders were immobilized with a thermoplastic mask.   films evaluated to ensure proper patient position.  CT obtained through entire volume of interest. Exam pushed to treatment planning system for contouring and planning.    I have personally reviewed the relevant data, performed the target localization, and determined all relevant factors for this patient’s simulation.    *Omnipaque 80 -100cc IVP in conjunction with 500cc NS

## 2020-12-11 NOTE — PROGRESS NOTES
On 12/11/2020 this ONN called patient. Introduced self and explained role of nurse navigator as added resource and support to patients in treatment. Patient states he is doing well. Patient states he has good support and reliable transportation. Patient denies questions or concerns about treatment. Patient is able to verbalize treatment plan. Patient confirms he uses myChart and this ONN agrees to send patient follow-up message with contact information. Patient thanked ONN for calling.     MyChart message sent to patient after phone call.

## 2020-12-11 NOTE — RADIATION PLANNING NOTES
Clinical Treatment Planning Note    DATE OF SERVICE: 12/10/2020    DIAGNOSIS:  Primary cancer of alveolar ridge mucosa (HCC)  Staging form: Oral Cavity, AJCC 8th Edition  - Pathologic stage from 12/8/2020: Stage THOMAS (ypT4a, pN1, cM0) - Signed by Daiana HARVEY M.D. on 12/8/2020  Histopathologic type: Squamous cell carcinoma, NOS  Stage prefix: Post-therapy  Histologic grade (G): G1  Histologic grading system: 3 grade system  Residual tumor (R): R0 - None  Laterality: Right  Lymph-vascular invasion (LVI): LVI not present (absent)/not identified  Perineural invasion (PNI): Present  ECOG performance status: Grade 0  Karnofsky performance status: Score 90         IMAGING REVIEWED:  [] CT     [] MRI     [x] PET/CT     [] BONE SCAN     [] MAMMO     [] OTHER      TREATMENT INTENT:   [x] CURATIVE     [] MAINTENANCE     []  PALLIATIVE      []  SUPPORTIVE     []  PROPHYLACTIC     [] BENIGN     []  CONSOLIDATIVE      [] DEFINITIVE   []  OLOGIMETASTATIC      LINE OF TREATMENT:  [x] ADJUVANT   [] DEFINITIVE   [] NEOADJUVANT   [] RE-TREATMENT      TECHNIQUE PLANNED:  [x] IMRT   [] 3D   [] SBRT   [] SRS/SRT   [] HDR   [] ELECTRON       IMRT JUSTIFICATION:  []   An immediately adjacent area has been previously irradiated and abutting portals must be established with high precision.    []  Dose escalation is planned to deliver radiation doses in excess of those commonly utilized for similar tumors with conventional treatment.    []  The target volume is concave or convex, and the critical normal tissues are within or around that convexity or concavity.    [x]  The target volume is in close proximity to critical structures that must be protected.    [x]  The volume of interest must be covered with narrow margins to adequately protect  immediately adjacent structures.      FIELDS & BLOCKING:  [] COMPLEX BLOCKS     []  = 3 TX AREAS     [x]  ARCS     []  CUSTOM SHEILD        []  SIMPLE BLOCK      CHEMOTHERAPY:  []  CONCURRENT      []  INDUCTION     [] SEQUENTIAL     []  <30 DAYS FROM XRT      NOTES:  OAR CONSTRAINTS: (GUIDELINES ONLY NOT ABSOLUTE)  Target Prescribed Coverage   PTV1 95% of PTV1 covered by 100% (cGy) of RX Dose     PTV1 99% of PTV1 covered by 93% (cGy) of RX Dose       ETTA Goal   Any volume (1cc) outside PTV Max Dose < 74Gy   Plan Hot Spot Max Dose < 110%   Cord  Max Dose < 45Gy   Cord + 0.5cm Max Dose < 50Gy   Brainstem Max Dose < 52 Gy   Brainstem + 0.5cm                           (0.3cm w/ Dr. Jamee.) Max Dose < 52Gy   Oral Pharynx  Mean Dose < 45Gy    Oral Cavity Mean Dose < 30Gy   R Parotid  Mean Dose < 23Gy    L Parotid  Mean Dose < 23Gy    Cervical Esophagus Mean Dose < 30Gy   Contralateral Submandibular gland (not target)  Max Dose < 39Gy   Optic Chiasm Max Dose < 54Gy   R Optic Nerve Max Dose < 54Gy   L Optic Nerve Max Dose < 54Gy   R Eye Max Dose < 35Gy   L Eye Max Dose < 35Gy   R Lens Max Dose < 10Gy   L Lens Max Dose < 10Gy   Inner Ear Mean Dose < 50Gy    Mandible Goal - 1cc Max Dose < 70Gy   *RTOG 1016, RTOG 0225

## 2020-12-11 NOTE — TELEPHONE ENCOUNTER
Called pt to follow up, he has not yet been scheduled with CT surgery. Explained to pt about discussion with Dr. Dotson.     Pt states that his plan is to complete radiation therapy first, starting 12/20 for 6 weeks, and then will schedule a consult with CT surgery to follow up on his coronary artery disease. Pt denies chest pain, ACKERMAN, and any other cardiac complaints.

## 2020-12-21 NOTE — PROGRESS NOTES
"Nutrition Services: RD Consultation/ New Radiation Start  80 year old male with diagnosis of Stage THOMAS (ypT4a, pN1, cM0)      Past Medical History:   Diagnosis Date   • Arrhythmia     \"A fib\"   • Cancer (HCC) 1987    basal skin removed at office.   • Cholecystitis June 2017   • Cholecystitis with cholelithiasis 2017   • Dental disorder     5 teeth exracted on 9/8/2020 bottom right side.   • Glaucoma     bilateral   • Hemorrhagic disorder (HCC)     Related to taking Xarelto.   • HTN (hypertension)    • Hyperlipidemia    • Hypertension    • Non-STEMI (non-ST elevated myocardial infarction) (HCC) 06/13/2017    Secondary to sepsis   • Sepsis, unspecified June 2017   • Skin cancer 1987   • Smoker      Previous smoke       RD met with pt to assess current intake, appetite, and nutritional status.  Pt presents to appointment unaccompanied. Pt states is managing well so far. States appetite remains well. Mentions is eating \"liquid-based foods\", such as lasagna, mac and cheese, protein shakes, mashed potatoes, poached eggs, ice cream, enchiladas, and creamy soups. States has a Breezyt mixer, though has not used yet. States was advised to discontinue vitamin supplementation, though was formerly taking vitamin C and E. Mentions may have had weight loss in the past few months as was weighing 175 lbs and is now around 160 lbs. Denies issues with swallowing, mainly only having soft foods due to chewing. States cannot have meat or toast. States toast is too dry and feels like it gets caught in throat. Confirms met with SLP and will continue to do exercises that were prescribed. Pt estimates is drinking 83 ounces of water per day. Pt did not express any further nutrition-related questions or concerns at this time.     Assessment:  • Pertinent Labs: Glucose 156, A1c 6.3%   • Pertinent Meds: lipitor, cordarone, norvasc, prinivil, xarelto, lopressor, tambocor, chlorpheniramine, xalatan, prilosec  • Weight: 167 lbs/ 75.6 kg on " "12/8/20  • Height: 5'10\"  • BMI: 23.9  • BMI Classification: WNL  • Nutrition-Focused Physical Assessment: Appears adequately nourished per brief visual observation     Weight History from Chart:  184 lbs on 9/14/20    Weight Change/Malnutrition Risk: Per chart review, pt may have experienced a 17 lb weight loss within the past 3 months, which is a severe 9.2% loss. Pt does not meet criteria for malnutrition, though remains at high risk given severe weight loss and high risk diagnosis.     Interventions:  • Introduced self and discussed role of dietitian throughout treatment process   • Provided and discussed handout regarding general nutrition guidelines as well as nutritional recommendations for patient's with head and neck cancer, including tips/tricks should side effects of tx occur.   • Encouraged balanced diet with plant-based focus. Verbalized importance of nutrition and maintaining LBM throughout treatment.   • Encouraged physical activity as tolerated with emphasis on reducing long periods of sedentary activity as able.   • Increase meal and snack frequency to 4-6 x/day.   • Focus on high calorie and protein foods, fruits and vegetables with all meals/snacks - handout provided. Provided booklet for high calorie/protien soft snacks and recipe ideas to incorporate   • Incorporate boost plus or comparable protein supplement daily.  • Discussed potential feeding tube placement as needed in future.   • Reviewed benefit of SLP and encouraged to continue with appointments. Appears pt has upcoming MBSS this Thursday.   • Reviewed adequate hydration and watching for signs of dehydration.     Pt reports understanding and was receptive to information provided.   RD provided contact information. Encouraged pt to reach out as questions/concerns arise.   RD following and to make further recommendations as indicated.  [phone]      "

## 2020-12-23 NOTE — ON TREATMENT VISIT
ON TREATMENT  NOTE  RADIATION ONCOLOGY DEPARTMENT    Patient name:  Damian Larsen    Primary Physician:  Grover Morillo M.D. MRN: [id number]  CSN: [id number]   Referring physician:  Elliott Flores M.D. : 1940, 80 y.o.     ENCOUNTER DATE:  2020      DIAGNOSIS:  Primary cancer of alveolar ridge mucosa (HCC)  Staging form: Oral Cavity, AJCC 8th Edition  - Pathologic stage from 2020: Stage THOMAS (ypT4a, pN1, cM0) - Signed by Daiana HARVEY M.D. on 2020  Histopathologic type: Squamous cell carcinoma, NOS  Stage prefix: Post-therapy  Histologic grade (G): G1  Histologic grading system: 3 grade system  Residual tumor (R): R0 - None  Laterality: Right  Lymph-vascular invasion (LVI): LVI not present (absent)/not identified  Perineural invasion (PNI): Present  ECOG performance status: Grade 0  Karnofsky performance status: Score 90      TREATMENT SUMMARY:  [facility] Treatment Information        Some values may be hidden. Unless noted otherwise, only the newest values recorded on each date are displayed.         [facility] Treatment Summary 20   Course First Treatment Date 2020   Course Last Treatment Date 2020   HN postop Plan from Course C1_Postop H&N   Fraction 4 of 33   Elapsed Course Days 3 @    Prescribed Fraction Dose 200 cGy   Prescribed Total Dose 6,600 cGy   HN cp Reference Point from Course C1_Postop H&N   Elapsed Course Days 3 @    Session Dose 203 cGy   Total Dose 811 cGy   PTV_66 Reference Point from Course C1_Postop H&N   Elapsed Course Days 3 @    Session Dose 200 cGy   Total Dose 800 cGy              SUBJECTIVE:  No complaints. Been to speech therapy.      VITAL SIGNS:  Vitals:    20 1422   BP: (!) 96/60   Pulse: 76   Temp: 36.6 °C (97.9 °F)   SpO2: 96%      KPS: 80, Normal activity with effort; some signs or symptoms of disease (ECOG equivalent 1)  Encounter Vitals  Temperature: 36.6 °C (97.9 °F)  Blood Pressure : (!)  96/60  Pulse: 76  Pulse Oximetry: 96 %  Weight: 74.6 kg (164 lb 8 oz)  Pain Score: No pain  Pain Assessment 12/23/2020 12/23/2020 12/8/2020   Pain Assessment Denies Pain - -   Pain Score 0 0 0   Some recent data might be hidden          PHYSICAL EXAM:  Physical Exam  Vitals signs and nursing note reviewed.   Constitutional:       General: He is not in acute distress.     Appearance: He is well-developed.   HENT:      Head: Normocephalic.   Skin:     General: Skin is warm and dry.      Findings: No erythema.   Neurological:      Mental Status: He is alert and oriented to person, place, and time.   Psychiatric:         Behavior: Behavior normal.         Thought Content: Thought content normal.         Judgment: Judgment normal.          Toxicity Assessment 12/23/2020   Toxicity Assessment Head/Neck   Fatigue (lethargy, malaise, asthenia) None   Radiation Dermatitis None   Rash/desquamation None   Constipation None   Dehydration None   Mouth Dryness Mild   RT Dysphagia-Pharyngeal None   Mucositis None   Salivary Gland Changes None   Stomatitis/Pharyngitis None   Taste Disturbance (dysgeusia) Normal   RT - Pain due to RT None   Cough Absent   Voice changes/stridor/larynx (hoarseness, loss of voice, laryngitis) Normal       CURRENT MEDICATIONS:    Current Outpatient Medications:   •  amiodarone (CORDARONE) 200 MG Tab, Take 200 mg by mouth., Disp: , Rfl:   •  atorvastatin (LIPITOR) 80 MG tablet, Take 80 mg by mouth., Disp: , Rfl:   •  aspirin (ASA) 81 MG Chew Tab chewable tablet, 81 mg by Nasogastric route., Disp: , Rfl:   •  acetaminophen (TYLENOL) 500 MG Tab, Take 500-1,000 mg by mouth every 6 hours as needed., Disp: , Rfl:   •  amLODIPine (NORVASC) 10 MG Tab, Take 1 Tab by mouth every day., Disp: 30 Tab, Rfl: 3  •  lisinopril (PRINIVIL) 40 MG tablet, Take 1 Tab by mouth every day., Disp: 100 Tab, Rfl: 3  •  rivaroxaban (XARELTO) 20 MG Tab tablet, Take 1 Tab by mouth with dinner., Disp: 90 Tab, Rfl: 1  •  metoprolol  (LOPRESSOR) 50 MG Tab, Take 1 Tab by mouth 2 Times a Day., Disp: 60 Tab, Rfl: 11  •  flecainide (TAMBOCOR) 50 MG tablet, Take 1 Tab by mouth 2 times a day., Disp: 60 Tab, Rfl: 11  •  triamcinolone acetonide (KENALOG) 0.1 % Cream, Apply 1 Application to affected area(s) as needed. Apply as directed, Disp: , Rfl: 0  •  chlorpheniramine (CHLOR-TRIMETON) 4 MG Tab, Take 4 mg by mouth every day., Disp: , Rfl:   •  latanoprost (XALATAN) 0.005 % Solution, Place 1 Drop in both eyes every evening., Disp: , Rfl:   •  omeprazole (PRILOSEC) 20 MG delayed-release capsule, Take 20 mg by mouth every day., Disp: , Rfl:   •  ascorbic acid (ASCORBIC ACID) 500 MG Tab, Take 500 mg by mouth every day., Disp: , Rfl:   •  vitamin e (VITAMIN E) 400 UNIT Cap, Take 400 Units by mouth every day., Disp: , Rfl:   •  therapeutic multivitamin-minerals (THERAGRAN-M) Tab, Take 1 Tab by mouth every day., Disp: , Rfl:   •  Omega-3 Fatty Acids (FISH OIL) 1000 MG Cap capsule, Take 1,000 mg by mouth every day., Disp: , Rfl:   •  loratadine (CLARITIN) 10 MG Tab, Take 10 mg by mouth every day., Disp: , Rfl:     LABORATORY DATA:   Lab Results   Component Value Date/Time    SODIUM 137 09/15/2020 06:31 AM    POTASSIUM 4.5 09/15/2020 06:31 AM    CHLORIDE 100 09/15/2020 06:31 AM    CO2 23 09/15/2020 06:31 AM    GLUCOSE 156 (H) 09/15/2020 06:31 AM    BUN 14 09/15/2020 06:31 AM    CREATININE 0.78 09/15/2020 06:31 AM       Lab Results   Component Value Date/Time    WBC 11.8 (H) 09/17/2020 06:06 AM    RBC 3.87 (L) 09/17/2020 06:06 AM    HEMOGLOBIN 12.7 (L) 09/17/2020 06:06 AM    HEMATOCRIT 38.2 (L) 09/17/2020 06:06 AM    MCV 98.7 (H) 09/17/2020 06:06 AM    MCH 32.8 09/17/2020 06:06 AM    MCHC 33.2 (L) 09/17/2020 06:06 AM    PLATELETCT 269 09/17/2020 06:06 AM         RADIOLOGY DATA:  No results found.    IMPRESSION:  Cancer Staging  Primary cancer of alveolar ridge mucosa (HCC)  Staging form: Oral Cavity, AJCC 8th Edition  - Pathologic stage from 12/8/2020: Stage  THOMAS (ypT4a, pN1, cM0) - Signed by Daiana HARVEY M.D. on 12/8/2020      PLAN:  No change in treatment plan    Disposition:  Treatment plan and imaging reviewed. Questions answered. Continue therapy outlined.     Daiana HARVEY M.D.    No orders of the defined types were placed in this encounter.

## 2020-12-25 NOTE — OP THERAPY EVALUATION
[facility]  Speech-Language Pathology Department  Modified Barium Swallow Study Summary    Patient: Geo Larsen    Date of Evaluation: 12/24/20      Referring Provider:   Daiana Stewart MD           Fax: [phone]      Patient History/Reason for Referral: Pt was referred for MBS due to primary cancer of alveolar ridge mucosa problems with swallowing.           Patient just recently started RAD therapy on 12/20/20 reporting that he has had 5 treatments.  Had a right marginal mandibulectomy and right neck dissection in September 2020.  Saw SLP Katerin Falk in outpatient on 12/11/20 to assess swallowing and changes in speech.   He was recommended to have puree items and thin liquids as he had no oral sensation and had difficulty maneuvering PO intake.  Food will stick on back of his tongue and increased deficits with projecting back and down into pharynx.  Stated that pills are going down with use of strategies.  Putting PO intake on left side of mouth and using a straw to assist with oral phase changes.  He is not wanting PEG and trying to maintain PO intake as long as safe.  Noted that starting to have slight changes with dysgeusia, but this it not limiting his intake.     Patient with PMHx not limited to See’s esophagus, skin cancer, HTN, MI, A-Fib, and hyperlipidemia.         Oral Mechanism Exam:     -Dentition: Upper, natural  -Labial: Decreased strength and ROM  -Lingual: Decreased strength and ROM  -Palatal Elevation: mildly decreased  -Laryngeal Elevation: Moderate decrease   -Cough: Adequate  -Vocal Quality: Clear, strong   - Circumlaryngeal Palpation: no reported pain/deficits    Procedure Results:  The examination was conducted with videofluoroscopy from a   Lateral and Anterior view.  The following textures were presented:   Pudding, Thin Liquid, Nectar Thick Liquid and mixed     Structural Abnormalities: n/a    The following observations were made:   (WFL unless otherwise  noted)    Oral Phase Thin Liquids  Thick Liquids Puree Mixed   Lip Closure       Tongue Control During Bolus Hold       Premature spillage into the valleculae  X X    Premature spillage into the pyriform sinus X   X   Bolus Preparation/ Mastication    X   Bolus Transport/ Lingual Motion       Oral Residue after swallow X  Majority X  Majority X  Majority X  Majority   Initiation of Pharyngeal Swallow X  Pyriform X  Vallecular        Other Observations:   Unable to sense residue         Pharyngeal Phase Thin Liquids  Thick Liquids Puree Mixed   Soft Palate Elevation X X X X   Laryngeal Elevation X X X X   Anterior Hyoid Excursion X X X X   Epiglottic Inversion  X X X X   Laryngeal Vestibular Closure X   X   Pharyngeal Stripping Wave X X X X   Pharyngoesophageal Segment Opening (PES) X  retention X  retention X  retention X   Tongue Base Retraction (BOT) and/or BOT Residue X  Collection X  Collection X  Collection X  Collection   Residue in valleculae X  Collection X  Collection X  Collection X  Collection   Residue in piriform sinus(es) X  Collection  X  Collection    Residue on posterior pharyngeal wall (PPW) X  Collection X  Collection     Penetration X  flash   X  flash   Aspiration         Other Observations: Retention noted at UES in lateral views. Strategies attempted were head turn L/R, effortful swallow, small bolus, alternating liquids and solids, Mendelson, and double swallow.      Penetration Aspiration Scale:   Score of 1: Neither penetration nor aspiration  Score of 2-5: Penetration  Score of 6-8: Aspiration    1: Material does not enter airway  2: Material enters airway, but remains above vocal folds; Ejected from airway; no stasis  3: Material remains above vocal folds; visible stasis remains  4: Material contacts vocal folds, but is ejected; no stasis  5: Material contacts vocal folds, and is not ejected; visible stasis remains  6: Material passes glottis, but is ejected from airway; No visible  subglottic stasis  7: Material passes glottis, but is not ejected from airway; visible subglottic stasis despite patient’s response  8: Material passes glottis, and is not ejected; visible subglottic stasis; Absent patient response                          Esophageal Phase: Retention at and below UES.  Retrograde flow below UES.      Impressions:  Patient with no aspiration with all intake and one instance of flash penetration on thin liquid during swallow with large intake.  Oral swallow phase characterized by moderate oral and base of tongue residue; decreased tongue base retraction; increased mastication; and premature spillage.  Pharyngeal dysphagia characterized by severely decreased epiglottic inversion, anterior hyoid excursion, and laryngeal elevation; mild decreased with soft palate elevation and UES opening; reduced stripping wave;  delayed initiation of swallow; mild vallecular, pyriform sinus, and posterior pharyngeal wall residue.  Esophageal phase with retention at and below UES.  Retrograde flow noted moderately below UES.      Small intake decreased oropharyngeal residue, eliminated penetration, and improved premature spillage.  Alternating liquids and solids reduced oropharyngeal and esophageal residue.   No significant difference with thin versus NTL.  Mendelson maneuver assisted with clearance of pyriform sinus residue and UES retention, however was difficult for patient to complete due to no sensation in base of tongue.  Patient did not sense oropharyngeal or esophageal residue.  Retrograde flow was not observed entering near or into pharynx.       Overall moderate oropharyngeal deficits due to decreased lingual strength, overall sensation, and pharyngeal strength.  There are no concerns for ascending aspiration/penetration at this time with use of strategies in place.  He is safe to continue PO intake with strict adherence to strategies.      Recommendations:   Diet: Puree (IDDSI L 3)    Liquid:  Thin (IDDSI L 0)         Medications:   Whole     Compensatory Strategies:   Siting up at 90 degrees, remain sitting 60 minutes after meal. small bites/drinks, alternating liquids and solids, slow rate, secondary swallows, avoid problematic foods, and no talking.        Your patient may benefit from a referral for:       1. Follow up with GI to address esophageal deficits and UES function as needed.    2. Continue outpatient speech therapy to address weakness and strategies for continued  tolerance of PO intake through RAD.         Thanks you for the referral.    For further questions, please call [phone].       Francine Koo M.S., CCC-SLP

## 2020-12-29 NOTE — ADDENDUM NOTE
Encounter addended by: Daiana HARVEY M.D. on: 12/29/2020 11:02 AM   Actions taken: Visit diagnoses modified, Diagnosis association updated, Order list changed, Clinical Note Signed

## 2020-12-29 NOTE — ON TREATMENT VISIT
ON TREATMENT  NOTE  RADIATION ONCOLOGY DEPARTMENT    Patient name:  Damian Larsen    Primary Physician:  Grover Morillo M.D. MRN: [id number]  [facility]: [id number]   Referring physician:  Elliott Flores M.D. : 1940, 80 y.o.     ENCOUNTER DATE:  2020      DIAGNOSIS:  Primary cancer of alveolar ridge mucosa (HCC)  Staging form: Oral Cavity, AJCC 8th Edition  - Pathologic stage from 2020: Stage THOMAS (ypT4a, pN1, cM0) - Signed by Daiana HARVEY M.D. on 2020  Histopathologic type: Squamous cell carcinoma, NOS  Stage prefix: Post-therapy  Histologic grade (G): G1  Histologic grading system: 3 grade system  Residual tumor (R): R0 - None  Laterality: Right  Lymph-vascular invasion (LVI): LVI not present (absent)/not identified  Perineural invasion (PNI): Present  ECOG performance status: Grade 0  Karnofsky performance status: Score 90      TREATMENT SUMMARY:  [facility] Treatment Information        Some values may be hidden. Unless noted otherwise, only the newest values recorded on each date are displayed.         [facility]ria Treatment Summary 20   Course First Treatment Date 2020   Course Last Treatment Date 2020   HN postop Plan from Course C1_Postop H&N   Fraction 6 of 33   Elapsed Course Days 7 @    Prescribed Fraction Dose 200 cGy   Prescribed Total Dose 6,600 cGy   HN cp Reference Point from Course C1_Postop H&N   Elapsed Course Days 7 @    Session Dose 203 cGy   Total Dose 1,216 cGy   PTV_66 Reference Point from Course C1_Postop H&N   Elapsed Course Days 7 @    Session Dose 200 cGy   Total Dose 1,200 cGy              SUBJECTIVE:  Swelling and drainge from surgical site. Started after he cut himself shaving.  Was bleeding also which improved with holding xaralto.  Drainage has a foul smell to it.  He was unable to be treated yesterday because of the swelling caused him not to be able to fit into his mask.  Today he wife states that he has had  significant drainage where the swelling has decreased.  I am still able to express purulent foul-smelling material from his left neck incision site.      VITAL SIGNS:  Vitals:    12/29/20 0746   BP: (!) 96/53   Pulse: 60   Resp: 16   Temp: 35.9 °C (96.7 °F)   SpO2: 98%      KPS: 80, Normal activity with effort; some signs or symptoms of disease (ECOG equivalent 1)  Encounter Vitals  Temperature: 35.9 °C (96.7 °F)  Blood Pressure : (!) 96/53  Pulse: 60  Respiration: 16  Pulse Oximetry: 98 %  Pain Score: No pain  Pain Assessment 12/29/2020 12/23/2020 12/23/2020 12/8/2020   Pain Assessment - Denies Pain - -   Pain Score 0 0 0 0   Some recent data might be hidden          PHYSICAL EXAM:  Physical Exam  Vitals signs and nursing note reviewed.   Constitutional:       General: He is not in acute distress.     Appearance: He is well-developed.   HENT:      Head: Normocephalic.   Neck:      Comments: Swelling and purulent foul-smelling drainage from left neck surgical site.  Skin:     General: Skin is warm and dry.      Findings: No erythema.   Neurological:      Mental Status: He is alert and oriented to person, place, and time.   Psychiatric:         Behavior: Behavior normal.         Thought Content: Thought content normal.         Judgment: Judgment normal.          Toxicity Assessment 12/23/2020   Toxicity Assessment Head/Neck   Fatigue (lethargy, malaise, asthenia) None   Radiation Dermatitis None   Rash/desquamation None   Constipation None   Dehydration None   Mouth Dryness Mild   RT Dysphagia-Pharyngeal None   Mucositis None   Salivary Gland Changes None   Stomatitis/Pharyngitis None   Taste Disturbance (dysgeusia) Normal   RT - Pain due to RT None   Cough Absent   Voice changes/stridor/larynx (hoarseness, loss of voice, laryngitis) Normal       CURRENT MEDICATIONS:    Current Outpatient Medications:   •  amoxicillin-clavulanate (AUGMENTIN) 875-125 MG Tab, Take 1 Tab by mouth 2 times a day for 7 days., Disp: 14 Tab,  Rfl: 0  •  amiodarone (CORDARONE) 200 MG Tab, Take 200 mg by mouth., Disp: , Rfl:   •  atorvastatin (LIPITOR) 80 MG tablet, Take 80 mg by mouth., Disp: , Rfl:   •  aspirin (ASA) 81 MG Chew Tab chewable tablet, 81 mg by Nasogastric route., Disp: , Rfl:   •  acetaminophen (TYLENOL) 500 MG Tab, Take 500-1,000 mg by mouth every 6 hours as needed., Disp: , Rfl:   •  amLODIPine (NORVASC) 10 MG Tab, Take 1 Tab by mouth every day., Disp: 30 Tab, Rfl: 3  •  lisinopril (PRINIVIL) 40 MG tablet, Take 1 Tab by mouth every day., Disp: 100 Tab, Rfl: 3  •  rivaroxaban (XARELTO) 20 MG Tab tablet, Take 1 Tab by mouth with dinner., Disp: 90 Tab, Rfl: 1  •  metoprolol (LOPRESSOR) 50 MG Tab, Take 1 Tab by mouth 2 Times a Day., Disp: 60 Tab, Rfl: 11  •  flecainide (TAMBOCOR) 50 MG tablet, Take 1 Tab by mouth 2 times a day., Disp: 60 Tab, Rfl: 11  •  triamcinolone acetonide (KENALOG) 0.1 % Cream, Apply 1 Application to affected area(s) as needed. Apply as directed, Disp: , Rfl: 0  •  chlorpheniramine (CHLOR-TRIMETON) 4 MG Tab, Take 4 mg by mouth every day., Disp: , Rfl:   •  latanoprost (XALATAN) 0.005 % Solution, Place 1 Drop in both eyes every evening., Disp: , Rfl:   •  omeprazole (PRILOSEC) 20 MG delayed-release capsule, Take 20 mg by mouth every day., Disp: , Rfl:   •  ascorbic acid (ASCORBIC ACID) 500 MG Tab, Take 500 mg by mouth every day., Disp: , Rfl:   •  vitamin e (VITAMIN E) 400 UNIT Cap, Take 400 Units by mouth every day., Disp: , Rfl:   •  therapeutic multivitamin-minerals (THERAGRAN-M) Tab, Take 1 Tab by mouth every day., Disp: , Rfl:   •  Omega-3 Fatty Acids (FISH OIL) 1000 MG Cap capsule, Take 1,000 mg by mouth every day., Disp: , Rfl:   •  loratadine (CLARITIN) 10 MG Tab, Take 10 mg by mouth every day., Disp: , Rfl:     LABORATORY DATA:   Lab Results   Component Value Date/Time    SODIUM 137 09/15/2020 06:31 AM    POTASSIUM 4.5 09/15/2020 06:31 AM    CHLORIDE 100 09/15/2020 06:31 AM    CO2 23 09/15/2020 06:31 AM     GLUCOSE 156 (H) 09/15/2020 06:31 AM    BUN 14 09/15/2020 06:31 AM    CREATININE 0.78 09/15/2020 06:31 AM       Lab Results   Component Value Date/Time    WBC 11.8 (H) 09/17/2020 06:06 AM    RBC 3.87 (L) 09/17/2020 06:06 AM    HEMOGLOBIN 12.7 (L) 09/17/2020 06:06 AM    HEMATOCRIT 38.2 (L) 09/17/2020 06:06 AM    MCV 98.7 (H) 09/17/2020 06:06 AM    MCH 32.8 09/17/2020 06:06 AM    MCHC 33.2 (L) 09/17/2020 06:06 AM    PLATELETCT 269 09/17/2020 06:06 AM         RADIOLOGY DATA:  No results found.    IMPRESSION:  Cancer Staging  Primary cancer of alveolar ridge mucosa (HCC)  Staging form: Oral Cavity, AJCC 8th Edition  - Pathologic stage from 12/8/2020: Stage THOMAS (ypT4a, pN1, cM0) - Signed by Daiana HARVEY M.D. on 12/8/2020      PLAN:  Change in treatment plan will resimulate patient today.  We will continue with all treatment plan till new plan available.  I will start him on Augmentin 1 week course.    Disposition:  Treatment plan and imaging reviewed. Questions answered. Continue therapy outlined.     Daiana HARVEY M.D.    No orders of the defined types were placed in this encounter.

## 2020-12-29 NOTE — CT SIMULATION
PATIENT NAME Damian Larsen   PRIMARY PHYSICIAN Grover Morillo P [id number]   REFERRING PHYSICIAN No ref. provider found 1940     Primary cancer of alveolar ridge mucosa (HCC)  Staging form: Oral Cavity, AJCC 8th Edition  - Pathologic stage from 12/8/2020: Stage THOMAS (ypT4a, pN1, cM0) - Signed by Daiana HARVEY M.D. on 12/8/2020  Histopathologic type: Squamous cell carcinoma, NOS  Stage prefix: Post-therapy  Histologic grade (G): G1  Histologic grading system: 3 grade system  Residual tumor (R): R0 - None  Laterality: Right  Lymph-vascular invasion (LVI): LVI not present (absent)/not identified  Perineural invasion (PNI): Present  ECOG performance status: Grade 0  Karnofsky performance status: Score 90         Treatment Planning CT Simulation      Order Questions     Question Answer Comment    Is this for a new course of treatment? No     Is this an Addendum? Yes     Implanted Device/Pacemaker No     Simulation Status Re-Simulation     Re-Sim Reason Change in volume     Treatment Technique IMRT     Treatment Pattern/Frequency Daily     Concurrent Chemotherapy Yes     CT Technique 3D     Slice Thickness 2mm     Scan Extent H&N     Contrast IV     IV Contrast Volume Other     Volume (cc) 100 @2.5    Treatment Device(s) S-Frame Mask      Shoulder Pulls     Patient Attire Gown     Patient Position Supine     Patient Orientation Head First     Arm Position Down     Dentures Out     Chin Position Neutral     Treatment Image Guidance CBCT     Frequency (CBCT) Daily     Image Guidance Match Bone     Treatment Planning Image Fusion CT/PET     Other Orders Special Tx Procedure      Weekly Physics Check             Comments     Push to [facility] - [facility] FOV. Inform physician after pushed. Will review in [facility]

## 2020-12-29 NOTE — RADIATION PLANNING NOTES
DATE OF SERVICE: 12/29/2020    DIAGNOSIS:  Primary cancer of alveolar ridge mucosa (HCC)  Staging form: Oral Cavity, AJCC 8th Edition  - Pathologic stage from 12/8/2020: Stage THOMAS (ypT4a, pN1, cM0) - Signed by Daiana HARVEY M.D. on 12/8/2020  Histopathologic type: Squamous cell carcinoma, NOS  Stage prefix: Post-therapy  Histologic grade (G): G1  Histologic grading system: 3 grade system  Residual tumor (R): R0 - None  Laterality: Right  Lymph-vascular invasion (LVI): LVI not present (absent)/not identified  Perineural invasion (PNI): Present  ECOG performance status: Grade 0  Karnofsky performance status: Score 90       DATE OF SERVICE: 12/29/2020    TYPE OF SIMULATION: Head & Neck    GOAL OF TREATMENT:   [x] Curative  [] Palliative  [] Oligometastatic    CONTRAST:    [x] IV Contrast*  [] Oral Contrast               POSITION:    [x]  Supine  [] Prone     COMPLEX:  [] Complex Blocking   [x]Arcs  [] Custom Blocks  [] >3 Sites    PROCEDURE: Resimulation because of swelling in neck. Unable to fit in mask yesterday, but able today though tight.Patient place in supine position on CT table with head in neutral position. Dentures removed. Shoulder pulls used to stabilize shoulders. Patient head and shoulders were immobilized with a thermoplastic mask.   films evaluated to ensure proper patient position.  CT obtained through entire volume of interest. Exam pushed to treatment planning system for contouring and planning.    I have personally reviewed the relevant data, performed the target localization, and determined all relevant factors for this patient’s simulation.    *Omnipaque 80 -100cc IVP in conjunction with 500cc NS

## 2020-12-29 NOTE — PATIENT COMMUNICATION
OK to stop for 2 days and then resume once bleeding has stopped. It takes 48 hours for xarelto to get out of system completely so if his bleeding persists or worsens, he may require an urgent care or ER visit if unable to stop. SC

## 2020-12-31 NOTE — PROGRESS NOTES
Nutrition Services: Brief Update  Weight:   74.6 kg/164 lbs on 12/23/20  Weight History:  75.6 kg/166 lbs on 12/21/20  184 lbs on 9/14/20  Weight Change:   No severe weight loss since start of treatment noted.    RD and intern spoke to patient after XRT. Patient reports no nausea, vomiting, diarrhea or constipation. Patient states that he is eating mostly liquids and soft foods and not chewing foods. Pt denies any issues with swallowing. MBS completed on 12/24. Pureed diet with thin liquid recommended by SLP.       Plan/Recommend:  • Continue with oral diet as tolerated.    RD to continue to monitor throughout treatment and provide nutrition recommendations as indicated.  Please contact [phone]

## 2021-01-01 ENCOUNTER — HOSPITAL ENCOUNTER (OUTPATIENT)
Dept: RADIATION ONCOLOGY | Facility: MEDICAL CENTER | Age: 81
End: 2021-01-31
Attending: RADIOLOGY
Payer: MEDICARE

## 2021-01-01 ENCOUNTER — HOSPITAL ENCOUNTER (OUTPATIENT)
Dept: RADIATION ONCOLOGY | Facility: MEDICAL CENTER | Age: 81
End: 2021-01-19
Payer: MEDICARE

## 2021-01-01 ENCOUNTER — PATIENT MESSAGE (OUTPATIENT)
Dept: HEALTH INFORMATION MANAGEMENT | Facility: OTHER | Age: 81
End: 2021-01-01

## 2021-01-01 ENCOUNTER — HOSPITAL ENCOUNTER (OUTPATIENT)
Dept: RADIATION ONCOLOGY | Facility: MEDICAL CENTER | Age: 81
End: 2021-01-28
Payer: MEDICARE

## 2021-01-01 ENCOUNTER — HOSPITAL ENCOUNTER (OUTPATIENT)
Dept: RADIATION ONCOLOGY | Facility: MEDICAL CENTER | Age: 81
End: 2021-01-20
Payer: MEDICARE

## 2021-01-01 ENCOUNTER — HOSPITAL ENCOUNTER (OUTPATIENT)
Dept: RADIATION ONCOLOGY | Facility: MEDICAL CENTER | Age: 81
End: 2021-01-11
Payer: MEDICARE

## 2021-01-01 ENCOUNTER — HOSPITAL ENCOUNTER (OUTPATIENT)
Dept: RADIATION ONCOLOGY | Facility: MEDICAL CENTER | Age: 81
End: 2021-01-14
Payer: MEDICARE

## 2021-01-01 ENCOUNTER — HOSPITAL ENCOUNTER (OUTPATIENT)
Dept: RADIOLOGY | Facility: MEDICAL CENTER | Age: 81
End: 2021-05-03
Attending: RADIOLOGY
Payer: MEDICARE

## 2021-01-01 ENCOUNTER — HOSPITAL ENCOUNTER (OUTPATIENT)
Dept: RADIATION ONCOLOGY | Facility: MEDICAL CENTER | Age: 81
End: 2021-01-13
Payer: MEDICARE

## 2021-01-01 ENCOUNTER — DOCUMENTATION (OUTPATIENT)
Dept: NUTRITION | Facility: MEDICAL CENTER | Age: 81
End: 2021-01-01

## 2021-01-01 ENCOUNTER — PATIENT OUTREACH (OUTPATIENT)
Dept: HEALTH INFORMATION MANAGEMENT | Facility: OTHER | Age: 81
End: 2021-01-01

## 2021-01-01 ENCOUNTER — HOSPITAL ENCOUNTER (OUTPATIENT)
Dept: RADIATION ONCOLOGY | Facility: MEDICAL CENTER | Age: 81
End: 2021-01-04
Payer: MEDICARE

## 2021-01-01 ENCOUNTER — HOSPITAL ENCOUNTER (OUTPATIENT)
Dept: RADIATION ONCOLOGY | Facility: MEDICAL CENTER | Age: 81
End: 2021-02-28
Attending: RADIOLOGY
Payer: MEDICARE

## 2021-01-01 ENCOUNTER — HOSPITAL ENCOUNTER (OUTPATIENT)
Dept: RADIATION ONCOLOGY | Facility: MEDICAL CENTER | Age: 81
End: 2021-01-05
Payer: MEDICARE

## 2021-01-01 ENCOUNTER — HOSPITAL ENCOUNTER (OUTPATIENT)
Dept: RADIATION ONCOLOGY | Facility: MEDICAL CENTER | Age: 81
End: 2021-01-22
Payer: MEDICARE

## 2021-01-01 ENCOUNTER — HOSPITAL ENCOUNTER (OUTPATIENT)
Dept: RADIATION ONCOLOGY | Facility: MEDICAL CENTER | Age: 81
End: 2021-01-07
Payer: MEDICARE

## 2021-01-01 ENCOUNTER — IMMUNIZATION (OUTPATIENT)
Dept: FAMILY PLANNING/WOMEN'S HEALTH CLINIC | Facility: IMMUNIZATION CENTER | Age: 81
End: 2021-01-01
Attending: INTERNAL MEDICINE
Payer: MEDICARE

## 2021-01-01 ENCOUNTER — HOSPITAL ENCOUNTER (OUTPATIENT)
Dept: RADIATION ONCOLOGY | Facility: MEDICAL CENTER | Age: 81
End: 2021-01-12
Payer: MEDICARE

## 2021-01-01 ENCOUNTER — HOSPITAL ENCOUNTER (OUTPATIENT)
Dept: RADIATION ONCOLOGY | Facility: MEDICAL CENTER | Age: 81
End: 2021-05-31
Attending: RADIOLOGY
Payer: MEDICARE

## 2021-01-01 ENCOUNTER — HOSPITAL ENCOUNTER (OUTPATIENT)
Dept: RADIATION ONCOLOGY | Facility: MEDICAL CENTER | Age: 81
End: 2021-01-25
Payer: MEDICARE

## 2021-01-01 ENCOUNTER — HOSPITAL ENCOUNTER (OUTPATIENT)
Dept: RADIATION ONCOLOGY | Facility: MEDICAL CENTER | Age: 81
End: 2021-01-27
Payer: MEDICARE

## 2021-01-01 ENCOUNTER — HOSPITAL ENCOUNTER (OUTPATIENT)
Facility: MEDICAL CENTER | Age: 81
End: 2021-05-11
Attending: RADIOLOGY | Admitting: RADIOLOGY
Payer: MEDICARE

## 2021-01-01 ENCOUNTER — APPOINTMENT (OUTPATIENT)
Dept: RADIOLOGY | Facility: MEDICAL CENTER | Age: 81
End: 2021-01-01
Attending: RADIOLOGY
Payer: MEDICARE

## 2021-01-01 ENCOUNTER — TELEPHONE (OUTPATIENT)
Dept: SPEECH THERAPY | Facility: REHABILITATION | Age: 81
End: 2021-01-01

## 2021-01-01 ENCOUNTER — HOSPITAL ENCOUNTER (OUTPATIENT)
Dept: RADIATION ONCOLOGY | Facility: MEDICAL CENTER | Age: 81
End: 2021-01-18
Payer: MEDICARE

## 2021-01-01 ENCOUNTER — HOSPITAL ENCOUNTER (OUTPATIENT)
Dept: RADIATION ONCOLOGY | Facility: MEDICAL CENTER | Age: 81
End: 2021-01-08
Payer: MEDICARE

## 2021-01-01 ENCOUNTER — HOSPITAL ENCOUNTER (OUTPATIENT)
Dept: RADIATION ONCOLOGY | Facility: MEDICAL CENTER | Age: 81
End: 2021-02-02
Payer: MEDICARE

## 2021-01-01 ENCOUNTER — HOSPITAL ENCOUNTER (INPATIENT)
Facility: MEDICAL CENTER | Age: 81
LOS: 3 days | DRG: 147 | End: 2021-06-17
Attending: EMERGENCY MEDICINE | Admitting: INTERNAL MEDICINE
Payer: MEDICARE

## 2021-01-01 ENCOUNTER — HOSPITAL ENCOUNTER (OUTPATIENT)
Dept: RADIATION ONCOLOGY | Facility: MEDICAL CENTER | Age: 81
End: 2021-01-21
Payer: MEDICARE

## 2021-01-01 ENCOUNTER — HOSPITAL ENCOUNTER (OUTPATIENT)
Dept: RADIATION ONCOLOGY | Facility: MEDICAL CENTER | Age: 81
End: 2021-01-15
Payer: MEDICARE

## 2021-01-01 ENCOUNTER — PRE-ADMISSION TESTING (OUTPATIENT)
Dept: ADMISSIONS | Facility: MEDICAL CENTER | Age: 81
End: 2021-01-01
Attending: RADIOLOGY
Payer: MEDICARE

## 2021-01-01 ENCOUNTER — HOSPITAL ENCOUNTER (OUTPATIENT)
Dept: RADIATION ONCOLOGY | Facility: MEDICAL CENTER | Age: 81
End: 2021-01-29
Payer: MEDICARE

## 2021-01-01 ENCOUNTER — HOSPITAL ENCOUNTER (OUTPATIENT)
Dept: RADIOLOGY | Facility: MEDICAL CENTER | Age: 81
End: 2021-02-12
Attending: RADIOLOGY
Payer: MEDICARE

## 2021-01-01 ENCOUNTER — HOSPITAL ENCOUNTER (EMERGENCY)
Facility: MEDICAL CENTER | Age: 81
End: 2021-06-14
Attending: EMERGENCY MEDICINE
Payer: MEDICARE

## 2021-01-01 ENCOUNTER — HOSPITAL ENCOUNTER (OUTPATIENT)
Dept: RADIATION ONCOLOGY | Facility: MEDICAL CENTER | Age: 81
End: 2021-03-31
Attending: RADIOLOGY
Payer: MEDICARE

## 2021-01-01 ENCOUNTER — HOSPITAL ENCOUNTER (OUTPATIENT)
Dept: LAB | Facility: MEDICAL CENTER | Age: 81
End: 2021-02-03
Attending: RADIOLOGY
Payer: MEDICARE

## 2021-01-01 ENCOUNTER — HOSPITAL ENCOUNTER (OUTPATIENT)
Dept: RADIATION ONCOLOGY | Facility: MEDICAL CENTER | Age: 81
End: 2021-01-06
Payer: MEDICARE

## 2021-01-01 ENCOUNTER — HOSPITAL ENCOUNTER (OUTPATIENT)
Dept: RADIATION ONCOLOGY | Facility: MEDICAL CENTER | Age: 81
End: 2021-01-26
Payer: MEDICARE

## 2021-01-01 ENCOUNTER — HOSPITAL ENCOUNTER (OUTPATIENT)
Dept: RADIATION ONCOLOGY | Facility: MEDICAL CENTER | Age: 81
End: 2021-02-01
Payer: MEDICARE

## 2021-01-01 VITALS
BODY MASS INDEX: 18.94 KG/M2 | RESPIRATION RATE: 19 BRPM | OXYGEN SATURATION: 93 % | TEMPERATURE: 98.5 F | DIASTOLIC BLOOD PRESSURE: 75 MMHG | HEIGHT: 70 IN | SYSTOLIC BLOOD PRESSURE: 147 MMHG | WEIGHT: 132.28 LBS | HEART RATE: 75 BPM

## 2021-01-01 VITALS
DIASTOLIC BLOOD PRESSURE: 63 MMHG | OXYGEN SATURATION: 96 % | BODY MASS INDEX: 22.68 KG/M2 | TEMPERATURE: 97.7 F | SYSTOLIC BLOOD PRESSURE: 128 MMHG | WEIGHT: 158.07 LBS | HEART RATE: 67 BPM

## 2021-01-01 VITALS
OXYGEN SATURATION: 98 % | HEART RATE: 69 BPM | RESPIRATION RATE: 16 BRPM | TEMPERATURE: 96.8 F | DIASTOLIC BLOOD PRESSURE: 65 MMHG | SYSTOLIC BLOOD PRESSURE: 123 MMHG | BODY MASS INDEX: 23.2 KG/M2 | WEIGHT: 161.71 LBS

## 2021-01-01 VITALS
DIASTOLIC BLOOD PRESSURE: 55 MMHG | HEART RATE: 72 BPM | BODY MASS INDEX: 22.66 KG/M2 | TEMPERATURE: 97 F | OXYGEN SATURATION: 95 % | WEIGHT: 157.96 LBS | SYSTOLIC BLOOD PRESSURE: 107 MMHG

## 2021-01-01 VITALS
RESPIRATION RATE: 20 BRPM | OXYGEN SATURATION: 96 % | WEIGHT: 135 LBS | TEMPERATURE: 98.1 F | BODY MASS INDEX: 20.46 KG/M2 | SYSTOLIC BLOOD PRESSURE: 115 MMHG | HEART RATE: 63 BPM | HEIGHT: 68 IN | DIASTOLIC BLOOD PRESSURE: 59 MMHG

## 2021-01-01 VITALS
BODY MASS INDEX: 20.39 KG/M2 | DIASTOLIC BLOOD PRESSURE: 63 MMHG | RESPIRATION RATE: 18 BRPM | TEMPERATURE: 97.4 F | WEIGHT: 142.42 LBS | HEART RATE: 89 BPM | OXYGEN SATURATION: 97 % | HEIGHT: 70 IN | SYSTOLIC BLOOD PRESSURE: 128 MMHG

## 2021-01-01 VITALS
SYSTOLIC BLOOD PRESSURE: 115 MMHG | OXYGEN SATURATION: 98 % | BODY MASS INDEX: 22.9 KG/M2 | WEIGHT: 159.61 LBS | TEMPERATURE: 96.7 F | HEART RATE: 66 BPM | DIASTOLIC BLOOD PRESSURE: 54 MMHG

## 2021-01-01 VITALS
TEMPERATURE: 97.2 F | BODY MASS INDEX: 22.08 KG/M2 | HEART RATE: 65 BPM | OXYGEN SATURATION: 99 % | WEIGHT: 153.88 LBS | DIASTOLIC BLOOD PRESSURE: 59 MMHG | SYSTOLIC BLOOD PRESSURE: 106 MMHG

## 2021-01-01 VITALS
SYSTOLIC BLOOD PRESSURE: 102 MMHG | BODY MASS INDEX: 22.36 KG/M2 | HEART RATE: 63 BPM | OXYGEN SATURATION: 95 % | TEMPERATURE: 97.7 F | DIASTOLIC BLOOD PRESSURE: 56 MMHG | WEIGHT: 155.87 LBS

## 2021-01-01 VITALS
DIASTOLIC BLOOD PRESSURE: 57 MMHG | SYSTOLIC BLOOD PRESSURE: 103 MMHG | TEMPERATURE: 96.6 F | HEART RATE: 67 BPM | OXYGEN SATURATION: 97 % | WEIGHT: 160.05 LBS | BODY MASS INDEX: 22.97 KG/M2

## 2021-01-01 VITALS
WEIGHT: 143.52 LBS | SYSTOLIC BLOOD PRESSURE: 116 MMHG | BODY MASS INDEX: 20.59 KG/M2 | OXYGEN SATURATION: 97 % | TEMPERATURE: 97.3 F | HEART RATE: 64 BPM | DIASTOLIC BLOOD PRESSURE: 63 MMHG

## 2021-01-01 DIAGNOSIS — Z23 NEED FOR VACCINATION: ICD-10-CM

## 2021-01-01 DIAGNOSIS — L24.A9 WOUND DRAINAGE: ICD-10-CM

## 2021-01-01 DIAGNOSIS — R91.1 LUNG NODULE, SOLITARY: ICD-10-CM

## 2021-01-01 DIAGNOSIS — R57.8 HEMORRHAGIC SHOCK (HCC): ICD-10-CM

## 2021-01-01 DIAGNOSIS — C76.0 HEAD AND NECK CANCER (HCC): ICD-10-CM

## 2021-01-01 DIAGNOSIS — C03.9 PRIMARY CANCER OF ALVEOLAR RIDGE MUCOSA (HCC): ICD-10-CM

## 2021-01-01 DIAGNOSIS — C03.1 MALIGNANT NEOPLASM OF LOWER GUM (HCC): ICD-10-CM

## 2021-01-01 DIAGNOSIS — Z23 ENCOUNTER FOR VACCINATION: Primary | ICD-10-CM

## 2021-01-01 DIAGNOSIS — C06.9 ORAL CANCER (HCC): Primary | ICD-10-CM

## 2021-01-01 DIAGNOSIS — I89.0 LYMPHEDEMA OF FACE: ICD-10-CM

## 2021-01-01 DIAGNOSIS — D72.829 LEUKOCYTOSIS, UNSPECIFIED TYPE: ICD-10-CM

## 2021-01-01 DIAGNOSIS — Z79.01 CHRONIC ANTICOAGULATION: ICD-10-CM

## 2021-01-01 DIAGNOSIS — K13.79 ORAL BLEEDING: ICD-10-CM

## 2021-01-01 DIAGNOSIS — Z01.812 PRE-OPERATIVE LABORATORY EXAMINATION: ICD-10-CM

## 2021-01-01 LAB
ABO GROUP BLD: NORMAL
ALBUMIN SERPL BCP-MCNC: 2.4 G/DL (ref 3.2–4.9)
ALBUMIN SERPL BCP-MCNC: 2.4 G/DL (ref 3.2–4.9)
ALBUMIN SERPL BCP-MCNC: 2.6 G/DL (ref 3.2–4.9)
ALBUMIN SERPL BCP-MCNC: 3 G/DL (ref 3.2–4.9)
ALBUMIN SERPL BCP-MCNC: 3.2 G/DL (ref 3.2–4.9)
ALBUMIN/GLOB SERPL: 0.8 G/DL
ALBUMIN/GLOB SERPL: 0.9 G/DL
ALBUMIN/GLOB SERPL: 1 G/DL
ALP SERPL-CCNC: 67 U/L (ref 30–99)
ALP SERPL-CCNC: 68 U/L (ref 30–99)
ALP SERPL-CCNC: 77 U/L (ref 30–99)
ALP SERPL-CCNC: 91 U/L (ref 30–99)
ALP SERPL-CCNC: 95 U/L (ref 30–99)
ALT SERPL-CCNC: 46 U/L (ref 2–50)
ALT SERPL-CCNC: 48 U/L (ref 2–50)
ALT SERPL-CCNC: 52 U/L (ref 2–50)
ALT SERPL-CCNC: 66 U/L (ref 2–50)
ALT SERPL-CCNC: 67 U/L (ref 2–50)
ANION GAP SERPL CALC-SCNC: 11 MMOL/L (ref 7–16)
ANION GAP SERPL CALC-SCNC: 12 MMOL/L (ref 7–16)
ANION GAP SERPL CALC-SCNC: 12 MMOL/L (ref 7–16)
ANION GAP SERPL CALC-SCNC: 15 MMOL/L (ref 7–16)
ANION GAP SERPL CALC-SCNC: 8 MMOL/L (ref 7–16)
ANION GAP SERPL CALC-SCNC: 8 MMOL/L (ref 7–16)
APPEARANCE UR: CLEAR
APTT PPP: 38.2 SEC (ref 24.7–36)
AST SERPL-CCNC: 31 U/L (ref 12–45)
AST SERPL-CCNC: 34 U/L (ref 12–45)
AST SERPL-CCNC: 36 U/L (ref 12–45)
AST SERPL-CCNC: 48 U/L (ref 12–45)
AST SERPL-CCNC: 51 U/L (ref 12–45)
BACTERIA BLD CULT: NORMAL
BACTERIA BLD CULT: NORMAL
BARCODED ABORH UBTYP: 5100
BARCODED ABORH UBTYP: 6200
BARCODED PRD CODE UBPRD: NORMAL
BARCODED PRD CODE UBPRD: NORMAL
BARCODED UNIT NUM UBUNT: NORMAL
BARCODED UNIT NUM UBUNT: NORMAL
BASOPHILS # BLD AUTO: 0.1 % (ref 0–1.8)
BASOPHILS # BLD AUTO: 0.1 % (ref 0–1.8)
BASOPHILS # BLD AUTO: 0.2 % (ref 0–1.8)
BASOPHILS # BLD AUTO: 0.3 % (ref 0–1.8)
BASOPHILS # BLD: 0.02 K/UL (ref 0–0.12)
BASOPHILS # BLD: 0.02 K/UL (ref 0–0.12)
BASOPHILS # BLD: 0.05 K/UL (ref 0–0.12)
BASOPHILS # BLD: 0.06 K/UL (ref 0–0.12)
BILIRUB SERPL-MCNC: 0.5 MG/DL (ref 0.1–1.5)
BILIRUB SERPL-MCNC: 0.5 MG/DL (ref 0.1–1.5)
BILIRUB SERPL-MCNC: 0.6 MG/DL (ref 0.1–1.5)
BILIRUB SERPL-MCNC: 0.8 MG/DL (ref 0.1–1.5)
BILIRUB SERPL-MCNC: 0.9 MG/DL (ref 0.1–1.5)
BILIRUB UR QL STRIP.AUTO: NEGATIVE
BLD GP AB SCN SERPL QL: NORMAL
BUN SERPL-MCNC: 11 MG/DL (ref 8–22)
BUN SERPL-MCNC: 12 MG/DL (ref 8–22)
BUN SERPL-MCNC: 17 MG/DL (ref 8–22)
BUN SERPL-MCNC: 19 MG/DL (ref 8–22)
BUN SERPL-MCNC: 20 MG/DL (ref 8–22)
BUN SERPL-MCNC: 21 MG/DL (ref 8–22)
CALCIUM SERPL-MCNC: 10.3 MG/DL (ref 8.4–10.2)
CALCIUM SERPL-MCNC: 8.5 MG/DL (ref 8.5–10.5)
CALCIUM SERPL-MCNC: 8.9 MG/DL (ref 8.5–10.5)
CALCIUM SERPL-MCNC: 9 MG/DL (ref 8.5–10.5)
CALCIUM SERPL-MCNC: 9.1 MG/DL (ref 8.4–10.2)
CALCIUM SERPL-MCNC: 9.9 MG/DL (ref 8.5–10.5)
CFT BLD TEG: 6.3 MIN (ref 5–10)
CHEMOTHERAPY INFUSION START DATE: NORMAL
CHEMOTHERAPY INFUSION STOP DATE: NORMAL
CHEMOTHERAPY RECORDS: 2
CHEMOTHERAPY RECORDS: 2000
CHEMOTHERAPY RECORDS: 6600
CHEMOTHERAPY RECORDS: NORMAL
CHEMOTHERAPY RX CANCER: NORMAL
CHLORIDE SERPL-SCNC: 101 MMOL/L (ref 96–112)
CHLORIDE SERPL-SCNC: 105 MMOL/L (ref 96–112)
CHLORIDE SERPL-SCNC: 107 MMOL/L (ref 96–112)
CHLORIDE SERPL-SCNC: 95 MMOL/L (ref 96–112)
CHLORIDE SERPL-SCNC: 95 MMOL/L (ref 96–112)
CHLORIDE SERPL-SCNC: 97 MMOL/L (ref 96–112)
CLOT ANGLE BLD TEG: 72.7 DEGREES (ref 53–72)
CLOT LYSIS 30M P MA LENFR BLD TEG: 0 % (ref 0–8)
CO2 SERPL-SCNC: 22 MMOL/L (ref 20–33)
CO2 SERPL-SCNC: 23 MMOL/L (ref 20–33)
CO2 SERPL-SCNC: 25 MMOL/L (ref 20–33)
CO2 SERPL-SCNC: 25 MMOL/L (ref 20–33)
COLOR UR: YELLOW
COMPONENT R 8504R: NORMAL
COMPONENT R 8504R: NORMAL
CREAT SERPL-MCNC: 0.27 MG/DL (ref 0.5–1.4)
CREAT SERPL-MCNC: 0.3 MG/DL (ref 0.5–1.4)
CREAT SERPL-MCNC: 0.3 MG/DL (ref 0.5–1.4)
CREAT SERPL-MCNC: 0.49 MG/DL (ref 0.5–1.4)
CREAT SERPL-MCNC: 0.52 MG/DL (ref 0.5–1.4)
CREAT SERPL-MCNC: 0.76 MG/DL (ref 0.5–1.4)
CT.EXTRINSIC BLD ROTEM: 1.2 MIN (ref 1–3)
DATE 1ST CHEMO CANCER: NORMAL
EOSINOPHIL # BLD AUTO: 0 K/UL (ref 0–0.51)
EOSINOPHIL # BLD AUTO: 0 K/UL (ref 0–0.51)
EOSINOPHIL # BLD AUTO: 0.01 K/UL (ref 0–0.51)
EOSINOPHIL # BLD AUTO: 0.03 K/UL (ref 0–0.51)
EOSINOPHIL NFR BLD: 0 % (ref 0–6.9)
EOSINOPHIL NFR BLD: 0.1 % (ref 0–6.9)
ERYTHROCYTE [DISTWIDTH] IN BLOOD BY AUTOMATED COUNT: 59.8 FL (ref 35.9–50)
ERYTHROCYTE [DISTWIDTH] IN BLOOD BY AUTOMATED COUNT: 60.9 FL (ref 35.9–50)
ERYTHROCYTE [DISTWIDTH] IN BLOOD BY AUTOMATED COUNT: 61.7 FL (ref 35.9–50)
ERYTHROCYTE [DISTWIDTH] IN BLOOD BY AUTOMATED COUNT: 62.3 FL (ref 35.9–50)
ERYTHROCYTE [DISTWIDTH] IN BLOOD BY AUTOMATED COUNT: 62.4 FL (ref 35.9–50)
ERYTHROCYTE [DISTWIDTH] IN BLOOD BY AUTOMATED COUNT: 63.3 FL (ref 35.9–50)
ERYTHROCYTE [DISTWIDTH] IN BLOOD BY AUTOMATED COUNT: 63.6 FL (ref 35.9–50)
ERYTHROCYTE [DISTWIDTH] IN BLOOD BY AUTOMATED COUNT: 66.7 FL (ref 35.9–50)
ERYTHROCYTE [DISTWIDTH] IN BLOOD BY AUTOMATED COUNT: 66.8 FL (ref 35.9–50)
ERYTHROCYTE [DISTWIDTH] IN BLOOD BY AUTOMATED COUNT: 67.6 FL (ref 35.9–50)
ERYTHROCYTE [DISTWIDTH] IN BLOOD BY AUTOMATED COUNT: 68.2 FL (ref 35.9–50)
FLUAV RNA SPEC QL NAA+PROBE: NEGATIVE
FLUBV RNA SPEC QL NAA+PROBE: NEGATIVE
GLOBULIN SER CALC-MCNC: 2.4 G/DL (ref 1.9–3.5)
GLOBULIN SER CALC-MCNC: 2.5 G/DL (ref 1.9–3.5)
GLOBULIN SER CALC-MCNC: 2.9 G/DL (ref 1.9–3.5)
GLOBULIN SER CALC-MCNC: 3.2 G/DL (ref 1.9–3.5)
GLOBULIN SER CALC-MCNC: 3.5 G/DL (ref 1.9–3.5)
GLUCOSE BLD-MCNC: 136 MG/DL (ref 65–99)
GLUCOSE BLD-MCNC: 137 MG/DL (ref 65–99)
GLUCOSE BLD-MCNC: 137 MG/DL (ref 65–99)
GLUCOSE BLD-MCNC: 140 MG/DL (ref 65–99)
GLUCOSE BLD-MCNC: 143 MG/DL (ref 65–99)
GLUCOSE BLD-MCNC: 150 MG/DL (ref 65–99)
GLUCOSE BLD-MCNC: 171 MG/DL (ref 65–99)
GLUCOSE BLD-MCNC: 172 MG/DL (ref 65–99)
GLUCOSE BLD-MCNC: 180 MG/DL (ref 65–99)
GLUCOSE BLD-MCNC: 196 MG/DL (ref 65–99)
GLUCOSE SERPL-MCNC: 117 MG/DL (ref 65–99)
GLUCOSE SERPL-MCNC: 129 MG/DL (ref 65–99)
GLUCOSE SERPL-MCNC: 145 MG/DL (ref 65–99)
GLUCOSE SERPL-MCNC: 152 MG/DL (ref 65–99)
GLUCOSE SERPL-MCNC: 164 MG/DL (ref 65–99)
GLUCOSE SERPL-MCNC: 184 MG/DL (ref 65–99)
GLUCOSE UR STRIP.AUTO-MCNC: NEGATIVE MG/DL
HCT VFR BLD AUTO: 22.6 % (ref 42–52)
HCT VFR BLD AUTO: 25.5 % (ref 42–52)
HCT VFR BLD AUTO: 25.5 % (ref 42–52)
HCT VFR BLD AUTO: 26.2 % (ref 42–52)
HCT VFR BLD AUTO: 26.5 % (ref 42–52)
HCT VFR BLD AUTO: 26.6 % (ref 42–52)
HCT VFR BLD AUTO: 27 % (ref 42–52)
HCT VFR BLD AUTO: 27.2 % (ref 42–52)
HCT VFR BLD AUTO: 30.3 % (ref 42–52)
HCT VFR BLD AUTO: 33.5 % (ref 42–52)
HCT VFR BLD AUTO: 35.3 % (ref 42–52)
HGB BLD-MCNC: 10.1 G/DL (ref 14–18)
HGB BLD-MCNC: 11.1 G/DL (ref 14–18)
HGB BLD-MCNC: 11.5 G/DL (ref 14–18)
HGB BLD-MCNC: 7.6 G/DL (ref 14–18)
HGB BLD-MCNC: 8.5 G/DL (ref 14–18)
HGB BLD-MCNC: 8.6 G/DL (ref 14–18)
HGB BLD-MCNC: 8.7 G/DL (ref 14–18)
HGB BLD-MCNC: 8.8 G/DL (ref 14–18)
HGB BLD-MCNC: 8.9 G/DL (ref 14–18)
HGB BLD-MCNC: 9 G/DL (ref 14–18)
HGB BLD-MCNC: 9.3 G/DL (ref 14–18)
IMM GRANULOCYTES # BLD AUTO: 0.21 K/UL (ref 0–0.11)
IMM GRANULOCYTES # BLD AUTO: 0.25 K/UL (ref 0–0.11)
IMM GRANULOCYTES # BLD AUTO: 0.27 K/UL (ref 0–0.11)
IMM GRANULOCYTES # BLD AUTO: 0.29 K/UL (ref 0–0.11)
IMM GRANULOCYTES NFR BLD AUTO: 1.2 % (ref 0–0.9)
IMM GRANULOCYTES NFR BLD AUTO: 1.2 % (ref 0–0.9)
IMM GRANULOCYTES NFR BLD AUTO: 1.4 % (ref 0–0.9)
IMM GRANULOCYTES NFR BLD AUTO: 1.4 % (ref 0–0.9)
INR BLD: 1.1
INR PPP: 1.45 (ref 0.87–1.13)
INR PPP: 1.57 (ref 0.87–1.13)
INR PPP: 1.97 (ref 0.87–1.13)
KETONES UR STRIP.AUTO-MCNC: ABNORMAL MG/DL
LEUKOCYTE ESTERASE UR QL STRIP.AUTO: NEGATIVE
LYMPHOCYTES # BLD AUTO: 0.72 K/UL (ref 1–4.8)
LYMPHOCYTES # BLD AUTO: 0.77 K/UL (ref 1–4.8)
LYMPHOCYTES # BLD AUTO: 0.84 K/UL (ref 1–4.8)
LYMPHOCYTES # BLD AUTO: 0.91 K/UL (ref 1–4.8)
LYMPHOCYTES NFR BLD: 3.7 % (ref 22–41)
LYMPHOCYTES NFR BLD: 4.2 % (ref 22–41)
LYMPHOCYTES NFR BLD: 4.4 % (ref 22–41)
LYMPHOCYTES NFR BLD: 4.5 % (ref 22–41)
MCF BLD TEG: 71.7 MM (ref 50–70)
MCH RBC QN AUTO: 32.1 PG (ref 27–33)
MCH RBC QN AUTO: 32.1 PG (ref 27–33)
MCH RBC QN AUTO: 32.2 PG (ref 27–33)
MCH RBC QN AUTO: 32.3 PG (ref 27–33)
MCH RBC QN AUTO: 32.8 PG (ref 27–33)
MCH RBC QN AUTO: 32.8 PG (ref 27–33)
MCH RBC QN AUTO: 33.1 PG (ref 27–33)
MCH RBC QN AUTO: 33.3 PG (ref 27–33)
MCH RBC QN AUTO: 33.5 PG (ref 27–33)
MCH RBC QN AUTO: 34 PG (ref 27–33)
MCH RBC QN AUTO: 34.3 PG (ref 27–33)
MCHC RBC AUTO-ENTMCNC: 32.6 G/DL (ref 33.7–35.3)
MCHC RBC AUTO-ENTMCNC: 33.1 G/DL (ref 33.7–35.3)
MCHC RBC AUTO-ENTMCNC: 33.1 G/DL (ref 33.7–35.3)
MCHC RBC AUTO-ENTMCNC: 33.2 G/DL (ref 33.7–35.3)
MCHC RBC AUTO-ENTMCNC: 33.3 G/DL (ref 33.7–35.3)
MCHC RBC AUTO-ENTMCNC: 33.6 G/DL (ref 33.7–35.3)
MCHC RBC AUTO-ENTMCNC: 33.6 G/DL (ref 33.7–35.3)
MCHC RBC AUTO-ENTMCNC: 33.7 G/DL (ref 33.7–35.3)
MCHC RBC AUTO-ENTMCNC: 34.2 G/DL (ref 33.7–35.3)
MCV RBC AUTO: 102 FL (ref 81.4–97.8)
MCV RBC AUTO: 102.3 FL (ref 81.4–97.8)
MCV RBC AUTO: 103.4 FL (ref 81.4–97.8)
MCV RBC AUTO: 95.5 FL (ref 81.4–97.8)
MCV RBC AUTO: 95.7 FL (ref 81.4–97.8)
MCV RBC AUTO: 96.4 FL (ref 81.4–97.8)
MCV RBC AUTO: 96.8 FL (ref 81.4–97.8)
MCV RBC AUTO: 97.4 FL (ref 81.4–97.8)
MCV RBC AUTO: 98.5 FL (ref 81.4–97.8)
MCV RBC AUTO: 99.3 FL (ref 81.4–97.8)
MCV RBC AUTO: 99.6 FL (ref 81.4–97.8)
MICRO URNS: ABNORMAL
MONOCYTES # BLD AUTO: 1.39 K/UL (ref 0–0.85)
MONOCYTES # BLD AUTO: 1.42 K/UL (ref 0–0.85)
MONOCYTES # BLD AUTO: 1.48 K/UL (ref 0–0.85)
MONOCYTES # BLD AUTO: 1.5 K/UL (ref 0–0.85)
MONOCYTES NFR BLD AUTO: 6.7 % (ref 0–13.4)
MONOCYTES NFR BLD AUTO: 7.3 % (ref 0–13.4)
MONOCYTES NFR BLD AUTO: 7.5 % (ref 0–13.4)
MONOCYTES NFR BLD AUTO: 8.7 % (ref 0–13.4)
NEUTROPHILS # BLD AUTO: 14.62 K/UL (ref 1.82–7.42)
NEUTROPHILS # BLD AUTO: 16.3 K/UL (ref 1.82–7.42)
NEUTROPHILS # BLD AUTO: 17.85 K/UL (ref 1.82–7.42)
NEUTROPHILS # BLD AUTO: 18.28 K/UL (ref 1.82–7.42)
NEUTROPHILS NFR BLD: 85.8 % (ref 44–72)
NEUTROPHILS NFR BLD: 86.5 % (ref 44–72)
NEUTROPHILS NFR BLD: 86.8 % (ref 44–72)
NEUTROPHILS NFR BLD: 87.9 % (ref 44–72)
NITRITE UR QL STRIP.AUTO: NEGATIVE
NRBC # BLD AUTO: 0 K/UL
NRBC BLD-RTO: 0 /100 WBC
PATHOLOGY CONSULT NOTE: NORMAL
PH UR STRIP.AUTO: 6 [PH] (ref 5–8)
PLATELET # BLD AUTO: 405 K/UL (ref 164–446)
PLATELET # BLD AUTO: 405 K/UL (ref 164–446)
PLATELET # BLD AUTO: 406 K/UL (ref 164–446)
PLATELET # BLD AUTO: 426 K/UL (ref 164–446)
PLATELET # BLD AUTO: 434 K/UL (ref 164–446)
PLATELET # BLD AUTO: 442 K/UL (ref 164–446)
PLATELET # BLD AUTO: 456 K/UL (ref 164–446)
PLATELET # BLD AUTO: 464 K/UL (ref 164–446)
PLATELET # BLD AUTO: 490 K/UL (ref 164–446)
PLATELET # BLD AUTO: 572 K/UL (ref 164–446)
PLATELET # BLD AUTO: 591 K/UL (ref 164–446)
PMV BLD AUTO: 10 FL (ref 9–12.9)
PMV BLD AUTO: 9.4 FL (ref 9–12.9)
PMV BLD AUTO: 9.6 FL (ref 9–12.9)
PMV BLD AUTO: 9.7 FL (ref 9–12.9)
PMV BLD AUTO: 9.8 FL (ref 9–12.9)
PMV BLD AUTO: 9.9 FL (ref 9–12.9)
POTASSIUM SERPL-SCNC: 3.2 MMOL/L (ref 3.6–5.5)
POTASSIUM SERPL-SCNC: 3.3 MMOL/L (ref 3.6–5.5)
POTASSIUM SERPL-SCNC: 3.7 MMOL/L (ref 3.6–5.5)
POTASSIUM SERPL-SCNC: 4.1 MMOL/L (ref 3.6–5.5)
POTASSIUM SERPL-SCNC: 4.5 MMOL/L (ref 3.6–5.5)
POTASSIUM SERPL-SCNC: 4.6 MMOL/L (ref 3.6–5.5)
PREALB SERPL-MCNC: 8 MG/DL (ref 18–38)
PREALB SERPL-MCNC: 8.7 MG/DL (ref 18–38)
PROCALCITONIN SERPL-MCNC: 0.47 NG/ML
PRODUCT TYPE UPROD: NORMAL
PRODUCT TYPE UPROD: NORMAL
PROT SERPL-MCNC: 4.8 G/DL (ref 6–8.2)
PROT SERPL-MCNC: 5.1 G/DL (ref 6–8.2)
PROT SERPL-MCNC: 5.3 G/DL (ref 6–8.2)
PROT SERPL-MCNC: 6.4 G/DL (ref 6–8.2)
PROT SERPL-MCNC: 6.5 G/DL (ref 6–8.2)
PROT UR QL STRIP: NEGATIVE MG/DL
PROTHROMBIN TIME: 17.3 SEC (ref 12–14.6)
PROTHROMBIN TIME: 18.3 SEC (ref 12–14.6)
PROTHROMBIN TIME: 23 SEC (ref 12–14.6)
RAD ONC ARIA COURSE LAST TREATMENT DATE: NORMAL
RAD ONC ARIA COURSE TREATMENT ELAPSED DAYS: NORMAL
RAD ONC ARIA REFERENCE POINT DOSAGE GIVEN TO DATE: 1.91
RAD ONC ARIA REFERENCE POINT DOSAGE GIVEN TO DATE: 10
RAD ONC ARIA REFERENCE POINT DOSAGE GIVEN TO DATE: 11.45
RAD ONC ARIA REFERENCE POINT DOSAGE GIVEN TO DATE: 12
RAD ONC ARIA REFERENCE POINT DOSAGE GIVEN TO DATE: 13.36
RAD ONC ARIA REFERENCE POINT DOSAGE GIVEN TO DATE: 14
RAD ONC ARIA REFERENCE POINT DOSAGE GIVEN TO DATE: 15.27
RAD ONC ARIA REFERENCE POINT DOSAGE GIVEN TO DATE: 16
RAD ONC ARIA REFERENCE POINT DOSAGE GIVEN TO DATE: 17.18
RAD ONC ARIA REFERENCE POINT DOSAGE GIVEN TO DATE: 18
RAD ONC ARIA REFERENCE POINT DOSAGE GIVEN TO DATE: 19.09
RAD ONC ARIA REFERENCE POINT DOSAGE GIVEN TO DATE: 19.09
RAD ONC ARIA REFERENCE POINT DOSAGE GIVEN TO DATE: 2
RAD ONC ARIA REFERENCE POINT DOSAGE GIVEN TO DATE: 20
RAD ONC ARIA REFERENCE POINT DOSAGE GIVEN TO DATE: 20.27
RAD ONC ARIA REFERENCE POINT DOSAGE GIVEN TO DATE: 22
RAD ONC ARIA REFERENCE POINT DOSAGE GIVEN TO DATE: 22.3
RAD ONC ARIA REFERENCE POINT DOSAGE GIVEN TO DATE: 24
RAD ONC ARIA REFERENCE POINT DOSAGE GIVEN TO DATE: 24.33
RAD ONC ARIA REFERENCE POINT DOSAGE GIVEN TO DATE: 26
RAD ONC ARIA REFERENCE POINT DOSAGE GIVEN TO DATE: 26.35
RAD ONC ARIA REFERENCE POINT DOSAGE GIVEN TO DATE: 28
RAD ONC ARIA REFERENCE POINT DOSAGE GIVEN TO DATE: 28.38
RAD ONC ARIA REFERENCE POINT DOSAGE GIVEN TO DATE: 3.82
RAD ONC ARIA REFERENCE POINT DOSAGE GIVEN TO DATE: 30
RAD ONC ARIA REFERENCE POINT DOSAGE GIVEN TO DATE: 30.41
RAD ONC ARIA REFERENCE POINT DOSAGE GIVEN TO DATE: 32
RAD ONC ARIA REFERENCE POINT DOSAGE GIVEN TO DATE: 32.44
RAD ONC ARIA REFERENCE POINT DOSAGE GIVEN TO DATE: 34
RAD ONC ARIA REFERENCE POINT DOSAGE GIVEN TO DATE: 34.46
RAD ONC ARIA REFERENCE POINT DOSAGE GIVEN TO DATE: 36
RAD ONC ARIA REFERENCE POINT DOSAGE GIVEN TO DATE: 36.49
RAD ONC ARIA REFERENCE POINT DOSAGE GIVEN TO DATE: 38
RAD ONC ARIA REFERENCE POINT DOSAGE GIVEN TO DATE: 38.52
RAD ONC ARIA REFERENCE POINT DOSAGE GIVEN TO DATE: 4
RAD ONC ARIA REFERENCE POINT DOSAGE GIVEN TO DATE: 40
RAD ONC ARIA REFERENCE POINT DOSAGE GIVEN TO DATE: 40.54
RAD ONC ARIA REFERENCE POINT DOSAGE GIVEN TO DATE: 42
RAD ONC ARIA REFERENCE POINT DOSAGE GIVEN TO DATE: 42.57
RAD ONC ARIA REFERENCE POINT DOSAGE GIVEN TO DATE: 44
RAD ONC ARIA REFERENCE POINT DOSAGE GIVEN TO DATE: 44.6
RAD ONC ARIA REFERENCE POINT DOSAGE GIVEN TO DATE: 46
RAD ONC ARIA REFERENCE POINT DOSAGE GIVEN TO DATE: 46
RAD ONC ARIA REFERENCE POINT DOSAGE GIVEN TO DATE: 46.63
RAD ONC ARIA REFERENCE POINT DOSAGE GIVEN TO DATE: 46.63
RAD ONC ARIA REFERENCE POINT DOSAGE GIVEN TO DATE: 5.73
RAD ONC ARIA REFERENCE POINT DOSAGE GIVEN TO DATE: 6
RAD ONC ARIA REFERENCE POINT DOSAGE GIVEN TO DATE: 7.63
RAD ONC ARIA REFERENCE POINT DOSAGE GIVEN TO DATE: 8
RAD ONC ARIA REFERENCE POINT DOSAGE GIVEN TO DATE: 9.54
RAD ONC ARIA REFERENCE POINT ID: NORMAL
RAD ONC ARIA REFERENCE POINT SESSION DOSAGE GIVEN: 1.91
RAD ONC ARIA REFERENCE POINT SESSION DOSAGE GIVEN: 2
RAD ONC ARIA REFERENCE POINT SESSION DOSAGE GIVEN: 2.03
RBC # BLD AUTO: 2.27 M/UL (ref 4.7–6.1)
RBC # BLD AUTO: 2.59 M/UL (ref 4.7–6.1)
RBC # BLD AUTO: 2.67 M/UL (ref 4.7–6.1)
RBC # BLD AUTO: 2.68 M/UL (ref 4.7–6.1)
RBC # BLD AUTO: 2.69 M/UL (ref 4.7–6.1)
RBC # BLD AUTO: 2.77 M/UL (ref 4.7–6.1)
RBC # BLD AUTO: 2.8 M/UL (ref 4.7–6.1)
RBC # BLD AUTO: 2.81 M/UL (ref 4.7–6.1)
RBC # BLD AUTO: 2.97 M/UL (ref 4.7–6.1)
RBC # BLD AUTO: 3.24 M/UL (ref 4.7–6.1)
RBC # BLD AUTO: 3.45 M/UL (ref 4.7–6.1)
RBC UR QL AUTO: NEGATIVE
RH BLD: NORMAL
RSV RNA SPEC QL NAA+PROBE: NEGATIVE
SARS-COV-2 RNA RESP QL NAA+PROBE: NOTDETECTED
SARS-COV-2 RNA RESP QL NAA+PROBE: NOTDETECTED
SIGNIFICANT IND 70042: NORMAL
SIGNIFICANT IND 70042: NORMAL
SITE SITE: NORMAL
SITE SITE: NORMAL
SODIUM SERPL-SCNC: 130 MMOL/L (ref 135–145)
SODIUM SERPL-SCNC: 133 MMOL/L (ref 135–145)
SODIUM SERPL-SCNC: 134 MMOL/L (ref 135–145)
SODIUM SERPL-SCNC: 134 MMOL/L (ref 135–145)
SODIUM SERPL-SCNC: 138 MMOL/L (ref 135–145)
SODIUM SERPL-SCNC: 138 MMOL/L (ref 135–145)
SOURCE SOURCE: NORMAL
SOURCE SOURCE: NORMAL
SP GR UR STRIP.AUTO: 1.02
SPECIMEN SOURCE: NORMAL
SPECIMEN SOURCE: NORMAL
TEG ALGORITHM TGALG: ABNORMAL
UNIT STATUS USTAT: NORMAL
UNIT STATUS USTAT: NORMAL
UROBILINOGEN UR STRIP.AUTO-MCNC: 0.2 MG/DL
WBC # BLD AUTO: 17.1 K/UL (ref 4.8–10.8)
WBC # BLD AUTO: 18.1 K/UL (ref 4.8–10.8)
WBC # BLD AUTO: 18.8 K/UL (ref 4.8–10.8)
WBC # BLD AUTO: 18.9 K/UL (ref 4.8–10.8)
WBC # BLD AUTO: 19.3 K/UL (ref 4.8–10.8)
WBC # BLD AUTO: 20.3 K/UL (ref 4.8–10.8)
WBC # BLD AUTO: 20.6 K/UL (ref 4.8–10.8)
WBC # BLD AUTO: 20.8 K/UL (ref 4.8–10.8)
WBC # BLD AUTO: 21 K/UL (ref 4.8–10.8)
WBC # BLD AUTO: 24.6 K/UL (ref 4.8–10.8)
WBC # BLD AUTO: 8.3 K/UL (ref 4.8–10.8)

## 2021-01-01 PROCEDURE — 160002 HCHG RECOVERY MINUTES (STAT)

## 2021-01-01 PROCEDURE — 77014 PR CT GUIDANCE PLACEMENT RAD THERAPY FIELDS: CPT | Mod: 26 | Performed by: RADIOLOGY

## 2021-01-01 PROCEDURE — 80053 COMPREHEN METABOLIC PANEL: CPT

## 2021-01-01 PROCEDURE — 84145 PROCALCITONIN (PCT): CPT

## 2021-01-01 PROCEDURE — 77336 RADIATION PHYSICS CONSULT: CPT | Performed by: RADIOLOGY

## 2021-01-01 PROCEDURE — 80048 BASIC METABOLIC PNL TOTAL CA: CPT

## 2021-01-01 PROCEDURE — A9552 F18 FDG: HCPCS

## 2021-01-01 PROCEDURE — 77427 RADIATION TX MANAGEMENT X5: CPT | Performed by: RADIOLOGY

## 2021-01-01 PROCEDURE — 77386 HCHG IMRT DELIVERY COMPLEX: CPT | Performed by: RADIOLOGY

## 2021-01-01 PROCEDURE — 86900 BLOOD TYPING SEROLOGIC ABO: CPT

## 2021-01-01 PROCEDURE — U0005 INFEC AGEN DETEC AMPLI PROBE: HCPCS

## 2021-01-01 PROCEDURE — 0001A PFIZER SARS-COV-2 VACCINE: CPT

## 2021-01-01 PROCEDURE — 36415 COLL VENOUS BLD VENIPUNCTURE: CPT

## 2021-01-01 PROCEDURE — 86923 COMPATIBILITY TEST ELECTRIC: CPT

## 2021-01-01 PROCEDURE — 85610 PROTHROMBIN TIME: CPT | Mod: 91

## 2021-01-01 PROCEDURE — 302214 INTUBATION BOX: Performed by: EMERGENCY MEDICINE

## 2021-01-01 PROCEDURE — A9270 NON-COVERED ITEM OR SERVICE: HCPCS | Performed by: STUDENT IN AN ORGANIZED HEALTH CARE EDUCATION/TRAINING PROGRAM

## 2021-01-01 PROCEDURE — 85025 COMPLETE CBC W/AUTO DIFF WBC: CPT | Mod: 91

## 2021-01-01 PROCEDURE — P9016 RBC LEUKOCYTES REDUCED: HCPCS

## 2021-01-01 PROCEDURE — 700105 HCHG RX REV CODE 258: Performed by: STUDENT IN AN ORGANIZED HEALTH CARE EDUCATION/TRAINING PROGRAM

## 2021-01-01 PROCEDURE — 99214 OFFICE O/P EST MOD 30 MIN: CPT | Performed by: RADIOLOGY

## 2021-01-01 PROCEDURE — 85730 THROMBOPLASTIN TIME PARTIAL: CPT

## 2021-01-01 PROCEDURE — 77300 RADIATION THERAPY DOSE PLAN: CPT | Mod: 26 | Performed by: RADIOLOGY

## 2021-01-01 PROCEDURE — 82962 GLUCOSE BLOOD TEST: CPT

## 2021-01-01 PROCEDURE — 700105 HCHG RX REV CODE 258: Performed by: RADIOLOGY

## 2021-01-01 PROCEDURE — 85025 COMPLETE CBC W/AUTO DIFF WBC: CPT

## 2021-01-01 PROCEDURE — A9270 NON-COVERED ITEM OR SERVICE: HCPCS | Performed by: INTERNAL MEDICINE

## 2021-01-01 PROCEDURE — 99238 HOSP IP/OBS DSCHRG MGMT 30/<: CPT | Mod: GC,GW | Performed by: INTERNAL MEDICINE

## 2021-01-01 PROCEDURE — 99232 SBSQ HOSP IP/OBS MODERATE 35: CPT | Mod: GC,GW | Performed by: INTERNAL MEDICINE

## 2021-01-01 PROCEDURE — 99284 EMERGENCY DEPT VISIT MOD MDM: CPT

## 2021-01-01 PROCEDURE — 85347 COAGULATION TIME ACTIVATED: CPT

## 2021-01-01 PROCEDURE — 84134 ASSAY OF PREALBUMIN: CPT

## 2021-01-01 PROCEDURE — 700111 HCHG RX REV CODE 636 W/ 250 OVERRIDE (IP): Performed by: INTERNAL MEDICINE

## 2021-01-01 PROCEDURE — 700101 HCHG RX REV CODE 250: Performed by: INTERNAL MEDICINE

## 2021-01-01 PROCEDURE — 700111 HCHG RX REV CODE 636 W/ 250 OVERRIDE (IP): Performed by: RADIOLOGY

## 2021-01-01 PROCEDURE — 700102 HCHG RX REV CODE 250 W/ 637 OVERRIDE(OP): Performed by: INTERNAL MEDICINE

## 2021-01-01 PROCEDURE — 85610 PROTHROMBIN TIME: CPT

## 2021-01-01 PROCEDURE — C9803 HOPD COVID-19 SPEC COLLECT: HCPCS

## 2021-01-01 PROCEDURE — 85027 COMPLETE CBC AUTOMATED: CPT

## 2021-01-01 PROCEDURE — 99212 OFFICE O/P EST SF 10 MIN: CPT | Performed by: RADIOLOGY

## 2021-01-01 PROCEDURE — 700111 HCHG RX REV CODE 636 W/ 250 OVERRIDE (IP): Performed by: STUDENT IN AN ORGANIZED HEALTH CARE EDUCATION/TRAINING PROGRAM

## 2021-01-01 PROCEDURE — 0241U HCHG SARS-COV-2 COVID-19 NFCT DS RESP RNA 4 TRGT MIC: CPT

## 2021-01-01 PROCEDURE — 99153 MOD SED SAME PHYS/QHP EA: CPT

## 2021-01-01 PROCEDURE — 700105 HCHG RX REV CODE 258: Performed by: EMERGENCY MEDICINE

## 2021-01-01 PROCEDURE — 82962 GLUCOSE BLOOD TEST: CPT | Mod: 91

## 2021-01-01 PROCEDURE — 99232 SBSQ HOSP IP/OBS MODERATE 35: CPT | Mod: GC | Performed by: INTERNAL MEDICINE

## 2021-01-01 PROCEDURE — 96374 THER/PROPH/DIAG INJ IV PUSH: CPT

## 2021-01-01 PROCEDURE — 88305 TISSUE EXAM BY PATHOLOGIST: CPT

## 2021-01-01 PROCEDURE — 80053 COMPREHEN METABOLIC PANEL: CPT | Mod: 91

## 2021-01-01 PROCEDURE — 700101 HCHG RX REV CODE 250

## 2021-01-01 PROCEDURE — 85576 BLOOD PLATELET AGGREGATION: CPT

## 2021-01-01 PROCEDURE — 71045 X-RAY EXAM CHEST 1 VIEW: CPT

## 2021-01-01 PROCEDURE — 85384 FIBRINOGEN ACTIVITY: CPT

## 2021-01-01 PROCEDURE — 700105 HCHG RX REV CODE 258: Performed by: INTERNAL MEDICINE

## 2021-01-01 PROCEDURE — 77338 DESIGN MLC DEVICE FOR IMRT: CPT | Mod: 26 | Performed by: RADIOLOGY

## 2021-01-01 PROCEDURE — 87040 BLOOD CULTURE FOR BACTERIA: CPT

## 2021-01-01 PROCEDURE — U0003 INFECTIOUS AGENT DETECTION BY NUCLEIC ACID (DNA OR RNA); SEVERE ACUTE RESPIRATORY SYNDROME CORONAVIRUS 2 (SARS-COV-2) (CORONAVIRUS DISEASE [COVID-19]), AMPLIFIED PROBE TECHNIQUE, MAKING USE OF HIGH THROUGHPUT TECHNOLOGIES AS DESCRIBED BY CMS-2020-01-R: HCPCS

## 2021-01-01 PROCEDURE — 770020 HCHG ROOM/CARE - TELE (206)

## 2021-01-01 PROCEDURE — 91300 PFIZER SARS-COV-2 VACCINE: CPT

## 2021-01-01 PROCEDURE — 700117 HCHG RX CONTRAST REV CODE 255: Performed by: RADIOLOGY

## 2021-01-01 PROCEDURE — 86901 BLOOD TYPING SEROLOGIC RH(D): CPT

## 2021-01-01 PROCEDURE — 99497 ADVNCD CARE PLAN 30 MIN: CPT | Performed by: FAMILY MEDICINE

## 2021-01-01 PROCEDURE — 70491 CT SOFT TISSUE NECK W/DYE: CPT | Mod: MG

## 2021-01-01 PROCEDURE — 81003 URINALYSIS AUTO W/O SCOPE: CPT

## 2021-01-01 PROCEDURE — 36430 TRANSFUSION BLD/BLD COMPNT: CPT

## 2021-01-01 PROCEDURE — 77338 DESIGN MLC DEVICE FOR IMRT: CPT | Mod: XU | Performed by: RADIOLOGY

## 2021-01-01 PROCEDURE — C9132 KCENTRA, PER I.U.: HCPCS | Mod: JG | Performed by: EMERGENCY MEDICINE

## 2021-01-01 PROCEDURE — 0002A PFIZER SARS-COV-2 VACCINE: CPT

## 2021-01-01 PROCEDURE — 700111 HCHG RX REV CODE 636 W/ 250 OVERRIDE (IP): Mod: JG | Performed by: EMERGENCY MEDICINE

## 2021-01-01 PROCEDURE — C9803 HOPD COVID-19 SPEC COLLECT: HCPCS | Performed by: EMERGENCY MEDICINE

## 2021-01-01 PROCEDURE — 86850 RBC ANTIBODY SCREEN: CPT

## 2021-01-01 PROCEDURE — 77300 RADIATION THERAPY DOSE PLAN: CPT | Performed by: RADIOLOGY

## 2021-01-01 PROCEDURE — 99498 ADVNCD CARE PLAN ADDL 30 MIN: CPT | Performed by: FAMILY MEDICINE

## 2021-01-01 PROCEDURE — 700102 HCHG RX REV CODE 250 W/ 637 OVERRIDE(OP): Performed by: STUDENT IN AN ORGANIZED HEALTH CARE EDUCATION/TRAINING PROGRAM

## 2021-01-01 PROCEDURE — 30233N1 TRANSFUSION OF NONAUTOLOGOUS RED BLOOD CELLS INTO PERIPHERAL VEIN, PERCUTANEOUS APPROACH: ICD-10-PCS | Performed by: INTERNAL MEDICINE

## 2021-01-01 PROCEDURE — 700111 HCHG RX REV CODE 636 W/ 250 OVERRIDE (IP)

## 2021-01-01 PROCEDURE — 99285 EMERGENCY DEPT VISIT HI MDM: CPT

## 2021-01-01 PROCEDURE — 99223 1ST HOSP IP/OBS HIGH 75: CPT | Performed by: INTERNAL MEDICINE

## 2021-01-01 RX ORDER — ENALAPRILAT 1.25 MG/ML
1.25 INJECTION INTRAVENOUS EVERY 6 HOURS PRN
Status: DISCONTINUED | OUTPATIENT
Start: 2021-01-01 | End: 2021-01-01 | Stop reason: HOSPADM

## 2021-01-01 RX ORDER — SODIUM CHLORIDE 9 MG/ML
500 INJECTION, SOLUTION INTRAVENOUS ONCE
Status: DISCONTINUED | OUTPATIENT
Start: 2021-01-01 | End: 2021-01-01 | Stop reason: HOSPADM

## 2021-01-01 RX ORDER — ONDANSETRON 2 MG/ML
4 INJECTION INTRAMUSCULAR; INTRAVENOUS PRN
Status: ACTIVE | OUTPATIENT
Start: 2021-01-01 | End: 2021-01-01

## 2021-01-01 RX ORDER — ONDANSETRON 2 MG/ML
4 INJECTION INTRAMUSCULAR; INTRAVENOUS EVERY 4 HOURS PRN
Status: DISCONTINUED | OUTPATIENT
Start: 2021-01-01 | End: 2021-01-01 | Stop reason: HOSPADM

## 2021-01-01 RX ORDER — RIVAROXABAN 20 MG/1
TABLET, FILM COATED ORAL
Qty: 90 TABLET | Refills: 0 | Status: SHIPPED | OUTPATIENT
Start: 2021-01-01

## 2021-01-01 RX ORDER — POTASSIUM CHLORIDE 20 MEQ/1
40 TABLET, EXTENDED RELEASE ORAL
Status: DISPENSED | OUTPATIENT
Start: 2021-01-01 | End: 2021-01-01

## 2021-01-01 RX ORDER — OXYCODONE HYDROCHLORIDE 5 MG/1
5-10 TABLET ORAL EVERY 6 HOURS PRN
COMMUNITY

## 2021-01-01 RX ORDER — TRANEXAMIC ACID 100 MG/ML
3 INJECTION, SOLUTION INTRAVENOUS ONCE
Status: COMPLETED | OUTPATIENT
Start: 2021-01-01 | End: 2021-01-01

## 2021-01-01 RX ORDER — METOPROLOL TARTRATE 50 MG/1
50 TABLET, FILM COATED ORAL 2 TIMES DAILY
Qty: 180 TAB | Refills: 3 | Status: SHIPPED | OUTPATIENT
Start: 2021-01-01

## 2021-01-01 RX ORDER — ONDANSETRON 4 MG/1
4 TABLET, ORALLY DISINTEGRATING ORAL EVERY 4 HOURS PRN
Status: DISCONTINUED | OUTPATIENT
Start: 2021-01-01 | End: 2021-01-01 | Stop reason: HOSPADM

## 2021-01-01 RX ORDER — OXYMETAZOLINE HYDROCHLORIDE 0.05 G/100ML
2 SPRAY NASAL 2 TIMES DAILY
Status: DISCONTINUED | OUTPATIENT
Start: 2021-01-01 | End: 2021-01-01 | Stop reason: HOSPADM

## 2021-01-01 RX ORDER — AMOXICILLIN 250 MG
2 CAPSULE ORAL 2 TIMES DAILY
Status: DISCONTINUED | OUTPATIENT
Start: 2021-01-01 | End: 2021-01-01 | Stop reason: HOSPADM

## 2021-01-01 RX ORDER — POLYETHYLENE GLYCOL 3350 17 G/17G
1 POWDER, FOR SOLUTION ORAL
Status: DISCONTINUED | OUTPATIENT
Start: 2021-01-01 | End: 2021-01-01 | Stop reason: HOSPADM

## 2021-01-01 RX ORDER — DEXTROSE MONOHYDRATE 25 G/50ML
50 INJECTION, SOLUTION INTRAVENOUS
Status: DISCONTINUED | OUTPATIENT
Start: 2021-01-01 | End: 2021-01-01 | Stop reason: HOSPADM

## 2021-01-01 RX ORDER — POTASSIUM CHLORIDE 20 MEQ/1
40 TABLET, EXTENDED RELEASE ORAL
Status: DISCONTINUED | OUTPATIENT
Start: 2021-01-01 | End: 2021-01-01 | Stop reason: HOSPADM

## 2021-01-01 RX ORDER — DORZOLAMIDE HCL 20 MG/ML
1 SOLUTION/ DROPS OPHTHALMIC 2 TIMES DAILY
COMMUNITY
Start: 2021-01-01

## 2021-01-01 RX ORDER — BISACODYL 10 MG
10 SUPPOSITORY, RECTAL RECTAL
Status: DISCONTINUED | OUTPATIENT
Start: 2021-01-01 | End: 2021-01-01 | Stop reason: HOSPADM

## 2021-01-01 RX ORDER — AMIODARONE HYDROCHLORIDE 200 MG/1
200 TABLET ORAL EVERY MORNING
Status: DISCONTINUED | OUTPATIENT
Start: 2021-01-01 | End: 2021-01-01 | Stop reason: HOSPADM

## 2021-01-01 RX ORDER — METOPROLOL TARTRATE 1 MG/ML
5 INJECTION, SOLUTION INTRAVENOUS
Status: DISCONTINUED | OUTPATIENT
Start: 2021-01-01 | End: 2021-01-01 | Stop reason: HOSPADM

## 2021-01-01 RX ORDER — CHLORPHENIRAMINE MALEATE 4 MG/1
16 TABLET ORAL
COMMUNITY

## 2021-01-01 RX ORDER — ATORVASTATIN CALCIUM 80 MG/1
80 TABLET, FILM COATED ORAL
Status: DISCONTINUED | OUTPATIENT
Start: 2021-01-01 | End: 2021-01-01 | Stop reason: HOSPADM

## 2021-01-01 RX ORDER — OXYCODONE HYDROCHLORIDE 5 MG/1
5 TABLET ORAL
Status: DISCONTINUED | OUTPATIENT
Start: 2021-01-01 | End: 2021-01-01 | Stop reason: HOSPADM

## 2021-01-01 RX ORDER — HYDROMORPHONE HYDROCHLORIDE 1 MG/ML
0.25 INJECTION, SOLUTION INTRAMUSCULAR; INTRAVENOUS; SUBCUTANEOUS
Status: DISCONTINUED | OUTPATIENT
Start: 2021-01-01 | End: 2021-01-01 | Stop reason: HOSPADM

## 2021-01-01 RX ORDER — AMOXICILLIN AND CLAVULANATE POTASSIUM 875; 125 MG/1; MG/1
1 TABLET, FILM COATED ORAL EVERY 12 HOURS
Status: DISCONTINUED | OUTPATIENT
Start: 2021-01-01 | End: 2021-01-01 | Stop reason: HOSPADM

## 2021-01-01 RX ORDER — MIDAZOLAM HYDROCHLORIDE 1 MG/ML
.5-2 INJECTION INTRAMUSCULAR; INTRAVENOUS PRN
Status: DISCONTINUED | OUTPATIENT
Start: 2021-01-01 | End: 2021-01-01

## 2021-01-01 RX ORDER — AMOXICILLIN AND CLAVULANATE POTASSIUM 875; 125 MG/1; MG/1
875 TABLET, FILM COATED ORAL
COMMUNITY
Start: 2021-01-01 | End: 2021-01-01

## 2021-01-01 RX ORDER — METOPROLOL TARTRATE 50 MG/1
50 TABLET, FILM COATED ORAL 2 TIMES DAILY
Status: DISCONTINUED | OUTPATIENT
Start: 2021-01-01 | End: 2021-01-01 | Stop reason: HOSPADM

## 2021-01-01 RX ORDER — SODIUM CHLORIDE 9 MG/ML
INJECTION, SOLUTION INTRAVENOUS CONTINUOUS
Status: ACTIVE | OUTPATIENT
Start: 2021-01-01 | End: 2021-01-01

## 2021-01-01 RX ORDER — AMLODIPINE BESYLATE 10 MG/1
10 TABLET ORAL DAILY
Qty: 90 TAB | Refills: 3 | Status: SHIPPED | OUTPATIENT
Start: 2021-01-01

## 2021-01-01 RX ORDER — AMOXICILLIN AND CLAVULANATE POTASSIUM 875; 125 MG/1; MG/1
1 TABLET, FILM COATED ORAL 2 TIMES DAILY
Qty: 14 TAB | Refills: 0 | Status: SHIPPED | OUTPATIENT
Start: 2021-01-01 | End: 2021-01-01

## 2021-01-01 RX ORDER — SODIUM CHLORIDE 9 MG/ML
INJECTION, SOLUTION INTRAVENOUS
Status: DISCONTINUED | OUTPATIENT
Start: 2021-01-01 | End: 2021-01-01 | Stop reason: HOSPADM

## 2021-01-01 RX ORDER — MORPHINE SULFATE 4 MG/ML
4 INJECTION, SOLUTION INTRAMUSCULAR; INTRAVENOUS
Status: DISCONTINUED | OUTPATIENT
Start: 2021-01-01 | End: 2021-01-01 | Stop reason: HOSPADM

## 2021-01-01 RX ORDER — SODIUM CHLORIDE 9 MG/ML
500 INJECTION, SOLUTION INTRAVENOUS
Status: DISPENSED | OUTPATIENT
Start: 2021-01-01 | End: 2021-01-01

## 2021-01-01 RX ORDER — OMEPRAZOLE 20 MG/1
20 CAPSULE, DELAYED RELEASE ORAL EVERY MORNING
Status: DISCONTINUED | OUTPATIENT
Start: 2021-01-01 | End: 2021-01-01 | Stop reason: HOSPADM

## 2021-01-01 RX ORDER — ONDANSETRON 2 MG/ML
4 INJECTION INTRAMUSCULAR; INTRAVENOUS EVERY 8 HOURS PRN
Status: DISCONTINUED | OUTPATIENT
Start: 2021-01-01 | End: 2021-01-01 | Stop reason: HOSPADM

## 2021-01-01 RX ORDER — MIDAZOLAM HYDROCHLORIDE 1 MG/ML
INJECTION INTRAMUSCULAR; INTRAVENOUS
Status: COMPLETED
Start: 2021-01-01 | End: 2021-01-01

## 2021-01-01 RX ORDER — LABETALOL HYDROCHLORIDE 5 MG/ML
10 INJECTION, SOLUTION INTRAVENOUS EVERY 4 HOURS PRN
Status: DISCONTINUED | OUTPATIENT
Start: 2021-01-01 | End: 2021-01-01 | Stop reason: HOSPADM

## 2021-01-01 RX ORDER — LATANOPROST 50 UG/ML
1 SOLUTION/ DROPS OPHTHALMIC DAILY
Status: DISCONTINUED | OUTPATIENT
Start: 2021-01-01 | End: 2021-01-01 | Stop reason: HOSPADM

## 2021-01-01 RX ORDER — DORZOLAMIDE HCL 20 MG/ML
1 SOLUTION/ DROPS OPHTHALMIC 2 TIMES DAILY
Status: DISCONTINUED | OUTPATIENT
Start: 2021-01-01 | End: 2021-01-01 | Stop reason: HOSPADM

## 2021-01-01 RX ORDER — OXYCODONE HYDROCHLORIDE 10 MG/1
10 TABLET ORAL
Status: DISCONTINUED | OUTPATIENT
Start: 2021-01-01 | End: 2021-01-01 | Stop reason: HOSPADM

## 2021-01-01 RX ORDER — OXYCODONE HYDROCHLORIDE 5 MG/1
2.5 TABLET ORAL
Status: DISCONTINUED | OUTPATIENT
Start: 2021-01-01 | End: 2021-01-01 | Stop reason: HOSPADM

## 2021-01-01 RX ORDER — SODIUM CHLORIDE 9 MG/ML
2000 INJECTION, SOLUTION INTRAVENOUS ONCE
Status: COMPLETED | OUTPATIENT
Start: 2021-01-01 | End: 2021-01-01

## 2021-01-01 RX ORDER — SODIUM CHLORIDE 9 MG/ML
500 INJECTION, SOLUTION INTRAVENOUS ONCE
Status: COMPLETED | OUTPATIENT
Start: 2021-01-01 | End: 2021-01-01

## 2021-01-01 RX ORDER — HYDROMORPHONE HYDROCHLORIDE 2 MG/1
2 TABLET ORAL
Qty: 56 TABLET | Refills: 0 | Status: SHIPPED | OUTPATIENT
Start: 2021-01-01 | End: 2021-01-01

## 2021-01-01 RX ORDER — IBUPROFEN 200 MG
200 TABLET ORAL PRN
COMMUNITY
End: 2021-01-01

## 2021-01-01 RX ORDER — SODIUM CHLORIDE 9 MG/ML
500 INJECTION, SOLUTION INTRAVENOUS CONTINUOUS
Status: DISCONTINUED | OUTPATIENT
Start: 2021-01-01 | End: 2021-01-01

## 2021-01-01 RX ORDER — SODIUM CHLORIDE 9 MG/ML
INJECTION, SOLUTION INTRAVENOUS CONTINUOUS
Status: DISCONTINUED | OUTPATIENT
Start: 2021-01-01 | End: 2021-01-01

## 2021-01-01 RX ADMIN — METOPROLOL TARTRATE 50 MG: 50 TABLET, FILM COATED ORAL at 04:50

## 2021-01-01 RX ADMIN — SODIUM CHLORIDE 3 G: 900 INJECTION INTRAVENOUS at 05:27

## 2021-01-01 RX ADMIN — POTASSIUM CHLORIDE 40 MEQ: 1500 TABLET, EXTENDED RELEASE ORAL at 08:29

## 2021-01-01 RX ADMIN — TRANEXAMIC ACID 300 MG: 100 INJECTION, SOLUTION INTRAVENOUS at 22:30

## 2021-01-01 RX ADMIN — LATANOPROST 1 DROP: 50 SOLUTION OPHTHALMIC at 21:34

## 2021-01-01 RX ADMIN — SODIUM CHLORIDE: 9 INJECTION, SOLUTION INTRAVENOUS at 20:33

## 2021-01-01 RX ADMIN — SODIUM CHLORIDE: 9 INJECTION, SOLUTION INTRAVENOUS at 21:53

## 2021-01-01 RX ADMIN — DOCUSATE SODIUM 50 MG AND SENNOSIDES 8.6 MG 2 TABLET: 8.6; 5 TABLET, FILM COATED ORAL at 16:59

## 2021-01-01 RX ADMIN — LATANOPROST 1 DROP: 50 SOLUTION OPHTHALMIC at 17:02

## 2021-01-01 RX ADMIN — OXYCODONE HYDROCHLORIDE 2.5 MG: 5 TABLET ORAL at 18:28

## 2021-01-01 RX ADMIN — ATORVASTATIN CALCIUM 80 MG: 80 TABLET, FILM COATED ORAL at 21:27

## 2021-01-01 RX ADMIN — SODIUM CHLORIDE 3 G: 900 INJECTION INTRAVENOUS at 11:21

## 2021-01-01 RX ADMIN — DORZOLAMIDE HYDROCHLORIDE 1 DROP: 20 SOLUTION/ DROPS OPHTHALMIC at 21:27

## 2021-01-01 RX ADMIN — MIDAZOLAM HYDROCHLORIDE 1 MG: 1 INJECTION, SOLUTION INTRAMUSCULAR; INTRAVENOUS at 08:29

## 2021-01-01 RX ADMIN — DORZOLAMIDE HYDROCHLORIDE 1 DROP: 20 SOLUTION/ DROPS OPHTHALMIC at 05:25

## 2021-01-01 RX ADMIN — SODIUM CHLORIDE 3 G: 900 INJECTION INTRAVENOUS at 05:42

## 2021-01-01 RX ADMIN — INSULIN HUMAN 1 UNITS: 100 INJECTION, SOLUTION PARENTERAL at 11:44

## 2021-01-01 RX ADMIN — DORZOLAMIDE HYDROCHLORIDE 1 DROP: 20 SOLUTION/ DROPS OPHTHALMIC at 21:34

## 2021-01-01 RX ADMIN — SODIUM CHLORIDE 3 G: 900 INJECTION INTRAVENOUS at 00:29

## 2021-01-01 RX ADMIN — DORZOLAMIDE HYDROCHLORIDE 1 DROP: 20 SOLUTION/ DROPS OPHTHALMIC at 22:16

## 2021-01-01 RX ADMIN — SODIUM CHLORIDE 3 G: 900 INJECTION INTRAVENOUS at 19:52

## 2021-01-01 RX ADMIN — METOPROLOL TARTRATE 50 MG: 50 TABLET, FILM COATED ORAL at 17:25

## 2021-01-01 RX ADMIN — SODIUM CHLORIDE 3 G: 900 INJECTION INTRAVENOUS at 23:39

## 2021-01-01 RX ADMIN — FENTANYL CITRATE 12.5 MCG: 50 INJECTION, SOLUTION INTRAMUSCULAR; INTRAVENOUS at 08:38

## 2021-01-01 RX ADMIN — OXYMETAZOLINE HCL 2 SPRAY: 0.05 SPRAY NASAL at 08:23

## 2021-01-01 RX ADMIN — INSULIN HUMAN 1 UNITS: 100 INJECTION, SOLUTION PARENTERAL at 23:42

## 2021-01-01 RX ADMIN — AMIODARONE HYDROCHLORIDE 200 MG: 200 TABLET ORAL at 04:51

## 2021-01-01 RX ADMIN — FENTANYL CITRATE 12.5 MCG: 50 INJECTION, SOLUTION INTRAMUSCULAR; INTRAVENOUS at 08:29

## 2021-01-01 RX ADMIN — PROTHROMBIN, COAGULATION FACTOR VII HUMAN, COAGULATION FACTOR IX HUMAN, COAGULATION FACTOR X HUMAN, PROTEIN C, PROTEIN S HUMAN, AND WATER 2000 UNITS: KIT at 15:51

## 2021-01-01 RX ADMIN — AMOXICILLIN AND CLAVULANATE POTASSIUM 1 TABLET: 875; 125 TABLET, FILM COATED ORAL at 04:50

## 2021-01-01 RX ADMIN — OXYCODONE HYDROCHLORIDE 5 MG: 5 TABLET ORAL at 04:50

## 2021-01-01 RX ADMIN — METOPROLOL TARTRATE 50 MG: 50 TABLET, FILM COATED ORAL at 16:59

## 2021-01-01 RX ADMIN — SODIUM CHLORIDE 500 ML: 9 INJECTION, SOLUTION INTRAVENOUS at 19:19

## 2021-01-01 RX ADMIN — SODIUM CHLORIDE 3 G: 900 INJECTION INTRAVENOUS at 16:59

## 2021-01-01 RX ADMIN — IOHEXOL 80 ML: 350 INJECTION, SOLUTION INTRAVENOUS at 11:24

## 2021-01-01 RX ADMIN — FENTANYL CITRATE 25 MCG: 50 INJECTION, SOLUTION INTRAMUSCULAR; INTRAVENOUS at 08:40

## 2021-01-01 RX ADMIN — MIDAZOLAM HYDROCHLORIDE 1 MG: 1 INJECTION, SOLUTION INTRAMUSCULAR; INTRAVENOUS at 08:40

## 2021-01-01 RX ADMIN — SODIUM CHLORIDE 2000 ML: 9 INJECTION, SOLUTION INTRAVENOUS at 16:15

## 2021-01-01 RX ADMIN — INSULIN HUMAN 1 UNITS: 100 INJECTION, SOLUTION PARENTERAL at 17:22

## 2021-01-01 RX ADMIN — OXYCODONE HYDROCHLORIDE 5 MG: 5 TABLET ORAL at 23:17

## 2021-01-01 RX ADMIN — SODIUM CHLORIDE 1000 ML: 9 INJECTION, SOLUTION INTRAVENOUS at 07:00

## 2021-01-01 RX ADMIN — POTASSIUM CHLORIDE 40 MEQ: 1500 TABLET, EXTENDED RELEASE ORAL at 08:23

## 2021-01-01 RX ADMIN — AMOXICILLIN AND CLAVULANATE POTASSIUM 1 TABLET: 875; 125 TABLET, FILM COATED ORAL at 17:22

## 2021-01-01 RX ADMIN — OMEPRAZOLE 20 MG: 20 CAPSULE, DELAYED RELEASE ORAL at 05:26

## 2021-01-01 RX ADMIN — AMOXICILLIN AND CLAVULANATE POTASSIUM 1 TABLET: 875; 125 TABLET, FILM COATED ORAL at 11:41

## 2021-01-01 RX ADMIN — DORZOLAMIDE HYDROCHLORIDE 1 DROP: 20 SOLUTION/ DROPS OPHTHALMIC at 05:42

## 2021-01-01 RX ADMIN — LATANOPROST 1 DROP: 50 SOLUTION OPHTHALMIC at 17:27

## 2021-01-01 RX ADMIN — OMEPRAZOLE 20 MG: 20 CAPSULE, DELAYED RELEASE ORAL at 04:50

## 2021-01-01 RX ADMIN — DOCUSATE SODIUM 50 MG AND SENNOSIDES 8.6 MG 2 TABLET: 8.6; 5 TABLET, FILM COATED ORAL at 04:51

## 2021-01-01 RX ADMIN — ATORVASTATIN CALCIUM 80 MG: 80 TABLET, FILM COATED ORAL at 22:14

## 2021-01-01 RX ADMIN — METOPROLOL TARTRATE 50 MG: 50 TABLET, FILM COATED ORAL at 05:25

## 2021-01-01 RX ADMIN — SILVER NITRATE APPLICATORS 1 APPLICATOR: 25; 75 STICK TOPICAL at 16:00

## 2021-01-01 RX ADMIN — OXYCODONE HYDROCHLORIDE 5 MG: 5 TABLET ORAL at 05:37

## 2021-01-01 RX ADMIN — DORZOLAMIDE HYDROCHLORIDE 1 DROP: 20 SOLUTION/ DROPS OPHTHALMIC at 04:51

## 2021-01-01 RX ADMIN — OXYCODONE HYDROCHLORIDE 5 MG: 5 TABLET ORAL at 22:13

## 2021-01-01 RX ADMIN — AMIODARONE HYDROCHLORIDE 200 MG: 200 TABLET ORAL at 05:26

## 2021-01-01 SDOH — ECONOMIC STABILITY: TRANSPORTATION INSECURITY
IN THE PAST 12 MONTHS, HAS LACK OF TRANSPORTATION KEPT YOU FROM MEETINGS, WORK, OR FROM GETTING THINGS NEEDED FOR DAILY LIVING?: NO

## 2021-01-01 SDOH — ECONOMIC STABILITY: FOOD INSECURITY: WITHIN THE PAST 12 MONTHS, YOU WORRIED THAT YOUR FOOD WOULD RUN OUT BEFORE YOU GOT MONEY TO BUY MORE.: NEVER TRUE

## 2021-01-01 SDOH — ECONOMIC STABILITY: FOOD INSECURITY: WITHIN THE PAST 12 MONTHS, THE FOOD YOU BOUGHT JUST DIDN'T LAST AND YOU DIDN'T HAVE MONEY TO GET MORE.: NEVER TRUE

## 2021-01-01 SDOH — ECONOMIC STABILITY: TRANSPORTATION INSECURITY
IN THE PAST 12 MONTHS, HAS THE LACK OF TRANSPORTATION KEPT YOU FROM MEDICAL APPOINTMENTS OR FROM GETTING MEDICATIONS?: NO

## 2021-01-01 ASSESSMENT — PAIN SCALES - GENERAL
PAINLEVEL: NO PAIN
PAINLEVEL: 2=MINIMAL-SLIGHT
PAINLEVEL: NO PAIN
PAINLEVEL: 4=SLIGHT-MODERATE PAIN
PAINLEVEL: 1=MINIMAL PAIN
PAINLEVEL: NO PAIN
PAINLEVEL: 1=MINIMAL PAIN
PAINLEVEL: NO PAIN

## 2021-01-01 ASSESSMENT — COGNITIVE AND FUNCTIONAL STATUS - GENERAL
MOVING FROM LYING ON BACK TO SITTING ON SIDE OF FLAT BED: A LITTLE
TOILETING: A LITTLE
WALKING IN HOSPITAL ROOM: A LITTLE
HELP NEEDED FOR BATHING: A LITTLE
MOVING TO AND FROM BED TO CHAIR: A LITTLE
STANDING UP FROM CHAIR USING ARMS: A LITTLE
MOBILITY SCORE: 18
TURNING FROM BACK TO SIDE WHILE IN FLAT BAD: A LITTLE
CLIMB 3 TO 5 STEPS WITH RAILING: A LITTLE
DAILY ACTIVITIY SCORE: 22
SUGGESTED CMS G CODE MODIFIER DAILY ACTIVITY: CJ
SUGGESTED CMS G CODE MODIFIER MOBILITY: CK

## 2021-01-01 ASSESSMENT — PAIN DESCRIPTION - PAIN TYPE
TYPE: ACUTE PAIN
TYPE: CHRONIC PAIN
TYPE: CHRONIC PAIN
TYPE: ACUTE PAIN
TYPE: ACUTE PAIN;CHRONIC PAIN
TYPE: ACUTE PAIN

## 2021-01-01 ASSESSMENT — FIBROSIS 4 INDEX
FIB4 SCORE: 1.17
FIB4 SCORE: 0.79
FIB4 SCORE: 1.17
FIB4 SCORE: 1.17
FIB4 SCORE: 0.78
FIB4 SCORE: 1.17
FIB4 SCORE: 0.87
FIB4 SCORE: 1.17
FIB4 SCORE: 0.78
FIB4 SCORE: 1.17
FIB4 SCORE: 0.78
FIB4 SCORE: 1.17

## 2021-01-01 ASSESSMENT — SOCIAL DETERMINANTS OF HEALTH (SDOH): HOW HARD IS IT FOR YOU TO PAY FOR THE VERY BASICS LIKE FOOD, HOUSING, MEDICAL CARE, AND HEATING?: NOT HARD AT ALL

## 2021-01-01 ASSESSMENT — LIFESTYLE VARIABLES
CONSUMPTION TOTAL: NEGATIVE
ON A TYPICAL DAY WHEN YOU DRINK ALCOHOL HOW MANY DRINKS DO YOU HAVE: 0
HAVE PEOPLE ANNOYED YOU BY CRITICIZING YOUR DRINKING: NO
ALCOHOL_USE: NO
HOW MANY TIMES IN THE PAST YEAR HAVE YOU HAD 5 OR MORE DRINKS IN A DAY: 0
AVERAGE NUMBER OF DAYS PER WEEK YOU HAVE A DRINK CONTAINING ALCOHOL: 0
HAVE YOU EVER FELT YOU SHOULD CUT DOWN ON YOUR DRINKING: NO
EVER FELT BAD OR GUILTY ABOUT YOUR DRINKING: NO
EVER HAD A DRINK FIRST THING IN THE MORNING TO STEADY YOUR NERVES TO GET RID OF A HANGOVER: NO
DO YOU DRINK ALCOHOL: NO
TOTAL SCORE: 0

## 2021-01-01 ASSESSMENT — COPD QUESTIONNAIRES
HAVE YOU SMOKED AT LEAST 100 CIGARETTES IN YOUR ENTIRE LIFE: YES
DO YOU EVER COUGH UP ANY MUCUS OR PHLEGM?: YES, A FEW DAYS A WEEK OR MONTH
DURING THE PAST 4 WEEKS HOW MUCH DID YOU FEEL SHORT OF BREATH: SOME OF THE TIME
COPD SCREENING SCORE: 7

## 2021-01-06 NOTE — ON TREATMENT VISIT
ON TREATMENT  NOTE  RADIATION ONCOLOGY DEPARTMENT    Patient name:  Damian Larsen    Primary Physician:  Grover Morillo M.D. MRN: [id number]  CSN: [id number]   Referring physician:  Elliott Flores M.D. : 1940, 80 y.o.     ENCOUNTER DATE:  2021      DIAGNOSIS:  Primary cancer of alveolar ridge mucosa (HCC)  Staging form: Oral Cavity, AJCC 8th Edition  - Pathologic stage from 2020: Stage THOMAS (ypT4a, pN1, cM0) - Signed by Daiana HARVEY M.D. on 2020  Histopathologic type: Squamous cell carcinoma, NOS  Stage prefix: Post-therapy  Histologic grade (G): G1  Histologic grading system: 3 grade system  Residual tumor (R): R0 - None  Laterality: Right  Lymph-vascular invasion (LVI): LVI not present (absent)/not identified  Perineural invasion (PNI): Present  ECOG performance status: Grade 0  Karnofsky performance status: Score 90      TREATMENT SUMMARY:  [facility] Treatment Information        Some values may be hidden. Unless noted otherwise, only the newest values recorded on each date are displayed.         [facility] Treatment Summary 21   Course First Treatment Date 2020   Course Last Treatment Date 2021   HN postop Plan from Course C1_Postop H&N   Fraction 12 of 33   Elapsed Course Days 17 @ [id number]   Prescribed Fraction Dose 200 cGy   Prescribed Total Dose 6,600 cGy   HN cp Reference Point from Course C1_Postop H&N   Elapsed Course Days [id number]   Session Dose 203 cGy   Total Dose 2,433 cGy   PTV_66 Reference Point from Course C1_Postop H&N   Elapsed Course Days  @ [id number]   Session Dose 200 cGy   Total Dose 2,400 cGy              SUBJECTIVE:  No complaints. Drainage from surgical site resolved with abx.    VITAL SIGNS:  Vitals:    21 1441   BP: 123/65   Pulse: 69   Resp: 16   Temp: 36 °C (96.8 °F)   SpO2: 98%      KPS: 80, Normal activity with effort; some signs or symptoms of disease (ECOG equivalent 1)  Encounter Vitals  Temperature: 36 °C (96.8  °F)  Blood Pressure : 123/65  Pulse: 69  Respiration: 16  Pulse Oximetry: 98 %  Weight: 73.4 kg (161 lb 11.3 oz)  Pain Score: No pain  Pain Assessment 1/6/2021 12/29/2020 12/23/2020 12/23/2020 12/8/2020   Pain Assessment - - Denies Pain - -   Pain Score 0 0 0 0 0   Some recent data might be hidden          PHYSICAL EXAM:  Physical Exam  Vitals signs and nursing note reviewed.   Constitutional:       General: He is not in acute distress.     Appearance: He is well-developed.   HENT:      Head: Normocephalic.      Mouth/Throat:      Comments: Flap intact  Skin:     General: Skin is warm and dry.      Findings: No erythema.   Neurological:      Mental Status: He is alert and oriented to person, place, and time.   Psychiatric:         Behavior: Behavior normal.         Thought Content: Thought content normal.         Judgment: Judgment normal.          Toxicity Assessment 1/6/2021 12/23/2020   Toxicity Assessment Head/Neck Head/Neck   Fatigue (lethargy, malaise, asthenia) Increased fatigue over baseline, but not altering normal activities None   Radiation Dermatitis None None   Rash/desquamation None None   Constipation None None   Dehydration None None   Mouth Dryness Mild Mild   RT Dysphagia-Pharyngeal None None   Mucositis None None   Salivary Gland Changes None None   Stomatitis/Pharyngitis None None   Taste Disturbance (dysgeusia) Slightly altered Normal   RT - Pain due to RT None None   Cough Absent Absent   Voice changes/stridor/larynx (hoarseness, loss of voice, laryngitis) Normal Normal       CURRENT MEDICATIONS:    Current Outpatient Medications:   •  amiodarone (CORDARONE) 200 MG Tab, Take 200 mg by mouth., Disp: , Rfl:   •  acetaminophen (TYLENOL) 500 MG Tab, Take 500-1,000 mg by mouth every 6 hours as needed., Disp: , Rfl:   •  amLODIPine (NORVASC) 10 MG Tab, Take 1 Tab by mouth every day., Disp: 30 Tab, Rfl: 3  •  lisinopril (PRINIVIL) 40 MG tablet, Take 1 Tab by mouth every day., Disp: 100 Tab, Rfl: 3  •   rivaroxaban (XARELTO) 20 MG Tab tablet, Take 1 Tab by mouth with dinner., Disp: 90 Tab, Rfl: 1  •  metoprolol (LOPRESSOR) 50 MG Tab, Take 1 Tab by mouth 2 Times a Day., Disp: 60 Tab, Rfl: 11  •  flecainide (TAMBOCOR) 50 MG tablet, Take 1 Tab by mouth 2 times a day., Disp: 60 Tab, Rfl: 11  •  triamcinolone acetonide (KENALOG) 0.1 % Cream, Apply 1 Application to affected area(s) as needed. Apply as directed, Disp: , Rfl: 0  •  chlorpheniramine (CHLOR-TRIMETON) 4 MG Tab, Take 4 mg by mouth every day., Disp: , Rfl:   •  latanoprost (XALATAN) 0.005 % Solution, Place 1 Drop in both eyes every evening., Disp: , Rfl:   •  omeprazole (PRILOSEC) 20 MG delayed-release capsule, Take 20 mg by mouth every day., Disp: , Rfl:   •  loratadine (CLARITIN) 10 MG Tab, Take 10 mg by mouth every day., Disp: , Rfl:   •  atorvastatin (LIPITOR) 80 MG tablet, Take 80 mg by mouth., Disp: , Rfl:   •  aspirin (ASA) 81 MG Chew Tab chewable tablet, 81 mg by Nasogastric route., Disp: , Rfl:   •  ascorbic acid (ASCORBIC ACID) 500 MG Tab, Take 500 mg by mouth every day., Disp: , Rfl:   •  vitamin e (VITAMIN E) 400 UNIT Cap, Take 400 Units by mouth every day., Disp: , Rfl:   •  therapeutic multivitamin-minerals (THERAGRAN-M) Tab, Take 1 Tab by mouth every day., Disp: , Rfl:   •  Omega-3 Fatty Acids (FISH OIL) 1000 MG Cap capsule, Take 1,000 mg by mouth every day., Disp: , Rfl:     LABORATORY DATA:   Lab Results   Component Value Date/Time    SODIUM 137 09/15/2020 06:31 AM    POTASSIUM 4.5 09/15/2020 06:31 AM    CHLORIDE 100 09/15/2020 06:31 AM    CO2 23 09/15/2020 06:31 AM    GLUCOSE 156 (H) 09/15/2020 06:31 AM    BUN 14 09/15/2020 06:31 AM    CREATININE 0.78 09/15/2020 06:31 AM       Lab Results   Component Value Date/Time    WBC 11.8 (H) 09/17/2020 06:06 AM    RBC 3.87 (L) 09/17/2020 06:06 AM    HEMOGLOBIN 12.7 (L) 09/17/2020 06:06 AM    HEMATOCRIT 38.2 (L) 09/17/2020 06:06 AM    MCV 98.7 (H) 09/17/2020 06:06 AM    MCH 32.8 09/17/2020 06:06 AM     [facility] 33.2 (L) 09/17/2020 06:06 AM    PLATELETCT 269 09/17/2020 06:06 AM         RADIOLOGY DATA:  No results found.    IMPRESSION:  Cancer Staging  Primary cancer of alveolar ridge mucosa (HCC)  Staging form: Oral Cavity, AJCC 8th Edition  - Pathologic stage from 12/8/2020: Stage THOMAS (ypT4a, pN1, cM0) - Signed by Daiana HARVEY M.D. on 12/8/2020      PLAN:  No change in treatment plan    Disposition:  Treatment plan and imaging reviewed. Questions answered. Continue therapy outlined.     Daiana HARVEY M.D.    No orders of the defined types were placed in this encounter.

## 2021-01-07 NOTE — PROGRESS NOTES
Nutrition Services: Brief Update  Weight: 161 lbs/ 73.4 kg  Weight History:  74.6 kg/164 lbs on 12/23/20  75.6 kg/166 lbs on 12/21/20  184 lbs on 9/14/20  Weight Change:  Down 1.6% in the last week for weight. Not severe, but significant.     RD and intern met with Pt after XRT to discuss any changes in pt. Pt denies any nausea, vomiting, diarrhea, or constipation. Pt says that he is feeling the same and is not having and problems. Pt did mention that he can only eat soft foods and sometimes his wife makes foods that are difficult to eat. RD spoke with pt regarding high calorie/protein add-ins for his foods. Also discussed some high calorie soup and softer food items that may be easier to eat. Dicussed food preferences and texture modifications that would be suitable for patient.     Plan/Recommend:  • Include at least 2 high lionel/protein add-ins in each meal  • Provided handout suggestions and recipes for patient to reference.     RD to continue to monitor throughout treatment and provide nutrition recommendations as indicated.  Please contact [phone]

## 2021-01-13 NOTE — ON TREATMENT VISIT
ON TREATMENT  NOTE  RADIATION ONCOLOGY DEPARTMENT    Patient name:  Damian Larsen    Primary Physician:  Grover Morillo M.D. MRN: [id number]  CSN: [id number]   Referring physician:  Elliott Flores M.D. : 1940, 80 y.o.     ENCOUNTER DATE:  2021      DIAGNOSIS:  Primary cancer of alveolar ridge mucosa (HCC)  Staging form: Oral Cavity, AJCC 8th Edition  - Pathologic stage from 2020: Stage THOMAS (ypT4a, pN1, cM0) - Signed by Daiana HARVEY M.D. on 2020  Histopathologic type: Squamous cell carcinoma, NOS  Stage prefix: Post-therapy  Histologic grade (G): G1  Histologic grading system: 3 grade system  Residual tumor (R): R0 - None  Laterality: Right  Lymph-vascular invasion (LVI): LVI not present (absent)/not identified  Perineural invasion (PNI): Present  ECOG performance status: Grade 0  Karnofsky performance status: Score 90      TREATMENT SUMMARY:  [facility] Treatment Information        Some values may be hidden. Unless noted otherwise, only the newest values recorded on each date are displayed.         [facility] Treatment Summary 21   Course First Treatment Date 2020   Course Last Treatment Date 2021   HN postop Plan from Course C1_Postop H&N   Fraction 17 of 33   Elapsed Course Days  @    Prescribed Fraction Dose 200 cGy   Prescribed Total Dose 6,600 cGy   HN cp Reference Point from Course C1_Postop H&N   Elapsed Course Days  @ [id number]   Session Dose 203 cGy   Total Dose 3,446 cGy   PTV_66 Reference Point from Course C1_Postop H&N   Elapsed Course Days  @    Session Dose 200 cGy   Total Dose 3,400 cGy              SUBJECTIVE:  No pain in mouth. Lips chapped. Serosanguinous.        VITAL SIGNS:  Vitals:    21 1449   BP: 103/57   Pulse: 67   Temp: 35.9 °C (96.6 °F)   SpO2: 97%      KPS: 80, Normal activity with effort; some signs or symptoms of disease (ECOG equivalent 1)  Encounter Vitals  Temperature: 35.9 °C (96.6 °F)  Blood Pressure :  103/57  Pulse: 67  Pulse Oximetry: 97 %  Weight: 72.6 kg (160 lb 0.9 oz)  Pain Score: No pain  Pain Assessment 1/13/2021 1/6/2021 12/29/2020 12/23/2020 12/23/2020   Pain Assessment - - - Denies Pain -   Pain Score 0 0 0 0 0   Some recent data might be hidden          PHYSICAL EXAM:  Physical Exam  Vitals signs and nursing note reviewed.   Constitutional:       General: He is not in acute distress.     Appearance: He is well-developed.   HENT:      Head: Normocephalic.   Neck:      Comments: Drainage from punctate wound right submandibular region.   Skin:     General: Skin is warm and dry.      Findings: Erythema present.   Neurological:      Mental Status: He is alert and oriented to person, place, and time.   Psychiatric:         Behavior: Behavior normal.         Thought Content: Thought content normal.         Judgment: Judgment normal.          Toxicity Assessment 1/13/2021 1/6/2021 12/23/2020   Toxicity Assessment Head/Neck Head/Neck Head/Neck   Fatigue (lethargy, malaise, asthenia) Increased fatigue over baseline, but not altering normal activities Increased fatigue over baseline, but not altering normal activities None   Radiation Dermatitis Faint erythema or dry desquamation None None   Rash/desquamation None None None   Constipation None None None   Dehydration Dry mucous membranes and/or diminished skin turgor None None   Mouth Dryness Mild Mild Mild   RT Dysphagia-Pharyngeal Dysphagia, requiring predominantly pureed, soft, or liquid diet None None   Mucositis Erythema of mucosa None None   Salivary Gland Changes Sticky thickened saliva, may have slightly altered taste (e.g. metallic), additional fluids may be required None None   Stomatitis/Pharyngitis - None None   Taste Disturbance (dysgeusia) Markedly altered Slightly altered Normal   RT - Pain due to RT None None None   Cough Absent Absent Absent   Voice changes/stridor/larynx (hoarseness, loss of voice, laryngitis) Mild or intermittent hoarseness  Normal Normal       CURRENT MEDICATIONS:    Current Outpatient Medications:   •  amoxicillin-clavulanate (AUGMENTIN) 875-125 MG Tab, Take 1 Tab by mouth 2 times a day for 7 days., Disp: 14 Tab, Rfl: 0  •  amLODIPine (NORVASC) 10 MG Tab, Take 1 Tab by mouth every day., Disp: 90 Tab, Rfl: 3  •  metoprolol (LOPRESSOR) 50 MG Tab, Take 1 Tab by mouth 2 Times a Day., Disp: 180 Tab, Rfl: 3  •  amiodarone (CORDARONE) 200 MG Tab, Take 200 mg by mouth., Disp: , Rfl:   •  atorvastatin (LIPITOR) 80 MG tablet, Take 80 mg by mouth., Disp: , Rfl:   •  aspirin (ASA) 81 MG Chew Tab chewable tablet, 81 mg by Nasogastric route., Disp: , Rfl:   •  acetaminophen (TYLENOL) 500 MG Tab, Take 500-1,000 mg by mouth every 6 hours as needed., Disp: , Rfl:   •  lisinopril (PRINIVIL) 40 MG tablet, Take 1 Tab by mouth every day., Disp: 100 Tab, Rfl: 3  •  rivaroxaban (XARELTO) 20 MG Tab tablet, Take 1 Tab by mouth with dinner., Disp: 90 Tab, Rfl: 1  •  flecainide (TAMBOCOR) 50 MG tablet, Take 1 Tab by mouth 2 times a day., Disp: 60 Tab, Rfl: 11  •  triamcinolone acetonide (KENALOG) 0.1 % Cream, Apply 1 Application to affected area(s) as needed. Apply as directed, Disp: , Rfl: 0  •  chlorpheniramine (CHLOR-TRIMETON) 4 MG Tab, Take 4 mg by mouth every day., Disp: , Rfl:   •  latanoprost (XALATAN) 0.005 % Solution, Place 1 Drop in both eyes every evening., Disp: , Rfl:   •  omeprazole (PRILOSEC) 20 MG delayed-release capsule, Take 20 mg by mouth every day., Disp: , Rfl:   •  ascorbic acid (ASCORBIC ACID) 500 MG Tab, Take 500 mg by mouth every day., Disp: , Rfl:   •  vitamin e (VITAMIN E) 400 UNIT Cap, Take 400 Units by mouth every day., Disp: , Rfl:   •  therapeutic multivitamin-minerals (THERAGRAN-M) Tab, Take 1 Tab by mouth every day., Disp: , Rfl:   •  Omega-3 Fatty Acids (FISH OIL) 1000 MG Cap capsule, Take 1,000 mg by mouth every day., Disp: , Rfl:   •  loratadine (CLARITIN) 10 MG Tab, Take 10 mg by mouth every day., Disp: , Rfl:     LABORATORY  DATA:   Lab Results   Component Value Date/Time    SODIUM 137 09/15/2020 06:31 AM    POTASSIUM 4.5 09/15/2020 06:31 AM    CHLORIDE 100 09/15/2020 06:31 AM    CO2 23 09/15/2020 06:31 AM    GLUCOSE 156 (H) 09/15/2020 06:31 AM    BUN 14 09/15/2020 06:31 AM    CREATININE 0.78 09/15/2020 06:31 AM       Lab Results   Component Value Date/Time    WBC 11.8 (H) 09/17/2020 06:06 AM    RBC 3.87 (L) 09/17/2020 06:06 AM    HEMOGLOBIN 12.7 (L) 09/17/2020 06:06 AM    HEMATOCRIT 38.2 (L) 09/17/2020 06:06 AM    MCV 98.7 (H) 09/17/2020 06:06 AM    MCH 32.8 09/17/2020 06:06 AM    MCHC 33.2 (L) 09/17/2020 06:06 AM    PLATELETCT 269 09/17/2020 06:06 AM         RADIOLOGY DATA:  No results found.    IMPRESSION:  Cancer Staging  Primary cancer of alveolar ridge mucosa (HCC)  Staging form: Oral Cavity, AJCC 8th Edition  - Pathologic stage from 12/8/2020: Stage THOMAS (ypT4a, pN1, cM0) - Signed by Daaina HARVEY M.D. on 12/8/2020      PLAN:  No change in treatment plan and Symptomatic - prescription given and/or additional testing required       Disposition:  Treatment plan and imaging reviewed. Questions answered. Continue therapy outlined.     Daiana HARVEY M.D.    Orders Placed This Encounter   • amoxicillin-clavulanate (AUGMENTIN) 875-125 MG Tab

## 2021-01-15 NOTE — PROGRESS NOTES
Nutrition Services: Brief Update  Weight: 72.6 kg/160 pounds  Weight History:  74.6 kg/164 lbs on 12/23/20  75.6 kg/166 lbs on 12/21/20  184 lbs on 9/14/20  73.4 kg/ 161 lbs on 1/7/2021    Weight Change: weight down 1% in the last week.     I met with patient today following XRT.  He reports that he has not been doing anything different really but has been consuming mostly liquids at this time.  He notes he is having difficulty with even soft solids but is able to consume liquids well.  He notes that he drinks a homemade smoothie three times per day and occasionally soup.  The protein shake consists of vanilla and fruit.  He is unable to give me other specifics about what else is in smoothie but states that he does not utilize any protein powder.  Discussed importance of adequate protein in order to help with weight and lean body mass preservation.        Plan/Recommend:  • Provided and discussed high calorie/protein add-in handout again.  • Emphasized high protein add-ins as well as protein powders.  Suggested patient incorporate protein powder with smoothie TID to meet protein needs  grams of protein/day. Provided Unjury samples  • Provided Boost pudding samples as an option for high calorie/protein soft food.     RD to continue to monitor throughout treatment and provide nutrition recommendations as indicated.  Please contact [phone]

## 2021-01-20 NOTE — ON TREATMENT VISIT
ON TREATMENT  NOTE  RADIATION ONCOLOGY DEPARTMENT    Patient name:  Damian Larsen    Primary Physician:  Grover Morillo M.D. MRN: [id number]  [facility]: [id number]   Referring physician:  Elliott Flores M.D. : 1940, 80 y.o.     ENCOUNTER DATE:  2021      DIAGNOSIS:  Primary cancer of alveolar ridge mucosa (HCC)  Staging form: Oral Cavity, AJCC 8th Edition  - Pathologic stage from 2020: Stage THOMAS (ypT4a, pN1, cM0) - Signed by Daiana HARVEY M.D. on 2020  Histopathologic type: Squamous cell carcinoma, NOS  Stage prefix: Post-therapy  Histologic grade (G): G1  Histologic grading system: 3 grade system  Residual tumor (R): R0 - None  Laterality: Right  Lymph-vascular invasion (LVI): LVI not present (absent)/not identified  Perineural invasion (PNI): Present  ECOG performance status: Grade 0  Karnofsky performance status: Score 90      TREATMENT SUMMARY:  [facility] Treatment Information        Some values may be hidden. Unless noted otherwise, only the newest values recorded on each date are displayed.         [facility] Treatment Summary 21   Course First Treatment Date 2020   Course Last Treatment Date 2021   HN postop Plan from Course C1_Postop H&N   Fraction 22 of 33   Elapsed Course Days  @    Prescribed Fraction Dose 200 cGy   Prescribed Total Dose 6,600 cGy   HN cp Reference Point from Course C1_Postop H&N   Elapsed Course Days    Session Dose 203 cGy   Total Dose 4,460 cGy   PTV_66 Reference Point from Course C1_Postop H&N   Elapsed Course Days    Session Dose 200 cGy   Total Dose 4,400 cGy              SUBJECTIVE:  Sporadic drainage from submandibular region.       VITAL SIGNS:  Vitals:    21 1429   BP: 128/63   Pulse: 67   Temp: 36.5 °C (97.7 °F)   SpO2: 96%      KPS: 70, Cares for self; unable to carry on normal activity or to do active work (ECOG equivalent 1)  Encounter Vitals  Temperature: 36.5 °C (97.7 °F)  Blood  Pressure : 128/63  Pulse: 67  Pulse Oximetry: 96 %  Weight: 71.7 kg (158 lb 1.1 oz)  Pain Score: 2=Minimal-Slight  Pain Assessment 1/20/2021 1/13/2021 1/6/2021 12/29/2020 12/23/2020 12/23/2020   Pain Assessment - - - - Denies Pain -   Pain Score 2 0 0 0 0 0   Pain Loc Mouth - - - - -   Some recent data might be hidden          PHYSICAL EXAM:  Physical Exam  Vitals signs and nursing note reviewed.   Constitutional:       General: He is not in acute distress.     Appearance: He is well-developed.   HENT:      Head: Normocephalic.      Mouth/Throat:      Comments: Mucositis right lower lip  Skin:     General: Skin is warm and dry.      Findings: Erythema present.   Neurological:      Mental Status: He is alert and oriented to person, place, and time.   Psychiatric:         Behavior: Behavior normal.         Thought Content: Thought content normal.         Judgment: Judgment normal.          Toxicity Assessment 1/20/2021 1/13/2021 1/6/2021 12/23/2020   Toxicity Assessment Head/Neck Head/Neck Head/Neck Head/Neck   Fatigue (lethargy, malaise, asthenia) Moderate (e.g., decrease in performance status by 1 ECOG level or 20% Karnofsky) or causing difficulty performing some activities Increased fatigue over baseline, but not altering normal activities Increased fatigue over baseline, but not altering normal activities None   Radiation Dermatitis Faint erythema or dry desquamation Faint erythema or dry desquamation None None   Rash/desquamation None None None None   Constipation None None None None   Dehydration None Dry mucous membranes and/or diminished skin turgor None None   Mouth Dryness Moderate Mild Mild Mild   RT Dysphagia-Pharyngeal Dysphagia, requiring predominantly pureed, soft, or liquid diet Dysphagia, requiring predominantly pureed, soft, or liquid diet None None   Mucositis Erythema of mucosa Erythema of mucosa None None   Salivary Gland Changes Thick, ropy, sticky saliva and/or markedly altered taste and  alteration in diet required Sticky thickened saliva, may have slightly altered taste (e.g. metallic), additional fluids may be required None None   Stomatitis/Pharyngitis - - None None   Taste Disturbance (dysgeusia) Markedly altered Markedly altered Slightly altered Normal   RT - Pain due to RT Mild pain not interfering with function None None None   Cough Absent Absent Absent Absent   Voice changes/stridor/larynx (hoarseness, loss of voice, laryngitis) Normal Mild or intermittent hoarseness Normal Normal       CURRENT MEDICATIONS:    Current Outpatient Medications:   •  amoxicillin-clavulanate (AUGMENTIN) 875-125 MG Tab, Take 1 Tab by mouth 2 times a day for 7 days., Disp: 14 Tab, Rfl: 0  •  amLODIPine (NORVASC) 10 MG Tab, Take 1 Tab by mouth every day., Disp: 90 Tab, Rfl: 3  •  metoprolol (LOPRESSOR) 50 MG Tab, Take 1 Tab by mouth 2 Times a Day., Disp: 180 Tab, Rfl: 3  •  amiodarone (CORDARONE) 200 MG Tab, Take 200 mg by mouth., Disp: , Rfl:   •  atorvastatin (LIPITOR) 80 MG tablet, Take 80 mg by mouth., Disp: , Rfl:   •  aspirin (ASA) 81 MG Chew Tab chewable tablet, 81 mg by Nasogastric route., Disp: , Rfl:   •  acetaminophen (TYLENOL) 500 MG Tab, Take 500-1,000 mg by mouth every 6 hours as needed., Disp: , Rfl:   •  lisinopril (PRINIVIL) 40 MG tablet, Take 1 Tab by mouth every day., Disp: 100 Tab, Rfl: 3  •  rivaroxaban (XARELTO) 20 MG Tab tablet, Take 1 Tab by mouth with dinner., Disp: 90 Tab, Rfl: 1  •  flecainide (TAMBOCOR) 50 MG tablet, Take 1 Tab by mouth 2 times a day., Disp: 60 Tab, Rfl: 11  •  triamcinolone acetonide (KENALOG) 0.1 % Cream, Apply 1 Application to affected area(s) as needed. Apply as directed, Disp: , Rfl: 0  •  chlorpheniramine (CHLOR-TRIMETON) 4 MG Tab, Take 4 mg by mouth every day., Disp: , Rfl:   •  latanoprost (XALATAN) 0.005 % Solution, Place 1 Drop in both eyes every evening., Disp: , Rfl:   •  omeprazole (PRILOSEC) 20 MG delayed-release capsule, Take 20 mg by mouth every day.,  Disp: , Rfl:   •  ascorbic acid (ASCORBIC ACID) 500 MG Tab, Take 500 mg by mouth every day., Disp: , Rfl:   •  vitamin e (VITAMIN E) 400 UNIT Cap, Take 400 Units by mouth every day., Disp: , Rfl:   •  therapeutic multivitamin-minerals (THERAGRAN-M) Tab, Take 1 Tab by mouth every day., Disp: , Rfl:   •  Omega-3 Fatty Acids (FISH OIL) 1000 MG Cap capsule, Take 1,000 mg by mouth every day., Disp: , Rfl:   •  loratadine (CLARITIN) 10 MG Tab, Take 10 mg by mouth every day., Disp: , Rfl:     LABORATORY DATA:   Lab Results   Component Value Date/Time    SODIUM 137 09/15/2020 06:31 AM    POTASSIUM 4.5 09/15/2020 06:31 AM    CHLORIDE 100 09/15/2020 06:31 AM    CO2 23 09/15/2020 06:31 AM    GLUCOSE 156 (H) 09/15/2020 06:31 AM    BUN 14 09/15/2020 06:31 AM    CREATININE 0.78 09/15/2020 06:31 AM       Lab Results   Component Value Date/Time    WBC 11.8 (H) 09/17/2020 06:06 AM    RBC 3.87 (L) 09/17/2020 06:06 AM    HEMOGLOBIN 12.7 (L) 09/17/2020 06:06 AM    HEMATOCRIT 38.2 (L) 09/17/2020 06:06 AM    MCV 98.7 (H) 09/17/2020 06:06 AM    MCH 32.8 09/17/2020 06:06 AM    MCHC 33.2 (L) 09/17/2020 06:06 AM    PLATELETCT 269 09/17/2020 06:06 AM         RADIOLOGY DATA:  No results found.    IMPRESSION:  Cancer Staging  Primary cancer of alveolar ridge mucosa (HCC)  Staging form: Oral Cavity, AJCC 8th Edition  - Pathologic stage from 12/8/2020: Stage THOMAS (ypT4a, pN1, cM0) - Signed by Daiana HARVEY M.D. on 12/8/2020      PLAN:  Change in treatment plan will shrink volumes off lip region.    Disposition:  Treatment plan and imaging reviewed. Questions answered. Continue therapy outlined.     Daiana HARVEY M.D.    No orders of the defined types were placed in this encounter.

## 2021-01-21 NOTE — PROGRESS NOTES
Nutrition Services: Brief Update  Weight: 71.7 kg/158 pounds  Weight History:  74.6 kg/164 lbs on 12/23/20  75.6 kg/166 lbs on 12/21/20  184 lbs on 9/14/20  73.4 kg/ 161 lbs on 1/7/2021  72.6 kg/160 lbs on 1/14/2021    Weight Change: Weight down 1.2% in the last week - considered significant. 2.9kg down/3.9% in the last month which is worth noting.     I met with patient and his wife Vicky today during OTV. Patient reports he has been including lots of high fat foods and beverages including butter and whole milk regularly.  He and his wife both report that he has been focusing on protein rich foods and that they plan to  [place] Instant Breakfast to try next time they go to the store.  He has been drinking 3-4 Glucerna supplements per day at this point as well.  Discussed weight change and need to continue to be diligent with food and especially fats and proteins.  Patient and wife receptive.     Plan/Recommend:  • Advised continuation of Glucerna 3-4x/day.  • Advised continuation of full-fat products to boost caloric density in meals/snacks.   • Eat frequently throughout the day, every 2-3 hours.     RD to continue to monitor throughout treatment and provide nutrition recommendations as indicated.  Please contact [phone]

## 2021-01-27 NOTE — ON TREATMENT VISIT
ON TREATMENT  NOTE  RADIATION ONCOLOGY DEPARTMENT    Patient name:  Damian Larsen    Primary Physician:  Grover Morillo M.D. MRN: [id number]  CSN: [id number]   Referring physician:  Elliott Flores M.D. : 1940, 80 y.o.     ENCOUNTER DATE:  2021      DIAGNOSIS:  Primary cancer of alveolar ridge mucosa (HCC)  Staging form: Oral Cavity, AJCC 8th Edition  - Pathologic stage from 2020: Stage THOMAS (ypT4a, pN1, cM0) - Signed by Daiana HARVEY M.D. on 2020  Histopathologic type: Squamous cell carcinoma, NOS  Stage prefix: Post-therapy  Histologic grade (G): G1  Histologic grading system: 3 grade system  Residual tumor (R): R0 - None  Laterality: Right  Lymph-vascular invasion (LVI): LVI not present (absent)/not identified  Perineural invasion (PNI): Present  ECOG performance status: Grade 0  Karnofsky performance status: Score 90      TREATMENT SUMMARY:  [facility] Treatment Information        Some values may be hidden. Unless noted otherwise, only the newest values recorded on each date are displayed.         [facility] Treatment Summary 21   Course First Treatment Date 2020   Course Last Treatment Date 2021   HN postop Plan from Course C1_Postop H&N   HN replan Plan from Course C1_Postop H&N   Fraction 4 of 10   Elapsed Course Days  @    Prescribed Fraction Dose 200 cGy   Prescribed Total Dose 2,000 cGy   HN cp Reference Point from Course C1_Postop H&N   [id number] Reference Point from Course C1_Postop H&N   Elapsed Course Days    Session Dose 200 cGy   Total Dose 800 cGy   [id number]_66 Reference Point from Course C1_Postop H&N   Replan cp Reference Point from Course C1_Postop H&N   Elapsed Course Days    Session Dose 191 cGy   Total Dose 763 cGy              SUBJECTIVE:  Xerostomia. Sore lips.      VITAL SIGNS:  Vitals:    21 1500   BP: 115/54   Pulse: 66   Temp: 35.9 °C (96.7 °F)   SpO2: 98%      KPS: 80, Normal activity with effort; some  signs or symptoms of disease (ECOG equivalent 1)  Encounter Vitals  Temperature: 35.9 °C (96.7 °F)  Blood Pressure : 115/54  Pulse: 66  Pulse Oximetry: 98 %  Weight: 72.4 kg (159 lb 9.8 oz)  Pain Score: No pain  Pain Assessment 1/27/2021 1/20/2021 1/13/2021 1/6/2021 12/29/2020   Pain Score 0 2 0 0 0   Pain Loc - Mouth - - -   Some recent data might be hidden          PHYSICAL EXAM:  Physical Exam  Vitals signs and nursing note reviewed.   Constitutional:       General: He is not in acute distress.     Appearance: He is well-developed.   HENT:      Head: Normocephalic.   Skin:     General: Skin is warm and dry.      Findings: Erythema present.      Comments: Some drainage from right submandibular region.   Neurological:      Mental Status: He is alert and oriented to person, place, and time.   Psychiatric:         Behavior: Behavior normal.         Thought Content: Thought content normal.         Judgment: Judgment normal.          Toxicity Assessment 1/27/2021 1/20/2021 1/13/2021 1/6/2021 12/23/2020   Toxicity Assessment Head/Neck Head/Neck Head/Neck Head/Neck Head/Neck   Fatigue (lethargy, malaise, asthenia) Increased fatigue over baseline, but not altering normal activities Moderate (e.g., decrease in performance status by 1 ECOG level or 20% Karnofsky) or causing difficulty performing some activities Increased fatigue over baseline, but not altering normal activities Increased fatigue over baseline, but not altering normal activities None   Radiation Dermatitis Faint erythema or dry desquamation Faint erythema or dry desquamation Faint erythema or dry desquamation None None   Rash/desquamation None None None None None   Constipation Requiring stool softener or dietary modification None None None None   Dehydration Dry mucous membranes and/or diminished skin turgor None Dry mucous membranes and/or diminished skin turgor None None   Mouth Dryness Moderate Moderate Mild Mild Mild   RT Dysphagia-Pharyngeal Dysphagia,  requiring predominantly pureed, soft, or liquid diet Dysphagia, requiring predominantly pureed, soft, or liquid diet Dysphagia, requiring predominantly pureed, soft, or liquid diet None None   Mucositis Erythema of mucosa Erythema of mucosa Erythema of mucosa None None   Salivary Gland Changes Thick, ropy, sticky saliva and/or markedly altered taste and alteration in diet required Thick, ropy, sticky saliva and/or markedly altered taste and alteration in diet required Sticky thickened saliva, may have slightly altered taste (e.g. metallic), additional fluids may be required None None   Stomatitis/Pharyngitis - - - None None   Taste Disturbance (dysgeusia) Markedly altered Markedly altered Markedly altered Slightly altered Normal   RT - Pain due to RT None Mild pain not interfering with function None None None   Cough Absent Absent Absent Absent Absent   Voice changes/stridor/larynx (hoarseness, loss of voice, laryngitis) Normal Normal Mild or intermittent hoarseness Normal Normal       CURRENT MEDICATIONS:    Current Outpatient Medications:   •  amLODIPine (NORVASC) 10 MG Tab, Take 1 Tab by mouth every day., Disp: 90 Tab, Rfl: 3  •  metoprolol (LOPRESSOR) 50 MG Tab, Take 1 Tab by mouth 2 Times a Day., Disp: 180 Tab, Rfl: 3  •  amiodarone (CORDARONE) 200 MG Tab, Take 200 mg by mouth., Disp: , Rfl:   •  atorvastatin (LIPITOR) 80 MG tablet, Take 80 mg by mouth., Disp: , Rfl:   •  aspirin (ASA) 81 MG Chew Tab chewable tablet, 81 mg by Nasogastric route., Disp: , Rfl:   •  acetaminophen (TYLENOL) 500 MG Tab, Take 500-1,000 mg by mouth every 6 hours as needed., Disp: , Rfl:   •  lisinopril (PRINIVIL) 40 MG tablet, Take 1 Tab by mouth every day., Disp: 100 Tab, Rfl: 3  •  rivaroxaban (XARELTO) 20 MG Tab tablet, Take 1 Tab by mouth with dinner., Disp: 90 Tab, Rfl: 1  •  flecainide (TAMBOCOR) 50 MG tablet, Take 1 Tab by mouth 2 times a day., Disp: 60 Tab, Rfl: 11  •  triamcinolone acetonide (KENALOG) 0.1 % Cream, Apply 1  Application to affected area(s) as needed. Apply as directed, Disp: , Rfl: 0  •  chlorpheniramine (CHLOR-TRIMETON) 4 MG Tab, Take 4 mg by mouth every day., Disp: , Rfl:   •  latanoprost (XALATAN) 0.005 % Solution, Place 1 Drop in both eyes every evening., Disp: , Rfl:   •  omeprazole (PRILOSEC) 20 MG delayed-release capsule, Take 20 mg by mouth every day., Disp: , Rfl:   •  ascorbic acid (ASCORBIC ACID) 500 MG Tab, Take 500 mg by mouth every day., Disp: , Rfl:   •  vitamin e (VITAMIN E) 400 UNIT Cap, Take 400 Units by mouth every day., Disp: , Rfl:   •  therapeutic multivitamin-minerals (THERAGRAN-M) Tab, Take 1 Tab by mouth every day., Disp: , Rfl:   •  Omega-3 Fatty Acids (FISH OIL) 1000 MG Cap capsule, Take 1,000 mg by mouth every day., Disp: , Rfl:   •  loratadine (CLARITIN) 10 MG Tab, Take 10 mg by mouth every day., Disp: , Rfl:     LABORATORY DATA:   Lab Results   Component Value Date/Time    SODIUM 137 09/15/2020 06:31 AM    POTASSIUM 4.5 09/15/2020 06:31 AM    CHLORIDE 100 09/15/2020 06:31 AM    CO2 23 09/15/2020 06:31 AM    GLUCOSE 156 (H) 09/15/2020 06:31 AM    BUN 14 09/15/2020 06:31 AM    CREATININE 0.78 09/15/2020 06:31 AM       Lab Results   Component Value Date/Time    WBC 11.8 (H) 09/17/2020 06:06 AM    RBC 3.87 (L) 09/17/2020 06:06 AM    HEMOGLOBIN 12.7 (L) 09/17/2020 06:06 AM    HEMATOCRIT 38.2 (L) 09/17/2020 06:06 AM    MCV 98.7 (H) 09/17/2020 06:06 AM    MCH 32.8 09/17/2020 06:06 AM    MCHC 33.2 (L) 09/17/2020 06:06 AM    PLATELETCT 269 09/17/2020 06:06 AM         RADIOLOGY DATA:  No results found.    IMPRESSION:  Cancer Staging  Primary cancer of alveolar ridge mucosa (HCC)  Staging form: Oral Cavity, AJCC 8th Edition  - Pathologic stage from 12/8/2020: Stage THOMAS (ypT4a, pN1, cM0) - Signed by Daiana HARVEY M.D. on 12/8/2020      PLAN:  No change in treatment plan   RTC 6 weeks.    Disposition:  Treatment plan and imaging reviewed. Questions answered. Continue therapy outlined.     Daiana  Terry HARVEY M.D.    No orders of the defined types were placed in this encounter.

## 2021-02-02 NOTE — PROGRESS NOTES
Nutrition Services: Brief Update  Weight: 72.4 kg/ 159 lbs, weighed on 1/27/21  Weight History:  74.6 kg/164 lbs on 12/23/20  75.6 kg/166 lbs on 12/21/20  184 lbs on 9/14/20  73.4 kg/ 161 lbs on 1/7/2021  72.6 kg/160 lbs on 1/14/2021     Weight Change: wt relatively stable within past 1 week. Overall, pt has lost 2 kg since start of treatment, which is a 2.6% loss within past > 1 month, which is not significant, though worth noting. Pt scheduled for last day of treatment on 2/4/21.     RD briefly visited with pt following XRT. Pt  is managing well and having mostly soft foods and shakes.  is able to eat salads and fruit in addition to soft puddings, scrambled eggs and carnation breakfast essential shakes.  was eating waffles a bit ago, though this became a bit too difficult to eat. Pt  has not returned to speech therapy, though is continuing with exercises, asks RD how long side effects usually last.     RD conducted EAT-10 tool with pt. Per EAT-10 Tool, pt scored a 23/40 today, compared to prior to starting EAT-10 score of 16/40 on 12/11/20. Highest marks including swallowing interfering with ability to go out for meals(4), more effort with swallowing solids(4) vs liquids (1) vs pills (3), and swallowing is stressful (3).     Plan/Recommend:  · RD encouraged follow-up with speech therapy to safely transition from soft foods to more textured foods, though pt did not seem interested. Reinforced swallowing exercises prescribed by SLP.   · Discussed minimal weight loss during treatment as beneficial  · Discussed resolvement of side effects takes time and swelling can worsen before improves.   · Ensured pt has RD contact info and additional questions or concerns as they arise.     RD to sign off at this time and remain available PRN.   Please contact [phone]

## 2021-02-03 NOTE — ON TREATMENT VISIT
ON TREATMENT  NOTE  RADIATION ONCOLOGY DEPARTMENT    Patient name:  Damian Larsen    Primary Physician:  Grover Morillo M.D. MRN: [id number]  CSN: [id number]   Referring physician:  Elliott Flores M.D. : 1940, 80 y.o.     ENCOUNTER DATE:  2/3/2021      DIAGNOSIS:  Primary cancer of alveolar ridge mucosa (HCC)  Staging form: Oral Cavity, AJCC 8th Edition  - Pathologic stage from 2020: Stage THOMAS (ypT4a, pN1, cM0) - Signed by Daiana HARVEY M.D. on 2020  Histopathologic type: Squamous cell carcinoma, NOS  Stage prefix: Post-therapy  Histologic grade (G): G1  Histologic grading system: 3 grade system  Residual tumor (R): R0 - None  Laterality: Right  Lymph-vascular invasion (LVI): LVI not present (absent)/not identified  Perineural invasion (PNI): Present  ECOG performance status: Grade 0  Karnofsky performance status: Score 90      TREATMENT SUMMARY:  [facility] Treatment Information        Some values may be hidden. Unless noted otherwise, only the newest values recorded on each date are displayed.         [facility] Treatment Summary 2/3/21   Course First Treatment Date 2020   Course Last Treatment Date 2021   HN postop Plan from Course C1_Postop H&N   HN replan Plan from Course C1_Postop H&N   Fraction 9 of 10   Elapsed Course Days 45 @    Prescribed Fraction Dose 200 cGy   Prescribed Total Dose 2,000 cGy   HN cp Reference Point from Course C1_Postop H&N   [id number] Reference Point from Course C1_Postop H&N   Elapsed Course Days 45 @    Session Dose 200 cGy   Total Dose 1,800 cGy   [id number] Reference Point from Course C1_Postop H&N   Replan cp Reference Point from Course C1_Postop H&N   Elapsed Course Days  @    Session Dose 191 cGy   Total Dose 1,718 cGy              SUBJECTIVE:  Able to get liquids down. Completes tomorrow. Mucositis lower lip and mouth. Flap intact.      VITAL SIGNS:  Vitals:    21 1437   BP: 107/55   Pulse: 72   Temp: 36.1 °C  (97 °F)   SpO2: 95%      KPS: 70, Cares for self; unable to carry on normal activity or to do active work (ECOG equivalent 1)  Encounter Vitals  Temperature: 36.1 °C (97 °F)  Blood Pressure : 107/55  Pulse: 72  Pulse Oximetry: 95 %  Weight: 71.6 kg (157 lb 15.4 oz)  Pain Score: 1=Minimal Pain  Pain Assessment 2/3/2021 1/27/2021 1/20/2021 1/13/2021   Pain Score 1 0 2 0   Pain Loc Mouth - Mouth -   Some recent data might be hidden          PHYSICAL EXAM:  Physical Exam  Vitals signs and nursing note reviewed.   Constitutional:       General: He is not in acute distress.     Appearance: He is well-developed.   HENT:      Head: Normocephalic.      Mouth/Throat:      Pharynx: Posterior oropharyngeal erythema present.      Comments: Oral mucositis.  Neck:      Comments: Bilateral lymphedema.  Skin:     General: Skin is warm and dry.      Findings: Erythema present.   Neurological:      Mental Status: He is alert and oriented to person, place, and time.   Psychiatric:         Behavior: Behavior normal.         Thought Content: Thought content normal.         Judgment: Judgment normal.          Toxicity Assessment 2/3/2021 1/27/2021 1/20/2021 1/13/2021 1/6/2021 12/23/2020   Toxicity Assessment Head/Neck Head/Neck Head/Neck Head/Neck Head/Neck Head/Neck   Fatigue (lethargy, malaise, asthenia) Increased fatigue over baseline, but not altering normal activities Increased fatigue over baseline, but not altering normal activities Moderate (e.g., decrease in performance status by 1 ECOG level or 20% Karnofsky) or causing difficulty performing some activities Increased fatigue over baseline, but not altering normal activities Increased fatigue over baseline, but not altering normal activities None   Radiation Dermatitis Faint erythema or dry desquamation Faint erythema or dry desquamation Faint erythema or dry desquamation Faint erythema or dry desquamation None None   Rash/desquamation None None None None None None   Constipation  Requiring stool softener or dietary modification Requiring stool softener or dietary modification None None None None   Dehydration None Dry mucous membranes and/or diminished skin turgor None Dry mucous membranes and/or diminished skin turgor None None   Mouth Dryness Moderate Moderate Moderate Mild Mild Mild   RT Dysphagia-Pharyngeal Dysphagia, requiring predominantly pureed, soft, or liquid diet Dysphagia, requiring predominantly pureed, soft, or liquid diet Dysphagia, requiring predominantly pureed, soft, or liquid diet Dysphagia, requiring predominantly pureed, soft, or liquid diet None None   Mucositis Erythema of mucosa Erythema of mucosa Erythema of mucosa Erythema of mucosa None None   Salivary Gland Changes Thick, ropy, sticky saliva and/or markedly altered taste and alteration in diet required Thick, ropy, sticky saliva and/or markedly altered taste and alteration in diet required Thick, ropy, sticky saliva and/or markedly altered taste and alteration in diet required Sticky thickened saliva, may have slightly altered taste (e.g. metallic), additional fluids may be required None None   Stomatitis/Pharyngitis - - - - None None   Taste Disturbance (dysgeusia) Markedly altered Markedly altered Markedly altered Markedly altered Slightly altered Normal   RT - Pain due to RT Mild pain not interfering with function None Mild pain not interfering with function None None None   Cough Mild, relieved by non-prescription medication Absent Absent Absent Absent Absent   Voice changes/stridor/larynx (hoarseness, loss of voice, laryngitis) - Normal Normal Mild or intermittent hoarseness Normal Normal       CURRENT MEDICATIONS:    Current Outpatient Medications:   •  amLODIPine (NORVASC) 10 MG Tab, Take 1 Tab by mouth every day., Disp: 90 Tab, Rfl: 3  •  metoprolol (LOPRESSOR) 50 MG Tab, Take 1 Tab by mouth 2 Times a Day., Disp: 180 Tab, Rfl: 3  •  amiodarone (CORDARONE) 200 MG Tab, Take 200 mg by mouth., Disp: , Rfl:   •   atorvastatin (LIPITOR) 80 MG tablet, Take 80 mg by mouth., Disp: , Rfl:   •  aspirin (ASA) 81 MG Chew Tab chewable tablet, 81 mg by Nasogastric route., Disp: , Rfl:   •  acetaminophen (TYLENOL) 500 MG Tab, Take 500-1,000 mg by mouth every 6 hours as needed., Disp: , Rfl:   •  lisinopril (PRINIVIL) 40 MG tablet, Take 1 Tab by mouth every day., Disp: 100 Tab, Rfl: 3  •  rivaroxaban (XARELTO) 20 MG Tab tablet, Take 1 Tab by mouth with dinner., Disp: 90 Tab, Rfl: 1  •  flecainide (TAMBOCOR) 50 MG tablet, Take 1 Tab by mouth 2 times a day., Disp: 60 Tab, Rfl: 11  •  triamcinolone acetonide (KENALOG) 0.1 % Cream, Apply 1 Application to affected area(s) as needed. Apply as directed, Disp: , Rfl: 0  •  chlorpheniramine (CHLOR-TRIMETON) 4 MG Tab, Take 4 mg by mouth every day., Disp: , Rfl:   •  latanoprost (XALATAN) 0.005 % Solution, Place 1 Drop in both eyes every evening., Disp: , Rfl:   •  omeprazole (PRILOSEC) 20 MG delayed-release capsule, Take 20 mg by mouth every day., Disp: , Rfl:   •  ascorbic acid (ASCORBIC ACID) 500 MG Tab, Take 500 mg by mouth every day., Disp: , Rfl:   •  vitamin e (VITAMIN E) 400 UNIT Cap, Take 400 Units by mouth every day., Disp: , Rfl:   •  therapeutic multivitamin-minerals (THERAGRAN-M) Tab, Take 1 Tab by mouth every day., Disp: , Rfl:   •  Omega-3 Fatty Acids (FISH OIL) 1000 MG Cap capsule, Take 1,000 mg by mouth every day., Disp: , Rfl:   •  loratadine (CLARITIN) 10 MG Tab, Take 10 mg by mouth every day., Disp: , Rfl:     LABORATORY DATA:   Lab Results   Component Value Date/Time    SODIUM 137 09/15/2020 06:31 AM    POTASSIUM 4.5 09/15/2020 06:31 AM    CHLORIDE 100 09/15/2020 06:31 AM    CO2 23 09/15/2020 06:31 AM    GLUCOSE 156 (H) 09/15/2020 06:31 AM    BUN 14 09/15/2020 06:31 AM    CREATININE 0.78 09/15/2020 06:31 AM       Lab Results   Component Value Date/Time    WBC 11.8 (H) 09/17/2020 06:06 AM    RBC 3.87 (L) 09/17/2020 06:06 AM    HEMOGLOBIN 12.7 (L) 09/17/2020 06:06 AM     HEMATOCRIT 38.2 (L) 09/17/2020 06:06 AM    MCV 98.7 (H) 09/17/2020 06:06 AM    MCH 32.8 09/17/2020 06:06 AM    MCHC 33.2 (L) 09/17/2020 06:06 AM    PLATELETCT 269 09/17/2020 06:06 AM         RADIOLOGY DATA:  No results found.    IMPRESSION:  Cancer Staging  Primary cancer of alveolar ridge mucosa (HCC)  Staging form: Oral Cavity, AJCC 8th Edition  - Pathologic stage from 12/8/2020: Stage THOMAS (ypT4a, pN1, cM0) - Signed by Daiana HARVEY M.D. on 12/8/2020      PLAN:  No change in treatment plan    Disposition:  Treatment plan and imaging reviewed. Questions answered. Continue therapy outlined.     Daiana HARVEY M.D.    Orders Placed This Encounter   • Rad Onc [facility] Session Summary

## 2021-02-18 NOTE — PROGRESS NOTES
RADIATION ONCOLOGY FOLLOW-UP    DATE OF SERVICE:   2/18/2021    IDENTIFICATION:   A 80 y.o. male with  Primary cancer of alveolar ridge mucosa (HCC)  Staging form: Oral Cavity, AJCC 8th Edition  - Pathologic stage from 12/8/2020: Stage THOMAS (ypT4a, pN1, cM0) - Signed by Daiana HARVEY M.D. on 12/8/2020  Histopathologic type: Squamous cell carcinoma, NOS  Stage prefix: Post-therapy  Histologic grade (G): G1  Histologic grading system: 3 grade system  Residual tumor (R): R0 - None  Laterality: Right  Lymph-vascular invasion (LVI): LVI not present (absent)/not identified  Perineural invasion (PNI): Present  ECOG performance status: Grade 0  Karnofsky performance status: Score 90      RADIATION SUMMARY:  [facility] Treatment Information        Some values may be hidden. Unless noted otherwise, only the newest values recorded on each date are displayed.         [facility] Treatment Summary 2/4/21   Course First Treatment Date 12/20/2020    Course Last Treatment Date 02/04/2021    HN postop Plan from Course C1_Postop H&N   Fraction 23 of 23   Elapsed Course Days 46 @ [id number]   Prescribed Fraction Dose 200 cGy   Prescribed Total Dose 6,600 cGy   HN replan Plan from Course C1_Postop H&N   Fraction 10 of 10    Elapsed Course Days 46 @ [id number]    Prescribed Fraction Dose 200 cGy    Prescribed Total Dose 2,000 cGy    HN cp Reference Point from Course C1_Postop H&N   Elapsed Course Days 46 @ [id number]   Session Dose -   Total Dose 4,663 cGy   [id number] Reference Point from Course C1_Postop H&N   Elapsed Course Days 46 @ [id number]    Session Dose -    Total Dose 2,000 cGy    [id number] Reference Point from Course C1_Postop H&N   Elapsed Course Days 46 @ [id number]   Session Dose -   Total Dose 4,600 cGy   Replan cp Reference Point from Course C1_Postop H&N   Elapsed Course Days 46 @ [id number]    Session Dose -    Total Dose 1,909 cGy     Some values recorded on this date have been omitted.              HISTORY OF PRESENT  ILLNESS:   10-year-old male who presented in June 2020 with right jaw pain and loose teeth.  Presented to his dentist to noted an abnormality suspicious for malignancy.  Referred to oral surgery where biopsy was performed demonstrating squamous cell carcinoma.  He underwent PET/CT demonstrating an FDG avid lesion involving the right mandible with possible small level 1 lymph node.     Seen by Dr. Vieira and ENT consultation and underwent on 9/14/2020 right marginal mandibulectomy with right neck dissection.  0/42 nodes positive, primary measured 4.2 cm with a depth of invasion of 1.6 cm.  Multiple margins were positive.     Patient referred to [facility] was evaluated by Dr. Flores.  11/4/2020 underwent composite resection with segmental mandibulectomy, bilateral neck exploration, and left osteocutaneous composite fibula free flap reconstruction.  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  Depth of invasion at least 38 mm.  Medial margin 1 mm.  Perineural invasion was noted.  1/26 right level 1 lymph node involved.     He is now 5 weeks postop.  Overall fairly active.  Does report some mild swallowing difficulty with thick liquids and solids.  Mainly drinking shakes for nutrition.  He denies pain.  Reports some numbness involving the surgical site of the neck bilaterally.    Completed radiotherapy to 4/20/2021.    INTERVAL HISTORY:  Short interval follow-up with imaging to assess lymphedema, drainage and oral mucositis.  He has been getting approximately 1500 lionel and daily.  Feels oral mucositis and pain have improved.  Still continues to have drainage from the right submental and submandibular region.  No fevers or chills.    CURRENT MEDICATIONS:  Current Outpatient Medications   Medication Sig Dispense Refill   • amLODIPine (NORVASC) 10 MG Tab Take 1 Tab by mouth every day. 90 Tab 3   • metoprolol (LOPRESSOR) 50 MG Tab Take 1 Tab by mouth 2 Times a Day. 180  Tab 3   • amiodarone (CORDARONE) 200 MG Tab Take 200 mg by mouth.     • atorvastatin (LIPITOR) 80 MG tablet Take 80 mg by mouth.     • aspirin (ASA) 81 MG Chew Tab chewable tablet 81 mg by Nasogastric route.     • acetaminophen (TYLENOL) 500 MG Tab Take 500-1,000 mg by mouth every 6 hours as needed.     • lisinopril (PRINIVIL) 40 MG tablet Take 1 Tab by mouth every day. 100 Tab 3   • rivaroxaban (XARELTO) 20 MG Tab tablet Take 1 Tab by mouth with dinner. 90 Tab 1   • flecainide (TAMBOCOR) 50 MG tablet Take 1 Tab by mouth 2 times a day. 60 Tab 11   • triamcinolone acetonide (KENALOG) 0.1 % Cream Apply 1 Application to affected area(s) as needed. Apply as directed  0   • chlorpheniramine (CHLOR-TRIMETON) 4 MG Tab Take 4 mg by mouth every day.     • latanoprost (XALATAN) 0.005 % Solution Place 1 Drop in both eyes every evening.     • omeprazole (PRILOSEC) 20 MG delayed-release capsule Take 20 mg by mouth every day.     • ascorbic acid (ASCORBIC ACID) 500 MG Tab Take 500 mg by mouth every day.     • vitamin e (VITAMIN E) 400 UNIT Cap Take 400 Units by mouth every day.     • therapeutic multivitamin-minerals (THERAGRAN-M) Tab Take 1 Tab by mouth every day.     • Omega-3 Fatty Acids (FISH OIL) 1000 MG Cap capsule Take 1,000 mg by mouth every day.     • loratadine (CLARITIN) 10 MG Tab Take 10 mg by mouth every day.       No current facility-administered medications for this encounter.       ALLERGIES:  Patient has no known allergies.    REVIEW OF SYSTEMS:    A complete review of system taken. Pertinent items in HPI.     PHYSICAL EXAM:   PERFORMANCE STATUS:  Karnofsky Score 12/8/2020   Karnofsky Score 80   Some recent data might be hidden     No flowsheet data found.  /59   Pulse 65   Temp 36.2 °C (97.2 °F)   Wt 69.8 kg (153 lb 14.1 oz)   SpO2 99%   BMI 22.08 kg/m²   Physical Exam  HENT:      Mouth/Throat:      Comments: Patchy areas of mucositis involving the right lower lip and right tongue anteriorly.  Oral  musculocutaneous flap intact..  Neck:      Comments: Bilateral lymphedema  Lymphadenopathy:      Cervical: No cervical adenopathy.         LABORATORY DATA:   Lab Results   Component Value Date/Time    WBC 11.8 (H) 09/17/2020 06:06 AM    RBC 3.87 (L) 09/17/2020 06:06 AM    HEMOGLOBIN 12.7 (L) 09/17/2020 06:06 AM    HEMATOCRIT 38.2 (L) 09/17/2020 06:06 AM    MCV 98.7 (H) 09/17/2020 06:06 AM    MCH 32.8 09/17/2020 06:06 AM    MCHC 33.2 (L) 09/17/2020 06:06 AM    RDW 46.6 09/17/2020 06:06 AM    PLATELETCT 269 09/17/2020 06:06 AM    MPV 9.3 09/17/2020 06:06 AM    NEUTSPOLYS 61.20 06/11/2020 01:43 PM    LYMPHOCYTES 25.70 06/11/2020 01:43 PM    MONOCYTES 9.90 06/11/2020 01:43 PM    EOSINOPHILS 2.20 06/11/2020 01:43 PM    BASOPHILS 0.50 06/11/2020 01:43 PM      Lab Results   Component Value Date/Time    SODIUM 133 (L) 02/03/2021 03:36 PM    POTASSIUM 4.6 02/03/2021 03:36 PM    CHLORIDE 95 (L) 02/03/2021 03:36 PM    CO2 23 02/03/2021 03:36 PM    GLUCOSE 117 (H) 02/03/2021 03:36 PM    BUN 17 02/03/2021 03:36 PM    CREATININE 0.76 02/03/2021 03:36 PM         RADIOLOGY DATA:  CT-SOFT TISSUE NECK WITH    Result Date: 2/12/2021  1. Posttreatment changes involving the mandible, with a residual 1.2 cm osseous lesion involving the anterior aspect of the left mandible. Residual/recurrent tumor in this region is possible. 2. Two submandibular peripherally enhancing fluid collections, which likely communicate each other. They measure up to 3.7 cm and may be sterile or infected. 3. No enlarged cervical lymph nodes by size criteria.      IMPRESSION:    A 80 y.o. with  Primary cancer of alveolar ridge mucosa (HCC)  Staging form: Oral Cavity, AJCC 8th Edition  - Pathologic stage from 12/8/2020: Stage THOMAS (ypT4a, pN1, cM0) - Signed by Daiana HARVEY M.D. on 12/8/2020  Histopathologic type: Squamous cell carcinoma, NOS  Stage prefix: Post-therapy  Histologic grade (G): G1  Histologic grading system: 3 grade system  Residual tumor (R):  R0 - None  Laterality: Right  Lymph-vascular invasion (LVI): LVI not present (absent)/not identified  Perineural invasion (PNI): Present  ECOG performance status: Grade 0  Karnofsky performance status: Score 90      CANCER STATUS:  Therapy Completed, Status Pending Restaging Work-up    RECOMMENDATIONS:   Reviewed posttreatment CT.  The lucency involving the left anterior mandible most likely is postsurgical.  Patient is asymptomatic in this area.  This area was in the high-dose radiation field receiving 6600cGy.  We will follow for now.  Did recommend a PET CT scan which will obtain 12 weeks post completion of therapy.  In the interim he will follow-up with Dr. Flores next week and will return here for follow-up in 4 weeks.    Thank you for the opportunity to participate in his care.  If any questions or comments, please do not hesitate in calling.      No orders of the defined types were placed in this encounter.

## 2021-02-18 NOTE — NON-PROVIDER
Patient was seen today in clinic with Dr. Stewart for follow up.  Vitals signs and weight were obtained and pain assessment was completed.  Allergies and medications were reviewed with the patient.  Toxicities of treatment assessed.     Vitals/Pain:  Vitals:    02/18/21 1503   BP: 106/59   Pulse: 65   Temp: 36.2 °C (97.2 °F)   SpO2: 99%   Weight: 69.8 kg (153 lb 14.1 oz)   Pain Score: No pain        Allergies:   Patient has no known allergies.    Current Medications:  Current Outpatient Medications   Medication Sig Dispense Refill   • amLODIPine (NORVASC) 10 MG Tab Take 1 Tab by mouth every day. 90 Tab 3   • metoprolol (LOPRESSOR) 50 MG Tab Take 1 Tab by mouth 2 Times a Day. 180 Tab 3   • amiodarone (CORDARONE) 200 MG Tab Take 200 mg by mouth.     • atorvastatin (LIPITOR) 80 MG tablet Take 80 mg by mouth.     • aspirin (ASA) 81 MG Chew Tab chewable tablet 81 mg by Nasogastric route.     • acetaminophen (TYLENOL) 500 MG Tab Take 500-1,000 mg by mouth every 6 hours as needed.     • lisinopril (PRINIVIL) 40 MG tablet Take 1 Tab by mouth every day. 100 Tab 3   • rivaroxaban (XARELTO) 20 MG Tab tablet Take 1 Tab by mouth with dinner. 90 Tab 1   • flecainide (TAMBOCOR) 50 MG tablet Take 1 Tab by mouth 2 times a day. 60 Tab 11   • triamcinolone acetonide (KENALOG) 0.1 % Cream Apply 1 Application to affected area(s) as needed. Apply as directed  0   • chlorpheniramine (CHLOR-TRIMETON) 4 MG Tab Take 4 mg by mouth every day.     • latanoprost (XALATAN) 0.005 % Solution Place 1 Drop in both eyes every evening.     • omeprazole (PRILOSEC) 20 MG delayed-release capsule Take 20 mg by mouth every day.     • ascorbic acid (ASCORBIC ACID) 500 MG Tab Take 500 mg by mouth every day.     • vitamin e (VITAMIN E) 400 UNIT Cap Take 400 Units by mouth every day.     • therapeutic multivitamin-minerals (THERAGRAN-M) Tab Take 1 Tab by mouth every day.     • Omega-3 Fatty Acids (FISH OIL) 1000 MG Cap capsule Take 1,000 mg by mouth every day.      • loratadine (CLARITIN) 10 MG Tab Take 10 mg by mouth every day.       No current facility-administered medications for this encounter.         PCP:  Ilia Farooq, Med Ass't

## 2021-02-22 NOTE — OP THERAPY DISCHARGE SUMMARY
Outpatient Speech Language Pathology  DISCHARGE SUMMARY NOTE      [facility] Speech Language Pathology [address]  [address]  [place] [address]  Phone:  [phone]  Fax:  [phone]        Patient Name:  Damian Larsen  :  1940  MR#:  [id number]    HICN:       Visits:  1       Cancel/No-Show:     Diagnosis/ICD-10:     Referring Provider: Daiana HARVEY M.D.    SOC Date: 2020   Onset Date: 2020      Your patient is being discharged from Speech therapy with the following comments:  Pt. has failed to schedule or reschedule follow up visits      Comments: Mr. Damian Larsen, a 80 year old male, participated in an outpatient speech therapy evaluation of swallow in the setting of squamous cell carcinoma of the oral cavity.     Patient participated in an outpatient speech therapy evaluation only and did not return for further visits of outpatient speech therapy addressing swallow. As a result, patient will be discharged from outpatient speech therapy at this time.       Limitations/Remaining:unknown. Patient was seen for [facility] 2020. Please refer to results of formal assessment of swallow.         Recommendations:D/C outpatient speech therapy as patient failed to schedule additional follow up visits of outpatient speech therapy.         Katerin Falk, SLP

## 2021-03-18 PROBLEM — I89.0 LYMPHEDEMA OF FACE: Status: ACTIVE | Noted: 2021-01-01

## 2021-03-18 NOTE — PROGRESS NOTES
RADIATION ONCOLOGY FOLLOW-UP    DATE OF SERVICE:   3/18/2021    IDENTIFICATION:   A 80 y.o. male with  Primary cancer of alveolar ridge mucosa (HCC)  Staging form: Oral Cavity, AJCC 8th Edition  - Pathologic stage from 12/8/2020: Stage THOMAS (ypT4a, pN1, cM0) - Signed by Daiana HARVEY M.D. on 12/8/2020  Histopathologic type: Squamous cell carcinoma, NOS  Stage prefix: Post-therapy  Histologic grade (G): G1  Histologic grading system: 3 grade system  Residual tumor (R): R0 - None  Laterality: Right  Lymph-vascular invasion (LVI): LVI not present (absent)/not identified  Perineural invasion (PNI): Present  ECOG performance status: Grade 0  Karnofsky performance status: Score 90      RADIATION SUMMARY:  [facility] Treatment Information        Some values may be hidden. Unless noted otherwise, only the newest values recorded on each date are displayed.         [facility] Treatment Summary 2/4/21   Course First Treatment Date 12/20/2020    Course Last Treatment Date 02/04/2021    HN postop Plan from Course C1_Postop H&N   Fraction 23 of 23   Elapsed Course Days 46 @ [id number]   Prescribed Fraction Dose 200 cGy   Prescribed Total Dose 6,600 cGy   HN replan Plan from Course C1_Postop H&N   Fraction 10 of 10    Elapsed Course Days 46 @ [id number]    Prescribed Fraction Dose 200 cGy    Prescribed Total Dose 2,000 cGy    HN cp Reference Point from Course C1_Postop H&N   Elapsed Course Days 46 @ [id number]   Session Dose -   Total Dose 4,663 cGy   [id number] Reference Point from Course C1_Postop H&N   Elapsed Course Days 46 @ [id number]    Session Dose -    Total Dose 2,000 cGy    [id number] Reference Point from Course C1_Postop H&N   Elapsed Course Days 46 @ [id number]   Session Dose -   Total Dose 4,600 cGy   Replan cp Reference Point from Course C1_Postop H&N   Elapsed Course Days 46 @ [id number]    Session Dose -    Total Dose 1,909 cGy     Some values recorded on this date have been omitted.              HISTORY OF PRESENT  ILLNESS:   80-year-old male who presented in June 2020 with right jaw pain and loose teeth.  Presented to his dentist to noted an abnormality suspicious for malignancy.  Referred to oral surgery where biopsy was performed demonstrating squamous cell carcinoma.  He underwent PET/CT demonstrating an FDG avid lesion involving the right mandible with possible small level 1 lymph node.     Seen by Dr. Vieira and ENT consultation and underwent on 9/14/2020 right marginal mandibulectomy with right neck dissection.  0/42 nodes positive, primary measured 4.2 cm with a depth of invasion of 1.6 cm.  Multiple margins were positive.     Patient referred to [facility] was evaluated by Dr. Flores.  11/4/2020 underwent composite resection with segmental mandibulectomy, bilateral neck exploration, and left osteocutaneous composite fibula free flap reconstruction.  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  Depth of invasion at least 38 mm.  Medial margin 1 mm.  Perineural invasion was noted.  1/26 right level 1 lymph node involved.     He is now 5 weeks postop.  Overall fairly active.  Does report some mild swallowing difficulty with thick liquids and solids.  Mainly drinking shakes for nutrition.  He denies pain.  Reports some numbness involving the surgical site of the neck bilaterally.     Completed radiotherapy to 4/20/2021.     2/18/21 Short interval follow-up with imaging to assess lymphedema, drainage and oral mucositis.  He has been getting approximately 1500 lionel and daily.  Feels oral mucositis and pain have improved.  Still continues to have drainage from the right submental and submandibular region.  No fevers or chills.       INTERVAL HISTORY:  Approximately 6 weeks status post completion of therapy.  Evaluated by Dr. Floyd on 2 weeks.  Started on course of Augmentin however 1 month duration.  He is experiencing significantly less drainage.  Lymphedema has  improved.  Weight slowly increasing.     His only complaint is pain following the lower left gingiva adjacent to the second tooth.      CURRENT MEDICATIONS:  Current Outpatient Medications   Medication Sig Dispense Refill   • amoxicillin-clavulanate (AUGMENTIN) 875-125 MG Tab Take 875 mg by mouth.     • amLODIPine (NORVASC) 10 MG Tab Take 1 Tab by mouth every day. 90 Tab 3   • metoprolol (LOPRESSOR) 50 MG Tab Take 1 Tab by mouth 2 Times a Day. 180 Tab 3   • amiodarone (CORDARONE) 200 MG Tab Take 200 mg by mouth.     • atorvastatin (LIPITOR) 80 MG tablet Take 80 mg by mouth.     • aspirin (ASA) 81 MG Chew Tab chewable tablet 81 mg by Nasogastric route.     • acetaminophen (TYLENOL) 500 MG Tab Take 500-1,000 mg by mouth every 6 hours as needed.     • lisinopril (PRINIVIL) 40 MG tablet Take 1 Tab by mouth every day. 100 Tab 3   • rivaroxaban (XARELTO) 20 MG Tab tablet Take 1 Tab by mouth with dinner. 90 Tab 1   • flecainide (TAMBOCOR) 50 MG tablet Take 1 Tab by mouth 2 times a day. 60 Tab 11   • triamcinolone acetonide (KENALOG) 0.1 % Cream Apply 1 Application to affected area(s) as needed. Apply as directed  0   • chlorpheniramine (CHLOR-TRIMETON) 4 MG Tab Take 4 mg by mouth every day.     • latanoprost (XALATAN) 0.005 % Solution Place 1 Drop in both eyes every evening.     • omeprazole (PRILOSEC) 20 MG delayed-release capsule Take 20 mg by mouth every day.     • ascorbic acid (ASCORBIC ACID) 500 MG Tab Take 500 mg by mouth every day.     • vitamin e (VITAMIN E) 400 UNIT Cap Take 400 Units by mouth every day.     • therapeutic multivitamin-minerals (THERAGRAN-M) Tab Take 1 Tab by mouth every day.     • Omega-3 Fatty Acids (FISH OIL) 1000 MG Cap capsule Take 1,000 mg by mouth every day.     • loratadine (CLARITIN) 10 MG Tab Take 10 mg by mouth every day.       No current facility-administered medications for this encounter.       ALLERGIES:  Patient has no known allergies.    REVIEW OF SYSTEMS:    A complete review of  system taken. Pertinent items in HPI.     PHYSICAL EXAM:   PERFORMANCE STATUS:  Karnofsky Score 12/8/2020   Karnofsky Score 80   Some recent data might be hidden     No flowsheet data found.  /56   Pulse 63   Temp 36.5 °C (97.7 °F)   Wt 70.7 kg (155 lb 13.8 oz)   SpO2 95%   BMI 22.36 kg/m²   Physical Exam  HENT:      Mouth/Throat:      Mouth: Mucous membranes are moist.      Pharynx: No oropharyngeal exudate or posterior oropharyngeal erythema.      Comments: Mandibular reconstruction intact.  Flap intact.  Some tenderness to palpation L gingiva - buccal aspect, second tooth. No visible abnormality.  Neck:      Comments: Bilateral lymphedema  Skin:     General: Skin is warm and dry.           LABORATORY DATA:   Lab Results   Component Value Date/Time    WBC 11.8 (H) 09/17/2020 06:06 AM    RBC 3.87 (L) 09/17/2020 06:06 AM    HEMOGLOBIN 12.7 (L) 09/17/2020 06:06 AM    HEMATOCRIT 38.2 (L) 09/17/2020 06:06 AM    MCV 98.7 (H) 09/17/2020 06:06 AM    MCH 32.8 09/17/2020 06:06 AM    MCHC 33.2 (L) 09/17/2020 06:06 AM    RDW 46.6 09/17/2020 06:06 AM    PLATELETCT 269 09/17/2020 06:06 AM    MPV 9.3 09/17/2020 06:06 AM    NEUTSPOLYS 61.20 06/11/2020 01:43 PM    LYMPHOCYTES 25.70 06/11/2020 01:43 PM    MONOCYTES 9.90 06/11/2020 01:43 PM    EOSINOPHILS 2.20 06/11/2020 01:43 PM    BASOPHILS 0.50 06/11/2020 01:43 PM      Lab Results   Component Value Date/Time    SODIUM 133 (L) 02/03/2021 03:36 PM    POTASSIUM 4.6 02/03/2021 03:36 PM    CHLORIDE 95 (L) 02/03/2021 03:36 PM    CO2 23 02/03/2021 03:36 PM    GLUCOSE 117 (H) 02/03/2021 03:36 PM    BUN 17 02/03/2021 03:36 PM    CREATININE 0.76 02/03/2021 03:36 PM         RADIOLOGY DATA:  CT-SOFT TISSUE NECK WITH    Result Date: 2/12/2021  1. Posttreatment changes involving the mandible, with a residual 1.2 cm osseous lesion involving the anterior aspect of the left mandible. Residual/recurrent tumor in this region is possible. 2. Two submandibular peripherally enhancing fluid  collections, which likely communicate each other. They measure up to 3.7 cm and may be sterile or infected. 3. No enlarged cervical lymph nodes by size criteria.      IMPRESSION:    A 80 y.o. with  Primary cancer of alveolar ridge mucosa (HCC)  Staging form: Oral Cavity, AJCC 8th Edition  - Pathologic stage from 12/8/2020: Stage THOMAS (ypT4a, pN1, cM0) - Signed by Daiana HARVEY M.D. on 12/8/2020  Histopathologic type: Squamous cell carcinoma, NOS  Stage prefix: Post-therapy  Histologic grade (G): G1  Histologic grading system: 3 grade system  Residual tumor (R): R0 - None  Laterality: Right  Lymph-vascular invasion (LVI): LVI not present (absent)/not identified  Perineural invasion (PNI): Present  ECOG performance status: Grade 0  Karnofsky performance status: Score 90      CANCER STATUS:  No Evidence of Disease    RECOMMENDATIONS:   PET/CT 5/3/21  Follow up 5/4/21      Thank you for the opportunity to participate in his care.  If any questions or comments, please do not hesitate in calling.      Orders Placed This Encounter   • amoxicillin-clavulanate (AUGMENTIN) 875-125 MG Tab

## 2021-03-18 NOTE — NON-PROVIDER
Patient was seen today in clinic with Dr. Stewart for follow up.  Vitals signs and weight were obtained and pain assessment was completed.  Allergies and medications were reviewed with the patient.  Toxicities of treatment assessed.     Vitals/Pain:  Vitals:    03/18/21 1009   BP: 102/56   Pulse: 63   Temp: 36.5 °C (97.7 °F)   SpO2: 95%   Weight: 70.7 kg (155 lb 13.8 oz)   Pain Score: 1=Minimal Pain        Allergies:   Patient has no known allergies.    Current Medications:  Current Outpatient Medications   Medication Sig Dispense Refill   • amoxicillin-clavulanate (AUGMENTIN) 875-125 MG Tab Take 875 mg by mouth.     • amLODIPine (NORVASC) 10 MG Tab Take 1 Tab by mouth every day. 90 Tab 3   • metoprolol (LOPRESSOR) 50 MG Tab Take 1 Tab by mouth 2 Times a Day. 180 Tab 3   • amiodarone (CORDARONE) 200 MG Tab Take 200 mg by mouth.     • atorvastatin (LIPITOR) 80 MG tablet Take 80 mg by mouth.     • aspirin (ASA) 81 MG Chew Tab chewable tablet 81 mg by Nasogastric route.     • acetaminophen (TYLENOL) 500 MG Tab Take 500-1,000 mg by mouth every 6 hours as needed.     • lisinopril (PRINIVIL) 40 MG tablet Take 1 Tab by mouth every day. 100 Tab 3   • rivaroxaban (XARELTO) 20 MG Tab tablet Take 1 Tab by mouth with dinner. 90 Tab 1   • flecainide (TAMBOCOR) 50 MG tablet Take 1 Tab by mouth 2 times a day. 60 Tab 11   • triamcinolone acetonide (KENALOG) 0.1 % Cream Apply 1 Application to affected area(s) as needed. Apply as directed  0   • chlorpheniramine (CHLOR-TRIMETON) 4 MG Tab Take 4 mg by mouth every day.     • latanoprost (XALATAN) 0.005 % Solution Place 1 Drop in both eyes every evening.     • omeprazole (PRILOSEC) 20 MG delayed-release capsule Take 20 mg by mouth every day.     • ascorbic acid (ASCORBIC ACID) 500 MG Tab Take 500 mg by mouth every day.     • vitamin e (VITAMIN E) 400 UNIT Cap Take 400 Units by mouth every day.     • therapeutic multivitamin-minerals (THERAGRAN-M) Tab Take 1 Tab by mouth every day.     •  Omega-3 Fatty Acids (FISH OIL) 1000 MG Cap capsule Take 1,000 mg by mouth every day.     • loratadine (CLARITIN) 10 MG Tab Take 10 mg by mouth every day.       No current facility-administered medications for this encounter.         PCP:  Ilia Farooq, [facility]

## 2021-05-04 PROBLEM — R91.1 LUNG NODULE, SOLITARY: Status: ACTIVE | Noted: 2021-01-01

## 2021-05-04 NOTE — PROGRESS NOTES
RADIATION ONCOLOGY FOLLOW-UP    DATE OF SERVICE:   5/4/2021    IDENTIFICATION:   A 80 y.o. male with  Primary cancer of alveolar ridge mucosa (HCC)  Staging form: Oral Cavity, AJCC 8th Edition  - Pathologic stage from 12/8/2020: Stage THOMAS (ypT4a, pN1, cM0) - Signed by Daiana HARVEY M.D. on 12/8/2020  Histopathologic type: Squamous cell carcinoma, NOS  Stage prefix: Post-therapy  Histologic grade (G): G1  Histologic grading system: 3 grade system  Residual tumor (R): R0 - None  Laterality: Right  Lymph-vascular invasion (LVI): LVI not present (absent)/not identified  Perineural invasion (PNI): Present  ECOG performance status: Grade 0  Karnofsky performance status: Score 90      RADIATION SUMMARY:  [facility] Treatment Information        Some values may be hidden. Unless noted otherwise, only the newest values recorded on each date are displayed.         [facility] Treatment Summary 2/4/21   Course First Treatment Date 12/20/2020    Course Last Treatment Date 02/04/2021    HN postop Plan from Course C1_Postop H&N   Fraction 23 of 23   Elapsed Course Days 46 @ [id number]   Prescribed Fraction Dose 200 cGy   Prescribed Total Dose 6,600 cGy   HN replan Plan from Course C1_Postop H&N   Fraction 10 of 10    Elapsed Course Days 46 @ [id number]    Prescribed Fraction Dose 200 cGy    Prescribed Total Dose 2,000 cGy    HN cp Reference Point from Course C1_Postop H&N   Elapsed Course Days 46 @ [id number]   Session Dose -   Total Dose 4,663 cGy   [id number] Reference Point from Course C1_Postop H&N   Elapsed Course Days 46 @ [id number]    Session Dose -    Total Dose 2,000 cGy    [id number] Reference Point from Course C1_Postop H&N   Elapsed Course Days 46 @ [id number]   Session Dose -   Total Dose 4,600 cGy   Replan cp Reference Point from Course C1_Postop H&N   Elapsed Course Days 46 @ [id number]    Session Dose -    Total Dose 1,909 cGy     Some values recorded on this date have been omitted.              HISTORY OF PRESENT  ILLNESS:   80-year-old male who presented in June 2020 with right jaw pain and loose teeth.  Presented to his dentist to noted an abnormality suspicious for malignancy.  Referred to oral surgery where biopsy was performed demonstrating squamous cell carcinoma.  He underwent PET/CT demonstrating an FDG avid lesion involving the right mandible with possible small level 1 lymph node.     Seen by Dr. Vieira and ENT consultation and underwent on 9/14/2020 right marginal mandibulectomy with right neck dissection.  0/42 nodes positive, primary measured 4.2 cm with a depth of invasion of 1.6 cm.  Multiple margins were positive.     Patient referred to [facility] was evaluated by Dr. Flores.  11/4/2020 underwent composite resection with segmental mandibulectomy, bilateral neck exploration, and left osteocutaneous composite fibula free flap reconstruction.  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  Depth of invasion at least 38 mm.  Medial margin 1 mm.  Perineural invasion was noted.  1/26 right level 1 lymph node involved.     He is now 5 weeks postop.  Overall fairly active.  Does report some mild swallowing difficulty with thick liquids and solids.  Mainly drinking shakes for nutrition.  He denies pain.  Reports some numbness involving the surgical site of the neck bilaterally.     Completed radiotherapy to 4/20/2021.     2/18/21 Short interval follow-up with imaging to assess lymphedema, drainage and oral mucositis.  He has been getting approximately 1500 lionel and daily.  Feels oral mucositis and pain have improved.  Still continues to have drainage from the right submental and submandibular region.  No fevers or chills.     3/18/21 Approximately 6 weeks status post completion of therapy.  Evaluated by Dr. Floyd on 2 weeks.  Started on course of Augmentin however 1 month duration.  He is experiencing significantly less drainage. Lymphedema has improved.  Weight  slowly increasing.      His only complaint is pain following the lower left gingiva adjacent to the second tooth.    INTERVAL HISTORY:  Returns today for follow-up after undergoing PET CT scan.  He complains of soreness in the left side of his jaw.  He is also worried that he has an infection in his jaw.  He has completed a course of Augmentin.  His appetite is decreased and he has lost weight.    CURRENT MEDICATIONS:  Current Outpatient Medications   Medication Sig Dispense Refill   • amLODIPine (NORVASC) 10 MG Tab Take 1 Tab by mouth every day. 90 Tab 3   • metoprolol (LOPRESSOR) 50 MG Tab Take 1 Tab by mouth 2 Times a Day. 180 Tab 3   • amiodarone (CORDARONE) 200 MG Tab Take 200 mg by mouth.     • atorvastatin (LIPITOR) 80 MG tablet Take 80 mg by mouth.     • acetaminophen (TYLENOL) 500 MG Tab Take 500-1,000 mg by mouth every 6 hours as needed.     • lisinopril (PRINIVIL) 40 MG tablet Take 1 Tab by mouth every day. 100 Tab 3   • rivaroxaban (XARELTO) 20 MG Tab tablet Take 1 Tab by mouth with dinner. 90 Tab 1   • flecainide (TAMBOCOR) 50 MG tablet Take 1 Tab by mouth 2 times a day. 60 Tab 11   • triamcinolone acetonide (KENALOG) 0.1 % Cream Apply 1 Application to affected area(s) as needed. Apply as directed  0   • chlorpheniramine (CHLOR-TRIMETON) 4 MG Tab Take 4 mg by mouth every day.     • latanoprost (XALATAN) 0.005 % Solution Place 1 Drop in both eyes every evening.     • omeprazole (PRILOSEC) 20 MG delayed-release capsule Take 20 mg by mouth every day.     • loratadine (CLARITIN) 10 MG Tab Take 10 mg by mouth every day.     • aspirin (ASA) 81 MG Chew Tab chewable tablet 81 mg by Nasogastric route.     • ascorbic acid (ASCORBIC ACID) 500 MG Tab Take 500 mg by mouth every day.     • vitamin e (VITAMIN E) 400 UNIT Cap Take 400 Units by mouth every day.     • therapeutic multivitamin-minerals (THERAGRAN-M) Tab Take 1 Tab by mouth every day.     • Omega-3 Fatty Acids (FISH OIL) 1000 MG Cap capsule Take 1,000 mg  by mouth every day.       No current facility-administered medications for this encounter.       ALLERGIES:  Patient has no known allergies.    REVIEW OF SYSTEMS:    A complete review of system taken. Pertinent items in HPI.     PHYSICAL EXAM:   PERFORMANCE STATUS:  Karnofsky Score 12/8/2020   Karnofsky Score 80   Some recent data might be hidden     No flowsheet data found.  /63   Pulse 64   Temp 36.3 °C (97.3 °F)   Wt 65.1 kg (143 lb 8.3 oz)   SpO2 97%   BMI 20.59 kg/m²   Physical Exam  Constitutional:       General: He is not in acute distress.  HENT:      Head:      Comments: Lymphedema involving the jawline and anterior neck.  There is erythema.  No significant tenderness to palpation.  No drainage.     Mouth/Throat:      Mouth: Mucous membranes are moist.      Comments: Lower left jaw anteriorly shows dark necrotic appearing tissue.  The tooth in this area is loose.  Mild tenderness to palpation anteriorly.  Flap appears intact.  Lymphadenopathy:      Cervical: No cervical adenopathy.   Neurological:      Mental Status: He is alert.         FIBEROPTIC EXAM:  Not performed    LABORATORY DATA:   Lab Results   Component Value Date/Time    WBC 11.8 (H) 09/17/2020 06:06 AM    RBC 3.87 (L) 09/17/2020 06:06 AM    HEMOGLOBIN 12.7 (L) 09/17/2020 06:06 AM    HEMATOCRIT 38.2 (L) 09/17/2020 06:06 AM    MCV 98.7 (H) 09/17/2020 06:06 AM    MCH 32.8 09/17/2020 06:06 AM    MCHC 33.2 (L) 09/17/2020 06:06 AM    RDW 46.6 09/17/2020 06:06 AM    PLATELETCT 269 09/17/2020 06:06 AM    MPV 9.3 09/17/2020 06:06 AM    NEUTSPOLYS 61.20 06/11/2020 01:43 PM    LYMPHOCYTES 25.70 06/11/2020 01:43 PM    MONOCYTES 9.90 06/11/2020 01:43 PM    EOSINOPHILS 2.20 06/11/2020 01:43 PM    BASOPHILS 0.50 06/11/2020 01:43 PM      Lab Results   Component Value Date/Time    SODIUM 133 (L) 02/03/2021 03:36 PM    POTASSIUM 4.6 02/03/2021 03:36 PM    CHLORIDE 95 (L) 02/03/2021 03:36 PM    CO2 23 02/03/2021 03:36 PM    GLUCOSE 117 (H) 02/03/2021  03:36 PM    BUN 17 02/03/2021 03:36 PM    CREATININE 0.76 02/03/2021 03:36 PM         RADIOLOGY DATA:  [id number] BASE TO MID-THIGH    Result Date: 5/3/2021  1.  Focal FDG activity in the posterior mandibular body is concerning for recurrence. Activity is located more posteriorly than the original metabolic activity in the right mandibular body. No definite CT correlate is appreciated. 2.  Increased FDG avid soft tissue surrounding the left mandibular body with central photopenia. Findings may represent malignancy or infection. Lytic lesion at the resection margin appears similar to the prior CT. 3.  Nonspecific focal activity to the right of the hyoid bone without definite CT correlate, possibly representing a small poorly visualized lymph node. 4.  Mildly FDG avid right upper lobe nodule is concerning for metastasis. Metachronous primary pulmonary malignancy could also have this appearance      IMPRESSION:    A 80 y.o. with  Primary cancer of alveolar ridge mucosa (HCC)  Staging form: Oral Cavity, AJCC 8th Edition  - Pathologic stage from 12/8/2020: Stage THOMAS (ypT4a, pN1, cM0) - Signed by Daiana HARVEY M.D. on 12/8/2020  Histopathologic type: Squamous cell carcinoma, NOS  Stage prefix: Post-therapy  Histologic grade (G): G1  Histologic grading system: 3 grade system  Residual tumor (R): R0 - None  Laterality: Right  Lymph-vascular invasion (LVI): LVI not present (absent)/not identified  Perineural invasion (PNI): Present  ECOG performance status: Grade 0  Karnofsky performance status: Score 90      CANCER STATUS:  Distant Recurrence Suspected    RECOMMENDATIONS:   Reviewed PET/CT scan with patient.  I do think he has what appears to be an infection involving the left jaw region.  He has been on several courses of antibiotics using Augmentin.  He may need debridement and IV antibiotics to help resolve the infection in the jaw area.    More troubling is interval development of a metabolically active nodule  involving the right upper lobe.  This is suggestive of metastatic disease.  Recommended CT directed biopsy.    He is scheduled see Dr. Flores this Thursday.  I have asked him to return for follow-up after biopsy to review results and make further plans.    Thank you for the opportunity to participate in his care.  If any questions or comments, please do not hesitate in calling.      Orders Placed This Encounter   • CT-BIOPSY-LUNG/MEDIASTINUM W/GUIDE RIGHT

## 2021-05-04 NOTE — NON-PROVIDER
Patient was seen today in clinic with Dr. Stewart for follow up.  Vitals signs and weight were obtained and pain assessment was completed.  Allergies and medications were reviewed with the patient.  Toxicities of treatment assessed.     Vitals/Pain:  Vitals:    05/04/21 0916   BP: 116/63   Pulse: 64   Temp: 36.3 °C (97.3 °F)   SpO2: 97%   Weight: 65.1 kg (143 lb 8.3 oz)   Pain Score: 4=Slight-Moderate Pain        Allergies:   Patient has no known allergies.    Current Medications:  Current Outpatient Medications   Medication Sig Dispense Refill   • amLODIPine (NORVASC) 10 MG Tab Take 1 Tab by mouth every day. 90 Tab 3   • metoprolol (LOPRESSOR) 50 MG Tab Take 1 Tab by mouth 2 Times a Day. 180 Tab 3   • amiodarone (CORDARONE) 200 MG Tab Take 200 mg by mouth.     • atorvastatin (LIPITOR) 80 MG tablet Take 80 mg by mouth.     • acetaminophen (TYLENOL) 500 MG Tab Take 500-1,000 mg by mouth every 6 hours as needed.     • lisinopril (PRINIVIL) 40 MG tablet Take 1 Tab by mouth every day. 100 Tab 3   • rivaroxaban (XARELTO) 20 MG Tab tablet Take 1 Tab by mouth with dinner. 90 Tab 1   • flecainide (TAMBOCOR) 50 MG tablet Take 1 Tab by mouth 2 times a day. 60 Tab 11   • triamcinolone acetonide (KENALOG) 0.1 % Cream Apply 1 Application to affected area(s) as needed. Apply as directed  0   • chlorpheniramine (CHLOR-TRIMETON) 4 MG Tab Take 4 mg by mouth every day.     • latanoprost (XALATAN) 0.005 % Solution Place 1 Drop in both eyes every evening.     • omeprazole (PRILOSEC) 20 MG delayed-release capsule Take 20 mg by mouth every day.     • loratadine (CLARITIN) 10 MG Tab Take 10 mg by mouth every day.     • aspirin (ASA) 81 MG Chew Tab chewable tablet 81 mg by Nasogastric route.     • ascorbic acid (ASCORBIC ACID) 500 MG Tab Take 500 mg by mouth every day.     • vitamin e (VITAMIN E) 400 UNIT Cap Take 400 Units by mouth every day.     • therapeutic multivitamin-minerals (THERAGRAN-M) Tab Take 1 Tab by mouth every day.     •  Omega-3 Fatty Acids (FISH OIL) 1000 MG Cap capsule Take 1,000 mg by mouth every day.       No current facility-administered medications for this encounter.         PCP:  Ilia Farooq, [facility]

## 2021-05-11 NOTE — DISCHARGE INSTRUCTIONS
ACTIVITY: Rest and take it easy for the first 24 hours.  A responsible adult is recommended to remain with you during that time.  It is normal to feel sleepy.  We encourage you to not do anything that requires balance, judgment or coordination.    MILD FLU-LIKE SYMPTOMS ARE NORMAL. YOU MAY EXPERIENCE GENERALIZED MUSCLE ACHES, THROAT IRRITATION, HEADACHE AND/OR SOME NAUSEA.    FOR 24 HOURS DO NOT:  Drive, operate machinery or run household appliances.  Drink beer or alcoholic beverages.   Make important decisions or sign legal documents.    SPECIAL INSTRUCTIONS:     Keep the dressing clean and dry for 24 hours.  May remove dressing tomorrow  and can shower.  Do not submerge site under water for 1 week.      DIET: To avoid nausea, slowly advance diet as tolerated, avoiding spicy or greasy foods for the first day.  Add more substantial food to your diet according to your physician's instructions.  Babies can be fed formula or breast milk as soon as they are hungry.  INCREASE FLUIDS AND FIBER TO AVOID CONSTIPATION.      FOLLOW-UP APPOINTMENT:  A follow-up appointment should be arranged with your doctor ; call to schedule.    You should CALL YOUR PHYSICIAN if you develop:  Fever greater than 101 degrees F.  Pain not relieved by medication, or persistent nausea or vomiting.  Excessive bleeding (blood soaking through dressing) or unexpected drainage from the wound.  Extreme redness or swelling around the incision site, drainage of pus or foul smelling drainage.  Inability to urinate or empty your bladder within 8 hours.  Problems with breathing or chest pain.    You should call 911 if you develop problems with breathing or chest pain.  If you are unable to contact your doctor or surgical center, you should go to the nearest emergency room or urgent care center.      Physician's telephone #: [phone]    If any questions arise, call your doctor.  If your doctor is not available, please feel free to call the Surgical Center  at [phone]. The Contact Center is open Monday through Friday 7AM to 5PM and may speak to a nurse at [phone], or toll free at [phone].     A registered nurse may call you a few days after your surgery to see how you are doing after your procedure.    MEDICATIONS: Resume taking daily medication.  Take prescribed pain medication with food.  If no medication is prescribed, you may take non-aspirin pain medication if needed.  PAIN MEDICATION CAN BE VERY CONSTIPATING.  Take a stool softener or laxative such as senokot, pericolace, or milk of magnesia if needed.      If your physician has prescribed pain medication that includes Acetaminophen (Tylenol), do not take additional Acetaminophen (Tylenol) while taking the prescribed medication.    Depression / Suicide Risk    As you are discharged from this [facility] facility, it is important to learn how to keep safe from harming yourself.    Recognize the warning signs:  · Abrupt changes in personality, positive or negative- including increase in energy   · Giving away possessions  · Change in eating patterns- significant weight changes-  positive or negative  · Change in sleeping patterns- unable to sleep or sleeping all the time   · Unwillingness or inability to communicate  · Depression  · Unusual sadness, discouragement and loneliness  · Talk of wanting to die  · Neglect of personal appearance   · Rebelliousness- reckless behavior  · Withdrawal from people/activities they love  · Confusion- inability to concentrate     If you or a loved one observes any of these behaviors or has concerns about self-harm, here's what you can do:  · Talk about it- your feelings and reasons for harming yourself  · Remove any means that you might use to hurt yourself (examples: pills, rope, extension cords, firearm)  · Get professional help from the community (Mental Health, Substance Abuse, psychological counseling)  · Do not be alone:Call your Safe Contact- someone  whom you trust who will be there for you.  · Call your local CRISIS HOTLINE 187-5453 or 836-081-0901  · Call your local Children's Mobile Crisis Response Team [place] (329) 412-5985 or www.[employer]  · Call the toll free National Suicide Prevention Hotlines   · National Suicide Prevention Lifeline 000-019-JGPZ (3323)  · [employer] [place] Line Network 800-SUICIDE (944-4393)      Lung Biopsy    A lung biopsy is a procedure to remove a tissue sample from the lung. The tissue can be examined under a microscope to help diagnose various lung disorders.  There are three types of lung biopsies:  · Needle biopsy. In this type, the sample is removed with a needle.  · Bronchoscopy. In this type, the sample is removed through a flexible tube inserted into the lungs (bronchoscope).  · Open biopsy. In this type, the sample is removed through an incision made in the chest.  Tell a health care provider about:  · Any allergies you have.  · All medicines you are taking, including vitamins, herbs, eye drops, creams, and over-the-counter medicines.  · Any problems you or family members have had with anesthetic medicines.  · Any blood disorders or bleeding problems that you have.  · Any surgeries you have had.  · Any medical conditions you have.  · Whether you are pregnant or may be pregnant.  What are the risks?  Generally, this is a safe procedure. However, problems may occur, including:  · Collapsed lung.  · Bleeding.  · Infection.  · Pain.  What happens before the procedure?  Staying hydrated  Follow instructions from your health care provider about hydration, which may include:  · Up to 2 hours before the procedure - you may continue to drink clear liquids, such as water, clear fruit juice, black coffee, and plain tea.  Eating and drinking restrictions  Follow instructions from your health care provider about eating and drinking, which may include:  · 8 hours before the procedure - stop eating heavy meals or foods such as  meat, fried foods, or fatty foods.  · 6 hours before the procedure - stop eating light meals or foods, such as toast or cereal.  · 6 hours before the procedure - stop drinking milk or drinks that contain milk.  · 2 hours before the procedure - stop drinking clear liquids.  General instructions  · Ask your health care provider about:  ? Changing or stopping your regular medicines. This is especially important if you take diabetes medicines or blood thinners.  ? Taking medicines such as aspirin and ibuprofen. These medicines can thin your blood. Do not take these medicines before your procedure if your health care provider instructs you not to.  · Plan to have someone take you home from the hospital or clinic.  What happens during the procedure?  · To lower your risk of infection:  ? Your health care team will wash or sanitize their hands.  ? If you are having an open biopsy, your skin will be washed with soap.  · An IV tube may be inserted into one of your veins.  · You may be given one or both of the following:  ? A medicine to help you relax (sedative) during the procedure.  ? A medicine to numb the area where the biopsy sample will be taken (local anesthetic).  ? A medicine to make you sleep through the procedure (general anesthetic).  · If you have a needle biopsy:  ? A biopsy needle will be inserted into your lung. A CT scanner may be used to guide the needle to the right place.  ? The needle will be used to collect the tissue sample.  · If you have bronchoscopy:  ? A bronchoscope will be inserted into your lungs through your mouth or nose.  ? A needle or forceps will be passed through the bronchoscope to remove the tissue sample.  · If you have an open biopsy:  ? An incision will be made in your chest.  ? The tissue sample will be removed using surgical tools.  ? The incision will be closed with skin glue, skin adhesive strips, or stitches (sutures).  The procedure may vary among health care providers and  [facility].  What happens after the procedure?  · Your blood pressure, heart rate, breathing rate, and blood oxygen level will be monitored until the medicines you were given have worn off.  · If a needle biopsy was performed, a bandage (dressing) will be applied over the area where the needle was inserted. You may be asked to apply pressure to the bandage for several minutes to ensure there is minimal bleeding.  · If a bronchoscope was used, you may have a cough and some soreness in your throat.  · Do not drive for 24 hours if you were given a sedative.  Summary  · A lung biopsy is a procedure to remove a tissue sample from your lung. The sample is examined to help diagnose various lung disorders.  · A lung biopsy may be done using a needle, by inserting a tube into the lungs, or through an incision in the chest.  · After your lung biopsy, you may have a cough and some throat soreness.  This information is not intended to replace advice given to you by your health care provider. Make sure you discuss any questions you have with your health care provider.  Document Released: 03/08/2006 Document Revised: 11/30/2018 Document Reviewed: 11/08/2017  Elsevier Patient Education © 2020 [employer] Inc[employer]

## 2021-05-11 NOTE — OR NURSING
0906: Patient from radiology to PPU via gurney s/p R lung bipsy. Patient is awake. VSS. R anterior chest biopsy site soft and dressing clean, dry and intact.  No c/o pain or nausea at this time. Will monitor closely. Patient's respiration spontaneous and non-labored. NPO.  0930: VSS. Wife updated.   0945: 1st CXR completed.   1025: Patient ambulated to and from the bathroom to void. Patient tolerated PO fluids well.  1140: 2nd CXR completed.  1210: OK for DC per Dr. Hernandez.   1212: DC instructions given. Questions answered. R anterior chest biopsy site soft,CDI. No c/o pain or nausea. Patient wide awake. VSS. Discussed diet, activity, medications, follow up care and worsening symptoms. No questions at this time.Patient met criteria for discharge.

## 2021-05-11 NOTE — OR SURGEON
Immediate Post- Operative Note        PostOp Diagnosis: PET + pulmonary nodule. Hx oral ca.       Procedure(s): CT guided R lung biopsy.      Estimated Blood Loss: Less than 5 ml        Complications: None            5/11/2021     0843 AM     Brendan Hernandez M.D.

## 2021-05-11 NOTE — PROGRESS NOTES
IR Nursing Note:    Procedure Confirmed with MD, patient and RN pre procedure. Consent obtained by MD with all questions answered, placed in Patient's chart. Patient assisted to the table in the supine position with all bony prominences padded and draped in sterile fashion.    Right Lung Biopsy with Possible Chest Tube Placement by MD Hernandez assisted by RT Geo, right upper chest access site CDI with gauze and a tegaderm dressing.     End Tidal CO2 range 13-21 during procedure.  ?  Cores x 3 collected by Dr. Hernandez, specimen sent to Pathology, delivered to lab by RT Geo.     Patient tolerated procedure, hemodynamically stable; pt drowsy but easily aroused post procedure; report given to NISHANT Reed; patient transported to PPU via IR RN monitored then transferred care to report RN.

## 2021-06-02 NOTE — PROGRESS NOTES
Outcome: Left Message To schedule Comprehensive health  Assessment      Please transfer to Patient Outreach Team at [phone] when patient returns call.      Attempt # 2

## 2021-06-14 PROBLEM — K13.79 ORAL BLEEDING: Status: ACTIVE | Noted: 2021-01-01

## 2021-06-14 PROBLEM — C06.9 ORAL CANCER (HCC): Status: ACTIVE | Noted: 2021-01-01

## 2021-06-14 PROBLEM — R13.10 DYSPHAGIA: Status: ACTIVE | Noted: 2021-01-01

## 2021-06-14 PROBLEM — R73.9 HYPERGLYCEMIA: Status: ACTIVE | Noted: 2021-01-01

## 2021-06-14 PROBLEM — Z79.01 CHRONIC ANTICOAGULATION: Status: ACTIVE | Noted: 2021-01-01

## 2021-06-14 PROBLEM — D72.829 LEUKOCYTOSIS: Status: ACTIVE | Noted: 2021-01-01

## 2021-06-14 PROBLEM — Z86.79 HISTORY OF ATRIAL FIBRILLATION: Status: ACTIVE | Noted: 2021-01-01

## 2021-06-14 NOTE — ED TRIAGE NOTES
Chief Complaint   Patient presents with   • Bleeding/Bruising     This pt has mouth cancer, today he is unable to control the bleeding   He is sitting with a wad of guze in his mouth.

## 2021-06-14 NOTE — ED NOTES
Gerardo Shelton, T   Pharmacy [facility]      ED Notes      Signed   Date of Service:  6/14/2021 12:27 PM                  Med rec completed per Pt's spouse at bedside with medication list provided by Pt's spouse. Medication list reviewed with spouse and returned.  Allergies reviewed with Pt and Pt's spouse. No known drug allergies.  No oral antibiotics in last 14 days.  Pt takes XARELTO.  Pt's pharmacy: [facility] on [address]       ED on 6/14/2021     ED on 6/14/2021        Detailed Report      Note shared with patient      Med rec updated at [facility] prior to transfer by med rec [facility] pharmacy [facility] [phone]

## 2021-06-14 NOTE — ED PROVIDER NOTES
"ED Provider Note    CHIEF COMPLAINT  Chief Complaint   Patient presents with   • Bleeding/Bruising     This pt has mouth cancer, today he is unable to control the bleeding       HPI  Damian Larsen is a 80 y.o. male for evaluation of bleeding from his oral cancer.  Patient is here with his wife who states that he has bleeding intermittently, and this is not new for him, however this episode keeps \"trickling\" and would not stop.  He does apply some pressure to the floor of the Oral cavity, and this does seem to work, however it starts up again when he stops the pressure.  Patient is on Xarelto.  He sees Dr. Vieira for ent.  Patient has no vomiting, fever or chills.  He has no chest pain or shortness of breath.  No abdominal pain.      ROS  See HPI for further details, o/w negative.     PAST MEDICAL HISTORY   has a past medical history of Arrhythmia, Cancer ([facility]) (), Cholecystitis (2017), Cholecystitis with cholelithiasis (), Dental disorder, Glaucoma, Hemorrhagic disorder ([facility]), High cholesterol, HTN (hypertension), Hyperlipidemia, Hypertension, Non-STEMI (non-ST elevated myocardial infarction) ([facility]) (2017), Sepsis, unspecified (2017), Skin cancer (), and Smoker ( ).    SOCIAL HISTORY  Social History     Tobacco Use   • Smoking status: Former Smoker     Packs/day: 0.50     Years: 20.00     Pack years: 10.00     Types: Cigarettes, Pipe     Quit date: 1987     Years since quittin.4   • Smokeless tobacco: Never Used   Vaping Use   • Vaping Use: Never used   Substance and Sexual Activity   • Alcohol use: No     Comment: Not since .   • Drug use: No   • Sexual activity: Not on file     Comment:        Family History  No known bleeding disorders    SURGICAL HISTORY   has a past surgical history that includes brook by laparoscopy (N/A, 2017); endoscopy-esoph/stomach (2019); colonoscopy (2019); neck dissection radical (Right, 2020); mandible fracture orif " (Right, 9/14/2020); neck dissection (11/04/2020); and other.    CURRENT MEDICATIONS  Home Medications     Reviewed by Sahara Gruber (Pharmacy Tech) on 06/14/21 at 1227  Med List Status: Complete   Medication Last Dose Status   acetaminophen (TYLENOL) 500 MG Tab 6/10/2021 Active   amiodarone (CORDARONE) 200 MG Tab 6/14/2021 Active   amLODIPine (NORVASC) 10 MG Tab 6/14/2021 Active   atorvastatin (LIPITOR) 80 MG tablet 6/13/2021 Active   chlorpheniramine (EQ CHLORTABS) 4 MG Tab 6/13/2021 Active   dorzolamide (TRUSOPT) 2 % Solution 6/14/2021 Active   flecainide (TAMBOCOR) 50 MG tablet NOT TAKING Active   latanoprost (XALATAN) 0.005 % Solution 6/13/2021 Active   lisinopril (PRINIVIL) 40 MG tablet 6/14/2021 Active   metoprolol (LOPRESSOR) 50 MG Tab 6/14/2021 Active   omeprazole (PRILOSEC) 20 MG delayed-release capsule 6/14/2021 Active   oxyCODONE immediate-release (ROXICODONE) 5 MG Tab 6/13/2021 Active   XARELTO 20 MG Tab tablet 6/13/2021 Active                ALLERGIES  No Known Allergies    REVIEW OF SYSTEMS  See HPI for further details. Review of systems as above, otherwise all other systems are negative.     PHYSICAL EXAM  Constitutional: well developed.  Well  Nourished.   HEENT: Normocephalic,  atraumatic. Posterior pharynx with known tissue and cancerous growth.  Lesion with very mild bleeding, controled with pressure.   Eyes:  EOMI. Normal sclera.  Neck: Supple, Full range of motion, nontender.  Chest/Pulmonary: clear to ausculation. Symmetrical expansion.   Cardio: Regular rate and rhythm with no murmur.   Abdomen: Soft, nontender. No peritoneal signs. No guarding. No palpable masses.  Musculoskeletal: No deformity, no edema, neurovascular intact.   Neuro: Clear speech, appropriate, cooperative, cranial nerves II-XII grossly intact.  Psych: Normal mood and affect    PROCEDURES     MEDICAL RECORD  I have reviewed patient's medical record and pertinent results are listed.    COURSE & MEDICAL DECISION MAKING  I  have reviewed any medical record information, laboratory studies and radiographic results as noted above.      Results for orders placed or performed during the hospital encounter of 05/11/21   Histology Request   Result Value Ref Range    Pathology Request Sent to Histo    POCT prothrombin time device results   Result Value Ref Range    Istat Inr 1.1      1:15 PM  The patient prefers to leave right now with his wife, and have her drive him over to the emergency department, his we do not have ENT coverage here.  The patient is nontoxic and, not dizzy, and his bleeding is controlled.  He does have bleeding like this from his oral cancer, and usually this will stop, however this 1 has persisted.  I offered them transport via EMS, but they have declined.  They state they want to drive themselves.    1:30 PM  I spoke to the transfer center, and they will create an encounter, but we do not speak to another erp because they are signing out and going by private care.   I also spoke to Carl (charge RN) and they are expecting the pt.    Again, the pt and his wife, would like to drive themselves, and will sign out AMA.  He has only a very mild and skant bleeding.  He has no dizziness, and no other issues.  His blood work is pending.     I discussed with the patient the risks of their decision to leave without receiving the appropriate medical care. I discussed with the patient the risks of their decision to refuse or withhold consent to receive appropriate medical care. The patient has the capacity to understand the risks and benefits described above. The patient is not intoxicated clinically, the patient's is alert and oriented and able to make a good decision in my opinion. I discussed alternative treatments with the patient. The patient was given discharge instructions and a followup plan as documented in the medical record. I have asked the patient to return at any time to the emergency department for any  reason.      HYDRATION: Based on the patient's presentation of Dehydration the patient was given IV fluids. IV Hydration was used because oral hydration was not adequate alone. Upon recheck following hydration, the patient was improved.      If you have had any blood pressure issues while here in the emergency department, please see your doctor for a further evaluation or work up.    Differential diagnoses include but not limited to: oral cancer.  Bleeding.     This patient presents with oral cancer and bleeding .  At this time, I have counseled the patient/family regarding their medications, pain control, and follow up.  They will continue their medications, if any, as prescribed.  They will return immediately for any worsening symptoms and/or any other medical concerns.  They will see their doctor, or contact the doctor provided, in 1-2 days for follow up.       FINAL IMPRESSION  Oral cancer  Bleeding  ama      Electronically signed by: Lopez Thakkar D.O., 6/14/2021 1:27 PM

## 2021-06-14 NOTE — ED NOTES
Patients spouse will be taking patient to [facility] to see ENT.  Offered to transport patient via ambulance patient and spouse  Decline and would like to go private vehicle.  Bleeding controlled upon wheelchair out to car.  Apologized to wife and patient over not having the ENT here to see the patient.

## 2021-06-14 NOTE — ED NOTES
Med rec completed per Pt's spouse at bedside with medication list provided by Pt's spouse. Medication list reviewed with spouse and returned.  Allergies reviewed with Pt and Pt's spouse. No known drug allergies.  No oral antibiotics in last 14 days.  Pt takes XARELTO.  Pt's pharmacy: [facility] on [address]

## 2021-06-14 NOTE — DISCHARGE PLANNING
SW responded to provide support to Pt spouse.   SW offered support and kept wife updated on Pt while she could not be in room     Wife Medically updated by Dr. Vieira.     SW will be available for support to Pt wife as needed.   SW will continue to monitor.

## 2021-06-14 NOTE — ED TRIAGE NOTES
.  Chief Complaint   Patient presents with   • Oral Swelling     Bleeding from tumor      Pt to blue 18 via wheel chair. Pt was seen at [facility] today and sent over for further care. Pt has profuse oral bleeding, able to maintain airway but feels like he is swallowing the blood. Pt given suction.

## 2021-06-14 NOTE — ED PROVIDER NOTES
"ED Provider Note    CHIEF COMPLAINT  Chief Complaint   Patient presents with   • Oral Swelling     Bleeding from tumor. 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 male who presents to the emergency department for evaluation of a bleeding tumor.  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  He does have a relationship with a local ENT.  He presents here transferred via private vehicle from [facility] for bleeding from his tumor.  History of bleeding intermittently and oozing for the last couple of days.  He started to bleed more heavily today and is now bleeding very briskly.  He was called to see him emergently.  The patient is unable to talk.  He is extremites when I arrived in the room.    Much of the history is obtained from his wife    REVIEW OF SYSTEMS  See HPI for further details. All other systems are negative.    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   • Glaucoma     bilateral   • Hemorrhagic disorder (HCC)     Related to taking Xarelto.   • High cholesterol    • HTN (hypertension)    • Hyperlipidemia    • Hypertension    • Non-STEMI (non-ST elevated myocardial infarction) (HCC) 06/13/2017    Secondary to sepsis   • Sepsis, unspecified June 2017   • Skin cancer 1987   • Smoker      Previous smoke       FAMILY HISTORY  Family History   Problem Relation Age of Onset   • Cancer Mother         colon   • Cancer Father         colon   • Cancer Sister         breast and liver   • Cancer Other         sister - ovarian       SOCIAL HISTORY  Social History     Socioeconomic History   • Marital status:      Spouse name: Not on file   • Number of " children: Not on file   • Years of education: Not on file   • Highest education level: Not on file   Occupational History   • Not on file   Tobacco Use   • Smoking status: Former Smoker     Packs/day: 0.50     Years: 20.00     Pack years: 10.00     Types: Cigarettes, Pipe     Quit date: 1987     Years since quittin.4   • Smokeless tobacco: Never Used   Vaping Use   • Vaping Use: Never used   Substance and Sexual Activity   • Alcohol use: No     Comment: Not since .   • Drug use: No   • Sexual activity: Not on file     Comment:    Other Topics Concern   • Not on file   Social History Narrative    Lives in [place], [place] with wife. 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 4 children that don't live local.      Social Determinants of Health     Financial Resource Strain:    • Difficulty of Paying Living Expenses:    Food Insecurity:    • Worried About Running Out of Food in the Last Year:    • Ran Out of Food in the Last Year:    Transportation Needs:    • Lack of Transportation (Medical):    • Lack of Transportation (Non-Medical):    Physical Activity:    • Days of Exercise per Week:    • Minutes of Exercise per Session:    Stress:    • Feeling of Stress :    Social Connections:    • Frequency of Communication with Friends and Family:    • Frequency of Social Gatherings with Friends and Family:    • Attends [employer] Services:    • Active Member of Clubs or Organizations:    • Attends Club or Organization Meetings:    • Marital Status:    Intimate Partner Violence:    • Fear of Current or Ex-Partner:    • Emotionally Abused:    • Physically Abused:    • Sexually Abused:        SURGICAL HISTORY  Past Surgical History:   Procedure Laterality Date   • NECK DISSECTION  2020    Right marginal mandibulectomy and right modified radical neck dissection   • NECK DISSECTION RADICAL Right 2020    Procedure: DISSECTION, NECK, RADICAL- MODIFIED;  Surgeon: Shayne Vieira M.D.;  Location: SURGERY  "SAME DAY [facility];  Service: Ent   • MANDIBLE FRACTURE ORIF Right 9/14/2020    Procedure: ORIF, FRACTURE, MANDIBLE- FOR MARGINAL MANDIBULECTOMY.;  Surgeon: Shayne Vieira M.D.;  Location: SURGERY SAME DAY [facility];  Service: Ent   • COLONOSCOPY  5/30/2019    Procedure: COLONOSCOPY - W/POSS BX, DILATION, POLYPECTOMY, CONTROL OF HEMORRHAGE, W/ENDOSCOPIC MUCOSAL RESECTION;  Surgeon: Will Alvarenga M.D.;  Location: SURGERY [facility];  Service: EUS   • ENDOSCOPY-ESOPH/STOMACH  04/01/2019    with colonoscopy   • REJI BY LAPAROSCOPY N/A 8/22/2017    Procedure: REJI BY LAPAROSCOPY;  Surgeon: Kae Last M.D.;  Location: SURGERY [facility];  Service:    • OTHER      jaw cancer- rebuilt jaw       CURRENT MEDICATIONS  Home Medications     Reviewed by Sahara Pro (Pharmacy Tech) on 06/14/21 at 1526  Med List Status: Complete   Medication Last Dose Status   acetaminophen (TYLENOL) 500 MG Tab 6/10/2021 Active   amiodarone (CORDARONE) 200 MG Tab 6/14/2021 Active   amLODIPine (NORVASC) 10 MG Tab 6/14/2021 Active   atorvastatin (LIPITOR) 80 MG tablet 6/13/2021 Active   chlorpheniramine (EQ CHLORTABS) 4 MG Tab 6/14/2021 Active   dorzolamide (TRUSOPT) 2 % Solution 6/14/2021 Active   flecainide (TAMBOCOR) 50 MG tablet Not Taking Active   latanoprost (XALATAN) 0.005 % Solution 6/13/2021 Active   lisinopril (PRINIVIL) 40 MG tablet 6/14/2021 Active   metoprolol (LOPRESSOR) 50 MG Tab 6/14/2021 Active   omeprazole (PRILOSEC) 20 MG delayed-release capsule 6/14/2021 Active   oxyCODONE immediate-release (ROXICODONE) 5 MG Tab 6/13/2021 Active   XARELTO 20 MG Tab tablet 6/13/2021 Active                ALLERGIES  No Known Allergies    PHYSICAL EXAM  VITAL SIGNS: /58   Pulse 77   Temp 36.7 °C (98 °F) (Temporal)   Resp 14   Ht 1.778 m (5' 10\")   Wt 61.2 kg (135 lb)   SpO2 100%   BMI 19.37 kg/m²    Constitutional: Pale ill-appearing moderate distress bleeding vigorously  HENT: Normocephalic, Atraumatic, " Bilateral external ears normal, he has an enlarged swollen tongue this pushed upright near the drip of his mouth.  He has a large mass under his tongue with active bleeding with a large clot likely from the left.  He has deformity and swelling around the jaw and on the neck.  He is laterally displaced airway.  Eyes: PERRL, EOMI, Conjunctiva normal, No discharge.   Neck: Normal range of motion  Cardiovascular: Normal heart rate, Normal rhythm, No murmurs, No rubs, No gallops.   Thorax & Lungs: Normal breath sounds, No respiratory distress  Neurologic: Alert, No focal deficits noted.   Psychiatric: Affect anxious    Results for orders placed or performed during the hospital encounter of 06/14/21   CBC WITH DIFFERENTIAL   Result Value Ref Range    WBC 20.6 (H) 4.8 - 10.8 K/uL    RBC 2.97 (L) 4.70 - 6.10 M/uL    Hemoglobin 10.1 (L) 14.0 - 18.0 g/dL    Hematocrit 30.3 (L) 42.0 - 52.0 %    .0 (H) 81.4 - 97.8 fL    MCH 34.0 (H) 27.0 - 33.0 pg    MCHC 33.3 (L) 33.7 - 35.3 g/dL    RDW 62.4 (H) 35.9 - 50.0 fL    Platelet Count 591 (H) 164 - 446 K/uL    MPV 9.4 9.0 - 12.9 fL    Neutrophils-Polys 86.80 (H) 44.00 - 72.00 %    Lymphocytes 4.40 (L) 22.00 - 41.00 %    Monocytes 7.30 0.00 - 13.40 %    Eosinophils 0.10 0.00 - 6.90 %    Basophils 0.20 0.00 - 1.80 %    Immature Granulocytes 1.20 (H) 0.00 - 0.90 %    Nucleated RBC 0.00 /100 WBC    Neutrophils (Absolute) 17.85 (H) 1.82 - 7.42 K/uL    Lymphs (Absolute) 0.91 (L) 1.00 - 4.80 K/uL    Monos (Absolute) 1.50 (H) 0.00 - 0.85 K/uL    Eos (Absolute) 0.03 0.00 - 0.51 K/uL    Baso (Absolute) 0.05 0.00 - 0.12 K/uL    Immature Granulocytes (abs) 0.25 (H) 0.00 - 0.11 K/uL    NRBC (Absolute) 0.00 K/uL   COMP METABOLIC PANEL   Result Value Ref Range    Sodium 134 (L) 135 - 145 mmol/L    Potassium 4.1 3.6 - 5.5 mmol/L    Chloride 97 96 - 112 mmol/L    Co2 25 20 - 33 mmol/L    Anion Gap 12.0 7.0 - 16.0    Glucose 164 (H) 65 - 99 mg/dL    Bun 21 8 - 22 mg/dL    Creatinine 0.52 0.50 -  1.40 mg/dL    Calcium 9.9 8.5 - 10.5 mg/dL    AST(SGOT) 48 (H) 12 - 45 U/L    ALT(SGPT) 67 (H) 2 - 50 U/L    Alkaline Phosphatase 91 30 - 99 U/L    Total Bilirubin 0.5 0.1 - 1.5 mg/dL    Albumin 3.2 3.2 - 4.9 g/dL    Total Protein 6.4 6.0 - 8.2 g/dL    Globulin 3.2 1.9 - 3.5 g/dL    A-G Ratio 1.0 g/dL   PROTHROMBIN TIME (INR)   Result Value Ref Range    PT 18.3 (H) 12.0 - 14.6 sec    INR 1.57 (H) 0.87 - 1.13   COD (ADULT)   Result Value Ref Range    ABO Grouping Only A     Rh Grouping Only POS     Antibody Screen-Cod NEG    APTT   Result Value Ref Range    APTT 38.2 (H) 24.7 - 36.0 sec   UN-XM'D RBC   Result Value Ref Range    Component R       R99                 Red Cells, LR       [id number]   issued       06/14/21   15:36      Product Type R99     Dispense Status issued     Unit Number (Barcoded) [id number]     Product Code (Barcoded) A2076Y74     Blood Type (Barcoded) 5100    ESTIMATED GFR   Result Value Ref Range    GFR If African American >60 >60 mL/min/1.73 m 2    GFR If Non African American >60 >60 mL/min/1.73 m 2   COV-2, FLU A/B, AND RSV BY PCR (2-4 HOURS CEPHEID): Collect NP swab in VTM    Specimen: Respirate   Result Value Ref Range    Influenza virus A RNA Negative Negative    Influenza virus B, PCR Negative Negative    RSV, PCR Negative Negative    SARS-CoV-2 by PCR NotDetected     SARS-CoV-2 Source NP Swab         RADIOLOGY/PROCEDURES  No orders to display       COURSE & MEDICAL DECISION MAKING  Pertinent Labs & Imaging studies reviewed. (See chart for details)    I was called to see this patient emergently because of active hemorrhage from the patient's mass under his tongue.    My arrival to the room he is bleeding briskly from this mass and there is a large clot in his mouth.  The clot is evacuated with trying to apply pressure.    His chart is reviewed for previous work-up and baseline labs and 2 IVs were established and placed on cardiac monitoring and oxygen.    I have ordered fluids and  labs including a COPD and a type and cross.  The patient takes Xarelto last dose was last night he requires reversal with Kcentra because of life-threatening bleeding.      Pressure is applied but despite that he continues to bleed aggressively his pressure has trended down.    The patient was initially hypotensive.  He was then normotensive but around 315 he became hypotensive most call back to the room because the patient syncopized.      He became unresponsive he was myoclonic gurgling and not protecting his airway.  Pressure was applied for source of bleeding, IV  fluid was continued on a pressure bag and his pressures remain low.  I had called ENT and it was somewhat difficult to get a hold of the surgeon who is caring for this patient.  I took significant amount of time to get return phone calls.  During this time the patient was not initially responding to fluids.  His blood pressure remained low and he remained ill-appearing and a decreased responsiveness.  We prepared to intubate him.  RT and pharmacy were called difficult airway cart was brought to bedside and I called for assistance by calling anesthesia and trauma.    At this point 600 mL of blood noted to be in the canister from suctioning from his mouth where the bleeding is in his additional blood on his clothes, as well as the towels an as 4 x 4's that were being used to soak up the blood.    Around the time the anesthesiologist has arrived the patient's blood pressure has improved and his mental status improved he is now getting his airway little bit but bleeding quite briskly.  Discussing a plan including seems to be tapering off slowly.  Kcentra is given.  Bleeding is still ongoing and brisk and may worsen.      The patient is responding to fluid resuscitation.  He was consented for a blood transfusion.  Although this was brief because of his clinical acuity.    Patient had impending airway compromise.  By the time if the bleeding slowed down the  ENT doctor arrived and was able to cauterize this.  Fortunately ENT was able to temporize the bleeding.    Patient be hospitalized for further work-up and treatment.  Dr. Vieira from ENT is seen the patient. Await  Recommendations.    Patient requires hospitalization further work-up and treatment were able to avoid intubation.  Patient's blood pressure has responded to fluids of crystalloid unit of blood.  Anticipate his hemoglobin to drop further.    Patient is hospitalized in fair condition discussed case with the hospitalist and care is transferred at the time.        FINAL IMPRESSION  1. Oral bleeding     2. Hemorrhagic shock (HCC)     3. Oral cancer (HCC)     4. Leukocytosis, unspecified type     5.  Critical care time 45 minutes no procedures    2.   3.         Electronically signed by: Koby Moctezuma M.D., 6/14/2021 3:59 PM

## 2021-06-15 PROBLEM — I48.91 ATRIAL FIBRILLATION WITH RVR (HCC): Status: RESOLVED | Noted: 2017-06-16 | Resolved: 2021-01-01

## 2021-06-15 NOTE — ASSESSMENT & PLAN NOTE
Oral bleeding is stopped with cauterization by Dr. Vieira/ENT  Xarelto held and reversed with Kcentra in ER  RBC transfusion started in ER  We will continue holding Xarelto.  Admitted to monitor for recurrence of bleeding  Repeat H&H every 12 hours, transfuse RBC as needed; goal Hg >8

## 2021-06-15 NOTE — ASSESSMENT & PLAN NOTE
Patient underwent right marginal mandibulectomy with right neck dissection on 9/2020 by Dr. Vieira with positive lymph nodes and multiple positive margins.  On 11/2020 he underwent composite resection with segmental mandibulectomy, bilateral neck exploration and left osteocutaneous composite fibula free flap reconstruction at [facility] by Dr. Flores.  Pathology demonstrated residual invasive, cell carcinoma.  Treated with Augmentin in March and April 2021 due to drainage.  Completed radiotherapy 4/20/2021.  Patient has been dealing with lymphedema, drainage and oral mucositis.  Appetite decreased, weight loss reported.  Considering presence of leukocytosis, secondary infection is possible.  We will start on IV Unasyn  Indurated lesion in the lower face on the left side concerning for dermal metastasis, that might need to be biopsied.  We will consider to consult oncology tomorrow to help with plan of care  Palliative care consult.  Goals of care addressed as documented above, patient elected DNR/DNI and palliation/comfort based approach.  Discharge pending acceptance by hospice.   POLST filled.

## 2021-06-15 NOTE — CONSULTS
"Reason for Palliative Care Consult: Advance Care Planning    Consulted by: Dr. Clement    HPI: Damian Larsen is a 80 year old male with head and neck cancer s/p free flap resection at [facility] and finished a course of radiation with Dr. Stewart. He has upcoming appontment at [facility] with Dr. Hendricks for potentially further chemotherapy or immunotherapy. He was admitted for oral bleeding and ENT Dr. Vieira performed successful cauterization yesterday. Patient also has possible dermal metastasis on cheek and skin of neck. He is awaiting Oncology Consult today.    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   • Glaucoma     bilateral   • Hemorrhagic disorder (HCC)     Related to taking Xarelto.   • High cholesterol    • HTN (hypertension)    • Hyperlipidemia    • Hypertension    • Non-STEMI (non-ST elevated myocardial infarction) (HCC) 06/13/2017    Secondary to sepsis   • Sepsis, unspecified June 2017   • Skin cancer 1987   • Smoker      Previous smoke       Additional consults:   Oncology    Assessment:  Neuro: A&Ox4  Dyspnea: No    Last BM:  (PTA)    Pain: Well controlled    Depression: Mood appropriate for situation   Dementia: No       Living situation & psychosocial: Geo lives with his wife Vicky in [place], [place]. 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 He and his wife have a total of 4 children, 8 grandchildren and 4 great-grandchildren with a 5th on the way. One son lives in [place], [place] and the other kids live in [place] and [place]. Geo enjoys talking to people on the computer and playing golf. He is independent with ADLs, with Vicky standing by for safety.    Spiritual:  Is Yazidism or spirituality important " "for coping with this illness? Yes   Has a  or spiritual provider visit been requested? No,  visit declined by Geo    Palliative Performance Scale:  70%    Advance Directive: None    DPOA: No (NOK wife Vicky Larsen)    POLST: No      Code Status: DNR/DNI      Outcome:  Met with Damian Larsen and his wife, Vicky, at bedside. Introduced myself and explained the role of palliative care. Assessed their understanding of current medical issues and confirmed accuracy. Geo reports that he tolerated radiation treatments well and has not had any chemotherapy. Geo and Vicky report that Dr. Hendricks with [facility] is the Oncologist and they were discussing starting immunotherapy prior to his admission. They are expecting a visit from Oncology Consultant today and are hoping to get a better idea about what treatments might be offered. Patient states his goals are to go home and be comfortable. He does not want any treatment that would exacerbate or prolong suffering.     Discussed Advance Directive and educated patient about purpose of form. Patient not sure if he has done one before. He states he would want his wife to be his surrogate for healthcare decisons if he could no longer make his own decisions. Offered to help patient complete an advance directive if he chooses. Left a blank copy of advance directive at the bedside for patient and wife to review at their leisure. He did not want to fill it out today.    Discussed code status and educated patient about term. Described measures employed in a full code including chest compressions, intubation/artificial ventilation, and using IV medications and \"shocking\" to attempt to restart the heart. Geo and Vicky state that they discussed this already today with Resident Physician Dr. Cornelius. Patient states he does NOT want intubation or a tracheostomy placed and would not want CPR. Vicky states, yes, he does want CPR, and that they discussed " "\"shocking\" and chest compressions and that would be ok. Geo shook his head \"no\" in disagreement. Vicky states he has a \"history of a-fib and has had that done before, a few times\".  We discussed the difference between medical treatment (when patient is ALIVE BUT SICK) and CODE STATUS (when patient's heart has stopped and he is no longer breathing, aka cardiopulmonary arrest). Discussed that in the event of a cardiac problem such as an arrhythmia, if patient is still alive, he can receive treatment even if his code status is DNR/DNI. Also discussed likely medical futility in the event of cardiopulmonary arrest, especially without achieving an adequate airway. Vicky wanted to make sure that Geo would receive treatment in the event of an arrhythmia, such as cardioversion and/or medications but understands that in the event of cardiopulmonary arrest, we will ALLOW NATURAL DEATH. I gave reassurance regarding medical treatment of cardiac conditions as long as patient consents to them and verified patient's desire to have code status changed to DNR/DNI.      We discussed POLST, its purpose and how it differs from an advance directive. I recommended POLST completion today, however, Geo states \"not today\" as he is feeling overwhelmed.     Provided therapeutic communication including open-ended questions, reflective listening, validation and reassurrance throughout encounter. Provided business card with palliative care contact information and encouraged Geo and Vicky to call with any questions or needs.     Plan: CODE STATUS: DNR/DNI. Patient would like to speak to Oncology before making any further decisions or completing advance care planning documents.    Recommendations: I do not recommend an ethics or hospice consult at this time because patient would like to speak to Oncology before making any further decisions. I recommend POLST completion prior to discharge. This can be done by primary team or any " specialist, including Palliative Care.    Thank you for allowing Palliative Care to participate in this patient's care. Please call our team with questions and/or additional needs.    Total visit time was 47 minutes discussing advance care planning.     Latonia Ortiz, DO  Palliative Medicine  [id number]      ADDENDUM: PLEASE SEE DR. RONQUILLO'S PROGRESS NOTE 6/15/21 FOR ADDITIONAL GOALS OF CARE DOCUMENTATION

## 2021-06-15 NOTE — CONSULTS
DATE OF SERVICE:  06/14/2021     CHIEF COMPLAINT:  Oral bleeding.     HISTORY OF PRESENT ILLNESS:  The patient went to his local emergency   department for oral bleeding and was transferred here due to continued   bleeding.  He does have a history of oral cancer, status post free flap   resection at [facility], status post chemoradiation therapy, finished a couple   months ago, getting ready to see Cancer Care specialist here in town for   further what sounds to be chemotherapy.  He has begun to have some additional   masses on his cheek and the skin of his neck recently. Pain is controlled at   this time.  He is actively bleeding in the Emergency Department.     PHYSICAL EXAMINATION: Active oral bleeding.  Nurse holding pressure in the   oral cavity.     PROCEDURE:  Pressure was held and a silver nitrate cautery stick as well as an   ophthalmology cautery were used to find a small venule that was bleeding of   the floor of mouth.  This was cauterized quite nicely.  Again, neck has   evidence of a possible dermal mets and significant masses throughout the skin.   There is a postsurgical change of the oral cavity and neck.     ASSESSMENT:  Oral bleeding.     PLAN:  Recommend keeping overnight for further evaluation and reversing for   good hemostasis.  Also, recommend oncology consult for possible consideration   for palliative care, or possibly further workup for the dermal component.  I   would be happy to get biopsies if required.        ______________________________  MD LISSETT Wilburn/CIARAN    DD:  06/15/2021 08:41  DT:  06/15/2021 09:15    Job#:  [id number]

## 2021-06-15 NOTE — PROGRESS NOTES
MD Clement updated on patient pocketing his pills in oral cavity and increased oral bleeding. Speech consult placed and patient NPO. Will monitor patient's oral bleeding and update as needed.

## 2021-06-15 NOTE — CARE PLAN
Problem: Nutritional:  Goal: Achieve adequate nutritional intake  Description: Patient will consume >50% of meals and snacks.  Outcome: Progressing   See RD note for interventions.

## 2021-06-15 NOTE — PROGRESS NOTES
Received bedside report from NISHANT Jane, pt care assumed, FARHEEN Jane, pt assessment complete. Pt AAOx4, c/o no pain at this time. No signs of acute distress noted at this time. POC discussed with pt and verbalizes no questions. Pt denies any additional needs at this time. Bed in lowest position, bed alarm on. Pt educated on fall risk and verbalized understanding, call light within reach, hourly rounding initiated.

## 2021-06-15 NOTE — CARE PLAN
The patient is Watcher - Medium risk of patient condition declining or worsening    Shift Goals  Clinical Goals: Decrease blood loss, HBG >8    Progress made toward(s) clinical / shift goals:        Problem: Knowledge Deficit - Standard  Goal: Patient and family/care givers will demonstrate understanding of plan of care, disease process/condition, diagnostic tests and medications  Outcome: Progressing     Problem: Skin Integrity  Goal: Skin integrity is maintained or improved  Outcome: Progressing     Problem: Fall Risk  Goal: Patient will remain free from falls  Outcome: Progressing

## 2021-06-15 NOTE — PROGRESS NOTES
Daily Progress Note:     Date of Service: 6/15/2021  Primary Team: [facility] Team   Attending: Christ Han D.O   Senior Resident: Dr. Brendan Quiroz MD.  Intern: Dr. VANESSA Cornelius MD  Contact:  [phone]    Chief Complaint:   Bleeding from oropharynx tumor.    Subjective:   Mr. 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 Flores, [facility]), CAD complicated by NSTEMI and A. fib on rivaroxaban who presented to [facility] ED, left AMA and presented to [facility] ED for bleeding from his oropharyngeal tumor.  He underwent cautery in the ED by Dr. Vieira/ENT as well as received 1 dose of Kcentra and 1 unit RBC transfusion.  Rivaroxaban was held.  Bleeding has not recurred overnight.  Patient denies any new onset of neurologic symptoms.    Consultants/Specialty:  Palliative care  Oncology    Review of Systems:   A 14 point review of system was conducted and negative    Objective Data:   Physical Exam:   Vitals:   Temp:  [36.5 °C (97.7 °F)-36.9 °C (98.5 °F)] 36.6 °C (97.8 °F)  Pulse:  [65-90] 72  Resp:  [14-20] 18  BP: ()/(44-93) 147/93  SpO2:  [92 %-100 %] 95 %    General: Elderly man with obvious derangement of normal mouth architecture laying in bed in no acute distress  HEENT: Obvious derangement of mouth architecture noted.  Oral cavity is remarkable for coagulated blood at the base of the tongue.  Below her mandible is protruding asymmetrically and erythematous with several umbilicated macules concerning for dermal metastases.  Patient is unable to close his mouth and constantly drools.  PERRLA, EOMI.  External ear normal.  Nares patent, nonedematous nonerythematous. Trachea midline.   Neck:  Evidence of surgical exploration with  well-healed scars no JVP.  Carotid bruit could not be appreciated.    Cardiovascular: PMI<2 cm at fifth costal space at midclavicular line.  No heaves appreciated.  No thrills.  RRR W/O M, R, G.  Pulmonary: Normal work of breathing.  Resonant to percussion.  CTAB, no wheezes, crackles, rhonchi heard in 10 lung fields  Abdomen: Flat, soft, nondistended.  Normoactive bowel sounds.  Nontender to percussion or palpation.  No hepatosplenomegaly noted.  No fluid wave  Musculoskeletal: Normal tone and bulk.  Full ROM.  Skin: Warm and dry.  No rashes, bruises or lacerations noted  Neuro: Alert and oriented X4.  CN II-XII checked and intact.  5 out of 5 strength throughout.  DTR 2+ throughout.  FTN, HTS intact.  Sensation intact . negative Romberg.  Psychiatric: Good mood congruent affect.  Thought form linear, content appropriate        Labs:   CBC is remarkable for WBC 18.9, H&H 8.5, 25.5 with platelets 442  CMP is remarkable for creatinine of 0.3.  LFTs remarkable for ALT of 52, albumin 2.6, protein 5.8  TEG: Remarkable for reaction time 6.3, clot kinetics 1.2, clot angle-angle 72.7 (elevated), max clot strength 71.7 (elevated) and lysis at 30 minutes of 0.00    Imaging:   None    Assessment and plan    Mr. 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 Flores, [facility]), CAD complicated by NSTEMI and A. fib on rivaroxaban who presented to [facility] ED, left AMA and [place] [facility] [place], admitted for bleeding from oropharyngeal squamous cell carcinoma mass status post cautery and anticoagulation reversal with Kcentra.  The patient's dermal lesions on the left side of the  "mandible are relatively new and concerning for new metastatic burden, and thus confer poor prognosis.  A discussion with Dr. Hendricks, Mr. Larsen's outpatient oncologist regarding this patient raised concerns that the patient's wife goals of care (who is at the bedside and advocating for the patient) and the patient himself are misaligned.  After an expressed permission from the patient, I discussed the patient's prognosis with the wife privately and away from the bedside.  She expressed her understanding of the poor prognosis and the usual sequela of his illness, and stated that the patient's goals of care are to extend life as much as possible.  I then discussed GOC at the bedside with the patient, in the presence of his wife.  The patient clearly and repeatedly stated that he is disinterested in intubation and/or resuscitation.  He further expressed that he would like to go home and spend the remainder of his life with family and away from the hospital environment.  When asked, he endorsed that he is seeking permission from his wife to \"stop fighting\" and expressed that his main concern is to transition his finances to her care.  Mrs. Larsen agreed to respect the patient's wishes, but expressed that she would like to speak with an oncologist.    * Oral bleeding  Assessment & Plan  Oral bleeding is stopped with cauterization by Dr. Vieira/ENT  Xarelto held and reversed with Kcentra in ER  RBC transfusion started in ER  We will continue holding Xarelto.  Admitted to monitor for recurrence of bleeding  Repeat H&H every 12 hours, transfuse RBC as needed; goal Hg >8      Oral cancer (HCC)  Assessment & Plan  Patient underwent right marginal mandibulectomy with right neck dissection on 9/2020 by Dr. Vieira with positive lymph nodes and multiple positive margins.  On 11/2020 he underwent composite resection with segmental mandibulectomy, bilateral neck exploration and left osteocutaneous composite fibula free flap " reconstruction at [facility] by Dr. Flores.  Pathology demonstrated residual invasive, cell carcinoma.  Treated with Augmentin in March and April 2021 due to drainage.  Completed radiotherapy 4/20/2021.  Patient has been dealing with lymphedema, drainage and oral mucositis.  Appetite decreased, weight loss reported.  Considering presence of leukocytosis, secondary infection is possible.  We will start on IV Unasyn  Indurated lesion in the lower face on the left side concerning for dermal metastasis, that might need to be biopsied.  We will consider to consult oncology tomorrow to help with plan of care  Palliative care consult.  Goals of care addressed as documented above, patient elected DNR/DNI and palliation/comfort based approach      History of atrial fibrillation  Assessment & Plan  Continue metoprolol 50 mg twice daily  Continue amiodarone 200 mg nightly  Hold rivaroxaban    Leukocytosis  Assessment & Plan  BC XX2 pending  CXR pending  UA pending  Continue Unasyn, concerns for potential cellulitis of the face (unlikely)    Chronic anticoagulation  Assessment & Plan  Continue to hold rivaroxaban  Status post 1 dose Kcentra 6/14/2021    Hyperglycemia  Assessment & Plan  SSI    Hyponatremia- (present on admission)  Assessment & Plan  Suspected hypovolemia  IV hydration    Hypertension- (present on admission)  Assessment & Plan  Hold lisinopril and amlodipine in the setting of hypotension (likely secondary to intravascular volume depletion in the setting of hemorrhage)  Monitor blood pressure closely    Dysphagia  Assessment & Plan  SLP evaluation pending  Continue full liquid diet

## 2021-06-15 NOTE — DIETARY
"Nutrition services: Day 1 of admit.  Damian Larsen is a 80 y.o. male with admitting DX of oral cancer with sublingual bleeding.  Consult received for FTT, poor PO intake, supplements.    Assessment:  Height: 177.8 cm (5' 10\")  Weight: 60.2 kg (132 lb 11.5 oz)  Body mass index is 19.04 kg/m²., BMI classification: Normal   Diet/Intake: Full Liquid    Evaluation:   1. PMHx: stage IV squamous cell carcinoma of oral cavity s/p mandibulectomy w/ bilateral neck exploration and flap reconstruction (11/2020), s/p recent radiation tx, CAD, HLD, HTN, NSTEMI, sepsis. Cauterized bleeding site this visit per pt's wife.  2. RD visited pt at bedside; pt's wife present and answered questions. GOC at present to continue PO intake per pt wife. Pt unable to chew; all nutrition taken via straw at back of mouth. Home nutrition includes Boost, Glucerna, [place] breakfast essentials. Appetite \"not huge\"; eating less this past year but pt wife unsure how much. Hasn't been able to chew since 11/2020 surgery; primarily liquid nutrition + things like mashed potatoes and gravy since then.   3. Wt loss per chart review from 186 lb 6/2020 to current wt 132.7 lb is a severe weight loss of 28.7% in <1 year.  4. Physical: severe muscle wasting (temporals)  5. Labs: POC glu 137-143, glu 145  6. MAR: abx, SSI, prilosec    Malnutrition Risk: Severe malnutrition in the context of chronic disease related to squamous cell carcinoma of oral cavity as evidenced by severe temporal muscle wasting and severe weight loss of 28.7% in <1 year per chart wt hx.    Recommendations/Plan:  1. Add high protein and high calorie liquid nutrition to meals and snacks. Order supplements per MD consult.  2. Monitor glucose with addition of high-CHO nutrition supplements (no hx of DM per chart).  3. Encourage intake of high calorie beverages.  4. Document intake of all meals as % taken in ADL's to provide interdisciplinary communication across all shifts.   5. Monitor " weight.  6. Nutrition rep will continue to see patient for ongoing meal and snack preferences.     RD following.

## 2021-06-15 NOTE — ASSESSMENT & PLAN NOTE
Hold lisinopril and amlodipine in the setting of hypotension (likely secondary to intravascular volume depletion in the setting of hemorrhage)  Monitor blood pressure closely

## 2021-06-15 NOTE — DISCHARGE PLANNING
RN DARÍO received Voalte message from SCP TCÁNGELA Bella recommending referral to [facility] as well as a PCP follow up.

## 2021-06-15 NOTE — CARE PLAN
Problem: Knowledge Deficit - Standard  Goal: Patient and family/care givers will demonstrate understanding of plan of care, disease process/condition, diagnostic tests and medications  Outcome: Progressing  Note: Patient and wife having extensive crucial conversations with providers today.     Problem: Fall Risk  Goal: Patient will remain free from falls  Outcome: Progressing  Note: Bed alarm on, non slip socks on, bed in low position, 2 side rails up, call light within reach, will continue to monitor.

## 2021-06-15 NOTE — PROGRESS NOTES
Received report and assumed care of patient. Patient is alert and oriented x4, gauze to inside of mouth under tongue. Patient is in no signs of distress and denies pain at this time, 2L NC saturating 95%. Wife at bedside. Patient was updated on the plan of care for the day. Call light within reach, bed in low position, 2 side rails up. All fall precautions in place. Will continue to monitor.

## 2021-06-15 NOTE — H&P
[facility] Medicine History & Physical Note    Date of Service  6/14/2021    Primary Care Physician  Grover Morillo M.D.    Consultants  ENT    Code Status  Full Code    Chief Complaint  Chief Complaint   Patient presents with   • Oral Swelling     Bleeding from tumor. Diagnosed with oral cancer 09/2020       History of Presenting Illness  80 y.o. male with history of stage IV squamous cell carcinoma of oral cavity, status post mandible ectomy, bilateral neck exploration and flap reconstruction in 11/2020 , status post recent radiation treatment, CAD/non-STEMI, A. fib on Xarelto, who presented 6/14/2021 with oral bleeding. Apparently, he has been having intermittent mild bleeding, however this time  it is severe and patient is unable to stop it at home by applying pressure.  Patient has been taking Xarelto, last dose was last night.  Initially presented at [facility], and presented for ENT evaluation in the [facility] hospital emergency department.  Dr. Vieira/ENT performed catheterization at bedside in the emergency department.  Xarelto reversed with Kcentra and patient was started on RBC transfusion, 1 unit.   I discussed POC with Dr. Vieira/NIC on-call.  According to him, patient does not need to be NPO.  He is concerned that patient now have what appears to be dermal metastases which usually signifies poor prognosis.  He recommended oncology involvement.  Patient might need to repeat PET scan.  His last PET scan was done 1 month ago post treatment.    Patient is unable to speak due to acute issues with bleeding, status post catheterization, tamponade.  He does not display any signs of respiratory compromise.  According to his wife,  Patient  has been having decreased oral intake.  His wife was feeding him with soup like diet through a straw as he is not able to chew.  G-tube or tracheostomy was not discussed with the patient previously.  At this time patient wants to be full code.  Review of  Systems  Review of Systems   Unable to perform ROS: Patient nonverbal       Past Medical History   has a past medical history of Arrhythmia, Cancer (HCC) (1987), Cholecystitis (June 2017), Cholecystitis with cholelithiasis (2017), Dental disorder, Glaucoma, Hemorrhagic disorder (HCC), High cholesterol, HTN (hypertension), Hyperlipidemia, Hypertension, Non-STEMI (non-ST elevated myocardial infarction) (HCC) (06/13/2017), Sepsis, unspecified (June 2017), Skin cancer (1987), and Smoker ( ).    Surgical History   has a past surgical history that includes brook by laparoscopy (N/A, 8/22/2017); endoscopy-esoph/stomach (04/01/2019); colonoscopy (5/30/2019); neck dissection radical (Right, 9/14/2020); mandible fracture orif (Right, 9/14/2020); neck dissection (11/04/2020); and other.     Family History  family history includes Cancer in his father, mother, sister, and another family member.     Social History   reports that he quit smoking about 34 years ago. His smoking use included cigarettes and pipe. He has a 10.00 pack-year smoking history. He has never used smokeless tobacco. He reports that he does not drink alcohol and does not use drugs.    Allergies  No Known Allergies    Medications  Prior to Admission Medications   Prescriptions Last Dose Informant Patient Reported? Taking?   XARELTO 20 MG Tab tablet 6/13/2021 at 1900 Significant Other No No   Sig: TAKE 1 TABLET BY MOUTH WITH SUPPER   Patient taking differently: Take 20 mg by mouth with dinner.   acetaminophen (TYLENOL) 500 MG Tab 6/10/2021 at 0730 Significant Other Yes No   Sig: Take 2,000 mg by mouth 2 times a day as needed for Moderate Pain. 4 tablets = 2,000 mg.   amLODIPine (NORVASC) 10 MG Tab 6/14/2021 at 0930 Significant Other No No   Sig: Take 1 Tab by mouth every day.   amiodarone (CORDARONE) 200 MG Tab 6/14/2021 at 0930 Significant Other Yes No   Sig: Take 200 mg by mouth every morning.   atorvastatin (LIPITOR) 80 MG tablet 6/13/2021 at 2200  Significant Other Yes No   Sig: Take 80 mg by mouth at bedtime.   chlorpheniramine (EQ CHLORTABS) 4 MG Tab 6/14/2021 at 0930 Significant Other Yes No   Sig: Take 16 mg by mouth 1 time a day as needed for Allergies. 4 tablets = 16 mg.   dorzolamide (TRUSOPT) 2 % Solution 6/14/2021 at 0830 Significant Other Yes No   Sig: Administer 1 Drop into both eyes 2 times a day.   flecainide (TAMBOCOR) 50 MG tablet Not Taking at Unknown time Significant Other No No   Sig: Take 1 Tab by mouth 2 times a day.   Patient not taking: Reported on 6/14/2021   latanoprost (XALATAN) 0.005 % Solution 6/13/2021 at 1200 Significant Other Yes No   Sig: Administer 1 Drop into both eyes every day.   lisinopril (PRINIVIL) 40 MG tablet 6/14/2021 at 0930 Significant Other No No   Sig: Take 1 Tab by mouth every day.   metoprolol (LOPRESSOR) 50 MG Tab 6/14/2021 at 0930 Significant Other No No   Sig: Take 1 Tab by mouth 2 Times a Day.   omeprazole (PRILOSEC) 20 MG delayed-release capsule 6/14/2021 at 0930 Significant Other Yes No   Sig: Take 20 mg by mouth every morning.   oxyCODONE immediate-release (ROXICODONE) 5 MG Tab 6/13/2021 at 1830 Significant Other Yes No   Sig: Take 5-10 mg by mouth every 6 hours as needed for Severe Pain. 1 to 2 tablets = 5 to 10 mg.      Facility-Administered Medications: None       Physical Exam  Temp:  [36.7 °C (98 °F)-36.9 °C (98.5 °F)] 36.7 °C (98 °F)  Pulse:  [63-90] 65  Resp:  [14-25] 19  BP: ()/(44-66) 129/55  SpO2:  [92 %-100 %] 92 %    Physical Exam  Vitals and nursing note reviewed.   Constitutional:       General: He is not in acute distress.     Appearance: Normal appearance. He is ill-appearing.   HENT:      Head: Normocephalic and atraumatic.      Jaw: Swelling present.      Comments: Dark necrotic appearing tissue in the mouth.  Status post cauterization.  No active bleeding.  Lymphedema of the face.  Disfiguring indurated lesions in the lower face area on the left side, concerning for skin  metastasis     Nose: Nose normal.      Mouth/Throat:      Mouth: Mucous membranes are moist.   Eyes:      Extraocular Movements: Extraocular movements intact.      Pupils: Pupils are equal, round, and reactive to light.   Cardiovascular:      Rate and Rhythm: Normal rate and regular rhythm.   Pulmonary:      Effort: Pulmonary effort is normal.      Breath sounds: Normal breath sounds.   Abdominal:      General: Abdomen is flat. There is no distension.      Tenderness: There is no abdominal tenderness.   Musculoskeletal:         General: No swelling or deformity. Normal range of motion.      Cervical back: Normal range of motion and neck supple.   Skin:     General: Skin is warm and dry.      Coloration: Skin is pale.   Neurological:      General: No focal deficit present.      Mental Status: He is alert and oriented to person, place, and time.   Psychiatric:         Mood and Affect: Mood normal.         Behavior: Behavior normal.         Laboratory:  Recent Labs     06/14/21  1208 06/14/21  1529   WBC 20.8* 20.6*   RBC 3.24* 2.97*   HEMOGLOBIN 11.1* 10.1*   HEMATOCRIT 33.5* 30.3*   .4* 102.0*   MCH 34.3* 34.0*   MCHC 33.1* 33.3*   RDW 63.6* 62.4*   PLATELETCT 572* 591*   MPV 10.0 9.4     Recent Labs     06/14/21  1208 06/14/21  1529   SODIUM 130* 134*   POTASSIUM 4.5 4.1   CHLORIDE 95* 97   CO2 23 25   GLUCOSE 184* 164*   BUN 20 21   CREATININE 0.49* 0.52   CALCIUM 10.3* 9.9     Recent Labs     06/14/21  1208 06/14/21  1529   ALTSGPT 66* 67*   ASTSGOT 51* 48*   ALKPHOSPHAT 95 91   TBILIRUBIN 0.5 0.5   GLUCOSE 184* 164*     Recent Labs     06/14/21  1208 06/14/21  1529   APTT  --  38.2*   INR 1.45* 1.57*     No results for input(s): NTPROBNP in the last 72 hours.      No results for input(s): TROPONINT in the last 72 hours.    Imaging:  No orders to display         Assessment/Plan:  I anticipate this patient will require at least two midnights for appropriate medical management, necessitating inpatient  admission.    Oral bleeding  Assessment & Plan  Oral bleeding is stopped with cauterization by Dr. Vieira/ENT  Xarelto held and reversed with Kcentra in ER  RBC transfusion started in ER  We will continue holding Xarelto.  Admitted to monitor for recurrence of bleeding  Repeat H&H every 8 hours, transfuse RBC as needed      History of atrial fibrillation  Assessment & Plan  Continue amiodarone, metoprolol    Chronic anticoagulation  Assessment & Plan  We will continue holding Xarelto  Kcentra was administered in ER    Dysphagia  Assessment & Plan  SLP evaluation ordered  Full liquid diet for now  Goals of care discussion with palliative care team    Hyperglycemia  Assessment & Plan  Insulin sliding scale    Leukocytosis  Assessment & Plan  Ordered blood culture, chest x-ray, UA  We will start Unasyn for possible secondary infection of the mouth    Oral cancer (HCC)  Assessment & Plan  Patient underwent right marginal mandibulectomy with right neck dissection on 9/2020 by Dr. Vieira with positive lymph nodes and multiple positive margins.  On 11/2020 he underwent composite resection with segmental mandibulectomy, bilateral neck exploration and left osteocutaneous composite fibula free flap reconstruction at [facility] by Dr. Flores.  Pathology demonstrated residual invasive, cell carcinoma.  Treated with Augmentin in March and April 2021 due to drainage.  Completed radiotherapy 4/20/2021.  Patient has been dealing with lymphedema, drainage and oral mucositis.  Appetite decreased, weight loss reported.  Considering presence of leukocytosis, secondary infection is possible.  We will start on IV Unasyn  Indurated lesion in the lower face on the left side concerning for dermal metastasis, that might need to be biopsied.  We will consider to consult oncology tomorrow to help with plan of care  Palliative care consult      Hyponatremia- (present on admission)  Assessment & Plan  Suspected hypovolemia  IV  hydration    Hypertension- (present on admission)  Assessment & Plan  Will hold lisinopril and amlodipine due to soft blood pressure  Monitor blood pressure closely    Atrial fibrillation with RVR (HCC)- (present on admission)  Assessment & Plan  History of  Continue home medications

## 2021-06-15 NOTE — ASSESSMENT & PLAN NOTE
BC XX2 pending  CXR pending  UA pending  discontinue Unasyn  Start augmentin; concerns for potential cellulitis of the face (unlikely)

## 2021-06-15 NOTE — PROGRESS NOTES
4 Eyes Skin Assessment Completed by NISHANT Trevino and NISHANT Farnsworth.    Head WDL  Ears WDL  Nose WDL  Mouth Ulcer(s) and Bleeding, swollen  Neck WDL  Breast/Chest WDL  Shoulder Blades WDL  Spine WDL  (R) Arm/Elbow/Hand WDL  (L) Arm/Elbow/Hand WDL  Abdomen WDL  Groin WDL  Scrotum/Coccyx/Buttocks Redness and Blanching  (R) Leg WDL  (L) Leg WDL  (R) Heel/Foot/Toe WDL  (L) Heel/Foot/Toe WDL          Devices In Places Tele Box      Interventions In Place Sacral Mepilex, Pillows, Heels Loaded W/Pillows and Pressure Redistribution Mattress    Possible Skin Injury No    Pictures Uploaded Into Epic N/A  Wound Consult Placed N/A  RN Wound Prevention Protocol Ordered No

## 2021-06-15 NOTE — PROGRESS NOTES
"  S: Damian Larsen is a 80 y.o. male here for overnight evaluation for oral bleeding. oncology referral for possible palliative care.    O:  /83   Pulse 70   Temp 36.7 °C (98 °F) (Temporal)   Resp 18   Ht 1.778 m (5' 10\")   Wt 60.2 kg (132 lb 11.5 oz)   SpO2 95%     Recent Labs     06/14/21  1208 06/14/21  1529 06/15/21  0137   WBC 20.8* 20.6* 18.9*   RBC 3.24* 2.97* 2.59*   HEMOGLOBIN 11.1* 10.1* 8.5*   HEMATOCRIT 33.5* 30.3* 25.5*   .4* 102.0* 98.5*   MCH 34.3* 34.0* 32.8   MCHC 33.1* 33.3* 33.3*   RDW 63.6* 62.4* 66.8*   PLATELETCT 572* 591* 442   MPV 10.0 9.4 10.0     Recent Labs     06/14/21  1208 06/14/21  1529 06/15/21  0137   SODIUM 130* 134* 138   POTASSIUM 4.5 4.1 3.7   CHLORIDE 95* 97 105   CO2 23 25 22   GLUCOSE 184* 164* 145*   BUN 20 21 19   CREATININE 0.49* 0.52 0.30*   CALCIUM 10.3* 9.9 8.5     Recent Labs     06/14/21  1208 06/14/21  1529   APTT  --  38.2*   INR 1.45* 1.57*   _  Oc/op:s/p flap, surgical changes, no active bleeding  Neck:surgical changes, concern for skin mets      A:s/p free flap, chemo/xrt, with oral bleeding - stable overnight, concern for skin mets    P:  -oncology referral for poss palliation  -will biopsy skin masses if needed  "

## 2021-06-16 NOTE — PROGRESS NOTES
Daily Progress Note:     Date of Service: 6/16/2021  Primary Team: [facility] Team   Attending: Christ Han D.O   Senior Resident: Dr. Brednan Quiroz MD.  Intern: Dr. VANESSA Cornelius MD  Contact:  [phone]    Chief Complaint:   Bleeding from oropharynx tumor.    Subjective:   Mr. 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 Flores, [facility]), CAD complicated by NSTEMI and A. fib on rivaroxaban who presented to [facility] ED, left AMA and presented to [facility] ED for bleeding from his oropharyngeal tumor.  He underwent cautery in the ED by Dr. Vieira/ENT as well as received 1 dose of Kcentra and 1 unit RBC transfusion.  Rivaroxaban was held.  Bleeding has not recurred overnight.  Patient denies any new onset of neurologic symptoms.    Consultants/Specialty:  Palliative care  Oncology    Review of Systems:   A 14 point review of system was conducted and negative    Objective Data:   Physical Exam:   Vitals:   Temp:  [36.4 °C (97.5 °F)-37.2 °C (98.9 °F)] 37.2 °C (98.9 °F)  Pulse:  [64-83] 69  Resp:  [16-18] 16  BP: (117-147)/(51-93) 118/62  SpO2:  [95 %-98 %] 97 %    General: Elderly man with obvious derangement of normal mouth architecture laying in bed in no acute distress  HEENT: Obvious derangement of mouth architecture noted.  Oral cavity is remarkable for coagulated blood at the base of the tongue.  Below her mandible is protruding asymmetrically and erythematous with several umbilicated macules concerning for dermal metastases.  Patient is unable to close his mouth and constantly drools.  PERRLA, EOMI.  External ear normal.  Nares patent, nonedematous nonerythematous. Trachea midline.   Neck:  Evidence of surgical exploration with  well-healed scars no JVP.  Carotid bruit could not be appreciated.    Cardiovascular: PMI<2 cm at fifth costal space at midclavicular line.  No heaves appreciated.  No thrills.  RRR W/O M, R, G.  Pulmonary: Normal work of breathing.  Resonant to percussion.  CTAB, no wheezes, crackles, rhonchi heard in 10 lung fields  Abdomen: Flat, soft, nondistended.  Normoactive bowel sounds.  Nontender to percussion or palpation.  No hepatosplenomegaly noted.  No fluid wave  Musculoskeletal: Normal tone and bulk.  Full ROM.  Skin: Warm and dry.  No rashes, bruises or lacerations noted  Neuro: Alert and oriented X4.  CN II-XII checked and intact.  5 out of 5 strength throughout.  DTR 2+ throughout.  FTN, HTS intact.  Sensation intact . negative Romberg.  Psychiatric: Good mood congruent affect.  Thought form linear, content appropriate        Labs:   CBC is remarkable for WBC 18.9, H&H 8.5, 25.5 with platelets 442  CMP is remarkable for creatinine of 0.3.  LFTs remarkable for ALT of 52, albumin 2.6, protein 5.8  TEG: Remarkable for reaction time 6.3, clot kinetics 1.2, clot angle-angle 72.7 (elevated), max clot strength 71.7 (elevated) and lysis at 30 minutes of 0.00    Imaging:   None    Assessment and plan    Mr. 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 Flores, [facility]), CAD complicated by NSTEMI and A. fib on rivaroxaban who presented to [facility] ED, left AMA and presented to [facility] ED, admitted for bleeding from oropharyngeal squamous cell carcinoma mass status post cautery and anticoagulation reversal with Kcentra.  The patient's dermal lesions on the left side of the  "mandible are relatively new and concerning for new metastatic burden, and thus confer poor prognosis.  A discussion with Dr. Hendricks, Mr. Larsen's outpatient oncologist regarding this patient raised concerns that the patient's wife goals of care (who is at the bedside and advocating for the patient) and the patient himself are misaligned.  After an expressed permission from the patient, I discussed the patient's prognosis with the wife privately and away from the bedside.  She expressed her understanding of the poor prognosis and the usual sequela of his illness, and stated that the patient's goals of care are to extend life as much as possible.  I then discussed GOC at the bedside with the patient, in the presence of his wife.  The patient clearly and repeatedly stated that he is disinterested in intubation and/or resuscitation.  He further expressed that he would like to go home and spend the remainder of his life with family and away from the hospital environment.  When asked, he endorsed that he is seeking permission from his wife to \"stop fighting\" and expressed that his main concern is to transition his finances to her care.  Mrs. Larsen agreed to respect the patient's wishes, but expressed that she would like to speak with an oncologist. Dr. Rich has assessed the pt and discussed his prognosis at the bedside, his wife present.   Pt and family had elected hospice. Will discharge home pending hospice approval.      * Oral bleeding  Assessment & Plan  Oral bleeding is stopped with cauterization by Dr. Vieira/ENT  Xarelto held and reversed with Kcentra in ER  RBC transfusion started in ER  We will continue holding Xarelto.  Admitted to monitor for recurrence of bleeding  Repeat H&H every 12 hours, transfuse RBC as needed; goal Hg >8      Oral cancer (HCC)  Assessment & Plan  Patient underwent right marginal mandibulectomy with right neck dissection on 9/2020 by Dr. Vieira with positive lymph nodes and multiple " positive margins.  On 11/2020 he underwent composite resection with segmental mandibulectomy, bilateral neck exploration and left osteocutaneous composite fibula free flap reconstruction at [facility] by Dr. Flores.  Pathology demonstrated residual invasive, cell carcinoma.  Treated with Augmentin in March and April 2021 due to drainage.  Completed radiotherapy 4/20/2021.  Patient has been dealing with lymphedema, drainage and oral mucositis.  Appetite decreased, weight loss reported.  Considering presence of leukocytosis, secondary infection is possible.  We will start on IV Unasyn  Indurated lesion in the lower face on the left side concerning for dermal metastasis, that might need to be biopsied.  We will consider to consult oncology tomorrow to help with plan of care  Palliative care consult.  Goals of care addressed as documented above, patient elected DNR/DNI and palliation/comfort based approach.  Discharge pending acceptance by hospice.   POLST filled.      History of atrial fibrillation  Assessment & Plan  Continue metoprolol 50 mg twice daily  Continue amiodarone 200 mg nightly  Hold rivaroxaban    Leukocytosis  Assessment & Plan  BC XX2 pending  CXR pending  UA pending  discontinue Unasyn  Start augmentin; concerns for potential cellulitis of the face (unlikely)    Chronic anticoagulation  Assessment & Plan  Continue to hold rivaroxaban  Status post 1 dose Kcentra 6/14/2021    Hyperglycemia  Assessment & Plan  SSI    Hyponatremia- (present on admission)  Assessment & Plan  Resolved  -D/C IVF    Hypertension- (present on admission)  Assessment & Plan  Hold lisinopril and amlodipine in the setting of hypotension (likely secondary to intravascular volume depletion in the setting of hemorrhage)  Monitor blood pressure closely    Dysphagia  Assessment & Plan  SLP evaluation pending  Continue full liquid diet

## 2021-06-16 NOTE — CARE PLAN
Problem: Knowledge Deficit - Standard  Goal: Patient and family/care givers will demonstrate understanding of plan of care, disease process/condition, diagnostic tests and medications  Outcome: Progressing  Note: Pt and wife actively participates in POC. Pt and board updated, all questions and concerns answered.  Currently anxious to get home with hospice.      Problem: Skin Integrity  Goal: Skin integrity is maintained or improved  Outcome: Progressing  Note: Pt able to reposition self in bed throughout day. Pt has preventative sacral mepilex in place.    The patient is Stable - Low risk of patient condition declining or worsening    Shift Goals  Clinical Goals: Patient Hbg will be >8  Patient Goals: Patient secretions will be well controlled     Progress made toward(s) clinical / shift goals:  Pt hgb currently stable >8 at 8.7    Patient is not progressing towards the following goals:

## 2021-06-16 NOTE — DISCHARGE PLANNING
Received Choice form at 0905  Agency/Facility Name: [facility]  Referral sent per Choice form at 0915

## 2021-06-16 NOTE — PROGRESS NOTES
Assumed care of patient at 1915, received bedside report from day shift RN. Bed is locked and in lowest position with call light within reach. Treaded socks in place. Patient updated on plan of care. White board updated. Pt A&Ox4. Patient's breathing pattern is unlabored. Patient has dry gauze in oral cavity to control bleeding and drooling. Tele monitor in place and cardiac rhythm being monitored. All needs met at this time. Patient spouse, Pat, at bedside.

## 2021-06-16 NOTE — PROGRESS NOTES
Community Health Worker Intake  • Social determinates of health intake Complete.   • Identified barriers to None.  • Contact information provided to Damian Larsen   • Declined Meds-To-Beds.   • Inpatient assessment completed.    CHW Moustapha called pts spouse to introduce CCM and [facility] programs. Spouse accepted [facility]. Spouse identifies no barriers to resources and expressed no needs at this time.     Plan: CHW will refer pt to [facility]

## 2021-06-16 NOTE — THERAPY
Missed Therapy     Patient Name: Damian Larsen  Age:  80 y.o., Sex:  male  Medical Record #: [id number]  Today's Date: 6/16/2021    Discussed missed therapy with RN and wife        06/16/21 1040   Treatment Variance   Reason For Missed Therapy Non-Medical - Patient Refused;Non-Medical - Patient Family Refused   Total Time Spent   Total Time Spent (Mins) 5   Initial Contact Note    Initial Contact Note  Order Received and Verified. Speech Therapy Evaluation NOT Completed Because Patient Does Not Require Acute Speech Therapy at this Time.   Interdisciplinary Plan of Care Collaboration   IDT Collaboration with  Nursing   Collaboration Comments This SLP attempted to see patient for clinical swallow evaluation. Discussed with patient and wife. Both declining evaluation at this time, reporting that patient can only consume thin liquids and they are going home on hospice soon, per their report and EMR. SLP will hold evaluation. Please notify SLP if patient/wife agreeable to evaluation.

## 2021-06-16 NOTE — DISCHARGE PLANNING
Anticipated Discharge Disposition: Home with Hospice    Action: CM spoke with pt and spouse at bedside regarding discharge plan. Pt agrees with sending referral to [facility]. Choice form sent to DPA.     Barriers to Discharge: Hospice acceptance pending    Plan: Care coordination will follow up on Hospice referral.

## 2021-06-16 NOTE — CARE PLAN
The patient is Watcher - Medium risk of patient condition declining or worsening    Shift Goals  Clinical Goals: Patient Hbg will be >8  Patient Goals: Patient secretions will be well controlled     Progress made toward(s) clinical / shift goals:  Patient bleeding has stopped in his mouth. Patient has trouble managing secretions but when packed with gauze, patient can rest comfortably.     Patient is not progressing towards the following goals: Patient Hbg was <8. Transfused 1 unit PRBCs.       Problem: Hemodynamics  Goal: Patient's hemodynamics, fluid balance and neurologic status will be stable or improve  Outcome: Not Met      Problem: Wound/ / Incision Healing  Goal: Patient's wound/surgical incision will decrease in size and heals properly  Outcome: Progressing

## 2021-06-17 PROBLEM — K13.79 ORAL BLEEDING: Status: RESOLVED | Noted: 2021-01-01 | Resolved: 2021-01-01

## 2021-06-17 NOTE — DISCHARGE PLANNING
Agency/Facility Name: [facility]   Outcome: Will have intake RNCassi contact this DPA regarding patient referral.

## 2021-06-17 NOTE — DISCHARGE SUMMARY
Discharge Summary    CHIEF COMPLAINT ON ADMISSION  Chief Complaint   Patient presents with   • Oral Swelling     Bleeding from tumor. 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 Flores) who presented to [facility]ED with brisk hemorrhage from oropharyngeal tumor. Medical hx further remarkable for paroxysmal atrial fibrillation on Rivaroxaban.    #Hemorrhage from oropharyngeal mass   #Hemorrhagic anemia  Anticoagulation was reversed in the ED with Kcentra. Hemorrhage was managed by urgent cautery by Dr. Vieira/ENT at the bedside.   Mr. Larsen was admitted for further observation.  Mr Larsen also received 2 units of packed red blood cells in the setting of anemia (Hd<8) due to concern for provocation of atrial fibrillation.     #Paroxysmal atrial fibrillation   Anticoagulation was discontinued in the setting of ongoing hemorrhage. Pt was counseled regarding the increased risk of an embolic stroke and has agreed that hemostasis should take priority in the medical management.    #Stage IV squamous cell carcinoma of the oropharynx  #Goals of care  Dermal lesions on L side of jaw, noted on physical exam are remarkably concerning for dermal metastases and conferring poor prognosis. A discussion with Dr. Hendricks/oncology who treats Mr. Larsen took place. Dr. Hendricks echoed our concerns for the poor prognosis. Mr Larsen case was reviewed and consulted by an inpatient oncologist, Dr. VITO Rich MD, who agreed with the bleak prognosis for Mr. Larsen.  A protracted discussion regarding goals of care took place, as Mrs.  Chava's advocacy for her  was conflicting with Mr. Larsen's expressed goals of care. Mr Larsen, who clearly has medical decision-making capacity and understand his condition clearly, had stated that he would prefer to minimize time at the hospital, maximize time at home, die at home when the time comes and avoid escalatoin of his care. He further expressed he would not want to be intubated or resuscitated. Mrs. Larsen, who advocated for extending life as much as possible had agreed to respect her 's wishes and accept a comfort-based approach to his healthcare.  Mr Lopez was accepted by hospice service of his choice and was discharged home.   Physician Orders for Life Sustaining Treatment was filled and scanned to the chart. PT was provided the original form and instructed to post on his refrigerator.    #Hypokaelmia  Etiology likely secondary to poor nutrition in the setting of difficulty swallowing and intolerance of solid food, which was replenished as needed.  On day of discharge, Mr lopez serum potassium was 3.2.  He received 40 mEq of potassium every three hours for a total of 2 doses prior to discharge, and was provided with additional 40 mEq of potassium which he was instructed to take at noon, 6/17/21 (day of discharge.)   A 14 point ROS was conducted and pan-negative.     Physical exam  General: Elderly man with obvious derangement of normal mouth architecture laying in bed in no acute distress  HEENT: Obvious derangement of mouth architecture noted.  Oral cavity is remarkable for coagulated blood at the base of the tongue.  Below her mandible is protruding asymmetrically and erythematous with several umbilicated macules concerning for dermal metastases.  Patient is unable to close his mouth and constantly drools.  PERRLA, EOMI.  External ear normal.  Nares patent, nonedematous nonerythematous. Trachea midline.   Neck: Evidence of surgical exploration with  well-healed scars no JVP.   Carotid bruit could not be appreciated.    Cardiovascular: PMI<2 cm at fifth costal space at midclavicular line.  No heaves appreciated.  No thrills.  RRR W/O M, R, G.  Pulmonary: Normal work of breathing.  Resonant to percussion.  CTAB, no wheezes, crackles, rhonchi heard in 10 lung fields  Abdomen: Flat, soft, nondistended.  Normoactive bowel sounds.  Nontender to percussion or palpation.  No hepatosplenomegaly noted.  No fluid wave  Musculoskeletal: Normal tone and bulk.  Full ROM.  Skin: Warm and dry.  No rashes, bruises or lacerations noted  Neuro: Alert and oriented X4.  CN II-XII checked and intact.  5 out of 5 strength throughout.  DTR 2+ throughout.  FTN, HTS intact.  Sensation intact . negative Romberg.  Psychiatric: Good mood congruent affect.  Thought form linear, content appropriate    Therefore, he is discharged in guarded and stable condition to hospice.    The patient met 2-midnight criteria for an inpatient stay at the time of discharge.    Discharge Date  6/17/2021    FOLLOW UP ITEMS POST DISCHARGE  none    DISCHARGE DIAGNOSES  Principal Problem (Resolved):    Oral bleeding POA: Unknown  Active Problems:    Oral cancer (HCC) POA: Unknown    Leukocytosis POA: Unknown    History of atrial fibrillation POA: Unknown    Hypertension POA: Yes    Hyponatremia POA: Yes    Hyperglycemia POA: Unknown    Chronic anticoagulation POA: Unknown    Dysphagia POA: Unknown  Resolved Problems:    Atrial fibrillation with RVR (HCC) POA: Yes      FOLLOW UP  No future appointments.  [address] [address]  [phone]          MEDICATIONS ON DISCHARGE     Medication List      ASK your doctor about these medications      Instructions   amiodarone 200 MG Tabs  Commonly known as: Cordarone   Take 200 mg by mouth every morning.  Dose: 200 mg     amLODIPine 10 MG Tabs  Commonly known as: NORVASC   Take 1 Tab by mouth every day.  Dose: 10 mg     atorvastatin 80 MG  tablet  Commonly known as: LIPITOR   Take 80 mg by mouth at bedtime.  Dose: 80 mg     dorzolamide 2 % Soln  Commonly known as: TRUSOPT   Administer 1 Drop into both eyes 2 times a day.  Dose: 1 Drop     EQ Chlortabs 4 MG Tabs  Generic drug: chlorpheniramine   Take 16 mg by mouth 1 time a day as needed for Allergies. 4 tablets = 16 mg.  Dose: 16 mg     flecainide 50 MG tablet  Commonly known as: TAMBOCOR   Take 1 Tab by mouth 2 times a day.  Dose: 50 mg     latanoprost 0.005 % Soln  Commonly known as: XALATAN   Administer 1 Drop into both eyes every day.  Dose: 1 Drop     lisinopril 40 MG tablet  Commonly known as: PRINIVIL   Doctor's comments: Patient's plan is requesting a 100 day supply. Thank you  Take 1 Tab by mouth every day.  Dose: 40 mg     metoprolol tartrate 50 MG Tabs  Commonly known as: LOPRESSOR   Take 1 Tab by mouth 2 Times a Day.  Dose: 50 mg     omeprazole 20 MG delayed-release capsule  Commonly known as: PRILOSEC   Take 20 mg by mouth every morning.  Dose: 20 mg     oxyCODONE immediate-release 5 MG Tabs  Commonly known as: ROXICODONE   Take 5-10 mg by mouth every 6 hours as needed for Severe Pain. 1 to 2 tablets = 5 to 10 mg.  Dose: 5-10 mg     TYLENOL 500 MG Tabs  Generic drug: acetaminophen   Take 2,000 mg by mouth 2 times a day as needed for Moderate Pain. 4 tablets = 2,000 mg.  Dose: 2,000 mg     Xarelto 20 MG Tabs tablet  Generic drug: rivaroxaban   Doctor's comments: Patient needs follow up and labs  TAKE 1 TABLET BY MOUTH WITH SUPPER            Allergies  No Known Allergies    DIET  No orders of the defined types were placed in this encounter.      ACTIVITY  As tolerated.  Weight bearing as tolerated    CONSULTATIONS  Hospice  Oncology inpatient    PROCEDURES  Bedside cautery    LABORATORY  Lab Results   Component Value Date    SODIUM 134 (L) 06/17/2021    POTASSIUM 3.2 (L) 06/17/2021    CHLORIDE 101 06/17/2021    CO2 25 06/17/2021    GLUCOSE 152 (H) 06/17/2021    BUN 11 06/17/2021     CREATININE 0.27 (L) 06/17/2021        Lab Results   Component Value Date    WBC 18.1 (H) 06/17/2021    HEMOGLOBIN 9.3 (L) 06/17/2021    HEMATOCRIT 27.2 (L) 06/17/2021    PLATELETCT 490 (H) 06/17/2021        Total time of the discharge process exceeds 45 minutes.

## 2021-06-17 NOTE — DISCHARGE PLANNING
Anticipated Discharge Disposition: Home w/ Hospice    Action: Pt accepted with [facility]. Met with pt and spouse at bedside. Spouse is making arrangements with [facility]. Spouse will provide transportation home.     IMM delivered     Barriers to Discharge: None    Plan: D/C home

## 2021-06-17 NOTE — DISCHARGE PLANNING
Agency/Facility Name: [facility]  Spoke To: Cassi  Outcome: Patient accepted. Will be in contact with patient wife to meet them at the patients home.     MADAN Heaton notified.

## 2021-06-17 NOTE — CARE PLAN
Problem: Nutritional:  Goal: Achieve adequate nutritional intake  Description: Patient will consume >50% of meals and snacks.  Outcome: Progressing   Since initial RD visit: 50-75% x 3 meals, <25% x 1 with trend up from 6/15 to 6/16.

## 2021-06-17 NOTE — PROGRESS NOTES
Handoff report received from day shift RN. Assumed pt care. Pt not in distress. Pt AOx 4, on RA. Tele box on, rhythm verified. Safety precautions in place. Call light and personal belongings within reach. Educated to call for assistance if needed.

## 2021-06-17 NOTE — DISCHARGE INSTRUCTIONS
Discharge Instructions    Discharged to home by car with relative. Discharged via wheelchair, hospital escort: Yes.  Special equipment needed: Not Applicable    Be sure to schedule a follow-up appointment with your primary care doctor or any specialists as instructed.     Discharge Plan:        I understand that a diet low in cholesterol, fat, and sodium is recommended for good health. Unless I have been given specific instructions below for another diet, I accept this instruction as my diet prescription.   Other diet: Comfort    Special Instructions: None    · Is patient discharged on Warfarin / Coumadin?   No     Depression / Suicide Risk    As you are discharged from this [facility] facility, it is important to learn how to keep safe from harming yourself.    Recognize the warning signs:  · Abrupt changes in personality, positive or negative- including increase in energy   · Giving away possessions  · Change in eating patterns- significant weight changes-  positive or negative  · Change in sleeping patterns- unable to sleep or sleeping all the time   · Unwillingness or inability to communicate  · Depression  · Unusual sadness, discouragement and loneliness  · Talk of wanting to die  · Neglect of personal appearance   · Rebelliousness- reckless behavior  · Withdrawal from people/activities they love  · Confusion- inability to concentrate     If you or a loved one observes any of these behaviors or has concerns about self-harm, here's what you can do:  · Talk about it- your feelings and reasons for harming yourself  · Remove any means that you might use to hurt yourself (examples: pills, rope, extension cords, firearm)  · Get professional help from the community (Mental Health, Substance Abuse, psychological counseling)  · Do not be alone:Call your Safe Contact- someone whom you trust who will be there for you.  · Call your local CRISIS HOTLINE 156-3037 or 663-063-9041  · Call your local Children's Mobile Crisis  Response Team [facility] (739) 191-0444 or www."[employer]  · Call the toll free National Suicide Prevention Hotlines   · National Suicide Prevention Lifeline 939-751-XBOG (3854)  · National Hope Line Network 800-SUICIDE (587-5925)

## 2021-06-17 NOTE — PROGRESS NOTES
Oral and written discharge instructions reviewed. Gave patient and wife noon potassium pill to take per MD. Hospice nurse meeting patient at home upon discharge. Patient discharging to home with hospice this shift.

## 2021-06-21 NOTE — PROGRESS NOTES
Transferred via bed to room 212 bed 1 without incident.   Suturegard Body: The suture ends were repeatedly re-tightened and re-clamped to achieve the desired tissue expansion.

## 2023-01-12 NOTE — PROGRESS NOTES
Target end date:[place]     Indication: Atrial fib     Drug: Xarelto 20 mg qday     CHADsVASC = 3 (HTN, Age)    Health Status Since Last Assessment  ER visit on 12/17/2019 for chest pain; pt was cardioverted in the ER, then discharged.    Patient denies any other new relevant medical problems or hospitalizations  Patient denies any embolic events (stroke/tia/systemic embolism)    Adherence with DOAC Therapy   Pt has not missed any doses in the average week    Bleeding Risk Assessment     Denies Epistaxis   Pt denies any excessive or unusual bleeding/hematomas.  Pt denies any GI bleeds or hematemesis.  Pt denies any concerning daily headache or sub dural hematoma symptoms.     Pt denies any hematuria or abnormal vaginal bleeding.   Latest Hemoglobin wnl in 12/2019   ETOH overuse Denies     Creatinine Clearance/Renal Function     Latest ClCr > 60 ml/min in 12/2019    Hepatic function   Latest LFTs wnl in 12/2019   Pt denies any history of liver dysfunction      Drug Interactions   Platelets: wnl in 12/2019   ASA/other antiplatelets denies   NSAID denies   Other drug interactions n/a   Verified no anticonvulsant or azole therapy, education provided for future use.     Examination   Blood Pressure unable to assess - this was a car visit d/t COVID19   Symptomatic hypotension unable to assess   Significant gait impairment/imbalance/high risk for falls? Unable to assess    Final Assessment and Recommendations:   Patient appears stable from the anticoagulation standpoint.     Benefits of continued DOAC therapy outweigh risks for this patient   Recommend pt continue with current DOAC therapy      Other Actions: cmp/ cbc hemogram ordered prior to next visit    Follow up:   Will follow up with patient 6 months.        Rosalba Moody, PharmD   [Consultation] : a consultation visit [FreeTextEntry1] : Referral for colon polyp, EMR, from Dr. Colon

## (undated) DEVICE — CORDS BIPOLAR COAGULATION - 12FT STERILE DISP. (10EA/BX)

## (undated) DEVICE — SET LEADWIRE 5 LEAD BEDSIDE DISPOSABLE ECG (1SET OF 5/EA)

## (undated) DEVICE — SPONGE PEANUT - (5/PK 50PK/CA)

## (undated) DEVICE — SNARECAPTIVATOR II - 20MM ROUND STIFF

## (undated) DEVICE — KIT ANESTHESIA W/CIRCUIT & 3/LT BAG W/FILTER (20EA/CA)

## (undated) DEVICE — NEPTUNE 4 PORT MANIFOLD - (20/PK)

## (undated) DEVICE — PROTECTOR ULNA NERVE - (36PR/CA)

## (undated) DEVICE — SET EXTENSION WITH 2 PORTS (48EA/CA) ***PART #2C8610 IS A SUBSTITUTE*****

## (undated) DEVICE — SPONGE XRAY 8X4 STERL. 12PL - (10EA/TY 80TY/CA)

## (undated) DEVICE — TUBE CONNECTING SUCTION - CLEAR PLASTIC STERILE 72 IN (50EA/CA)

## (undated) DEVICE — MASK ANESTHESIA ADULT  - (100/CA)

## (undated) DEVICE — SUCTION INSTRUMENT YANKAUER BULBOUS TIP W/O VENT (50EA/CA)

## (undated) DEVICE — LACTATED RINGERS INJ 1000 ML - (14EA/CA 60CA/PF)

## (undated) DEVICE — GLOVE, LITE (PAIR)

## (undated) DEVICE — CLOSURE WOUND 1/4 X 4 (STERI - STRIP) (50/BX 4BX/CA)

## (undated) DEVICE — TROCARCANN&SEAL 5X55 ZTHREAD - 12/BX

## (undated) DEVICE — SUTURE 2-0 SILK 12 X 18" (36PK/BX)"

## (undated) DEVICE — BONE WAX (12PK/BX)

## (undated) DEVICE — PACK ENT OR - (2EA/CA)

## (undated) DEVICE — GLOVE BIOGEL SZ 8 SURGICAL PF LTX - (50PR/BX 4BX/CA)

## (undated) DEVICE — HEMOSTAT ARISTA PWD 5 GRAM - (5/BX)

## (undated) DEVICE — TRAY SRGPRP PVP IOD WT PRP - (20/CA)

## (undated) DEVICE — FIBRILLAR SURGICEL 4X4 - 10/CA

## (undated) DEVICE — SUTURE GENERAL

## (undated) DEVICE — BOVIE BLADE COATED &INSULATED - 25/PK

## (undated) DEVICE — CANISTER SUCTION RIGID RED 1500CC (40EA/CA)

## (undated) DEVICE — BASIN EMESIS DISP. - (250/CA)

## (undated) DEVICE — PENCIL ELECTSURG 10FT BTN SWH - (50/CA)

## (undated) DEVICE — SET SUCTION/IRRIGATION WITH DISPOSABLE TIP (6/CA )PART #0250-070-520 IS A SUB

## (undated) DEVICE — BUR ULTRA 6MM MED LNGTH 64MM - (5/PK)

## (undated) DEVICE — SPONGE GAUZE NON-STERILE 4X4 - (2000/CA 10PK/CA)

## (undated) DEVICE — NEEDLE INSFL 120MM 14GA VRRS - (20/BX)

## (undated) DEVICE — DRESSING TRANSPARENT FILM TEGADERM 2.375 X 2.75"  (100EA/BX)"

## (undated) DEVICE — SUTURE 3-0 VICRYL PLUS SH - 27 INCH (36/BX)

## (undated) DEVICE — SUTURE 5-0 VICRYL PLUSRB-1 - 27 INCH (36/BX)

## (undated) DEVICE — SENSOR SPO2 ADULT LNCS ADTX (20/BX) ORDER ITEM #19593

## (undated) DEVICE — TUBE CONNECT SUCTION CLEAR 120 X 1/4" (50EA/CA)"

## (undated) DEVICE — ELECTRODE DUAL RETURN W/ CORD - (50/PK)

## (undated) DEVICE — CAPTIVATOR II-10MM ROUND STIFF

## (undated) DEVICE — GLOVE BIOGEL INDICATOR SZ 6.5 SURGICAL PF LTX - (50PR/BX 4BX/CA)

## (undated) DEVICE — PACK LAP CHOLE OR - (2EA/CA)

## (undated) DEVICE — ELECTRODE 850 FOAM ADHESIVE - HYDROGEL RADIOTRNSPRNT (50/PK)

## (undated) DEVICE — Device

## (undated) DEVICE — TUBING INSUFFLATION - (10/BX)

## (undated) DEVICE — APPLIER ENDO MEDIUM CLIP (3EA/BX)

## (undated) DEVICE — GLOVE BIOGEL SZ 7.5 SURGICAL PF LTX - (50PR/BX 4BX/CA)

## (undated) DEVICE — DRESSING NON-ADHERING 8 X 3 - (50/BX)

## (undated) DEVICE — BLADE SAGITTAL ZS-032 - .

## (undated) DEVICE — ENDO CATCH GOLD SPECIMEN BAG, 10MM

## (undated) DEVICE — KIT ROOM DECONTAMINATION

## (undated) DEVICE — SENSOR SPO2 NEO LNCS ADHESIVE (20/BX) SEE USER NOTES

## (undated) DEVICE — CANISTER SUCTION 3000ML MECHANICAL FILTER AUTO SHUTOFF MEDI-VAC NONSTERILE LF DISP  (40EA/CA)

## (undated) DEVICE — GLOVE BIOGEL SZ 6.5 SURGICAL PF LTX (50PR/BX 4BX/CA)

## (undated) DEVICE — BLADE SURGICAL CLIPPER - (50EA/CA)

## (undated) DEVICE — GOWN WARMING STANDARD FLEX - (30/CA)

## (undated) DEVICE — SUTURE 5-0 MONOCRYL PLUS PC-3 - (12/BX)

## (undated) DEVICE — TUBING CLEARLINK DUO-VENT - C-FLO (48EA/CA)

## (undated) DEVICE — GOWN SURGEONS X-LARGE - DISP. (30/CA)

## (undated) DEVICE — CANNULA W/ SUPPLY TUBING O2 - (50/CA)

## (undated) DEVICE — HEAD HOLDER JUNIOR/ADULT

## (undated) DEVICE — TUBE E-T HI-LO CUFF 6.5MM (10EA/BX)

## (undated) DEVICE — DRAIN BLAKE 10FR 3/4 FLUTED SILICONE CLOSED WOUND SUCTION CHANNEL  - (10/CA)

## (undated) DEVICE — TRAY SKIN SCRUB PVP WET (20EA/CA) PART #DYND70356 DISCONTINUED

## (undated) DEVICE — TROCAR Z THREAD 11 X 100 - BLADED (6/BX)

## (undated) DEVICE — GEL ORISE

## (undated) DEVICE — SODIUM CHL IRRIGATION 0.9% 1000ML (12EA/CA)

## (undated) DEVICE — TROCAR5X55 KII SHIELDED SYS - (6/BX)

## (undated) DEVICE — TRAP POLYP E-TRAP (25EA/BX)

## (undated) DEVICE — CLIP MED INTNL HRZN TI ESCP - (25/BX)

## (undated) DEVICE — DETERGENT RENUZYME PLUS 10 OZ PACKET (50/BX)

## (undated) DEVICE — SPONGE GAUZESTER. 2X2 4-PL - (2/PK 50PK/BX 30BX/CS)

## (undated) DEVICE — DEVICE HEMOSTATIC CLIPPING RESOLUTION 360 DEGREES (20EA/BX)

## (undated) DEVICE — SUTURE 4-0 VICRYL PLUS SH - UNDYED 27 INCH (36PK/BX)

## (undated) DEVICE — SUTURE 4-0 VICRYL PLUS FS-2 - 27 INCH (36/BX)

## (undated) DEVICE — DRAIN SUCTION 7MM FLAT - (10/CS)

## (undated) DEVICE — GLOVE BIOGEL PI INDICATOR SZ 8.0 SURGICAL PF LF -(50/BX 4BX/CA)

## (undated) DEVICE — TUBE E-T HI-LO CUFF 7.5MM (10EA/PK)

## (undated) DEVICE — TOWELS CLOTH SURGICAL - (4/PK 20PK/CA)

## (undated) DEVICE — CATHETER IV 20 GA X 1-1/4 ---SURG.& SDS ONLY--- (50EA/BX)

## (undated) DEVICE — RESERVOIR SUCTION 100 CC - SILICONE (20EA/CA)

## (undated) DEVICE — STERI STRIP COMPOUND BENZOIN - TINCTURE 0.6ML WITH APPLICATOR (40EA/BX)

## (undated) DEVICE — CLIP SM INTNL HRZN TI ESCP LGT - (24EA/PK 25PK/BX)

## (undated) DEVICE — KIT  I.V. START (100EA/CA)